# Patient Record
Sex: MALE | Race: WHITE | NOT HISPANIC OR LATINO | Employment: OTHER | URBAN - METROPOLITAN AREA
[De-identification: names, ages, dates, MRNs, and addresses within clinical notes are randomized per-mention and may not be internally consistent; named-entity substitution may affect disease eponyms.]

---

## 2017-01-13 ENCOUNTER — OFFICE VISIT (OUTPATIENT)
Dept: CARDIOLOGY | Facility: CLINIC | Age: 62
End: 2017-01-13
Payer: COMMERCIAL

## 2017-01-13 VITALS
BODY MASS INDEX: 26.62 KG/M2 | HEART RATE: 74 BPM | HEIGHT: 74 IN | SYSTOLIC BLOOD PRESSURE: 163 MMHG | WEIGHT: 207.44 LBS | DIASTOLIC BLOOD PRESSURE: 95 MMHG

## 2017-01-13 DIAGNOSIS — E11.22 CONTROLLED TYPE 2 DIABETES MELLITUS WITH STAGE 1 CHRONIC KIDNEY DISEASE, UNSPECIFIED LONG TERM INSULIN USE STATUS: ICD-10-CM

## 2017-01-13 DIAGNOSIS — I25.83 CORONARY ARTERY DISEASE DUE TO LIPID RICH PLAQUE: ICD-10-CM

## 2017-01-13 DIAGNOSIS — I25.10 CORONARY ARTERY DISEASE DUE TO LIPID RICH PLAQUE: ICD-10-CM

## 2017-01-13 DIAGNOSIS — N18.1 CONTROLLED TYPE 2 DIABETES MELLITUS WITH STAGE 1 CHRONIC KIDNEY DISEASE, UNSPECIFIED LONG TERM INSULIN USE STATUS: ICD-10-CM

## 2017-01-13 DIAGNOSIS — I10 ESSENTIAL HYPERTENSION: ICD-10-CM

## 2017-01-13 DIAGNOSIS — I48.92 ATRIAL FLUTTER, PAROXYSMAL: ICD-10-CM

## 2017-01-13 DIAGNOSIS — R94.39 ABNORMAL THALLIUM STRESS TEST: ICD-10-CM

## 2017-01-13 DIAGNOSIS — Z95.5 STENTED CORONARY ARTERY: Primary | ICD-10-CM

## 2017-01-13 DIAGNOSIS — I26.02 SADDLE EMBOLUS OF PULMONARY ARTERY WITH ACUTE COR PULMONALE, UNSPECIFIED CHRONICITY: ICD-10-CM

## 2017-01-13 PROCEDURE — 3077F SYST BP >= 140 MM HG: CPT | Mod: S$GLB,,, | Performed by: INTERNAL MEDICINE

## 2017-01-13 PROCEDURE — 99999 PR PBB SHADOW E&M-EST. PATIENT-LVL II: CPT | Mod: PBBFAC,,, | Performed by: INTERNAL MEDICINE

## 2017-01-13 PROCEDURE — 1159F MED LIST DOCD IN RCRD: CPT | Mod: S$GLB,,, | Performed by: INTERNAL MEDICINE

## 2017-01-13 PROCEDURE — 3044F HG A1C LEVEL LT 7.0%: CPT | Mod: S$GLB,,, | Performed by: INTERNAL MEDICINE

## 2017-01-13 PROCEDURE — 99213 OFFICE O/P EST LOW 20 MIN: CPT | Mod: S$GLB,,, | Performed by: INTERNAL MEDICINE

## 2017-01-13 PROCEDURE — 4010F ACE/ARB THERAPY RXD/TAKEN: CPT | Mod: S$GLB,,, | Performed by: INTERNAL MEDICINE

## 2017-01-13 PROCEDURE — 2022F DILAT RTA XM EVC RTNOPTHY: CPT | Mod: S$GLB,,, | Performed by: INTERNAL MEDICINE

## 2017-01-13 PROCEDURE — 3080F DIAST BP >= 90 MM HG: CPT | Mod: S$GLB,,, | Performed by: INTERNAL MEDICINE

## 2017-01-13 RX ORDER — PRAVASTATIN SODIUM 20 MG/1
20 TABLET ORAL DAILY
Qty: 90 TABLET | Refills: 4 | Status: SHIPPED | OUTPATIENT
Start: 2017-01-13 | End: 2019-12-17 | Stop reason: SDUPTHER

## 2017-01-13 RX ORDER — LOSARTAN POTASSIUM 50 MG/1
50 TABLET ORAL NIGHTLY
Qty: 90 TABLET | Refills: 6 | Status: SHIPPED | OUTPATIENT
Start: 2017-01-13 | End: 2017-07-26

## 2017-01-13 RX ORDER — METOPROLOL SUCCINATE 50 MG/1
50 TABLET, EXTENDED RELEASE ORAL EVERY MORNING
Qty: 90 TABLET | Refills: 5 | Status: SHIPPED | OUTPATIENT
Start: 2017-01-13 | End: 2018-01-13 | Stop reason: SDUPTHER

## 2017-01-13 NOTE — PROGRESS NOTES
Subjective:    Patient ID:  Yobany Richardson is a 61 y.o. male who presents for follow-up of Coronary Artery Disease (stent); Atrial Fibrillation; and Hypertension      HPI   Here for follow up of CAD-PCI. Patients states is doing well no chest pain, SOB or change in exertional tolerence. Patient is exercising regularly with out symptoms.Patient denies palpitations, syncope, presyncope, lightheadedness or dizziness.    Review of Systems   Constitution: Negative for malaise/fatigue.   Eyes: Negative for blurred vision.   Cardiovascular: Negative for chest pain, claudication, cyanosis, dyspnea on exertion, irregular heartbeat, leg swelling, near-syncope, orthopnea, palpitations, paroxysmal nocturnal dyspnea and syncope.   Respiratory: Negative for cough and shortness of breath.    Hematologic/Lymphatic: Does not bruise/bleed easily.   Musculoskeletal: Negative for back pain, falls, joint pain, muscle cramps, muscle weakness and myalgias.   Gastrointestinal: Negative for abdominal pain, change in bowel habit, nausea and vomiting.   Genitourinary: Negative for urgency.   Neurological: Negative for dizziness, focal weakness and light-headedness.        Objective:    Physical Exam   Constitutional: He is oriented to person, place, and time. He appears well-developed and well-nourished.   Neck: Normal range of motion. Neck supple. No JVD present.   Cardiovascular: Normal rate, regular rhythm, normal heart sounds and intact distal pulses.    Pulses:       Carotid pulses are 2+ on the right side, and 2+ on the left side.       Radial pulses are 2+ on the right side, and 2+ on the left side.   Pulmonary/Chest: Effort normal and breath sounds normal.   Musculoskeletal: Normal range of motion. He exhibits no edema.   Neurological: He is alert and oriented to person, place, and time.   Skin: Skin is warm and dry.   Psychiatric: He has a normal mood and affect. His speech is normal. Cognition and memory are normal.             ..     Chemistry        Component Value Date/Time     04/01/2016 0821    K 4.9 04/01/2016 0821     04/01/2016 0821    CO2 29 04/01/2016 0821    BUN 19 04/01/2016 0821    CREATININE 1.0 04/01/2016 0821     (H) 04/01/2016 0821        Component Value Date/Time    CALCIUM 9.8 04/01/2016 0821    ALKPHOS 79 04/01/2016 0821    AST 12 04/01/2016 0821    AST 18 01/07/2016 0412    ALT 10 04/01/2016 0821    BILITOT 0.4 04/01/2016 0821            ..  Lab Results   Component Value Date    CHOL 129 04/01/2016    CHOL 142 01/08/2016     Lab Results   Component Value Date    HDL 51 04/01/2016    HDL 32 (L) 01/08/2016     Lab Results   Component Value Date    LDLCALC 66.2 04/01/2016    LDLCALC 69.2 01/08/2016     Lab Results   Component Value Date    TRIG 59 04/01/2016    TRIG 204 (H) 01/08/2016     Lab Results   Component Value Date    CHOLHDL 39.5 04/01/2016    CHOLHDL 22.5 01/08/2016     ..  Lab Results   Component Value Date    WBC 9.31 12/29/2016    HGB 13.0 (L) 12/29/2016    HCT 37.3 (L) 12/29/2016    MCV 94 12/29/2016     12/29/2016       Test(s) Reviewed  I have reviewed the following in detail:  [] Stress test   [] Angiography   [x] Echocardiogram   [x] Labs   [x] Other:  holter 4k PVC, no AF     Assessment:       1. Stented coronary artery    2. Saddle embolus of pulmonary artery with acute cor pulmonale, unspecified chronicity    3. Essential hypertension    4. Coronary artery disease due to lipid rich plaque    5. Atrial flutter, paroxysmal    6. Abnormal thallium stress test         Plan:           Return to clinic 6 months   Aerobic exercise 5x's/wk. Heart healthy diet and risk factor modification.    See labs and med orders.  Increase BB. Decrease caffine use

## 2017-01-13 NOTE — MR AVS SNAPSHOT
[facility]  [address]  [facility] [address]  Phone: [phone]                  Yobany Richardson   2017 1:00 PM   Office Visit    Description:  Male : 1955   Provider:  Abhinav Bedolla MD   Department:  [facility]           Reason for Visit     Coronary Artery Disease     Atrial Fibrillation     Hypertension           Diagnoses this Visit        Comments    Stented coronary artery    -  Primary     Saddle embolus of pulmonary artery with acute cor pulmonale, unspecified chronicity         Essential hypertension         Coronary artery disease due to lipid rich plaque         Atrial flutter, paroxysmal         Abnormal thallium stress test         Controlled type 2 diabetes mellitus with stage 1 chronic kidney disease, unspecified long term insulin use status                To Do List           Goals (5 Years of Data)     None      Follow-Up and Disposition     Return in about 6 months (around 2017).       These Medications        Disp Refills Start End    losartan (COZAAR) 50 MG tablet 90 tablet 6 2017     Take 1 tablet (50 mg total) by mouth every evening. - Oral    Pharmacy: [facility] [address] [place] [address] [employer] [facility] AT [facility] [address] [facility] Ph #: [phone]       metoprolol succinate (TOPROL-XL) 50 MG 24 hr tablet 90 tablet 5 2017    Take 1 tablet (50 mg total) by mouth every morning. - Oral    Pharmacy: [facility] [address] [place] [address] [employer] [facility] [address] [address] Ph #: [phone]       pravastatin (PRAVACHOL) 20 MG tablet 90 tablet 4 2017    Take 1 tablet (20 mg total) by mouth once daily. - Oral    Pharmacy: [facility] [address] [place] [address] [employer] [facility] AT [facility] [address] [address] Ph #: [phone]         [facility]     [facility] On Call Nurse Care Line -  Assistance  Registered nurses in the [facility] provide clinical  advisement, health education, appointment booking, and other advisory services.  Call for this free service at [phone].             Medications           Message regarding Medications     Verify the changes and/or additions to your medication regime listed below are the same as discussed with your clinician today.  If any of these changes or additions are incorrect, please notify your healthcare provider.        CHANGE how you are taking these medications     Start Taking Instead of    losartan (COZAAR) 50 MG tablet losartan (COZAAR) 50 MG tablet    Dosage:  Take 1 tablet (50 mg total) by mouth every evening. Dosage:  Take 1 tablet (50 mg total) by mouth once daily.    Reason for Change:  Reorder     metoprolol succinate (TOPROL-XL) 50 MG 24 hr tablet metoprolol succinate (TOPROL-XL) 25 MG 24 hr tablet    Dosage:  Take 1 tablet (50 mg total) by mouth every morning. Dosage:  Take 1 tablet (25 mg total) by mouth once daily.    Reason for Change:  Reorder            Verify that the below list of medications is an accurate representation of the medications you are currently taking.  If none reported, the list may be blank. If incorrect, please contact your healthcare provider. Carry this list with you in case of emergency.           Current Medications     acetaminophen (TYLENOL) 325 MG tablet Take 1 tablet (325 mg total) by mouth every 6 (six) hours as needed for Pain.    ascorbic acid (VITAMIN C) 500 MG tablet Take 500 mg by mouth once daily.    clopidogrel (PLAVIX) 75 mg tablet TAKE 1 TABLET BY MOUTH ONCE DAILY    gabapentin (NEURONTIN) 100 MG capsule Take 300 mg by mouth every evening.     hydrocodone-acetaminophen 7.5-325mg (NORCO) 7.5-325 mg per tablet Take 1 tablet by mouth every 4 (four) hours as needed.    linagliptin (TRADJENTA) 5 mg Tab tablet Take 5 mg by mouth once daily.    losartan (COZAAR) 50 MG tablet Take 1 tablet (50 mg total) by mouth every evening.    metformin (GLUCOPHAGE) 1000 MG tablet  "Take 1,000 mg by mouth 2 (two) times daily with meals.    mv with min-FA-lycopene-ginkgo 400-300-120 mcg-mcg-mg Tab Take 1 tablet by mouth once daily.    metoprolol succinate (TOPROL-XL) 50 MG 24 hr tablet Take 1 tablet (50 mg total) by mouth every morning.    pantoprazole (PROTONIX) 40 MG tablet Take 1 tablet (40 mg total) by mouth once daily.    pravastatin (PRAVACHOL) 20 MG tablet Take 1 tablet (20 mg total) by mouth once daily.           Clinical Reference Information           Vital Signs - Last Recorded  Most recent update: 1/13/2017  1:00 PM by Kareen Chopra LPN    BP Pulse Ht Wt BMI    (!) 163/95 74 6' 2" (1.88 m) 94.1 kg (207 lb 7.3 oz) 26.64 kg/m2      Blood Pressure          Most Recent Value    BP  (!)  163/95      Allergies as of 1/13/2017     No Known Allergies      Immunizations Administered on Date of Encounter - 1/13/2017     None      Orders Placed During Today's Visit     Future Labs/Procedures Expected by Expires    2D echo with color flow doppler  7/13/2017 1/14/2018    Comprehensive metabolic panel  7/13/2017 1/14/2018    Lipid panel  7/13/2017 1/14/2018      [facility] Sign-Up     Activating your [facility] account is as easy as 1-2-3!     1) Visit [email or web address], select Sign Up Now, enter this activation code and your date of birth, then select Next.  [email or web address]  Expires: 2/27/2017  1:24 PM      2) Create a username and password to use when you visit [facility] in the future and select a security question in case you lose your password and select Next.    3) Enter your e-mail address and click Sign Up!    Additional Information  If you have questions, please e-mail [email or web address] or call [phone] to talk to our [facility] staff. Remember, [facility] is NOT to be used for urgent needs. For medical emergencies, dial 911.         "

## 2017-02-10 DIAGNOSIS — E11.9 DIABETES MELLITUS TYPE 2, CONTROLLED: ICD-10-CM

## 2017-02-10 DIAGNOSIS — I48.92 ATRIAL FLUTTER, PAROXYSMAL: ICD-10-CM

## 2017-02-10 DIAGNOSIS — I10 ESSENTIAL HYPERTENSION: ICD-10-CM

## 2017-02-13 RX ORDER — LOSARTAN POTASSIUM 50 MG/1
TABLET ORAL
Qty: 90 TABLET | Refills: 0 | Status: SHIPPED | OUTPATIENT
Start: 2017-02-13 | End: 2017-05-17 | Stop reason: SDUPTHER

## 2017-05-17 DIAGNOSIS — I10 ESSENTIAL HYPERTENSION: ICD-10-CM

## 2017-05-17 DIAGNOSIS — I48.92 ATRIAL FLUTTER, PAROXYSMAL: ICD-10-CM

## 2017-05-17 DIAGNOSIS — E11.9 DIABETES MELLITUS TYPE 2, CONTROLLED: ICD-10-CM

## 2017-05-17 RX ORDER — LOSARTAN POTASSIUM 50 MG/1
TABLET ORAL
Qty: 90 TABLET | Refills: 4 | Status: SHIPPED | OUTPATIENT
Start: 2017-05-17 | End: 2017-07-26

## 2017-06-15 ENCOUNTER — TELEPHONE (OUTPATIENT)
Dept: CARDIOLOGY | Facility: CLINIC | Age: 62
End: 2017-06-15

## 2017-06-15 NOTE — TELEPHONE ENCOUNTER
----- Message from Allyssa Mcwilliams sent at 6/14/2017  4:08 PM CDT -----  Contact: Patient  Yobany, patient [phone], Calling about recall letter to Schedule with Dr Bedolla on 7/27/17, but schedule is full. Please advise. Thanks.    Not available on Friday 7/28/17  Please schedule before August 1st, start of school in [facility].

## 2017-07-13 ENCOUNTER — LAB VISIT (OUTPATIENT)
Dept: LAB | Facility: HOSPITAL | Age: 62
End: 2017-07-13
Attending: INTERNAL MEDICINE
Payer: COMMERCIAL

## 2017-07-13 ENCOUNTER — CLINICAL SUPPORT (OUTPATIENT)
Dept: CARDIOLOGY | Facility: CLINIC | Age: 62
End: 2017-07-13
Payer: COMMERCIAL

## 2017-07-13 DIAGNOSIS — Z95.5 STENTED CORONARY ARTERY: ICD-10-CM

## 2017-07-13 DIAGNOSIS — I34.9 NONRHEUMATIC MITRAL VALVE DISORDER: ICD-10-CM

## 2017-07-13 DIAGNOSIS — I26.02 SADDLE EMBOLUS OF PULMONARY ARTERY WITH ACUTE COR PULMONALE, UNSPECIFIED CHRONICITY: ICD-10-CM

## 2017-07-13 DIAGNOSIS — I10 ESSENTIAL HYPERTENSION: ICD-10-CM

## 2017-07-13 DIAGNOSIS — N18.1 CONTROLLED TYPE 2 DIABETES MELLITUS WITH STAGE 1 CHRONIC KIDNEY DISEASE, UNSPECIFIED LONG TERM INSULIN USE STATUS: ICD-10-CM

## 2017-07-13 DIAGNOSIS — I25.10 CORONARY ARTERY DISEASE DUE TO LIPID RICH PLAQUE: ICD-10-CM

## 2017-07-13 DIAGNOSIS — R94.39 ABNORMAL THALLIUM STRESS TEST: ICD-10-CM

## 2017-07-13 DIAGNOSIS — E11.22 CONTROLLED TYPE 2 DIABETES MELLITUS WITH STAGE 1 CHRONIC KIDNEY DISEASE, UNSPECIFIED LONG TERM INSULIN USE STATUS: ICD-10-CM

## 2017-07-13 DIAGNOSIS — I25.83 CORONARY ARTERY DISEASE DUE TO LIPID RICH PLAQUE: ICD-10-CM

## 2017-07-13 DIAGNOSIS — I48.92 ATRIAL FLUTTER, PAROXYSMAL: ICD-10-CM

## 2017-07-13 LAB
ALBUMIN SERPL BCP-MCNC: 4 G/DL
ALP SERPL-CCNC: 82 U/L
ALT SERPL W/O P-5'-P-CCNC: 18 U/L
ANION GAP SERPL CALC-SCNC: 11 MMOL/L
AST SERPL-CCNC: 20 U/L
BILIRUB SERPL-MCNC: 0.6 MG/DL
BUN SERPL-MCNC: 20 MG/DL
CALCIUM SERPL-MCNC: 9.9 MG/DL
CHLORIDE SERPL-SCNC: 103 MMOL/L
CHOLEST/HDLC SERPL: 3 {RATIO}
CO2 SERPL-SCNC: 25 MMOL/L
CREAT SERPL-MCNC: 1.3 MG/DL
DIASTOLIC DYSFUNCTION: NO
EST. GFR  (AFRICAN AMERICAN): >60 ML/MIN/1.73 M^2
EST. GFR  (NON AFRICAN AMERICAN): 59.7 ML/MIN/1.73 M^2
ESTIMATED PA SYSTOLIC PRESSURE: 26.23
GLUCOSE SERPL-MCNC: 243 MG/DL
HDL/CHOLESTEROL RATIO: 33.6 %
HDLC SERPL-MCNC: 143 MG/DL
HDLC SERPL-MCNC: 48 MG/DL
LDLC SERPL CALC-MCNC: 65.2 MG/DL
MITRAL VALVE MOBILITY: NORMAL
MITRAL VALVE REGURGITATION: NORMAL
NONHDLC SERPL-MCNC: 95 MG/DL
POTASSIUM SERPL-SCNC: 5.2 MMOL/L
PROT SERPL-MCNC: 7.4 G/DL
RETIRED EF AND QEF - SEE NOTES: 55 (ref 55–65)
SODIUM SERPL-SCNC: 139 MMOL/L
TRICUSPID VALVE REGURGITATION: NORMAL
TRIGL SERPL-MCNC: 149 MG/DL

## 2017-07-13 PROCEDURE — 80053 COMPREHEN METABOLIC PANEL: CPT

## 2017-07-13 PROCEDURE — 80061 LIPID PANEL: CPT

## 2017-07-13 PROCEDURE — 93306 TTE W/DOPPLER COMPLETE: CPT | Mod: S$GLB,,, | Performed by: INTERNAL MEDICINE

## 2017-07-13 PROCEDURE — 36415 COLL VENOUS BLD VENIPUNCTURE: CPT | Mod: PO

## 2017-07-26 ENCOUNTER — OFFICE VISIT (OUTPATIENT)
Dept: CARDIOLOGY | Facility: CLINIC | Age: 62
End: 2017-07-26
Payer: COMMERCIAL

## 2017-07-26 VITALS
HEIGHT: 74 IN | HEART RATE: 71 BPM | BODY MASS INDEX: 25.47 KG/M2 | SYSTOLIC BLOOD PRESSURE: 152 MMHG | WEIGHT: 198.44 LBS | DIASTOLIC BLOOD PRESSURE: 83 MMHG

## 2017-07-26 DIAGNOSIS — Z95.5 STENTED CORONARY ARTERY: Primary | ICD-10-CM

## 2017-07-26 DIAGNOSIS — I25.10 CORONARY ARTERY DISEASE INVOLVING NATIVE CORONARY ARTERY OF NATIVE HEART WITHOUT ANGINA PECTORIS: ICD-10-CM

## 2017-07-26 DIAGNOSIS — I10 ESSENTIAL HYPERTENSION: ICD-10-CM

## 2017-07-26 DIAGNOSIS — I48.92 ATRIAL FLUTTER, PAROXYSMAL: ICD-10-CM

## 2017-07-26 PROCEDURE — 99213 OFFICE O/P EST LOW 20 MIN: CPT | Mod: S$GLB,,, | Performed by: INTERNAL MEDICINE

## 2017-07-26 PROCEDURE — 99999 PR PBB SHADOW E&M-EST. PATIENT-LVL III: CPT | Mod: PBBFAC,,, | Performed by: INTERNAL MEDICINE

## 2017-07-26 RX ORDER — VALSARTAN 160 MG/1
160 TABLET ORAL DAILY
Qty: 90 TABLET | Refills: 3 | Status: SHIPPED | OUTPATIENT
Start: 2017-07-26 | End: 2017-09-05

## 2017-07-26 NOTE — PROGRESS NOTES
Subjective:    Patient ID:  Yobany Richardson is a 61 y.o. male who presents for evaluation of Hypertension (6 month f/u -review labs ); Coronary Artery Disease; and Atrial Flutter      HPI  Here for follow up of CAD-PCI (DANILO 5/16 asymptomatic). Patients states is doing well no chest pain, SOB or change in exertional tolerence. Patient is exercising regularly with out symptoms.    Review of Systems   Constitution: Negative for malaise/fatigue.   Eyes: Negative for blurred vision.   Cardiovascular: Negative for chest pain, claudication, cyanosis, dyspnea on exertion, irregular heartbeat, leg swelling, near-syncope, orthopnea, palpitations, paroxysmal nocturnal dyspnea and syncope.   Respiratory: Negative for cough and shortness of breath.    Hematologic/Lymphatic: Does not bruise/bleed easily.   Musculoskeletal: Negative for back pain, falls, joint pain, muscle cramps, muscle weakness and myalgias.   Gastrointestinal: Negative for abdominal pain, change in bowel habit, nausea and vomiting.   Genitourinary: Negative for urgency.   Neurological: Negative for dizziness, focal weakness and light-headedness.        Objective:    Physical Exam   Constitutional: He is oriented to person, place, and time. He appears well-developed and well-nourished.   Neck: Normal range of motion. Neck supple. No JVD present.   Cardiovascular: Normal rate, regular rhythm, normal heart sounds and intact distal pulses.    Pulses:       Carotid pulses are 2+ on the right side, and 2+ on the left side.       Radial pulses are 2+ on the right side, and 2+ on the left side.   Pulmonary/Chest: Effort normal and breath sounds normal.   Musculoskeletal: Normal range of motion. He exhibits no edema.   Neurological: He is alert and oriented to person, place, and time.   Skin: Skin is warm and dry.   Psychiatric: He has a normal mood and affect. His speech is normal. Cognition and memory are normal.             ..    Chemistry        Component Value Date/Time      07/13/2017 0738    K 5.2 (H) 07/13/2017 0738     07/13/2017 0738    CO2 25 07/13/2017 0738    BUN 20 07/13/2017 0738    CREATININE 1.3 07/13/2017 0738     (H) 07/13/2017 0738        Component Value Date/Time    CALCIUM 9.9 07/13/2017 0738    ALKPHOS 82 07/13/2017 0738    AST 20 07/13/2017 0738    AST 18 01/07/2016 0412    ALT 18 07/13/2017 0738    BILITOT 0.6 07/13/2017 0738    ESTGFRAFRICA >60.0 07/13/2017 0738    EGFRNONAA 59.7 (A) 07/13/2017 0738            ..  Lab Results   Component Value Date    CHOL 143 07/13/2017    CHOL 129 04/01/2016    CHOL 142 01/08/2016     Lab Results   Component Value Date    HDL 48 07/13/2017    HDL 51 04/01/2016    HDL 32 (L) 01/08/2016     Lab Results   Component Value Date    LDLCALC 65.2 07/13/2017    LDLCALC 66.2 04/01/2016    LDLCALC 69.2 01/08/2016     Lab Results   Component Value Date    TRIG 149 07/13/2017    TRIG 59 04/01/2016    TRIG 204 (H) 01/08/2016     Lab Results   Component Value Date    CHOLHDL 33.6 07/13/2017    CHOLHDL 39.5 04/01/2016    CHOLHDL 22.5 01/08/2016     ..  Lab Results   Component Value Date    WBC 9.31 12/29/2016    HGB 13.0 (L) 12/29/2016    HCT 37.3 (L) 12/29/2016    MCV 94 12/29/2016     12/29/2016       Test(s) Reviewed  I have reviewed the following in detail:  [] Stress test   [x] Angiography   [x] Echocardiogram   [x] Labs   [x] Other:       Assessment:         ICD-10-CM ICD-9-CM   1. Stented coronary artery Z95.5 V45.82   2. Coronary artery disease involving native coronary artery of native heart without angina pectoris I25.10 414.01   3. Atrial flutter, paroxysmal I48.92 427.32   4. Essential hypertension I10 401.9     Problem List Items Addressed This Visit     Atrial flutter, paroxysmal    CAD (coronary artery disease)    Overview     5/2016 [facility]  Left main:NL  LAD: 35% proximal LAD. two small diagonals with minimal disease.   Circumflex/Large branching first obtuse marginal: with  minimal disease.    RCA: The  vessel was large sized (dominant) with a 60%  proximal lesion. 100 mcgs of intracoronary Nitroglycerin were given with no  change in the lesion. Thus FFR was performed. FFR was 0.79    2/2016 cor CTA ~~ 50% LAD         Essential hypertension    Stented coronary artery - Primary    Overview     5/2016 RCA- synergy 3.5x12           Other Visit Diagnoses    None.          Plan:         Return to clinic 12 months   Aerobic exercise 5x's/wk. Heart healthy diet and risk factor modification.    See labs and med orders.  Change to valsartan.   holter next visit follow pvc's

## 2017-10-17 ENCOUNTER — TELEPHONE (OUTPATIENT)
Dept: PODIATRY | Facility: CLINIC | Age: 62
End: 2017-10-17

## 2017-10-17 NOTE — TELEPHONE ENCOUNTER
----- Message from Augusto Simeon DPM sent at 10/13/2017  3:22 PM CDT -----  Alicia Sher,     Please mail this patient information regarding gout diet.  He will also need an appointment in 1 month for diabetes.  Thanks!    Dr. Simeon

## 2017-12-21 ENCOUNTER — OFFICE VISIT (OUTPATIENT)
Dept: PODIATRY | Facility: CLINIC | Age: 62
End: 2017-12-21
Payer: COMMERCIAL

## 2017-12-21 VITALS — BODY MASS INDEX: 24.75 KG/M2 | HEIGHT: 74 IN | WEIGHT: 192.88 LBS

## 2017-12-21 DIAGNOSIS — M20.41 HAMMER TOES OF BOTH FEET: ICD-10-CM

## 2017-12-21 DIAGNOSIS — M20.10 VALGUS DEFORMITY OF GREAT TOE, UNSPECIFIED LATERALITY: ICD-10-CM

## 2017-12-21 DIAGNOSIS — R20.2 PARESTHESIA OF FOOT, BILATERAL: ICD-10-CM

## 2017-12-21 DIAGNOSIS — M20.42 HAMMER TOES OF BOTH FEET: ICD-10-CM

## 2017-12-21 DIAGNOSIS — E11.49 TYPE II DIABETES MELLITUS WITH NEUROLOGICAL MANIFESTATIONS: Primary | ICD-10-CM

## 2017-12-21 DIAGNOSIS — M19.071 ARTHRITIS OF FOOT, RIGHT: ICD-10-CM

## 2017-12-21 PROCEDURE — 99999 PR PBB SHADOW E&M-EST. PATIENT-LVL III: CPT | Mod: PBBFAC,,, | Performed by: PODIATRIST

## 2017-12-21 PROCEDURE — 99214 OFFICE O/P EST MOD 30 MIN: CPT | Mod: S$GLB,,, | Performed by: PODIATRIST

## 2017-12-21 NOTE — PROGRESS NOTES
Subjective:      Patient ID: Yobany Richardson is a 62 y.o. male.    Chief Complaint: Diabetic Foot Exam ( PCP Selena  7/17  A1C ?) and Nail Care    Yobany is a 62 y.o. male who presents to the clinic for evaluation and treatment of diabetic feet. Yobany has a past medical history of Abnormal thallium stress test; Arrhythmia; Coronary artery disease; Diabetes mellitus; Diabetes mellitus, type 2; Disorder of kidney and ureter; Hypertension; Neuropathy; Paroxysmal atrial flutter; PE (pulmonary embolism); Rheumatoid arthritis; Shortness of breath; Wears contact lenses; and Wears prescription eyeglasses. Patient's chief complaint consists of neuropathy pain in bilateral foot with the Rt. > Lt.  States that symptoms have been present for months and is taking 500 mg gabapentin daily with only modest improvement.  Symptoms can occur throughout the day, however, are more noticeable at night.  Has attempted no other self treat.  Also, seeking reassurance that prior ulcerative site remains fully healed with today's exam.  Denies any additional pedal complaints.     PCP: Alirio Walters MD    Date Last Seen by PCP: 7/17      Hemoglobin A1C   Date Value Ref Range Status   04/01/2016 6.6 (H) 4.5 - 6.2 % Final   01/05/2016 9.9 (H) 0.0 - 5.6 % Final     Comment:     Reference Interval:  5.0 - 5.6 Normal   5.7 - 6.4 High Risk   > 6.5 Diabetic    Hgb A1c results are standardized based on the (NGSP) National   Glycohemoglobin Standardization Program.    Hemoglobin A1C levels are related to mean serum/plasma glucose   during the preceding 2-3 months.                Past Medical History:   Diagnosis Date    Abnormal thallium stress test     Arrhythmia     Coronary artery disease     Diabetes mellitus     Diabetes mellitus, type 2     Disorder of kidney and ureter     Hypertension     Neuropathy     Paroxysmal atrial flutter     PE (pulmonary embolism)     Rheumatoid arthritis     Shortness of breath     Wears contact lenses      Wears prescription eyeglasses        Past Surgical History:   Procedure Laterality Date    CARDIAC CATHETERIZATION      with stent placed x 1    KNEE ARTHROSCOPY      TONSILLECTOMY         Family History   Problem Relation Age of Onset    Cancer Mother     Angina Mother     Heart disease Mother     Hypertension Mother     Kidney disease Father     Thyroid disease Sister     Bell's palsy Sister     Thyroid disease Sister     Depression Sister     Depression Sister        Social History     Social History    Marital status:      Spouse name: N/A    Number of children: N/A    Years of education: N/A     Social History Main Topics    Smoking status: Never Smoker    Smokeless tobacco: Never Used    Alcohol use No    Drug use: No    Sexual activity: Not Asked     Other Topics Concern    None     Social History Narrative    None       Current Outpatient Prescriptions   Medication Sig Dispense Refill    acetaminophen (TYLENOL) 325 MG tablet Take 1 tablet (325 mg total) by mouth every 6 (six) hours as needed for Pain.      ascorbic acid (VITAMIN C) 500 MG tablet Take 500 mg by mouth once daily.      clopidogrel (PLAVIX) 75 mg tablet TAKE 1 TABLET BY MOUTH ONCE DAILY 90 tablet 11    clopidogrel (PLAVIX) 75 mg tablet TAKE 1 TABLET BY MOUTH ONCE DAILY 90 tablet 4    doxycycline (VIBRA-TABS) 100 MG tablet Take 1 tablet (100 mg total) by mouth 2 (two) times daily. 20 tablet 0    empagliflozin (JARDIANCE) 10 mg Tab Take 10 mg by mouth every evening.      gabapentin (NEURONTIN) 300 MG capsule Take 300 mg by mouth every evening.      losartan (COZAAR) 50 MG tablet Take 50 mg by mouth every evening.      metformin (GLUCOPHAGE) 1000 MG tablet Take 1,000 mg by mouth 2 (two) times daily with meals.      metoprolol succinate (TOPROL-XL) 50 MG 24 hr tablet Take 1 tablet (50 mg total) by mouth every morning. 90 tablet 5    naproxen (NAPROSYN) 250 MG tablet Take 250 mg by mouth 3 (three) times  daily as needed.      pravastatin (PRAVACHOL) 20 MG tablet Take 1 tablet (20 mg total) by mouth once daily. 90 tablet 4     No current facility-administered medications for this visit.        Review of patient's allergies indicates:  No Known Allergies      Review of Systems   Constitution: Negative for chills and fever.   Cardiovascular: Negative for claudication and leg swelling.   Skin: Positive for color change and nail changes.   Musculoskeletal: Positive for arthritis, joint pain and joint swelling.   Neurological: Positive for numbness and paresthesias.   Psychiatric/Behavioral: Negative for altered mental status.           Objective:      Physical Exam   Constitutional: He is oriented to person, place, and time. He appears well-developed and well-nourished. No distress.   Cardiovascular:   Pulses:       Dorsalis pedis pulses are 2+ on the right side, and 2+ on the left side.        Posterior tibial pulses are 2+ on the right side, and 2+ on the left side.   CFT <3 seconds bilateral.  Pedal hair growth present bilateral.   No varicosities noted bilateral.  Mild localized edema noted to the Rt. 1st mtp joint.   Musculoskeletal: He exhibits edema. He exhibits no tenderness.   Muscle strength 5/5 in all muscle groups bilateral.  No tenderness nor crepitation with ROM of foot/ankle joints bilateral.  No tenderness with palpation of bilateral foot and ankle.  Arthritic changes changes noted to the dorsal and medial aspect of the Rt. 1st mtp joint.  Bilateral pes planus foot type.  Bilateral hallux abducto valgus.  Bilateral semi-reducible contracture of toes 2-5.   Neurological: He is alert and oriented to person, place, and time. He has normal strength. No sensory deficit.   Protective sensation per Collins-Yash monofilament decreased bilateral.    Vibratory sensation delayed bilateral.    Light touch intact bilateral.   Skin: Skin is warm, dry and intact. Capillary refill takes less than 2 seconds. No  abrasion, no bruising, no burn, no ecchymosis, no lesion, no petechiae and no rash noted. He is not diaphoretic. No cyanosis or erythema. No pallor. Nails show no clubbing.   Pedal skin has normal turgor, temperature, and texture bilateral.  Toenails x 10 appear mycotic but well maintained.  Hyperpigmentation of skin overlying the dorsal lateral Rt. 1st ray, secondary to prior ulceration.  Adjacent skin integrity remains intact and devoid of signs of infection. Examination of the skin reveals no evidence of significant maceration, rashes, open lesions, suspicious appearing nevi or other concerning lesions.              Assessment:       Encounter Diagnoses   Name Primary?    Type II diabetes mellitus with neurological manifestations Yes    Paresthesia of foot, bilateral     Arthritis of foot, right     Valgus deformity of great toe, unspecified laterality     Hammer toes of both feet          Plan:       Yobany was seen today for diabetic foot exam and nail care.    Diagnoses and all orders for this visit:    Type II diabetes mellitus with neurological manifestations    Paresthesia of foot, bilateral    Arthritis of foot, right    Valgus deformity of great toe, unspecified laterality    Hammer toes of both feet      I counseled the patient on his conditions, their implications and medical management.    Advised to continue wearing shoe gear that accommodates for digital deformities.    Given both verbal and written information regarding alpha lipoic acid and B-complex vitamins to address paresthesias associated with diabetic neuropathy.    Patient to discuss increasing current dosage of gabapentin with PCP.    Discussed applying topical frankincense and myrrh oil to bilateral foot to address paresthesias.      Shoe inspection. Diabetic Foot Education. Patient reminded of the importance of good nutrition and blood sugar control to help prevent podiatric complications of diabetes. Patient instructed on proper foot  hygeine. We discussed wearing proper shoe gear, daily foot inspections, never walking without protective shoe gear, never putting sharp instruments to feet    Patient instructed to inspect his feet, wear protective shoe gear when ambulatory, moisturizer to maintain skin integrity and follow in this office in approximately 12 months, sooner p.r.n.    Return in about 1 year (around 12/21/2018).    Augusto Simeon DPM

## 2018-01-13 DIAGNOSIS — I25.83 CORONARY ARTERY DISEASE DUE TO LIPID RICH PLAQUE: ICD-10-CM

## 2018-01-13 DIAGNOSIS — R94.39 ABNORMAL THALLIUM STRESS TEST: ICD-10-CM

## 2018-01-13 DIAGNOSIS — E11.22 CONTROLLED TYPE 2 DIABETES MELLITUS WITH STAGE 1 CHRONIC KIDNEY DISEASE, UNSPECIFIED LONG TERM INSULIN USE STATUS: ICD-10-CM

## 2018-01-13 DIAGNOSIS — I26.02 SADDLE EMBOLUS OF PULMONARY ARTERY WITH ACUTE COR PULMONALE, UNSPECIFIED CHRONICITY: ICD-10-CM

## 2018-01-13 DIAGNOSIS — Z95.5 STENTED CORONARY ARTERY: ICD-10-CM

## 2018-01-13 DIAGNOSIS — I48.92 ATRIAL FLUTTER, PAROXYSMAL: ICD-10-CM

## 2018-01-13 DIAGNOSIS — N18.1 CONTROLLED TYPE 2 DIABETES MELLITUS WITH STAGE 1 CHRONIC KIDNEY DISEASE, UNSPECIFIED LONG TERM INSULIN USE STATUS: ICD-10-CM

## 2018-01-13 DIAGNOSIS — I25.10 CORONARY ARTERY DISEASE DUE TO LIPID RICH PLAQUE: ICD-10-CM

## 2018-01-13 DIAGNOSIS — I10 ESSENTIAL HYPERTENSION: ICD-10-CM

## 2018-01-16 RX ORDER — METOPROLOL SUCCINATE 50 MG/1
TABLET, EXTENDED RELEASE ORAL
Qty: 90 TABLET | Refills: 4 | Status: SHIPPED | OUTPATIENT
Start: 2018-01-16 | End: 2018-10-31

## 2018-03-01 DIAGNOSIS — Z95.5 STENTED CORONARY ARTERY: ICD-10-CM

## 2018-03-01 DIAGNOSIS — R94.39 ABNORMAL THALLIUM STRESS TEST: ICD-10-CM

## 2018-03-01 DIAGNOSIS — I26.02 SADDLE EMBOLUS OF PULMONARY ARTERY WITH ACUTE COR PULMONALE, UNSPECIFIED CHRONICITY: ICD-10-CM

## 2018-03-01 DIAGNOSIS — I48.92 ATRIAL FLUTTER, PAROXYSMAL: ICD-10-CM

## 2018-03-01 DIAGNOSIS — N18.1 CONTROLLED TYPE 2 DIABETES MELLITUS WITH STAGE 1 CHRONIC KIDNEY DISEASE, UNSPECIFIED LONG TERM INSULIN USE STATUS: ICD-10-CM

## 2018-03-01 DIAGNOSIS — I25.83 CORONARY ARTERY DISEASE DUE TO LIPID RICH PLAQUE: ICD-10-CM

## 2018-03-01 DIAGNOSIS — I10 ESSENTIAL HYPERTENSION: ICD-10-CM

## 2018-03-01 DIAGNOSIS — I25.10 CORONARY ARTERY DISEASE DUE TO LIPID RICH PLAQUE: ICD-10-CM

## 2018-03-01 DIAGNOSIS — E11.22 CONTROLLED TYPE 2 DIABETES MELLITUS WITH STAGE 1 CHRONIC KIDNEY DISEASE, UNSPECIFIED LONG TERM INSULIN USE STATUS: ICD-10-CM

## 2018-03-01 RX ORDER — LOSARTAN POTASSIUM 50 MG/1
TABLET ORAL
Qty: 90 TABLET | Refills: 4 | Status: SHIPPED | OUTPATIENT
Start: 2018-03-01 | End: 2018-10-31

## 2018-06-20 ENCOUNTER — TELEPHONE (OUTPATIENT)
Dept: CARDIOLOGY | Facility: CLINIC | Age: 63
End: 2018-06-20

## 2018-06-20 NOTE — TELEPHONE ENCOUNTER
----- Message from Jasmina Leiva sent at 2018 10:30 AM CDT -----  Contact: Patient  Yobany, patient [phone] called to schedule an annual check up from recall, tried scheduling lab work orders and 48 hour holter monitor but I could not schedule an appointment on the recall date (18) so the orders  on 18 and it would not let me schedule. Patient is scheduled for 18 at 2:30. Please advise. Thanks.

## 2018-08-20 ENCOUNTER — CLINICAL SUPPORT (OUTPATIENT)
Dept: CARDIOLOGY | Facility: CLINIC | Age: 63
End: 2018-08-20
Attending: INTERNAL MEDICINE
Payer: COMMERCIAL

## 2018-08-20 ENCOUNTER — LAB VISIT (OUTPATIENT)
Dept: LAB | Facility: HOSPITAL | Age: 63
End: 2018-08-20
Attending: INTERNAL MEDICINE
Payer: COMMERCIAL

## 2018-08-20 DIAGNOSIS — I48.92 ATRIAL FLUTTER, PAROXYSMAL: ICD-10-CM

## 2018-08-20 DIAGNOSIS — Z95.5 STENTED CORONARY ARTERY: ICD-10-CM

## 2018-08-20 DIAGNOSIS — I25.10 CORONARY ARTERY DISEASE INVOLVING NATIVE CORONARY ARTERY OF NATIVE HEART WITHOUT ANGINA PECTORIS: ICD-10-CM

## 2018-08-20 DIAGNOSIS — I10 ESSENTIAL HYPERTENSION: ICD-10-CM

## 2018-08-20 LAB
ALBUMIN SERPL BCP-MCNC: 4.3 G/DL
ALP SERPL-CCNC: 92 U/L
ALT SERPL W/O P-5'-P-CCNC: 14 U/L
ANION GAP SERPL CALC-SCNC: 11 MMOL/L
AST SERPL-CCNC: 18 U/L
BILIRUB SERPL-MCNC: 0.9 MG/DL
BUN SERPL-MCNC: 19 MG/DL
CALCIUM SERPL-MCNC: 10.3 MG/DL
CHLORIDE SERPL-SCNC: 101 MMOL/L
CHOLEST SERPL-MCNC: 129 MG/DL
CHOLEST/HDLC SERPL: 2.7 {RATIO}
CO2 SERPL-SCNC: 27 MMOL/L
CREAT SERPL-MCNC: 1.2 MG/DL
EST. GFR  (AFRICAN AMERICAN): >60 ML/MIN/1.73 M^2
EST. GFR  (NON AFRICAN AMERICAN): >60 ML/MIN/1.73 M^2
GLUCOSE SERPL-MCNC: 152 MG/DL
HDLC SERPL-MCNC: 47 MG/DL
HDLC SERPL: 36.4 %
LDLC SERPL CALC-MCNC: 60.2 MG/DL
NONHDLC SERPL-MCNC: 82 MG/DL
POTASSIUM SERPL-SCNC: 4.2 MMOL/L
PROT SERPL-MCNC: 7.4 G/DL
SODIUM SERPL-SCNC: 139 MMOL/L
TRIGL SERPL-MCNC: 109 MG/DL
TSH SERPL DL<=0.005 MIU/L-ACNC: 1.81 UIU/ML

## 2018-08-20 PROCEDURE — 36415 COLL VENOUS BLD VENIPUNCTURE: CPT | Mod: PO

## 2018-08-20 PROCEDURE — 80061 LIPID PANEL: CPT

## 2018-08-20 PROCEDURE — 80053 COMPREHEN METABOLIC PANEL: CPT

## 2018-08-20 PROCEDURE — 84443 ASSAY THYROID STIM HORMONE: CPT

## 2018-08-20 PROCEDURE — 93224 XTRNL ECG REC UP TO 48 HRS: CPT | Mod: ,,, | Performed by: INTERNAL MEDICINE

## 2018-08-27 LAB
OHS CV HOLTER LENGTH DECIMAL HOURS: 47.98
OHS CV HOLTER LENGTH HOURS: 47
OHS CV HOLTER LENGTH MINUTES: 59

## 2018-08-29 ENCOUNTER — TELEPHONE (OUTPATIENT)
Dept: CARDIOLOGY | Facility: CLINIC | Age: 63
End: 2018-08-29

## 2018-08-29 NOTE — TELEPHONE ENCOUNTER
----- Message from Jaron Hightower sent at 8/29/2018 12:00 PM CDT -----  Contact: pt  Pt is returning call, call placed to pod no answer( I attempted to reschedule appt for him but pt does not want to wait until Oct to be seen)  Call Back#[phone]  Thanks

## 2018-10-31 ENCOUNTER — OFFICE VISIT (OUTPATIENT)
Dept: CARDIOLOGY | Facility: CLINIC | Age: 63
End: 2018-10-31
Payer: COMMERCIAL

## 2018-10-31 VITALS
HEART RATE: 77 BPM | BODY MASS INDEX: 29.14 KG/M2 | DIASTOLIC BLOOD PRESSURE: 92 MMHG | WEIGHT: 227.06 LBS | HEIGHT: 74 IN | SYSTOLIC BLOOD PRESSURE: 178 MMHG

## 2018-10-31 DIAGNOSIS — I10 ESSENTIAL HYPERTENSION: ICD-10-CM

## 2018-10-31 DIAGNOSIS — Z95.5 STENTED CORONARY ARTERY: Primary | ICD-10-CM

## 2018-10-31 DIAGNOSIS — I48.92 ATRIAL FLUTTER, PAROXYSMAL: ICD-10-CM

## 2018-10-31 DIAGNOSIS — I25.10 CORONARY ARTERY DISEASE INVOLVING NATIVE CORONARY ARTERY OF NATIVE HEART WITHOUT ANGINA PECTORIS: ICD-10-CM

## 2018-10-31 PROCEDURE — 3080F DIAST BP >= 90 MM HG: CPT | Mod: CPTII,S$GLB,, | Performed by: INTERNAL MEDICINE

## 2018-10-31 PROCEDURE — 3008F BODY MASS INDEX DOCD: CPT | Mod: CPTII,S$GLB,, | Performed by: INTERNAL MEDICINE

## 2018-10-31 PROCEDURE — 99999 PR PBB SHADOW E&M-EST. PATIENT-LVL III: CPT | Mod: PBBFAC,,, | Performed by: INTERNAL MEDICINE

## 2018-10-31 PROCEDURE — 99214 OFFICE O/P EST MOD 30 MIN: CPT | Mod: S$GLB,,, | Performed by: INTERNAL MEDICINE

## 2018-10-31 PROCEDURE — 3077F SYST BP >= 140 MM HG: CPT | Mod: CPTII,S$GLB,, | Performed by: INTERNAL MEDICINE

## 2018-10-31 RX ORDER — NAPROXEN SODIUM 220 MG/1
81 TABLET, FILM COATED ORAL DAILY
COMMUNITY
End: 2023-04-25

## 2018-10-31 RX ORDER — INSULIN GLARGINE 100 [IU]/ML
45 INJECTION, SOLUTION SUBCUTANEOUS
COMMUNITY
End: 2020-01-02 | Stop reason: SDUPTHER

## 2018-10-31 RX ORDER — CARVEDILOL 6.25 MG/1
6.25 TABLET ORAL 2 TIMES DAILY
Qty: 180 TABLET | Refills: 5 | Status: SHIPPED | OUTPATIENT
Start: 2018-10-31 | End: 2020-02-03

## 2018-10-31 RX ORDER — CYANOCOBALAMIN (VITAMIN B-12) 1000MCG/15
LIQUID (ML) ORAL
COMMUNITY

## 2018-10-31 RX ORDER — OLMESARTAN MEDOXOMIL 40 MG/1
40 TABLET ORAL DAILY
COMMUNITY
End: 2019-10-04

## 2018-10-31 NOTE — PROGRESS NOTES
Subjective:    Patient ID:  Yobany Richardson is a 62 y.o. male who presents for follow-up of Hypertension (Annual)      HPI  Here for follow up of CAD-PCI (DANILO 5/16 asymptomatic).  Decreased exercise due to staph infection in foot. No angina.         Review of Systems   Constitution: Negative for malaise/fatigue.   Eyes: Negative for blurred vision.   Cardiovascular: Negative for chest pain, claudication, cyanosis, dyspnea on exertion, irregular heartbeat, leg swelling, near-syncope, orthopnea, palpitations, paroxysmal nocturnal dyspnea and syncope.   Respiratory: Negative for cough and shortness of breath.    Hematologic/Lymphatic: Does not bruise/bleed easily.   Musculoskeletal: Negative for back pain, falls, joint pain, muscle cramps, muscle weakness and myalgias.   Gastrointestinal: Negative for abdominal pain, change in bowel habit, nausea and vomiting.   Genitourinary: Negative for urgency.   Neurological: Negative for dizziness, focal weakness and light-headedness.        Objective:    Physical Exam   Constitutional: He is oriented to person, place, and time. He appears well-developed and well-nourished.   Neck: Normal range of motion. Neck supple. No JVD present.   Cardiovascular: Normal rate, regular rhythm, normal heart sounds and intact distal pulses.   Pulses:       Carotid pulses are 2+ on the right side, and 2+ on the left side.       Radial pulses are 2+ on the right side, and 2+ on the left side.   Pulmonary/Chest: Effort normal and breath sounds normal.   Musculoskeletal: Normal range of motion. He exhibits no edema.   Neurological: He is alert and oriented to person, place, and time.   Skin: Skin is warm and dry.   Psychiatric: He has a normal mood and affect. His speech is normal. Cognition and memory are normal.               ..    Chemistry        Component Value Date/Time     08/20/2018 1501    K 4.2 08/20/2018 1501     08/20/2018 1501    CO2 27 08/20/2018 1501    BUN 19 08/20/2018 1501     CREATININE 1.2 08/20/2018 1501     (H) 08/20/2018 1501        Component Value Date/Time    CALCIUM 10.3 08/20/2018 1501    ALKPHOS 92 08/20/2018 1501    AST 18 08/20/2018 1501    AST 18 01/07/2016 0412    ALT 14 08/20/2018 1501    BILITOT 0.9 08/20/2018 1501    ESTGFRAFRICA >60.0 08/20/2018 1501    EGFRNONAA >60.0 08/20/2018 1501            ..  Lab Results   Component Value Date    CHOL 129 08/20/2018    CHOL 143 07/13/2017    CHOL 129 04/01/2016     Lab Results   Component Value Date    HDL 47 08/20/2018    HDL 48 07/13/2017    HDL 51 04/01/2016     Lab Results   Component Value Date    LDLCALC 60.2 (L) 08/20/2018    LDLCALC 65.2 07/13/2017    LDLCALC 66.2 04/01/2016     Lab Results   Component Value Date    TRIG 109 08/20/2018    TRIG 149 07/13/2017    TRIG 59 04/01/2016     Lab Results   Component Value Date    CHOLHDL 36.4 08/20/2018    CHOLHDL 33.6 07/13/2017    CHOLHDL 39.5 04/01/2016     ..  Lab Results   Component Value Date    WBC 12.05 09/03/2017    HGB 11.9 (L) 09/03/2017    HCT 35.2 (L) 09/03/2017    MCV 96 09/03/2017     09/03/2017       Test(s) Reviewed  I have reviewed the following in detail:  [] Stress test   [] Angiography   [x] Echocardiogram   [x] Labs   [x] Other:       Assessment:         ICD-10-CM ICD-9-CM   1. Stented coronary artery Z95.5 V45.82   2. Essential hypertension I10 401.9   3. Coronary artery disease involving native coronary artery of native heart without angina pectoris I25.10 414.01   4. Atrial flutter, paroxysmal I48.92 427.32     Problem List Items Addressed This Visit     Atrial flutter, paroxysmal    CAD (coronary artery disease)    Overview     5/2016 [facility]  Left main:NL  LAD: 35% proximal LAD. two small diagonals with minimal disease.   Circumflex/Large branching first obtuse marginal: with  minimal disease.    RCA: The vessel was large sized (dominant) with a 60%  proximal lesion. 100 mcgs of intracoronary Nitroglycerin were given with no  change in the  lesion. Thus FFR was performed. FFR was 0.79    2/2016 cor CTA ~~ 50% LAD         Essential hypertension    Stented coronary artery - Primary    Overview     5/2016 RCA- synergy 3.5x12                Plan:           Return to clinic 6 months   Aerobic exercise 5x's/wk. Heart healthy diet and risk factor modification.    See labs and med orders.  CHELE leg due to foot wound. Tm stress nuc in 6 months due to asx ischemia  Change HR

## 2019-05-16 ENCOUNTER — TELEPHONE (OUTPATIENT)
Dept: CARDIOLOGY | Facility: CLINIC | Age: 64
End: 2019-05-16

## 2019-05-16 NOTE — TELEPHONE ENCOUNTER
----- Message from Leslie Anne sent at 5/16/2019  9:20 AM CDT -----  Contact: pt  pt wants to cancel appointment  and reschedule and be worked into the schedule please advise. [phone] (home)

## 2019-06-03 ENCOUNTER — HOSPITAL ENCOUNTER (OUTPATIENT)
Dept: RADIOLOGY | Facility: HOSPITAL | Age: 64
Discharge: HOME | End: 2019-06-03
Attending: INTERNAL MEDICINE
Payer: COMMERCIAL

## 2019-06-03 ENCOUNTER — CLINICAL SUPPORT (OUTPATIENT)
Dept: CARDIOLOGY | Facility: CLINIC | Age: 64
End: 2019-06-03
Attending: INTERNAL MEDICINE
Payer: COMMERCIAL

## 2019-06-03 DIAGNOSIS — I25.10 CORONARY ARTERY DISEASE INVOLVING NATIVE CORONARY ARTERY OF NATIVE HEART WITHOUT ANGINA PECTORIS: ICD-10-CM

## 2019-06-03 DIAGNOSIS — I10 ESSENTIAL HYPERTENSION: ICD-10-CM

## 2019-06-03 DIAGNOSIS — Z95.5 STENTED CORONARY ARTERY: ICD-10-CM

## 2019-06-03 PROCEDURE — 93925 LOWER EXTREMITY STUDY: CPT | Mod: S$GLB,,, | Performed by: INTERNAL MEDICINE

## 2019-06-03 PROCEDURE — 93925 CV US DOPPLER ARTERIAL LEGS BILATERAL: ICD-10-PCS | Mod: S$GLB,,, | Performed by: INTERNAL MEDICINE

## 2019-06-03 PROCEDURE — 78452 HT MUSCLE IMAGE SPECT MULT: CPT | Mod: 26,,, | Performed by: INTERNAL MEDICINE

## 2019-06-03 PROCEDURE — 93016 CV STRESS TEST SUPVJ ONLY: CPT | Mod: ,,, | Performed by: INTERNAL MEDICINE

## 2019-06-03 PROCEDURE — 93016 STRESS TEST WITH MYOCARDIAL PERFUSION: ICD-10-PCS | Mod: ,,, | Performed by: INTERNAL MEDICINE

## 2019-06-03 PROCEDURE — 93018 PR CARDIAC STRESS TST,INTERP/REPT ONLY: ICD-10-PCS | Mod: ,,, | Performed by: INTERNAL MEDICINE

## 2019-06-03 PROCEDURE — 78452 STRESS TEST WITH MYOCARDIAL PERFUSION: ICD-10-PCS | Mod: 26,,, | Performed by: INTERNAL MEDICINE

## 2019-06-03 PROCEDURE — A9502 TC99M TETROFOSMIN: HCPCS | Mod: PO

## 2019-06-03 PROCEDURE — 93018 CV STRESS TEST I&R ONLY: CPT | Mod: ,,, | Performed by: INTERNAL MEDICINE

## 2019-06-04 LAB
CV STRESS BASE HR: 63 BPM
DIASTOLIC BLOOD PRESSURE: 79 MMHG
LEFT ANT TIBIAL SYS PSV: 85 CM/S
LEFT CFA PSV: 101 CM/S
LEFT PERONEAL SYS PSV: 83 CM/S
LEFT POPLITEAL PSV: 61 CM/S
LEFT POST TIBIAL SYS PSV: 129 CM/S
LEFT PROFUNDA SYS PSV: 70 CM/S
LEFT SUPER FEMORAL DIST SYS PSV: 104 CM/S
LEFT SUPER FEMORAL MID SYS PSV: 89 CM/S
LEFT SUPER FEMORAL OSTIAL SYS PSV: 114 CM/S
LEFT SUPER FEMORAL PROX SYS PSV: 119 CM/S
LEFT TIB/PER TRUNK SYS PSV: 70 CM/S
NUC REST DIASTOLIC VOLUME INDEX: 109
NUC REST EJECTION FRACTION: 56
NUC REST SYSTOLIC VOLUME INDEX: 48
OHS CV CPX 1 MINUTE RECOVERY HEART RATE: 114 BPM
OHS CV CPX 85 PERCENT MAX PREDICTED HEART RATE MALE: 135
OHS CV CPX ESTIMATED METS: 8
OHS CV CPX MAX PREDICTED HEART RATE: 159
OHS CV CPX PATIENT IS FEMALE: 0
OHS CV CPX PATIENT IS MALE: 1
OHS CV CPX PEAK DIASTOLIC BLOOD PRESSURE: 65 MMHG
OHS CV CPX PEAK HEAR RATE: 136 BPM
OHS CV CPX PEAK RATE PRESSURE PRODUCT: NORMAL
OHS CV CPX PEAK SYSTOLIC BLOOD PRESSURE: 232 MMHG
OHS CV CPX PERCENT MAX PREDICTED HEART RATE ACHIEVED: 86
OHS CV CPX RATE PRESSURE PRODUCT PRESENTING: NORMAL
OHS CV LEFT LOWER EXTREMITY ABI (NO CALC): 0.9
OHS CV RIGHT ABI LOWER EXTREMITY (NO CALC): 1
RIGHT ANT TIBIAL SYS PSV: 101 CM/S
RIGHT CFA PSV: 134 CM/S
RIGHT PERONEAL SYS PSV: 108 CM/S
RIGHT POPLITEAL PSV: 68 CM/S
RIGHT POST TIBIAL SYS PSV: 77 CM/S
RIGHT PROFUNDA SYS PSV: 108 CM/S
RIGHT SUPER FEMORAL DIST SYS PSV: 112 CM/S
RIGHT SUPER FEMORAL MID SYS PSV: 102 CM/S
RIGHT SUPER FEMORAL OSTIAL SYS PSV: 108 CM/S
RIGHT SUPER FEMORAL PROX SYS PSV: 106 CM/S
RIGHT TIB/PER TRUNK SYS PSV: 87 CM/S
STRESS ECHO POST EXERCISE DUR MIN: 5 MINUTES
STRESS ECHO POST EXERCISE DUR SEC: 24 SECONDS
STRESS ECHO TARGET HR: 135.15 BPM
SYSTOLIC BLOOD PRESSURE: 188 MMHG

## 2019-07-24 ENCOUNTER — TELEPHONE (OUTPATIENT)
Dept: FAMILY MEDICINE | Facility: CLINIC | Age: 64
End: 2019-07-24

## 2019-07-24 NOTE — TELEPHONE ENCOUNTER
----- Message from Mihaela Bashir sent at 7/24/2019  1:26 PM CDT -----  Contact: Pt wife   Pt needs a refill on  gabapentin (NEURONTIN) 300 MG capsule called into pharmacy. Pt has been out of his rx for two days and stated it is urgent for him to get a refill.     Pt wife can be reached at [phone]

## 2019-07-24 NOTE — TELEPHONE ENCOUNTER
Spoke to pt wife, pt is scheduled to see Dr. RODRIGUEZ on 8/30 @ 3pm. Wife states pt takes gabapentin 300mg 1 po bid. Per pt wife, call into [facility] [phone].    Per Dr. RODRIGUEZ, ok to call in Gabapentin 300mg 1 po bid #60x0.  Called into [facility] [phone], spoke to Santa.

## 2019-07-25 ENCOUNTER — OFFICE VISIT (OUTPATIENT)
Dept: CARDIOLOGY | Facility: CLINIC | Age: 64
End: 2019-07-25
Payer: COMMERCIAL

## 2019-07-25 VITALS
BODY MASS INDEX: 29.09 KG/M2 | HEIGHT: 74 IN | WEIGHT: 226.63 LBS | DIASTOLIC BLOOD PRESSURE: 89 MMHG | HEART RATE: 89 BPM | SYSTOLIC BLOOD PRESSURE: 164 MMHG

## 2019-07-25 DIAGNOSIS — I25.10 CORONARY ARTERY DISEASE INVOLVING NATIVE CORONARY ARTERY OF NATIVE HEART WITHOUT ANGINA PECTORIS: ICD-10-CM

## 2019-07-25 DIAGNOSIS — Z95.5 STENTED CORONARY ARTERY: Primary | ICD-10-CM

## 2019-07-25 DIAGNOSIS — I48.92 ATRIAL FLUTTER, PAROXYSMAL: ICD-10-CM

## 2019-07-25 DIAGNOSIS — I10 ESSENTIAL HYPERTENSION: ICD-10-CM

## 2019-07-25 PROCEDURE — 99214 PR OFFICE/OUTPT VISIT, EST, LEVL IV, 30-39 MIN: ICD-10-PCS | Mod: S$GLB,,, | Performed by: INTERNAL MEDICINE

## 2019-07-25 PROCEDURE — 99999 PR PBB SHADOW E&M-EST. PATIENT-LVL III: CPT | Mod: PBBFAC,,, | Performed by: INTERNAL MEDICINE

## 2019-07-25 PROCEDURE — 3079F DIAST BP 80-89 MM HG: CPT | Mod: CPTII,S$GLB,, | Performed by: INTERNAL MEDICINE

## 2019-07-25 PROCEDURE — 3008F BODY MASS INDEX DOCD: CPT | Mod: CPTII,S$GLB,, | Performed by: INTERNAL MEDICINE

## 2019-07-25 PROCEDURE — 3079F PR MOST RECENT DIASTOLIC BLOOD PRESSURE 80-89 MM HG: ICD-10-PCS | Mod: CPTII,S$GLB,, | Performed by: INTERNAL MEDICINE

## 2019-07-25 PROCEDURE — 99214 OFFICE O/P EST MOD 30 MIN: CPT | Mod: S$GLB,,, | Performed by: INTERNAL MEDICINE

## 2019-07-25 PROCEDURE — 3008F PR BODY MASS INDEX (BMI) DOCUMENTED: ICD-10-PCS | Mod: CPTII,S$GLB,, | Performed by: INTERNAL MEDICINE

## 2019-07-25 PROCEDURE — 3077F SYST BP >= 140 MM HG: CPT | Mod: CPTII,S$GLB,, | Performed by: INTERNAL MEDICINE

## 2019-07-25 PROCEDURE — 3077F PR MOST RECENT SYSTOLIC BLOOD PRESSURE >= 140 MM HG: ICD-10-PCS | Mod: CPTII,S$GLB,, | Performed by: INTERNAL MEDICINE

## 2019-07-25 PROCEDURE — 99999 PR PBB SHADOW E&M-EST. PATIENT-LVL III: ICD-10-PCS | Mod: PBBFAC,,, | Performed by: INTERNAL MEDICINE

## 2019-07-25 RX ORDER — VITAMIN B COMPLEX
1 CAPSULE ORAL DAILY
COMMUNITY

## 2019-07-25 RX ORDER — OLMESARTAN MEDOXOMIL AND HYDROCHLOROTHIAZIDE 40/12.5 40; 12.5 MG/1; MG/1
1 TABLET ORAL DAILY
COMMUNITY
End: 2022-07-12

## 2019-07-25 NOTE — PROGRESS NOTES
Subjective:    Patient ID:  Yobany Richardson is a 63 y.o. male who presents for follow-up of Atrial Flutter F/U and Results      HPI  Here for follow up of CAD-PCI (DANILO 5/16 asymptomatic).  Active man w/o angina.     Review of Systems   Constitution: Negative for malaise/fatigue.   Eyes: Negative for blurred vision.   Cardiovascular: Negative for chest pain, claudication, cyanosis, dyspnea on exertion, irregular heartbeat, leg swelling, near-syncope, orthopnea, palpitations, paroxysmal nocturnal dyspnea and syncope.   Respiratory: Negative for cough and shortness of breath.    Hematologic/Lymphatic: Does not bruise/bleed easily.   Musculoskeletal: Negative for back pain, falls, joint pain, muscle cramps, muscle weakness and myalgias.   Gastrointestinal: Negative for abdominal pain, change in bowel habit, nausea and vomiting.   Genitourinary: Negative for urgency.   Neurological: Negative for dizziness, focal weakness and light-headedness.       Past Medical History:   Diagnosis Date    Abnormal thallium stress test     Arrhythmia     Atrial flutter, paroxysmal 2/11/2016    CAD (coronary artery disease) 3/9/2016    5/2016 [facility] Left main:NL LAD: 35% proximal LAD. two small diagonals with minimal disease.  Circumflex/Large branching first obtuse marginal: with minimal disease.   RCA: The vessel was large sized (dominant) with a 60% proximal lesion. 100 mcgs of intracoronary Nitroglycerin were given with no change in the lesion. Thus FFR was performed. FFR was 0.79  2/2016 cor CTA ~~ 50% LAD    Coronary artery disease     Diabetes mellitus     Diabetes mellitus, type 2     Disorder of kidney and ureter     Hypertension     Neuropathy     Paroxysmal atrial flutter     PE (pulmonary embolism)     Pulmonary embolism 2/11/2016 1/2016     Rheumatoid arthritis     Shortness of breath     Stented coronary artery 6/15/2016    5/2016 RCA- synergy 3.5x12    Wears contact lenses     Wears prescription eyeglasses            Objective:     Vitals:    07/25/19 1043   BP: (!) 164/89   Pulse: 89        Physical Exam   Constitutional: He is oriented to person, place, and time. He appears well-developed and well-nourished.   Neck: Normal range of motion. Neck supple. No JVD present.   Cardiovascular: Normal rate, regular rhythm, normal heart sounds and intact distal pulses.   Pulses:       Carotid pulses are 2+ on the right side, and 2+ on the left side.       Radial pulses are 2+ on the right side, and 2+ on the left side.   Pulmonary/Chest: Effort normal and breath sounds normal.   Musculoskeletal: Normal range of motion. He exhibits no edema.   Neurological: He is alert and oriented to person, place, and time.   Skin: Skin is warm and dry.   Psychiatric: He has a normal mood and affect. His speech is normal. Cognition and memory are normal.             ..    Chemistry        Component Value Date/Time     06/03/2019 0745    K 4.6 06/03/2019 0745     06/03/2019 0745    CO2 25 06/03/2019 0745    BUN 23 06/03/2019 0745    CREATININE 1.1 06/03/2019 0745    GLU 99 06/03/2019 0745        Component Value Date/Time    CALCIUM 10.2 06/03/2019 0745    ALKPHOS 80 06/03/2019 0745    AST 21 06/03/2019 0745    AST 18 01/07/2016 0412    ALT 17 06/03/2019 0745    BILITOT 0.4 06/03/2019 0745    ESTGFRAFRICA >60.0 06/03/2019 0745    EGFRNONAA >60.0 06/03/2019 0745            ..  Lab Results   Component Value Date    CHOL 136 06/03/2019    CHOL 129 08/20/2018    CHOL 143 07/13/2017     Lab Results   Component Value Date    HDL 44 06/03/2019    HDL 47 08/20/2018    HDL 48 07/13/2017     Lab Results   Component Value Date    LDLCALC 67.2 06/03/2019    LDLCALC 60.2 (L) 08/20/2018    LDLCALC 65.2 07/13/2017     Lab Results   Component Value Date    TRIG 124 06/03/2019    TRIG 109 08/20/2018    TRIG 149 07/13/2017     Lab Results   Component Value Date    CHOLHDL 32.4 06/03/2019    CHOLHDL 36.4 08/20/2018    CHOLHDL 33.6 07/13/2017      ..  Lab Results   Component Value Date    WBC 12.05 09/03/2017    HGB 11.9 (L) 09/03/2017    HCT 35.2 (L) 09/03/2017    MCV 96 09/03/2017     09/03/2017       Test(s) Reviewed  I have reviewed the following in detail:  [x] Stress test   [] Angiography   [x] Echocardiogram   [] Labs   [] Other:       Assessment:         ICD-10-CM ICD-9-CM   1. Stented coronary artery Z95.5 V45.82   2. Coronary artery disease involving native coronary artery of native heart without angina pectoris I25.10 414.01   3. Atrial flutter, paroxysmal I48.92 427.32   4. Essential hypertension I10 401.9     Problem List Items Addressed This Visit     Stented coronary artery - Primary    Overview     5/2016 RCA- synergy 3.5x12         Essential hypertension    CAD (coronary artery disease)    Overview     5/2016 [facility]  Left main:NL  LAD: 35% proximal LAD. two small diagonals with minimal disease.   Circumflex/Large branching first obtuse marginal: with  minimal disease.    RCA: The vessel was large sized (dominant) with a 60%  proximal lesion. 100 mcgs of intracoronary Nitroglycerin were given with no  change in the lesion. Thus FFR was performed. FFR was 0.79    2/2016 cor CTA ~~ 50% LAD         Atrial flutter, paroxysmal           Plan:           Return to clinic 12 months   Aerobic exercise 5x's/wk. Heart healthy diet and risk factor modification.    See labs and med orders.  BP control has renal f/u      Portions of this note may have been created with voice recognition software.  Grammatical, syntax and spelling errors may be inevitable.

## 2019-08-20 DIAGNOSIS — E11.42 TYPE 2 DIABETES MELLITUS WITH DIABETIC POLYNEUROPATHY, WITH LONG-TERM CURRENT USE OF INSULIN: Primary | ICD-10-CM

## 2019-08-20 DIAGNOSIS — Z79.4 TYPE 2 DIABETES MELLITUS WITH DIABETIC POLYNEUROPATHY, WITH LONG-TERM CURRENT USE OF INSULIN: Primary | ICD-10-CM

## 2019-08-20 RX ORDER — GABAPENTIN 300 MG/1
300 CAPSULE ORAL 2 TIMES DAILY
Qty: 60 CAPSULE | Refills: 5 | Status: SHIPPED | OUTPATIENT
Start: 2019-08-20 | End: 2020-02-18 | Stop reason: SDUPTHER

## 2019-08-20 NOTE — TELEPHONE ENCOUNTER
Ok to refill gabapentin 300 mg take two cap po at bedtime #60  Last ref 07/24/19 pharm tisha [phone]

## 2019-10-04 ENCOUNTER — OFFICE VISIT (OUTPATIENT)
Dept: FAMILY MEDICINE | Facility: CLINIC | Age: 64
End: 2019-10-04
Payer: COMMERCIAL

## 2019-10-04 VITALS
SYSTOLIC BLOOD PRESSURE: 154 MMHG | DIASTOLIC BLOOD PRESSURE: 84 MMHG | BODY MASS INDEX: 31.49 KG/M2 | WEIGHT: 237.56 LBS | HEIGHT: 73 IN | RESPIRATION RATE: 18 BRPM | HEART RATE: 78 BPM | OXYGEN SATURATION: 98 % | TEMPERATURE: 99 F

## 2019-10-04 DIAGNOSIS — I25.10 CORONARY ARTERY DISEASE INVOLVING NATIVE CORONARY ARTERY OF NATIVE HEART WITHOUT ANGINA PECTORIS: ICD-10-CM

## 2019-10-04 DIAGNOSIS — E11.42 DIABETIC POLYNEUROPATHY ASSOCIATED WITH TYPE 2 DIABETES MELLITUS: ICD-10-CM

## 2019-10-04 DIAGNOSIS — I48.92 ATRIAL FLUTTER, PAROXYSMAL: ICD-10-CM

## 2019-10-04 DIAGNOSIS — I11.9 HYPERTENSIVE HEART DISEASE WITHOUT HEART FAILURE: ICD-10-CM

## 2019-10-04 PROCEDURE — 99213 PR OFFICE/OUTPT VISIT, EST, LEVL III, 20-29 MIN: ICD-10-PCS | Mod: S$GLB,,, | Performed by: INTERNAL MEDICINE

## 2019-10-04 PROCEDURE — 3077F SYST BP >= 140 MM HG: CPT | Mod: CPTII,S$GLB,, | Performed by: INTERNAL MEDICINE

## 2019-10-04 PROCEDURE — 3008F BODY MASS INDEX DOCD: CPT | Mod: CPTII,S$GLB,, | Performed by: INTERNAL MEDICINE

## 2019-10-04 PROCEDURE — 99213 OFFICE O/P EST LOW 20 MIN: CPT | Mod: S$GLB,,, | Performed by: INTERNAL MEDICINE

## 2019-10-04 PROCEDURE — 3079F PR MOST RECENT DIASTOLIC BLOOD PRESSURE 80-89 MM HG: ICD-10-PCS | Mod: CPTII,S$GLB,, | Performed by: INTERNAL MEDICINE

## 2019-10-04 PROCEDURE — 99999 PR PBB SHADOW E&M-EST. PATIENT-LVL III: ICD-10-PCS | Mod: PBBFAC,,, | Performed by: INTERNAL MEDICINE

## 2019-10-04 PROCEDURE — 99999 PR PBB SHADOW E&M-EST. PATIENT-LVL III: CPT | Mod: PBBFAC,,, | Performed by: INTERNAL MEDICINE

## 2019-10-04 PROCEDURE — 3079F DIAST BP 80-89 MM HG: CPT | Mod: CPTII,S$GLB,, | Performed by: INTERNAL MEDICINE

## 2019-10-04 PROCEDURE — 3008F PR BODY MASS INDEX (BMI) DOCUMENTED: ICD-10-PCS | Mod: CPTII,S$GLB,, | Performed by: INTERNAL MEDICINE

## 2019-10-04 PROCEDURE — 3077F PR MOST RECENT SYSTOLIC BLOOD PRESSURE >= 140 MM HG: ICD-10-PCS | Mod: CPTII,S$GLB,, | Performed by: INTERNAL MEDICINE

## 2019-10-04 RX ORDER — PEN NEEDLE, DIABETIC 31 GX5/16"
NEEDLE, DISPOSABLE MISCELLANEOUS
Refills: 3 | COMMUNITY
Start: 2019-06-28 | End: 2019-10-04 | Stop reason: SDUPTHER

## 2019-10-04 RX ORDER — METOPROLOL SUCCINATE 25 MG/1
TABLET, EXTENDED RELEASE ORAL
Refills: 2 | COMMUNITY
Start: 2019-07-05 | End: 2019-10-04 | Stop reason: ALTCHOICE

## 2019-10-04 RX ORDER — LANCETS 33 GAUGE
EACH MISCELLANEOUS
Refills: 0 | COMMUNITY
Start: 2019-09-09

## 2019-10-04 RX ORDER — VITAMIN E 268 MG
400 CAPSULE ORAL DAILY
COMMUNITY

## 2019-10-04 RX ORDER — BLOOD-GLUCOSE METER
KIT MISCELLANEOUS
Refills: 2 | COMMUNITY
Start: 2019-08-20

## 2019-10-04 RX ORDER — PEN NEEDLE, DIABETIC 31 GX5/16"
1 NEEDLE, DISPOSABLE MISCELLANEOUS DAILY
Qty: 100 EACH | Refills: 3 | Status: SHIPPED | OUTPATIENT
Start: 2019-10-04 | End: 2020-10-12 | Stop reason: SDUPTHER

## 2019-10-04 NOTE — PROGRESS NOTES
[facility] Note    Chief Complaint      Chief Complaint   Patient presents with    Follow-up     6 month F/U       History of Present Illness      Yobany Richardson is a 63 y.o. male who presents today for   Chief Complaint   Patient presents with    Follow-up     6 month F/U   .  Patient comes to appointment here for 6 m checkup for chronic issues as below . He is complaint with all meds and specialists f/u . needs a1c today. Considering flu vaccine .     HPI    No problem-specific Assessment & Plan notes found for this encounter.       Problem List Items Addressed This Visit        Neuro    Diabetic polyneuropathy associated with type 2 diabetes mellitus    Overview     Needs a1c , optho is needed [place] cotn current medical regimen             Cardiac/Vascular    Hypertensive heart disease without heart failure    Overview     Repeat bp looks good cont current regimen is seeing dr pradhan          Atrial flutter, paroxysmal    Overview     Taking plavix only . Had only one episode dr pradhan managing          CAD (coronary artery disease)    Overview     Dr pradhan managing just ahd neg stress est last month with him labs all reviewed is on statin , b blocker and plavix for sec prevention . Has no pain currently                 Past Medical History:  Past Medical History:   Diagnosis Date    Abnormal thallium stress test     Arrhythmia     Atrial flutter, paroxysmal 2/11/2016    CAD (coronary artery disease) 3/9/2016    5/2016 [facility] Left main:NL LAD: 35% proximal LAD. two small diagonals with minimal disease.  Circumflex/Large branching first obtuse marginal: with minimal disease.   RCA: The vessel was large sized (dominant) with a 60% proximal lesion. 100 mcgs of intracoronary Nitroglycerin were given with no change in the lesion. Thus FFR was performed. FFR was 0.79  2/2016 cor CTA ~~ 50% LAD    Coronary artery disease     Diabetes mellitus     Diabetes mellitus, type 2     Disorder of  kidney and ureter     Hypertension     Neuropathy     Paroxysmal atrial flutter     PE (pulmonary embolism)     Pulmonary embolism 2/11/2016 1/2016     Rheumatoid arthritis     Shortness of breath     Stented coronary artery 6/15/2016    5/2016 RCA- synergy 3.5x12    Wears contact lenses     Wears prescription eyeglasses        Past Surgical History:  Past Surgical History:   Procedure Laterality Date    CARDIAC CATHETERIZATION      with stent placed x 1    KNEE ARTHROSCOPY      TONSILLECTOMY         Family History:  family history includes Angina in his mother; Bell's palsy in his sister; Cancer in his mother; Depression in his sister and sister; Heart disease in his mother; Hypertension in his mother; Kidney disease in his father; Thyroid disease in his sister and sister.     Social History:  Social History     Socioeconomic History    Marital status:      Spouse name: Not on file    Number of children: Not on file    Years of education: Not on file    Highest education level: Not on file   Occupational History    Not on file   Social Needs    Financial resource strain: Not on file    Food insecurity:     Worry: Not on file     Inability: Not on file    Transportation needs:     Medical: Not on file     Non-medical: Not on file   Tobacco Use    Smoking status: Never Smoker    Smokeless tobacco: Never Used   Substance and Sexual Activity    Alcohol use: No    Drug use: No    Sexual activity: Not on file   Lifestyle    Physical activity:     Days per week: Not on file     Minutes per session: Not on file    Stress: Not on file   Relationships    Social connections:     Talks on phone: Not on file     Gets together: Not on file     Attends Shinto service: Not on file     Active member of club or organization: Not on file     Attends meetings of clubs or organizations: Not on file     Relationship status: Not on file   Other Topics Concern    Not on file   Social History  "Narrative    Not on file       Review of Systems:   Review of Systems   Constitutional: Negative for fever and weight loss.   HENT: Negative for congestion and hearing loss.    Eyes: Negative for blurred vision.   Respiratory: Negative for cough and shortness of breath.    Cardiovascular: Negative for chest pain, palpitations, claudication and leg swelling.   Gastrointestinal: Negative for abdominal pain, constipation, diarrhea and vomiting.   Genitourinary: Negative for dysuria.   Musculoskeletal: Negative for back pain and myalgias.   Skin: Negative for rash.   Neurological: Positive for tingling. Negative for focal weakness and headaches.   Psychiatric/Behavioral: Negative for depression and suicidal ideas. The patient is not nervous/anxious.         Medications:  Outpatient Encounter Medications as of 10/4/2019   Medication Sig Note Dispense Refill    acetaminophen (TYLENOL) 325 MG tablet Take 1 tablet (325 mg total) by mouth every 6 (six) hours as needed for Pain. 10/4/2019: Take as needed      APPLE CIDER VINEGAR ORAL Take by mouth.       ascorbic acid (VITAMIN C) 500 MG tablet Take 500 mg by mouth once daily.       aspirin 81 MG Chew Take 81 mg by mouth once daily.       b complex vitamins capsule Take 1 capsule by mouth once daily.       BD ULTRA-FINE AMAN PEN NEEDLE 32 gauge x 5/32" Ndle Inject 1 Syringe into the skin once daily.  100 each 3    carvedilol (COREG) 6.25 MG tablet Take 1 tablet (6.25 mg total) by mouth 2 (two) times daily.  180 tablet 5    clopidogrel (PLAVIX) 75 mg tablet TAKE 1 TABLET BY MOUTH ONCE DAILY  90 tablet 4    fish,bora,flax oils-om3,6,9no1 (OMEGA 3-6-9) 1,200 mg Cap Take by mouth.       FREESTYLE LITE STRIPS Strp TEST 2 TO 3 TIMES D   2    gabapentin (NEURONTIN) 300 MG capsule Take 1 capsule (300 mg total) by mouth 2 (two) times daily.  60 capsule 5    insulin glargine (LANTUS SOLOSTAR) 100 unit/mL (3 mL) InPn pen Inject 45 Units into the skin.        metformin " "(GLUCOPHAGE) 1000 MG tablet Take 1,000 mg by mouth 2 (two) times daily with meals.       multivit-min/iron/folic acid/K (ADULTS MULTIVITAMIN ORAL) Take by mouth.       olmesartan-hydrochlorothiazide (BENICAR HCT) 40-12.5 mg Tab Take 1 tablet by mouth once daily.       ONETOUCH DELICA PLUS LANCET 33 gauge Misc TEST ONCE D   0    UNABLE TO FIND medication name: Tart Cherry Extract       vitamin E 400 UNIT capsule Take 400 Units by mouth once daily.       [DISCONTINUED] BD ULTRA-FINE AMAN PEN NEEDLE 32 gauge x 5/32" Ndle INJ UTD   3    [DISCONTINUED] metoprolol succinate (TOPROL-XL) 25 MG 24 hr tablet TK 1 T PO D   2    cyanocobalamin, vitamin B-12, 1,000 mcg/15 mL Liqd Take by mouth.       pravastatin (PRAVACHOL) 20 MG tablet Take 1 tablet (20 mg total) by mouth once daily.  90 tablet 4    [DISCONTINUED] doxycycline (VIBRA-TABS) 100 MG tablet Take 1 tablet (100 mg total) by mouth 2 (two) times daily. 9/29/2017: Completed  20 tablet 0    [DISCONTINUED] empagliflozin (JARDIANCE) 10 mg Tab Take 10 mg by mouth every evening.       [DISCONTINUED] naproxen (NAPROSYN) 250 MG tablet Take 250 mg by mouth 3 (three) times daily as needed.       [DISCONTINUED] olmesartan (BENICAR) 40 MG tablet Take 40 mg by mouth once daily.        No facility-administered encounter medications on file as of 10/4/2019.        Allergies:  Review of patient's allergies indicates:  No Known Allergies      Physical Exam      Vitals:    10/04/19 1612   BP: (!) 154/84   Pulse:    Resp:    Temp:         Vital Signs  Temp: 98.6 °F (37 °C)  Temp src: Oral  Pulse: 78  Resp: 18  SpO2: 98 %  BP: (!) 154/84  BP Location: Right arm  Patient Position: Sitting  Pain Score: 0-No pain  Height and Weight  Height: 6' 1" (185.4 cm)  Weight: 107.7 kg (237 lb 8.7 oz)  BSA (Calculated - sq m): 2.36 sq meters  BMI (Calculated): 31.4  Weight in (lb) to have BMI = 25: 189.1]     Body mass index is 31.34 kg/m².    Physical Exam   Constitutional: He is oriented " to person, place, and time. He appears well-developed and well-nourished.   HENT:   Head: Normocephalic.   Eyes: Pupils are equal, round, and reactive to light.   Neck: Normal range of motion. No thyromegaly present.   Cardiovascular: Normal rate and regular rhythm. Exam reveals no gallop and no friction rub.   No murmur heard.  Pulses:       Dorsalis pedis pulses are 1+ on the right side, and 1+ on the left side.   Pulmonary/Chest: Effort normal and breath sounds normal.   Abdominal: Soft. Bowel sounds are normal.   Musculoskeletal: Normal range of motion. He exhibits no edema.   Feet:   Right Foot:   Protective Sensation: 3 sites tested. 4 sites sensed.   Left Foot:   Protective Sensation: 3 sites tested. 4 sites sensed.   Neurological: He is alert and oriented to person, place, and time. No sensory deficit.   Skin: Skin is warm and dry.   Psychiatric: He has a normal mood and affect. His behavior is normal.        Laboratory:  CBC:  No results for input(s): WBC, RBC, HGB, HCT, PLT, MCV, MCH, MCHC in the last 2160 hours.  CMP:  No results for input(s): GLU, CALCIUM, ALBUMIN, PROT, NA, K, CO2, CL, BUN, ALKPHOS, ALT, AST, BILITOT in the last 2160 hours.    Invalid input(s): CREATININ  URINALYSIS:  No results for input(s): COLORU, CLARITYU, SPECGRAV, PHUR, PROTEINUA, GLUCOSEU, BILIRUBINCON, BLOODU, WBCU, RBCU, BACTERIA, MUCUS, NITRITE, LEUKOCYTESUR, UROBILINOGEN, HYALINECASTS in the last 2160 hours.   LIPIDS:  No results for input(s): TSH, HDL, CHOL, TRIG, LDLCALC, CHOLHDL, NONHDLCHOL, TOTALCHOLEST in the last 2160 hours.  TSH:  No results for input(s): TSH in the last 2160 hours.  A1C:  No results for input(s): HGBA1C in the last 2160 hours.    Radiology:        Assessment:     Yobany Richardson is a 63 y.o.male with:    Diabetic polyneuropathy associated with type 2 diabetes mellitus  -     Hemoglobin A1c; Future; Expected date: 10/04/2019    Hypertensive heart disease without heart failure    Coronary artery disease  "involving native coronary artery of native heart without angina pectoris    Atrial flutter, paroxysmal    Other orders  -     BD ULTRA-FINE AMAN PEN NEEDLE 32 gauge x 5/32" Ndle; Inject 1 Syringe into the skin once daily.  Dispense: 100 each; Refill: 3                Plan:     As above, continue current medications and maintain follow up with specialists.  Return to clinic in 6 months.    Frederick W Dantagnan [facility]                "

## 2019-11-06 LAB — HBA1C MFR BLD: 7.3 % OF TOTAL HGB

## 2019-11-12 ENCOUNTER — TELEPHONE (OUTPATIENT)
Dept: FAMILY MEDICINE | Facility: CLINIC | Age: 64
End: 2019-11-12

## 2019-11-12 NOTE — TELEPHONE ENCOUNTER
----- Message from Manjula Curiel sent at 11/12/2019  1:46 PM CST -----  Contact: Yobany Weston missed a call from the provider on 11/11/19. Please call the pt at [phone].

## 2019-11-19 RX ORDER — METFORMIN HYDROCHLORIDE 1000 MG/1
1000 TABLET ORAL 2 TIMES DAILY WITH MEALS
Qty: 180 TABLET | Refills: 3 | Status: SHIPPED | OUTPATIENT
Start: 2019-11-19 | End: 2020-11-16 | Stop reason: SDUPTHER

## 2019-11-19 NOTE — TELEPHONE ENCOUNTER
----- Message from Maddie De La Garza sent at 11/19/2019  3:33 PM CST -----  Contact: [phone]/pharmacy  Type:  RX Refill Request    Who Called:  pharmacy  Refill or New Rx: NEW  RX Name and Strength: METFORMIN 1000mg  How is the patient currently taking it? (ex. 1XDay):  1 tablet 2 times a day  Is this a 30 day or 90 day RX: 90 day  RX Name and Strength: PRAVASTATIN 20 mg  How is the patient currently taking it? (ex. 1XDay): 1 tablet 1 time a day  Is this a 30 day or 90 day RX: 90    Preferred Pharmacy with phone number: [facility] #[id number] [place] [address] [address] AT [facility] [address]  Local or Mail Order: local  Ordering Provider:   Would the patient rather a call back or a response via [facility]?  call  Best Call Back Number:   Additional Information:  HE IS WAITING AT PHARMACY

## 2019-12-17 ENCOUNTER — TELEPHONE (OUTPATIENT)
Dept: FAMILY MEDICINE | Facility: CLINIC | Age: 64
End: 2019-12-17

## 2019-12-17 DIAGNOSIS — I48.92 ATRIAL FLUTTER, PAROXYSMAL: ICD-10-CM

## 2019-12-17 DIAGNOSIS — I25.83 CORONARY ARTERY DISEASE DUE TO LIPID RICH PLAQUE: ICD-10-CM

## 2019-12-17 DIAGNOSIS — I25.10 CORONARY ARTERY DISEASE DUE TO LIPID RICH PLAQUE: ICD-10-CM

## 2019-12-17 DIAGNOSIS — E11.22: ICD-10-CM

## 2019-12-17 DIAGNOSIS — I26.02 SADDLE EMBOLUS OF PULMONARY ARTERY WITH ACUTE COR PULMONALE, UNSPECIFIED CHRONICITY: ICD-10-CM

## 2019-12-17 DIAGNOSIS — I10 ESSENTIAL HYPERTENSION: ICD-10-CM

## 2019-12-17 DIAGNOSIS — R94.39 ABNORMAL THALLIUM STRESS TEST: ICD-10-CM

## 2019-12-17 DIAGNOSIS — N18.1: ICD-10-CM

## 2019-12-17 DIAGNOSIS — Z95.5 STENTED CORONARY ARTERY: ICD-10-CM

## 2019-12-17 RX ORDER — PRAVASTATIN SODIUM 20 MG/1
20 TABLET ORAL DAILY
Qty: 90 TABLET | Refills: 3 | Status: SHIPPED | OUTPATIENT
Start: 2019-12-17 | End: 2020-12-10 | Stop reason: SDUPTHER

## 2019-12-17 NOTE — TELEPHONE ENCOUNTER
----- Message from Kennedy Huff sent at 12/17/2019  9:30 AM CST -----  Contact: patient  Patient called to request a refill sent to his pharmacy for the medication listed below.    Please call [phone] to confirm when sent.    pravastatin (PRAVACHOL) 20 MG tablet    [facility] #[id number] [address] AT [facility] [address] & [facility]

## 2019-12-17 NOTE — TELEPHONE ENCOUNTER
----- Message from Kennedy Huff sent at 12/17/2019  9:30 AM CST -----  Contact: patient  Patient called to request a refill sent to his pharmacy for the medication listed below.    Please call [phone] to confirm when sent.    pravastatin (PRAVACHOL) 20 MG tablet    [facility] #[id number] [address] AT [facility] [address] & [facility]

## 2020-01-02 RX ORDER — INSULIN GLARGINE 100 [IU]/ML
45 INJECTION, SOLUTION SUBCUTANEOUS DAILY
Qty: 15 ML | Refills: 5 | Status: SHIPPED | OUTPATIENT
Start: 2020-01-02 | End: 2020-07-06 | Stop reason: SDUPTHER

## 2020-02-03 RX ORDER — CARVEDILOL 6.25 MG/1
TABLET ORAL
Qty: 180 TABLET | Refills: 4 | Status: SHIPPED | OUTPATIENT
Start: 2020-02-03 | End: 2021-04-28

## 2020-02-18 DIAGNOSIS — E11.42 TYPE 2 DIABETES MELLITUS WITH DIABETIC POLYNEUROPATHY, WITH LONG-TERM CURRENT USE OF INSULIN: ICD-10-CM

## 2020-02-18 DIAGNOSIS — Z79.4 TYPE 2 DIABETES MELLITUS WITH DIABETIC POLYNEUROPATHY, WITH LONG-TERM CURRENT USE OF INSULIN: ICD-10-CM

## 2020-02-18 RX ORDER — GABAPENTIN 300 MG/1
300 CAPSULE ORAL 2 TIMES DAILY
Qty: 60 CAPSULE | Refills: 5 | Status: SHIPPED | OUTPATIENT
Start: 2020-02-18 | End: 2020-07-21 | Stop reason: SDUPTHER

## 2020-05-08 DIAGNOSIS — Z12.11 COLON CANCER SCREENING: ICD-10-CM

## 2020-06-16 ENCOUNTER — TELEPHONE (OUTPATIENT)
Dept: FAMILY MEDICINE | Facility: CLINIC | Age: 65
End: 2020-06-16

## 2020-06-16 DIAGNOSIS — Z79.4 CONTROLLED TYPE 2 DIABETES MELLITUS WITH STAGE 1 CHRONIC KIDNEY DISEASE, WITH LONG-TERM CURRENT USE OF INSULIN: Primary | ICD-10-CM

## 2020-06-16 DIAGNOSIS — E11.22 CONTROLLED TYPE 2 DIABETES MELLITUS WITH STAGE 1 CHRONIC KIDNEY DISEASE, WITH LONG-TERM CURRENT USE OF INSULIN: Primary | ICD-10-CM

## 2020-06-16 DIAGNOSIS — N18.1 CONTROLLED TYPE 2 DIABETES MELLITUS WITH STAGE 1 CHRONIC KIDNEY DISEASE, WITH LONG-TERM CURRENT USE OF INSULIN: Primary | ICD-10-CM

## 2020-06-16 NOTE — TELEPHONE ENCOUNTER
Called and spoke with pt in regards of his message. Pt called to see if he needs to get blood work done before he comes and see you in the office on 7/21/2020. Patient stated if blood work is needed please send lab orders to [facility] please. Please advise.

## 2020-06-16 NOTE — TELEPHONE ENCOUNTER
----- Message from Jo Rushing sent at 6/16/2020 12:00 PM CDT -----  Regarding: Lab inquiry  Contact: self  Patient requesting to speak with you regarding if he needs to have labs done before his appointment that is scheduled on 07/21/2020. His phone number is [phone]. Please advise.

## 2020-06-23 LAB — HBA1C MFR BLD: 6.7 % OF TOTAL HGB

## 2020-07-06 RX ORDER — INSULIN GLARGINE 100 [IU]/ML
45 INJECTION, SOLUTION SUBCUTANEOUS DAILY
Qty: 15 ML | Refills: 5 | Status: ON HOLD | OUTPATIENT
Start: 2020-07-06 | End: 2020-07-11 | Stop reason: SDUPTHER

## 2020-07-07 ENCOUNTER — PATIENT OUTREACH (OUTPATIENT)
Dept: ADMINISTRATIVE | Facility: HOSPITAL | Age: 65
End: 2020-07-07

## 2020-07-07 DIAGNOSIS — Z11.59 NEED FOR HEPATITIS C SCREENING TEST: Primary | ICD-10-CM

## 2020-07-10 DIAGNOSIS — E11.9 TYPE 2 DIABETES MELLITUS WITHOUT COMPLICATION, UNSPECIFIED WHETHER LONG TERM INSULIN USE: ICD-10-CM

## 2020-07-11 PROBLEM — E16.2 HYPOGLYCEMIA: Status: ACTIVE | Noted: 2020-07-11

## 2020-07-11 PROBLEM — E11.649 HYPOGLYCEMIA ASSOCIATED WITH DIABETES: Status: ACTIVE | Noted: 2020-07-11

## 2020-07-17 ENCOUNTER — LAB VISIT (OUTPATIENT)
Dept: LAB | Facility: HOSPITAL | Age: 65
End: 2020-07-17
Attending: INTERNAL MEDICINE
Payer: COMMERCIAL

## 2020-07-17 DIAGNOSIS — I10 ESSENTIAL HYPERTENSION: ICD-10-CM

## 2020-07-17 DIAGNOSIS — Z11.59 NEED FOR HEPATITIS C SCREENING TEST: ICD-10-CM

## 2020-07-17 DIAGNOSIS — I48.92 ATRIAL FLUTTER, PAROXYSMAL: ICD-10-CM

## 2020-07-17 DIAGNOSIS — Z95.5 STENTED CORONARY ARTERY: ICD-10-CM

## 2020-07-17 DIAGNOSIS — I25.10 CORONARY ARTERY DISEASE INVOLVING NATIVE CORONARY ARTERY OF NATIVE HEART WITHOUT ANGINA PECTORIS: ICD-10-CM

## 2020-07-17 LAB
ALBUMIN SERPL BCP-MCNC: 4.2 G/DL (ref 3.5–5.2)
ALP SERPL-CCNC: 83 U/L (ref 55–135)
ALT SERPL W/O P-5'-P-CCNC: 12 U/L (ref 10–44)
ANION GAP SERPL CALC-SCNC: 8 MMOL/L (ref 8–16)
AST SERPL-CCNC: 15 U/L (ref 10–40)
BILIRUB SERPL-MCNC: 0.3 MG/DL (ref 0.1–1)
BUN SERPL-MCNC: 24 MG/DL (ref 8–23)
CALCIUM SERPL-MCNC: 9.9 MG/DL (ref 8.7–10.5)
CHLORIDE SERPL-SCNC: 103 MMOL/L (ref 95–110)
CHOLEST SERPL-MCNC: 180 MG/DL (ref 120–199)
CHOLEST/HDLC SERPL: 4.3 {RATIO} (ref 2–5)
CK SERPL-CCNC: 108 U/L (ref 20–200)
CO2 SERPL-SCNC: 27 MMOL/L (ref 23–29)
CREAT SERPL-MCNC: 1.3 MG/DL (ref 0.5–1.4)
EST. GFR  (AFRICAN AMERICAN): >60 ML/MIN/1.73 M^2
EST. GFR  (NON AFRICAN AMERICAN): 58.5 ML/MIN/1.73 M^2
GLUCOSE SERPL-MCNC: 140 MG/DL (ref 70–110)
HCV AB SERPL QL IA: NEGATIVE
HDLC SERPL-MCNC: 42 MG/DL (ref 40–75)
HDLC SERPL: 23.3 % (ref 20–50)
LDLC SERPL CALC-MCNC: 80.4 MG/DL (ref 63–159)
NONHDLC SERPL-MCNC: 138 MG/DL
POTASSIUM SERPL-SCNC: 4.2 MMOL/L (ref 3.5–5.1)
PROT SERPL-MCNC: 7.3 G/DL (ref 6–8.4)
SODIUM SERPL-SCNC: 138 MMOL/L (ref 136–145)
TRIGL SERPL-MCNC: 288 MG/DL (ref 30–150)

## 2020-07-17 PROCEDURE — 86803 HEPATITIS C AB TEST: CPT

## 2020-07-17 PROCEDURE — 36415 COLL VENOUS BLD VENIPUNCTURE: CPT | Mod: PO

## 2020-07-17 PROCEDURE — 82550 ASSAY OF CK (CPK): CPT

## 2020-07-17 PROCEDURE — 80061 LIPID PANEL: CPT

## 2020-07-17 PROCEDURE — 80053 COMPREHEN METABOLIC PANEL: CPT

## 2020-07-20 ENCOUNTER — TELEPHONE (OUTPATIENT)
Dept: FAMILY MEDICINE | Facility: CLINIC | Age: 65
End: 2020-07-20

## 2020-07-21 ENCOUNTER — OFFICE VISIT (OUTPATIENT)
Dept: FAMILY MEDICINE | Facility: CLINIC | Age: 65
End: 2020-07-21
Payer: COMMERCIAL

## 2020-07-21 VITALS
HEIGHT: 74 IN | OXYGEN SATURATION: 97 % | SYSTOLIC BLOOD PRESSURE: 164 MMHG | HEART RATE: 77 BPM | RESPIRATION RATE: 16 BRPM | WEIGHT: 230.5 LBS | DIASTOLIC BLOOD PRESSURE: 80 MMHG | TEMPERATURE: 99 F | BODY MASS INDEX: 29.58 KG/M2

## 2020-07-21 DIAGNOSIS — Z79.4 TYPE 2 DIABETES MELLITUS WITH DIABETIC POLYNEUROPATHY, WITH LONG-TERM CURRENT USE OF INSULIN: ICD-10-CM

## 2020-07-21 DIAGNOSIS — N18.2 CONTROLLED TYPE 2 DIABETES MELLITUS WITH STAGE 2 CHRONIC KIDNEY DISEASE, WITH LONG-TERM CURRENT USE OF INSULIN: Primary | ICD-10-CM

## 2020-07-21 DIAGNOSIS — I25.10 CORONARY ARTERY DISEASE INVOLVING NATIVE CORONARY ARTERY OF NATIVE HEART WITHOUT ANGINA PECTORIS: ICD-10-CM

## 2020-07-21 DIAGNOSIS — I48.92 ATRIAL FLUTTER, PAROXYSMAL: ICD-10-CM

## 2020-07-21 DIAGNOSIS — I11.9 HYPERTENSIVE HEART DISEASE WITHOUT HEART FAILURE: ICD-10-CM

## 2020-07-21 DIAGNOSIS — Z79.4 CONTROLLED TYPE 2 DIABETES MELLITUS WITH STAGE 2 CHRONIC KIDNEY DISEASE, WITH LONG-TERM CURRENT USE OF INSULIN: Primary | ICD-10-CM

## 2020-07-21 DIAGNOSIS — E11.42 TYPE 2 DIABETES MELLITUS WITH DIABETIC POLYNEUROPATHY, WITH LONG-TERM CURRENT USE OF INSULIN: ICD-10-CM

## 2020-07-21 DIAGNOSIS — E11.22 CONTROLLED TYPE 2 DIABETES MELLITUS WITH STAGE 2 CHRONIC KIDNEY DISEASE, WITH LONG-TERM CURRENT USE OF INSULIN: Primary | ICD-10-CM

## 2020-07-21 PROCEDURE — 99999 PR PBB SHADOW E&M-EST. PATIENT-LVL V: CPT | Mod: PBBFAC,,, | Performed by: INTERNAL MEDICINE

## 2020-07-21 PROCEDURE — 3079F DIAST BP 80-89 MM HG: CPT | Mod: CPTII,S$GLB,, | Performed by: INTERNAL MEDICINE

## 2020-07-21 PROCEDURE — 3079F PR MOST RECENT DIASTOLIC BLOOD PRESSURE 80-89 MM HG: ICD-10-PCS | Mod: CPTII,S$GLB,, | Performed by: INTERNAL MEDICINE

## 2020-07-21 PROCEDURE — 3008F BODY MASS INDEX DOCD: CPT | Mod: CPTII,S$GLB,, | Performed by: INTERNAL MEDICINE

## 2020-07-21 PROCEDURE — 99999 PR PBB SHADOW E&M-EST. PATIENT-LVL V: ICD-10-PCS | Mod: PBBFAC,,, | Performed by: INTERNAL MEDICINE

## 2020-07-21 PROCEDURE — 3077F SYST BP >= 140 MM HG: CPT | Mod: CPTII,S$GLB,, | Performed by: INTERNAL MEDICINE

## 2020-07-21 PROCEDURE — 3008F PR BODY MASS INDEX (BMI) DOCUMENTED: ICD-10-PCS | Mod: CPTII,S$GLB,, | Performed by: INTERNAL MEDICINE

## 2020-07-21 PROCEDURE — 99214 PR OFFICE/OUTPT VISIT, EST, LEVL IV, 30-39 MIN: ICD-10-PCS | Mod: S$GLB,,, | Performed by: INTERNAL MEDICINE

## 2020-07-21 PROCEDURE — 99214 OFFICE O/P EST MOD 30 MIN: CPT | Mod: S$GLB,,, | Performed by: INTERNAL MEDICINE

## 2020-07-21 PROCEDURE — 3044F HG A1C LEVEL LT 7.0%: CPT | Mod: CPTII,S$GLB,, | Performed by: INTERNAL MEDICINE

## 2020-07-21 PROCEDURE — 3044F PR MOST RECENT HEMOGLOBIN A1C LEVEL <7.0%: ICD-10-PCS | Mod: CPTII,S$GLB,, | Performed by: INTERNAL MEDICINE

## 2020-07-21 PROCEDURE — 3077F PR MOST RECENT SYSTOLIC BLOOD PRESSURE >= 140 MM HG: ICD-10-PCS | Mod: CPTII,S$GLB,, | Performed by: INTERNAL MEDICINE

## 2020-07-21 RX ORDER — GABAPENTIN 300 MG/1
300 CAPSULE ORAL 3 TIMES DAILY
Qty: 90 CAPSULE | Refills: 5 | Status: SHIPPED | OUTPATIENT
Start: 2020-07-21 | End: 2020-08-17

## 2020-07-21 NOTE — PROGRESS NOTES
[facility] Note    Chief Complaint      Chief Complaint   Patient presents with    Follow-up       History of Present Illness      Yobany Richardson is a 64 y.o. male who presents today for   Chief Complaint   Patient presents with    Follow-up   .  Patient comes to appointment here for 6m checkup for chronic issues . He has been having problems with his neuropathy as well as issues with hypoglycemic events . He has adjusted his insulin down from 40 to 25  units . He will be seeing dr quiñonez his endocrinologist soon and will likely be starting on insulin pump . He will be seeing dr lorne avendaño soon for routine checkup .     HPI    No problem-specific Assessment & Plan notes found for this encounter.       Problem List Items Addressed This Visit        Cardiac/Vascular    Hypertensive heart disease without heart failure    Overview     bp elevated will be seeing dr pradhan this week will defer to him for med adjustment          Atrial flutter, paroxysmal    Overview     Taking plavix only . Had only one episode dr pradhan managing          CAD (coronary artery disease)    Overview     Dr pradhan managing just ahd neg stress est last month with him labs all reviewed is on statin , b blocker and plavix for sec prevention . Has no pain currently             Endocrine    Type 2 diabetes mellitus with diabetic polyneuropathy, with long-term current use of insulin    Overview     See hpi will be having visit with dr olamide kevin , increase gabapentin to 300 mg in am 600mg at baseline         Controlled type 2 diabetes mellitus with stage 2 chronic kidney disease, with long-term current use of insulin - Primary    Overview     ckd 60 with last visit cont monitoring                 Past Medical History:  Past Medical History:   Diagnosis Date    Abnormal thallium stress test     Arrhythmia     Atrial flutter, paroxysmal 2/11/2016    CAD (coronary artery disease) 3/9/2016    5/2016 [facility] Left main:NL LAD: 35% proximal  LAD. two small diagonals with minimal disease.  Circumflex/Large branching first obtuse marginal: with minimal disease.   RCA: The vessel was large sized (dominant) with a 60% proximal lesion. 100 mcgs of intracoronary Nitroglycerin were given with no change in the lesion. Thus FFR was performed. FFR was 0.79  2/2016 cor CTA ~~ 50% LAD    Coronary artery disease     Diabetes mellitus     Diabetes mellitus, type 2     Disorder of kidney and ureter     Hypertension     Neuropathy     Paroxysmal atrial flutter     PE (pulmonary embolism)     Pulmonary embolism 2/11/2016 1/2016     Rheumatoid arthritis     Shortness of breath     Stented coronary artery 6/15/2016    5/2016 RCA- synergy 3.5x12    Wears contact lenses     Wears prescription eyeglasses        Past Surgical History:  Past Surgical History:   Procedure Laterality Date    CARDIAC CATHETERIZATION      with stent placed x 1    KNEE ARTHROSCOPY      TONSILLECTOMY         Family History:  family history includes Angina in his mother; Bell's palsy in his sister; Cancer in his mother; Depression in his sister and sister; Heart disease in his mother; Hypertension in his mother; Kidney disease in his father; Thyroid disease in his sister and sister.     Social History:  Social History     Socioeconomic History    Marital status:      Spouse name: Not on file    Number of children: Not on file    Years of education: Not on file    Highest education level: Not on file   Occupational History    Not on file   Social Needs    Financial resource strain: Not on file    Food insecurity     Worry: Not on file     Inability: Not on file    Transportation needs     Medical: Not on file     Non-medical: Not on file   Tobacco Use    Smoking status: Never Smoker    Smokeless tobacco: Never Used   Substance and Sexual Activity    Alcohol use: No    Drug use: No    Sexual activity: Not on file   Lifestyle    Physical activity     Days per  "week: Not on file     Minutes per session: Not on file    Stress: Not on file   Relationships    Social connections     Talks on phone: Not on file     Gets together: Not on file     Attends Hindu service: Not on file     Active member of club or organization: Not on file     Attends meetings of clubs or organizations: Not on file     Relationship status: Not on file   Other Topics Concern    Not on file   Social History Narrative    Not on file       Review of Systems:   Review of Systems   Constitutional: Negative for fever and weight loss.   HENT: Negative for congestion, hearing loss and sore throat.    Eyes: Negative for blurred vision.   Respiratory: Negative for cough and shortness of breath.    Cardiovascular: Negative for chest pain, palpitations, claudication and leg swelling.   Gastrointestinal: Negative for abdominal pain, constipation, diarrhea and heartburn.   Genitourinary: Negative for dysuria.   Musculoskeletal: Negative for back pain and myalgias.   Skin: Negative for rash.   Neurological: Negative for focal weakness and headaches.   Psychiatric/Behavioral: Negative for depression, memory loss and suicidal ideas. The patient is not nervous/anxious.         Medications:  Outpatient Encounter Medications as of 7/21/2020   Medication Sig Note Dispense Refill    amLODIPine (NORVASC) 10 MG tablet TK 1 T PO D       APPLE CIDER VINEGAR ORAL Take by mouth.       ascorbic acid (VITAMIN C) 500 MG tablet Take 500 mg by mouth once daily.       aspirin 81 MG Chew Take 81 mg by mouth once daily.       b complex vitamins capsule Take 1 capsule by mouth once daily.       BD ULTRA-FINE AMAN PEN NEEDLE 32 gauge x 5/32" Ndle Inject 1 Syringe into the skin once daily.  100 each 3    carvediloL (COREG) 6.25 MG tablet TAKE 1 TABLET(6.25 MG) BY MOUTH TWICE DAILY  180 tablet 4    clopidogrel (PLAVIX) 75 mg tablet TAKE 1 TABLET BY MOUTH ONCE DAILY  90 tablet 4    cyanocobalamin, vitamin B-12, 1,000 mcg/15 " "mL Liqd Take by mouth.       fish,bora,flax oils-om3,6,9no1 (OMEGA 3-6-9) 1,200 mg Cap Take by mouth.       FREESTYLE LITE STRIPS Strp TEST 2 TO 3 TIMES D   2    gabapentin (NEURONTIN) 300 MG capsule Take 1 capsule (300 mg total) by mouth 3 (three) times daily.  90 capsule 5    insulin glargine 100 units/mL (3mL) SubQ pen Inject 25 Units into the skin every evening.  15 mL 5    metFORMIN (GLUCOPHAGE) 1000 MG tablet Take 1 tablet (1,000 mg total) by mouth 2 (two) times daily with meals.  180 tablet 3    multivit-min/iron/folic acid/K (ADULTS MULTIVITAMIN ORAL) Take by mouth.       olmesartan-hydrochlorothiazide (BENICAR HCT) 40-12.5 mg Tab Take 1 tablet by mouth once daily.       ONETOUCH DELICA PLUS LANCET 33 gauge Misc TEST ONCE D   0    pravastatin (PRAVACHOL) 20 MG tablet Take 1 tablet (20 mg total) by mouth once daily.  90 tablet 3    UNABLE TO FIND medication name: Tart Cherry Extract       vitamin E 400 UNIT capsule Take 400 Units by mouth once daily.       [DISCONTINUED] gabapentin (NEURONTIN) 300 MG capsule Take 1 capsule (300 mg total) by mouth 2 (two) times daily.  60 capsule 5    acetaminophen (TYLENOL) 325 MG tablet Take 1 tablet (325 mg total) by mouth every 6 (six) hours as needed for Pain. 10/4/2019: Take as needed      famotidine (PEPCID) 20 MG tablet Take 1 tablet (20 mg total) by mouth 2 (two) times daily. (Patient not taking: Reported on 7/21/2020)  60 tablet 0     No facility-administered encounter medications on file as of 7/21/2020.        Allergies:  Review of patient's allergies indicates:  No Known Allergies      Physical Exam      Vitals:    07/21/20 1531   BP: (!) 164/80   Pulse:    Resp:    Temp:         Vital Signs  Temp: 98.8 °F (37.1 °C)  Temp src: Temporal  Pulse: 77  Resp: 16  SpO2: 97 %  BP: (!) 164/80  BP Location: Left arm  Patient Position: Sitting  Height and Weight  Height: 6' 2" (188 cm)  Weight: 104.5 kg (230 lb 7.9 oz)  BSA (Calculated - sq m): 2.34 sq " meters  BMI (Calculated): 29.6  Weight in (lb) to have BMI = 25: 194.3]     Body mass index is 29.59 kg/m².    Physical Exam  Constitutional:       Appearance: He is well-developed.   HENT:      Head: Normocephalic.   Eyes:      Pupils: Pupils are equal, round, and reactive to light.   Neck:      Musculoskeletal: Normal range of motion.      Thyroid: No thyromegaly.   Cardiovascular:      Rate and Rhythm: Normal rate and regular rhythm.      Heart sounds: No murmur. No friction rub. No gallop.    Pulmonary:      Effort: Pulmonary effort is normal.      Breath sounds: Normal breath sounds.   Abdominal:      General: Bowel sounds are normal.      Palpations: Abdomen is soft.   Musculoskeletal: Normal range of motion.   Skin:     General: Skin is warm and dry.   Neurological:      Mental Status: He is alert and oriented to person, place, and time.      Sensory: No sensory deficit.   Psychiatric:         Behavior: Behavior normal.          Laboratory:  CBC:  Recent Labs   Lab Result Units 06/06/20  2303 07/10/20  2218   WBC K/uL 8.58 13.08*   RBC M/uL 2.98* 3.35*   Hemoglobin g/dL 9.8* 10.9*   Hematocrit % 28.8* 32.7*   Platelets K/uL 218 227   Mean Corpuscular Volume fL 97 98   Mean Corpuscular Hemoglobin pg 32.9* 32.5*   Mean Corpuscular Hemoglobin Conc g/dL 34.0 33.3     CMP:  Recent Labs   Lab Result Units 06/06/20  2303 07/10/20  2218  07/17/20  0802   Glucose mg/dL 71 135*   < > 140*   Calcium mg/dL 9.6 10.0   < > 9.9   Albumin g/dL 4.4 4.8  --  4.2   Total Protein g/dL 7.2 7.9  --  7.3   Sodium mmol/L 137 137   < > 138   Potassium mmol/L 3.8 3.9   < > 4.2   CO2 mmol/L 25 28   < > 27   Chloride mmol/L 103 102   < > 103   BUN, Bld mg/dL 33* 24*   < > 24*   Alkaline Phosphatase U/L 71 83  --  83   ALT U/L 14 14  --  12   AST U/L 25 27  --  15   Total Bilirubin mg/dL 0.3 0.3  --  0.3    < > = values in this interval not displayed.     URINALYSIS:  Recent Labs   Lab Result Units 07/11/20  1310   Color, UA  Yellow    Specific Gravity, UA  1.015   pH, UA  6.0   Protein, UA  2+*   Bacteria /hpf Negative  Negative   Nitrite, UA  Negative   Leukocytes, UA  Negative   Urobilinogen, UA EU/dL 0.2   Hyaline Casts, UA /lpf 6*  6*      LIPIDS:  Recent Labs   Lab Result Units 07/11/20  0512 07/17/20  0802   TSH uIU/mL 1.690  --    HDL mg/dL  --  42   Cholesterol mg/dL  --  180   Triglycerides mg/dL  --  288*   LDL Cholesterol mg/dL  --  80.4   Hdl/Cholesterol Ratio %  --  23.3   Non-HDL Cholesterol mg/dL  --  138   Total Cholesterol/HDL Ratio   --  4.3     TSH:  Recent Labs   Lab Result Units 07/11/20  0512   TSH uIU/mL 1.690     A1C:  Recent Labs   Lab Result Units 06/22/20  1014   Hemoglobin A1C % of total Hgb 6.7*       Radiology:        Assessment:     Yobany Richardson is a 64 y.o.male with:    Controlled type 2 diabetes mellitus with stage 2 chronic kidney disease, with long-term current use of insulin    Type 2 diabetes mellitus with diabetic polyneuropathy, with long-term current use of insulin  -     gabapentin (NEURONTIN) 300 MG capsule; Take 1 capsule (300 mg total) by mouth 3 (three) times daily.  Dispense: 90 capsule; Refill: 5    Coronary artery disease involving native coronary artery of native heart without angina pectoris    Hypertensive heart disease without heart failure    Atrial flutter, paroxysmal                Plan:     Problem List Items Addressed This Visit        Cardiac/Vascular    Hypertensive heart disease without heart failure    Overview     bp elevated will be seeing dr pradhan this week will defer to him for med adjustment          Atrial flutter, paroxysmal    Overview     Taking plavix only . Had only one episode dr pradhan managing          CAD (coronary artery disease)    Overview     Dr pradhan managing just ahd neg stress est last month with him labs all reviewed is on statin , b blocker and plavix for sec prevention . Has no pain currently             Endocrine    Type 2 diabetes mellitus with diabetic  polyneuropathy, with long-term current use of insulin    Overview     See hpi will be having visit with dr olamide kevin , increase gabapentin to 300 mg in am 600mg at baseline         Controlled type 2 diabetes mellitus with stage 2 chronic kidney disease, with long-term current use of insulin - Primary    Overview     ckd 60 with last visit cont monitoring                As above, continue current medications and maintain follow up with specialists.  Return to clinic in 6 months.      Frederick W Dantagnan [facility] [place]

## 2020-07-24 ENCOUNTER — OFFICE VISIT (OUTPATIENT)
Dept: CARDIOLOGY | Facility: CLINIC | Age: 65
End: 2020-07-24
Payer: COMMERCIAL

## 2020-07-24 VITALS
HEIGHT: 74 IN | SYSTOLIC BLOOD PRESSURE: 148 MMHG | DIASTOLIC BLOOD PRESSURE: 81 MMHG | WEIGHT: 229.06 LBS | HEART RATE: 70 BPM | BODY MASS INDEX: 29.4 KG/M2

## 2020-07-24 DIAGNOSIS — E11.22 CONTROLLED TYPE 2 DIABETES MELLITUS WITH STAGE 2 CHRONIC KIDNEY DISEASE, WITH LONG-TERM CURRENT USE OF INSULIN: ICD-10-CM

## 2020-07-24 DIAGNOSIS — N18.2 CONTROLLED TYPE 2 DIABETES MELLITUS WITH STAGE 2 CHRONIC KIDNEY DISEASE, WITH LONG-TERM CURRENT USE OF INSULIN: ICD-10-CM

## 2020-07-24 DIAGNOSIS — Z95.5 STENTED CORONARY ARTERY: ICD-10-CM

## 2020-07-24 DIAGNOSIS — E11.649 HYPOGLYCEMIA ASSOCIATED WITH DIABETES: ICD-10-CM

## 2020-07-24 DIAGNOSIS — I11.9 HYPERTENSIVE HEART DISEASE WITHOUT HEART FAILURE: Primary | ICD-10-CM

## 2020-07-24 DIAGNOSIS — I25.10 CORONARY ARTERY DISEASE INVOLVING NATIVE CORONARY ARTERY OF NATIVE HEART WITHOUT ANGINA PECTORIS: ICD-10-CM

## 2020-07-24 DIAGNOSIS — Z79.4 CONTROLLED TYPE 2 DIABETES MELLITUS WITH STAGE 2 CHRONIC KIDNEY DISEASE, WITH LONG-TERM CURRENT USE OF INSULIN: ICD-10-CM

## 2020-07-24 DIAGNOSIS — I48.92 ATRIAL FLUTTER, PAROXYSMAL: ICD-10-CM

## 2020-07-24 DIAGNOSIS — R94.39 ABNORMAL THALLIUM STRESS TEST: ICD-10-CM

## 2020-07-24 PROCEDURE — 99214 OFFICE O/P EST MOD 30 MIN: CPT | Mod: S$GLB,,, | Performed by: INTERNAL MEDICINE

## 2020-07-24 PROCEDURE — 99999 PR PBB SHADOW E&M-EST. PATIENT-LVL IV: ICD-10-PCS | Mod: PBBFAC,,, | Performed by: INTERNAL MEDICINE

## 2020-07-24 PROCEDURE — 3044F PR MOST RECENT HEMOGLOBIN A1C LEVEL <7.0%: ICD-10-PCS | Mod: CPTII,S$GLB,, | Performed by: INTERNAL MEDICINE

## 2020-07-24 PROCEDURE — 3044F HG A1C LEVEL LT 7.0%: CPT | Mod: CPTII,S$GLB,, | Performed by: INTERNAL MEDICINE

## 2020-07-24 PROCEDURE — 3008F BODY MASS INDEX DOCD: CPT | Mod: CPTII,S$GLB,, | Performed by: INTERNAL MEDICINE

## 2020-07-24 PROCEDURE — 3077F PR MOST RECENT SYSTOLIC BLOOD PRESSURE >= 140 MM HG: ICD-10-PCS | Mod: CPTII,S$GLB,, | Performed by: INTERNAL MEDICINE

## 2020-07-24 PROCEDURE — 99999 PR PBB SHADOW E&M-EST. PATIENT-LVL IV: CPT | Mod: PBBFAC,,, | Performed by: INTERNAL MEDICINE

## 2020-07-24 PROCEDURE — 99214 PR OFFICE/OUTPT VISIT, EST, LEVL IV, 30-39 MIN: ICD-10-PCS | Mod: S$GLB,,, | Performed by: INTERNAL MEDICINE

## 2020-07-24 PROCEDURE — 3077F SYST BP >= 140 MM HG: CPT | Mod: CPTII,S$GLB,, | Performed by: INTERNAL MEDICINE

## 2020-07-24 PROCEDURE — 3008F PR BODY MASS INDEX (BMI) DOCUMENTED: ICD-10-PCS | Mod: CPTII,S$GLB,, | Performed by: INTERNAL MEDICINE

## 2020-07-24 PROCEDURE — 3079F DIAST BP 80-89 MM HG: CPT | Mod: CPTII,S$GLB,, | Performed by: INTERNAL MEDICINE

## 2020-07-24 PROCEDURE — 3079F PR MOST RECENT DIASTOLIC BLOOD PRESSURE 80-89 MM HG: ICD-10-PCS | Mod: CPTII,S$GLB,, | Performed by: INTERNAL MEDICINE

## 2020-07-24 NOTE — PROGRESS NOTES
Subjective:    Patient ID:  Yobany Richardson is a 64 y.o. male who presents for follow-up of No chief complaint on file.      HPI  Here for follow up of CAD-PCI (DANILO 5/16 asymptomatic). 2 recent hosp stay for hypoglycemia. No angina.   Active man.     Review of Systems   Constitution: Negative for malaise/fatigue.   Eyes: Negative for blurred vision.   Cardiovascular: Negative for chest pain, claudication, cyanosis, dyspnea on exertion, irregular heartbeat, leg swelling, near-syncope, orthopnea, palpitations, paroxysmal nocturnal dyspnea and syncope.   Respiratory: Negative for cough and shortness of breath.    Hematologic/Lymphatic: Does not bruise/bleed easily.   Musculoskeletal: Negative for back pain, falls, joint pain, muscle cramps, muscle weakness and myalgias.   Gastrointestinal: Negative for abdominal pain, change in bowel habit, nausea and vomiting.   Genitourinary: Negative for urgency.   Neurological: Negative for dizziness, focal weakness and light-headedness.       Past Medical History:   Diagnosis Date    Abnormal thallium stress test     Arrhythmia     Atrial flutter, paroxysmal 2/11/2016    CAD (coronary artery disease) 3/9/2016    5/2016 [facility] Left main:NL LAD: 35% proximal LAD. two small diagonals with minimal disease.  Circumflex/Large branching first obtuse marginal: with minimal disease.   RCA: The vessel was large sized (dominant) with a 60% proximal lesion. 100 mcgs of intracoronary Nitroglycerin were given with no change in the lesion. Thus FFR was performed. FFR was 0.79  2/2016 cor CTA ~~ 50% LAD    Coronary artery disease     Diabetes mellitus     Diabetes mellitus, type 2     Disorder of kidney and ureter     Hypertension     Neuropathy     Paroxysmal atrial flutter     PE (pulmonary embolism)     Pulmonary embolism 2/11/2016 1/2016     Rheumatoid arthritis     Shortness of breath     Stented coronary artery 6/15/2016    5/2016 RCA- synergy 3.5x12    Wears contact lenses      Wears prescription eyeglasses      Patient Active Problem List   Diagnosis    Diabetic polyneuropathy associated with type 2 diabetes mellitus    Cholelithiasis    Hypertensive heart disease without heart failure    Atrial flutter, paroxysmal    Pulmonary embolism    Abnormal thallium stress test    CAD (coronary artery disease)    Stented coronary artery    Hypoglycemia associated with diabetes    Hypoglycemia    Type 2 diabetes mellitus with diabetic polyneuropathy, with long-term current use of insulin    Controlled type 2 diabetes mellitus with stage 2 chronic kidney disease, with long-term current use of insulin        Objective:     Vitals:    07/24/20 1136   BP: (!) 148/81   Pulse: 70        Physical Exam   Constitutional: He is oriented to person, place, and time. He appears well-developed and well-nourished.   Neck: Normal range of motion. Neck supple. No JVD present.   Cardiovascular: Normal rate, regular rhythm, normal heart sounds and intact distal pulses.   Pulses:       Carotid pulses are 2+ on the right side and 2+ on the left side.       Radial pulses are 2+ on the right side and 2+ on the left side.   Pulmonary/Chest: Effort normal and breath sounds normal.   Musculoskeletal: Normal range of motion.         General: No edema.   Neurological: He is alert and oriented to person, place, and time.   Skin: Skin is warm and dry.   Psychiatric: He has a normal mood and affect. His speech is normal. Cognition and memory are normal.             ..    Chemistry        Component Value Date/Time     07/17/2020 0802    K 4.2 07/17/2020 0802     07/17/2020 0802    CO2 27 07/17/2020 0802    BUN 24 (H) 07/17/2020 0802    CREATININE 1.3 07/17/2020 0802     (H) 07/17/2020 0802        Component Value Date/Time    CALCIUM 9.9 07/17/2020 0802    ALKPHOS 83 07/17/2020 0802    AST 15 07/17/2020 0802    AST 18 01/07/2016 0412    ALT 12 07/17/2020 0802    BILITOT 0.3 07/17/2020 0802     ESTGFRAFRICA >60.0 07/17/2020 0802    EGFRNONAA 58.5 (A) 07/17/2020 0802            ..  Lab Results   Component Value Date    CHOL 180 07/17/2020    CHOL 136 06/03/2019    CHOL 129 08/20/2018     Lab Results   Component Value Date    HDL 42 07/17/2020    HDL 44 06/03/2019    HDL 47 08/20/2018     Lab Results   Component Value Date    LDLCALC 80.4 07/17/2020    LDLCALC 67.2 06/03/2019    LDLCALC 60.2 (L) 08/20/2018     Lab Results   Component Value Date    TRIG 288 (H) 07/17/2020    TRIG 124 06/03/2019    TRIG 109 08/20/2018     Lab Results   Component Value Date    CHOLHDL 23.3 07/17/2020    CHOLHDL 32.4 06/03/2019    CHOLHDL 36.4 08/20/2018     ..  Lab Results   Component Value Date    WBC 13.08 (H) 07/10/2020    HGB 10.9 (L) 07/10/2020    HCT 32.7 (L) 07/10/2020    MCV 98 07/10/2020     07/10/2020       Test(s) Reviewed  I have reviewed the following in detail:  [] Stress test   [] Angiography   [x] Echocardiogram   [x] Labs   [x] Other:       Assessment:         ICD-10-CM ICD-9-CM   1. Hypertensive heart disease without heart failure  I11.9 402.90   2. Atrial flutter, paroxysmal  I48.92 427.32   3. Coronary artery disease involving native coronary artery of native heart without angina pectoris  I25.10 414.01   4. Stented coronary artery  Z95.5 V45.82   5. Abnormal thallium stress test  R94.39 794.39   6. Controlled type 2 diabetes mellitus with stage 2 chronic kidney disease, with long-term current use of insulin  E11.22 250.40    N18.2 585.2    Z79.4 V58.67   7. Hypoglycemia associated with diabetes  E11.649 250.80     Problem List Items Addressed This Visit     Hypertensive heart disease without heart failure - Primary    Overview     bp elevated will be seeing dr pradhan this week will defer to him for med adjustment          Atrial flutter, paroxysmal    Overview     Taking plavix only . Had only one episode dr pradhan managing          Abnormal thallium stress test    Overview     2/2016 mild [place] wall          CAD (coronary artery disease)    Overview     Dr pradhan managing just ahd neg stress est last month with him labs all reviewed is on statin , b blocker and plavix for sec prevention . Has no pain currently          Stented coronary artery    Overview     5/2016 RCA- synergy 3.5x12         Hypoglycemia associated with diabetes    Controlled type 2 diabetes mellitus with stage 2 chronic kidney disease, with long-term current use of insulin    Overview     ckd 60 with last visit cont monitoring                 Plan:             Return to clinic 1 year   Aerobic exercise 5x's/wk. Heart healthy diet and risk factor modification.    See labs and med orders.  Reduce norvasc if BP controled    Portions of this note may have been created with voice recognition software.  Grammatical, syntax and spelling errors may be inevitable.

## 2020-09-17 ENCOUNTER — PATIENT MESSAGE (OUTPATIENT)
Dept: ADMINISTRATIVE | Facility: HOSPITAL | Age: 65
End: 2020-09-17

## 2020-09-24 ENCOUNTER — PATIENT OUTREACH (OUTPATIENT)
Dept: ADMINISTRATIVE | Facility: HOSPITAL | Age: 65
End: 2020-09-24

## 2020-10-05 ENCOUNTER — PATIENT MESSAGE (OUTPATIENT)
Dept: INTERNAL MEDICINE | Facility: CLINIC | Age: 65
End: 2020-10-05

## 2020-10-12 RX ORDER — PEN NEEDLE, DIABETIC 31 GX5/16"
1 NEEDLE, DISPOSABLE MISCELLANEOUS DAILY
Qty: 100 EACH | Refills: 3 | Status: SHIPPED | OUTPATIENT
Start: 2020-10-12 | End: 2021-10-14 | Stop reason: SDUPTHER

## 2020-11-16 RX ORDER — METFORMIN HYDROCHLORIDE 1000 MG/1
1000 TABLET ORAL 2 TIMES DAILY WITH MEALS
Qty: 180 TABLET | Refills: 3 | Status: SHIPPED | OUTPATIENT
Start: 2020-11-16 | End: 2021-08-17

## 2020-12-10 DIAGNOSIS — Z95.5 STENTED CORONARY ARTERY: ICD-10-CM

## 2020-12-10 DIAGNOSIS — E11.22: ICD-10-CM

## 2020-12-10 DIAGNOSIS — I25.83 CORONARY ARTERY DISEASE DUE TO LIPID RICH PLAQUE: ICD-10-CM

## 2020-12-10 DIAGNOSIS — I10 ESSENTIAL HYPERTENSION: ICD-10-CM

## 2020-12-10 DIAGNOSIS — R94.39 ABNORMAL THALLIUM STRESS TEST: ICD-10-CM

## 2020-12-10 DIAGNOSIS — N18.1: ICD-10-CM

## 2020-12-10 DIAGNOSIS — I48.92 ATRIAL FLUTTER, PAROXYSMAL: ICD-10-CM

## 2020-12-10 DIAGNOSIS — I25.10 CORONARY ARTERY DISEASE DUE TO LIPID RICH PLAQUE: ICD-10-CM

## 2020-12-10 DIAGNOSIS — I26.02 SADDLE EMBOLUS OF PULMONARY ARTERY WITH ACUTE COR PULMONALE, UNSPECIFIED CHRONICITY: ICD-10-CM

## 2020-12-10 RX ORDER — PRAVASTATIN SODIUM 20 MG/1
20 TABLET ORAL DAILY
Qty: 90 TABLET | Refills: 3 | Status: SHIPPED | OUTPATIENT
Start: 2020-12-10 | End: 2021-01-22

## 2020-12-28 LAB
LEFT EYE DM RETINOPATHY: NORMAL
RIGHT EYE DM RETINOPATHY: NORMAL

## 2021-01-04 ENCOUNTER — PATIENT MESSAGE (OUTPATIENT)
Dept: ADMINISTRATIVE | Facility: HOSPITAL | Age: 66
End: 2021-01-04

## 2021-01-06 DIAGNOSIS — E11.9 TYPE 2 DIABETES MELLITUS WITHOUT COMPLICATION: ICD-10-CM

## 2021-01-18 LAB
CHOL/HDLC RATIO: 2.6
CHOLEST SERPL-MSCNC: 135 MG/DL (ref 0–200)
HBA1C MFR BLD: 6.7 % (ref 4–6)
HDLC SERPL-MCNC: 51 MG/DL
LDLC SERPL CALC-MCNC: 68 MG/DL
NON-HDL CHOLESTEROL: 84
TRIGLYCERIDE (LIPID PAN): 81

## 2021-01-22 ENCOUNTER — OFFICE VISIT (OUTPATIENT)
Dept: FAMILY MEDICINE | Facility: CLINIC | Age: 66
End: 2021-01-22
Payer: COMMERCIAL

## 2021-01-22 ENCOUNTER — CLINICAL SUPPORT (OUTPATIENT)
Dept: FAMILY MEDICINE | Facility: CLINIC | Age: 66
End: 2021-01-22
Attending: INTERNAL MEDICINE
Payer: COMMERCIAL

## 2021-01-22 ENCOUNTER — TELEPHONE (OUTPATIENT)
Dept: ADMINISTRATIVE | Facility: HOSPITAL | Age: 66
End: 2021-01-22

## 2021-01-22 VITALS
SYSTOLIC BLOOD PRESSURE: 120 MMHG | DIASTOLIC BLOOD PRESSURE: 68 MMHG | TEMPERATURE: 97 F | HEART RATE: 79 BPM | OXYGEN SATURATION: 99 % | WEIGHT: 223.88 LBS | BODY MASS INDEX: 28.73 KG/M2 | HEIGHT: 74 IN | RESPIRATION RATE: 18 BRPM

## 2021-01-22 DIAGNOSIS — N18.2 CONTROLLED TYPE 2 DIABETES MELLITUS WITH STAGE 2 CHRONIC KIDNEY DISEASE, WITH LONG-TERM CURRENT USE OF INSULIN: Primary | ICD-10-CM

## 2021-01-22 DIAGNOSIS — I11.9 HYPERTENSIVE HEART DISEASE WITHOUT HEART FAILURE: ICD-10-CM

## 2021-01-22 DIAGNOSIS — E11.42 DIABETIC POLYNEUROPATHY ASSOCIATED WITH TYPE 2 DIABETES MELLITUS: ICD-10-CM

## 2021-01-22 DIAGNOSIS — E11.22 CONTROLLED TYPE 2 DIABETES MELLITUS WITH STAGE 2 CHRONIC KIDNEY DISEASE, WITH LONG-TERM CURRENT USE OF INSULIN: Primary | ICD-10-CM

## 2021-01-22 DIAGNOSIS — I25.10 CORONARY ARTERY DISEASE INVOLVING NATIVE CORONARY ARTERY OF NATIVE HEART WITHOUT ANGINA PECTORIS: ICD-10-CM

## 2021-01-22 DIAGNOSIS — Z79.4 CONTROLLED TYPE 2 DIABETES MELLITUS WITH STAGE 2 CHRONIC KIDNEY DISEASE, WITH LONG-TERM CURRENT USE OF INSULIN: Primary | ICD-10-CM

## 2021-01-22 DIAGNOSIS — Z79.4 CONTROLLED TYPE 2 DIABETES MELLITUS WITH STAGE 2 CHRONIC KIDNEY DISEASE, WITH LONG-TERM CURRENT USE OF INSULIN: ICD-10-CM

## 2021-01-22 DIAGNOSIS — I48.92 ATRIAL FLUTTER, PAROXYSMAL: ICD-10-CM

## 2021-01-22 DIAGNOSIS — E11.22 CONTROLLED TYPE 2 DIABETES MELLITUS WITH STAGE 2 CHRONIC KIDNEY DISEASE, WITH LONG-TERM CURRENT USE OF INSULIN: ICD-10-CM

## 2021-01-22 DIAGNOSIS — N18.2 CONTROLLED TYPE 2 DIABETES MELLITUS WITH STAGE 2 CHRONIC KIDNEY DISEASE, WITH LONG-TERM CURRENT USE OF INSULIN: ICD-10-CM

## 2021-01-22 PROBLEM — N18.1: Status: ACTIVE | Noted: 2020-07-21

## 2021-01-22 PROCEDURE — 1126F AMNT PAIN NOTED NONE PRSNT: CPT | Mod: S$GLB,,, | Performed by: INTERNAL MEDICINE

## 2021-01-22 PROCEDURE — 99214 PR OFFICE/OUTPT VISIT, EST, LEVL IV, 30-39 MIN: ICD-10-PCS | Mod: S$GLB,,, | Performed by: INTERNAL MEDICINE

## 2021-01-22 PROCEDURE — 3078F DIAST BP <80 MM HG: CPT | Mod: CPTII,S$GLB,, | Performed by: INTERNAL MEDICINE

## 2021-01-22 PROCEDURE — 99214 OFFICE O/P EST MOD 30 MIN: CPT | Mod: S$GLB,,, | Performed by: INTERNAL MEDICINE

## 2021-01-22 PROCEDURE — 99999 PR PBB SHADOW E&M-EST. PATIENT-LVL V: CPT | Mod: PBBFAC,,, | Performed by: INTERNAL MEDICINE

## 2021-01-22 PROCEDURE — 1126F PR PAIN SEVERITY QUANTIFIED, NO PAIN PRESENT: ICD-10-PCS | Mod: S$GLB,,, | Performed by: INTERNAL MEDICINE

## 2021-01-22 PROCEDURE — 3008F PR BODY MASS INDEX (BMI) DOCUMENTED: ICD-10-PCS | Mod: CPTII,S$GLB,, | Performed by: INTERNAL MEDICINE

## 2021-01-22 PROCEDURE — 3044F PR MOST RECENT HEMOGLOBIN A1C LEVEL <7.0%: ICD-10-PCS | Mod: CPTII,S$GLB,, | Performed by: INTERNAL MEDICINE

## 2021-01-22 PROCEDURE — 3044F HG A1C LEVEL LT 7.0%: CPT | Mod: CPTII,S$GLB,, | Performed by: INTERNAL MEDICINE

## 2021-01-22 PROCEDURE — 3078F PR MOST RECENT DIASTOLIC BLOOD PRESSURE < 80 MM HG: ICD-10-PCS | Mod: CPTII,S$GLB,, | Performed by: INTERNAL MEDICINE

## 2021-01-22 PROCEDURE — 3074F PR MOST RECENT SYSTOLIC BLOOD PRESSURE < 130 MM HG: ICD-10-PCS | Mod: CPTII,S$GLB,, | Performed by: INTERNAL MEDICINE

## 2021-01-22 PROCEDURE — 3074F SYST BP LT 130 MM HG: CPT | Mod: CPTII,S$GLB,, | Performed by: INTERNAL MEDICINE

## 2021-01-22 PROCEDURE — 99999 PR PBB SHADOW E&M-EST. PATIENT-LVL V: ICD-10-PCS | Mod: PBBFAC,,, | Performed by: INTERNAL MEDICINE

## 2021-01-22 PROCEDURE — 3008F BODY MASS INDEX DOCD: CPT | Mod: CPTII,S$GLB,, | Performed by: INTERNAL MEDICINE

## 2021-01-22 RX ORDER — FLASH GLUCOSE SENSOR
KIT MISCELLANEOUS
COMMUNITY
Start: 2021-01-05

## 2021-01-22 RX ORDER — PRAVASTATIN SODIUM 40 MG/1
40 TABLET ORAL NIGHTLY
Qty: 90 TABLET | Refills: 3 | Status: SHIPPED | OUTPATIENT
Start: 2021-01-22 | End: 2021-10-25

## 2021-01-22 RX ORDER — ORAL SEMAGLUTIDE 7 MG/1
7 TABLET ORAL EVERY MORNING
COMMUNITY
Start: 2021-01-07 | End: 2023-10-20

## 2021-01-29 ENCOUNTER — TELEPHONE (OUTPATIENT)
Dept: ADMINISTRATIVE | Facility: HOSPITAL | Age: 66
End: 2021-01-29

## 2021-01-29 ENCOUNTER — PATIENT OUTREACH (OUTPATIENT)
Dept: ADMINISTRATIVE | Facility: HOSPITAL | Age: 66
End: 2021-01-29

## 2021-02-04 DIAGNOSIS — E11.42 TYPE 2 DIABETES MELLITUS WITH DIABETIC POLYNEUROPATHY, WITH LONG-TERM CURRENT USE OF INSULIN: ICD-10-CM

## 2021-02-04 DIAGNOSIS — Z79.4 TYPE 2 DIABETES MELLITUS WITH DIABETIC POLYNEUROPATHY, WITH LONG-TERM CURRENT USE OF INSULIN: ICD-10-CM

## 2021-02-04 RX ORDER — GABAPENTIN 300 MG/1
CAPSULE ORAL
Qty: 90 CAPSULE | Refills: 3 | Status: SHIPPED | OUTPATIENT
Start: 2021-02-04 | End: 2021-06-07

## 2021-03-29 DIAGNOSIS — I25.10 CORONARY ARTERY DISEASE INVOLVING NATIVE CORONARY ARTERY OF NATIVE HEART WITHOUT ANGINA PECTORIS: Primary | ICD-10-CM

## 2021-03-29 RX ORDER — CLOPIDOGREL BISULFATE 75 MG/1
75 TABLET ORAL DAILY
Qty: 90 TABLET | Refills: 4 | Status: SHIPPED | OUTPATIENT
Start: 2021-03-29 | End: 2021-10-26

## 2021-04-20 LAB
CHOL/HDLC RATIO: 3.1
CHOLEST SERPL-MSCNC: 145 MG/DL (ref 0–200)
CREATININE RANDOM URINE: 259 MG/DL
HBA1C MFR BLD: 7.7 % (ref 4–6)
HDLC SERPL-MCNC: 47 MG/DL
LDLC SERPL CALC-MCNC: 73 MG/DL
MICROALBUMIN/CREAT RATIO PNL UR: 346 MG/G
NON-HDL CHOLESTEROL: 98
TRIGLYCERIDE (LIPID PAN): 169

## 2021-04-28 RX ORDER — CARVEDILOL 6.25 MG/1
TABLET ORAL
Qty: 180 TABLET | Refills: 4 | Status: SHIPPED | OUTPATIENT
Start: 2021-04-28 | End: 2022-07-05

## 2021-05-05 ENCOUNTER — TELEPHONE (OUTPATIENT)
Dept: ADMINISTRATIVE | Facility: HOSPITAL | Age: 66
End: 2021-05-05

## 2021-05-10 ENCOUNTER — PATIENT MESSAGE (OUTPATIENT)
Dept: RESEARCH | Facility: HOSPITAL | Age: 66
End: 2021-05-10

## 2021-06-17 ENCOUNTER — TELEPHONE (OUTPATIENT)
Dept: FAMILY MEDICINE | Facility: CLINIC | Age: 66
End: 2021-06-17

## 2021-06-17 ENCOUNTER — TELEPHONE (OUTPATIENT)
Dept: CARDIOLOGY | Facility: CLINIC | Age: 66
End: 2021-06-17

## 2021-06-17 DIAGNOSIS — Z79.4 CONTROLLED TYPE 2 DIABETES MELLITUS WITH STAGE 2 CHRONIC KIDNEY DISEASE, WITH LONG-TERM CURRENT USE OF INSULIN: Primary | ICD-10-CM

## 2021-06-17 DIAGNOSIS — N18.2 CONTROLLED TYPE 2 DIABETES MELLITUS WITH STAGE 2 CHRONIC KIDNEY DISEASE, WITH LONG-TERM CURRENT USE OF INSULIN: Primary | ICD-10-CM

## 2021-06-17 DIAGNOSIS — E11.22 CONTROLLED TYPE 2 DIABETES MELLITUS WITH STAGE 2 CHRONIC KIDNEY DISEASE, WITH LONG-TERM CURRENT USE OF INSULIN: Primary | ICD-10-CM

## 2021-06-22 LAB — HBA1C MFR BLD: 6.9 % OF TOTAL HGB

## 2021-07-09 ENCOUNTER — PATIENT OUTREACH (OUTPATIENT)
Dept: ADMINISTRATIVE | Facility: HOSPITAL | Age: 66
End: 2021-07-09

## 2021-07-16 ENCOUNTER — HOSPITAL ENCOUNTER (OUTPATIENT)
Dept: CARDIOLOGY | Facility: HOSPITAL | Age: 66
Discharge: HOME | End: 2021-07-16
Attending: INTERNAL MEDICINE
Payer: COMMERCIAL

## 2021-07-16 VITALS — HEIGHT: 74 IN | WEIGHT: 223 LBS | BODY MASS INDEX: 28.62 KG/M2

## 2021-07-16 DIAGNOSIS — I25.10 CORONARY ARTERY DISEASE INVOLVING NATIVE CORONARY ARTERY OF NATIVE HEART WITHOUT ANGINA PECTORIS: ICD-10-CM

## 2021-07-16 DIAGNOSIS — R94.39 ABNORMAL THALLIUM STRESS TEST: ICD-10-CM

## 2021-07-16 DIAGNOSIS — Z95.5 STENTED CORONARY ARTERY: ICD-10-CM

## 2021-07-16 DIAGNOSIS — I11.9 HYPERTENSIVE HEART DISEASE WITHOUT HEART FAILURE: ICD-10-CM

## 2021-07-16 DIAGNOSIS — I48.92 ATRIAL FLUTTER, PAROXYSMAL: ICD-10-CM

## 2021-07-16 LAB
ASCENDING AORTA: 3.09 CM
AV INDEX (PROSTH): 0.9
AV MEAN GRADIENT: 4 MMHG
AV PEAK GRADIENT: 6 MMHG
AV VALVE AREA: 3.46 CM2
AV VELOCITY RATIO: 0.85
BSA FOR ECHO PROCEDURE: 2.3 M2
CV ECHO LV RWT: 0.58 CM
DOP CALC AO PEAK VEL: 1.21 M/S
DOP CALC AO VTI: 26.16 CM
DOP CALC LVOT AREA: 3.8 CM2
DOP CALC LVOT DIAMETER: 2.21 CM
DOP CALC LVOT PEAK VEL: 1.03 M/S
DOP CALC LVOT STROKE VOLUME: 90.64 CM3
DOP CALCLVOT PEAK VEL VTI: 23.64 CM
E WAVE DECELERATION TIME: 117.35 MSEC
E/A RATIO: 0.9
E/E' RATIO: 9.76 M/S
ECHO LV POSTERIOR WALL: 1.16 CM (ref 0.6–1.1)
EJECTION FRACTION: 60 %
FRACTIONAL SHORTENING: 31 % (ref 28–44)
INTERVENTRICULAR SEPTUM: 1.1 CM (ref 0.6–1.1)
LA MAJOR: 5.87 CM
LA MINOR: 5.67 CM
LA WIDTH: 4.06 CM
LEFT ATRIUM SIZE: 3.81 CM
LEFT ATRIUM VOLUME INDEX: 33.3 ML/M2
LEFT ATRIUM VOLUME: 75.84 CM3
LEFT INTERNAL DIMENSION IN SYSTOLE: 2.78 CM (ref 2.1–4)
LEFT VENTRICLE DIASTOLIC VOLUME INDEX: 30.88 ML/M2
LEFT VENTRICLE DIASTOLIC VOLUME: 70.41 ML
LEFT VENTRICLE MASS INDEX: 67 G/M2
LEFT VENTRICLE SYSTOLIC VOLUME INDEX: 12.7 ML/M2
LEFT VENTRICLE SYSTOLIC VOLUME: 28.93 ML
LEFT VENTRICULAR INTERNAL DIMENSION IN DIASTOLE: 4.01 CM (ref 3.5–6)
LEFT VENTRICULAR MASS: 152.03 G
LV LATERAL E/E' RATIO: 8.3 M/S
LV SEPTAL E/E' RATIO: 11.86 M/S
MV PEAK A VEL: 0.92 M/S
MV PEAK E VEL: 0.83 M/S
MV STENOSIS PRESSURE HALF TIME: 34.03 MS
MV VALVE AREA P 1/2 METHOD: 6.46 CM2
PISA TR MAX VEL: 2.64 M/S
RA MAJOR: 5.53 CM
RA PRESSURE: 3 MMHG
RA WIDTH: 3.66 CM
RIGHT VENTRICULAR END-DIASTOLIC DIMENSION: 4.07 CM
RV TISSUE DOPPLER FREE WALL SYSTOLIC VELOCITY 1 (APICAL 4 CHAMBER VIEW): 14.49 CM/S
SINUS: 3.34 CM
STJ: 2.92 CM
TDI LATERAL: 0.1 M/S
TDI SEPTAL: 0.07 M/S
TDI: 0.09 M/S
TR MAX PG: 28 MMHG
TRICUSPID ANNULAR PLANE SYSTOLIC EXCURSION: 3.17 CM
TV REST PULMONARY ARTERY PRESSURE: 31 MMHG

## 2021-07-16 PROCEDURE — 93306 ECHO (CUPID ONLY): ICD-10-PCS | Mod: 26,,, | Performed by: INTERNAL MEDICINE

## 2021-07-16 PROCEDURE — 93306 TTE W/DOPPLER COMPLETE: CPT | Mod: 26,,, | Performed by: INTERNAL MEDICINE

## 2021-07-16 PROCEDURE — 93306 TTE W/DOPPLER COMPLETE: CPT | Mod: PO

## 2021-07-22 ENCOUNTER — PATIENT OUTREACH (OUTPATIENT)
Dept: ADMINISTRATIVE | Facility: OTHER | Age: 66
End: 2021-07-22

## 2021-07-22 DIAGNOSIS — Z12.11 COLON CANCER SCREENING: Primary | ICD-10-CM

## 2021-07-23 ENCOUNTER — OFFICE VISIT (OUTPATIENT)
Dept: FAMILY MEDICINE | Facility: CLINIC | Age: 66
End: 2021-07-23
Payer: COMMERCIAL

## 2021-07-23 VITALS
SYSTOLIC BLOOD PRESSURE: 124 MMHG | WEIGHT: 222.44 LBS | HEART RATE: 72 BPM | HEIGHT: 74 IN | DIASTOLIC BLOOD PRESSURE: 80 MMHG | BODY MASS INDEX: 28.55 KG/M2 | OXYGEN SATURATION: 97 % | RESPIRATION RATE: 18 BRPM | TEMPERATURE: 98 F

## 2021-07-23 DIAGNOSIS — Z79.4 CONTROLLED TYPE 2 DIABETES MELLITUS WITH STAGE 1 CHRONIC KIDNEY DISEASE, WITH LONG-TERM CURRENT USE OF INSULIN: ICD-10-CM

## 2021-07-23 DIAGNOSIS — I11.9 HYPERTENSIVE HEART DISEASE WITHOUT HEART FAILURE: Primary | ICD-10-CM

## 2021-07-23 DIAGNOSIS — E11.42 DIABETIC POLYNEUROPATHY ASSOCIATED WITH TYPE 2 DIABETES MELLITUS: ICD-10-CM

## 2021-07-23 DIAGNOSIS — N18.1 CONTROLLED TYPE 2 DIABETES MELLITUS WITH STAGE 1 CHRONIC KIDNEY DISEASE, WITH LONG-TERM CURRENT USE OF INSULIN: ICD-10-CM

## 2021-07-23 DIAGNOSIS — Z79.4 TYPE 2 DIABETES MELLITUS WITH DIABETIC POLYNEUROPATHY, WITH LONG-TERM CURRENT USE OF INSULIN: ICD-10-CM

## 2021-07-23 DIAGNOSIS — I25.10 CORONARY ARTERY DISEASE INVOLVING NATIVE CORONARY ARTERY OF NATIVE HEART WITHOUT ANGINA PECTORIS: ICD-10-CM

## 2021-07-23 DIAGNOSIS — E11.22 CONTROLLED TYPE 2 DIABETES MELLITUS WITH STAGE 1 CHRONIC KIDNEY DISEASE, WITH LONG-TERM CURRENT USE OF INSULIN: ICD-10-CM

## 2021-07-23 DIAGNOSIS — I48.92 ATRIAL FLUTTER, PAROXYSMAL: ICD-10-CM

## 2021-07-23 DIAGNOSIS — E11.42 TYPE 2 DIABETES MELLITUS WITH DIABETIC POLYNEUROPATHY, WITH LONG-TERM CURRENT USE OF INSULIN: ICD-10-CM

## 2021-07-23 PROCEDURE — 1126F AMNT PAIN NOTED NONE PRSNT: CPT | Mod: CPTII,S$GLB,, | Performed by: INTERNAL MEDICINE

## 2021-07-23 PROCEDURE — 3008F PR BODY MASS INDEX (BMI) DOCUMENTED: ICD-10-PCS | Mod: CPTII,S$GLB,, | Performed by: INTERNAL MEDICINE

## 2021-07-23 PROCEDURE — 3051F HG A1C>EQUAL 7.0%<8.0%: CPT | Mod: CPTII,S$GLB,, | Performed by: INTERNAL MEDICINE

## 2021-07-23 PROCEDURE — 99214 OFFICE O/P EST MOD 30 MIN: CPT | Mod: S$GLB,,, | Performed by: INTERNAL MEDICINE

## 2021-07-23 PROCEDURE — 1126F PR PAIN SEVERITY QUANTIFIED, NO PAIN PRESENT: ICD-10-PCS | Mod: CPTII,S$GLB,, | Performed by: INTERNAL MEDICINE

## 2021-07-23 PROCEDURE — 99214 PR OFFICE/OUTPT VISIT, EST, LEVL IV, 30-39 MIN: ICD-10-PCS | Mod: S$GLB,,, | Performed by: INTERNAL MEDICINE

## 2021-07-23 PROCEDURE — 3008F BODY MASS INDEX DOCD: CPT | Mod: CPTII,S$GLB,, | Performed by: INTERNAL MEDICINE

## 2021-07-23 PROCEDURE — 99999 PR PBB SHADOW E&M-EST. PATIENT-LVL V: ICD-10-PCS | Mod: PBBFAC,,, | Performed by: INTERNAL MEDICINE

## 2021-07-23 PROCEDURE — 99999 PR PBB SHADOW E&M-EST. PATIENT-LVL V: CPT | Mod: PBBFAC,,, | Performed by: INTERNAL MEDICINE

## 2021-07-23 PROCEDURE — 3051F PR MOST RECENT HEMOGLOBIN A1C LEVEL 7.0 - < 8.0%: ICD-10-PCS | Mod: CPTII,S$GLB,, | Performed by: INTERNAL MEDICINE

## 2021-07-23 RX ORDER — DAPAGLIFLOZIN 5 MG/1
5 TABLET, FILM COATED ORAL NIGHTLY
COMMUNITY
Start: 2021-07-06 | End: 2023-10-20

## 2021-07-27 ENCOUNTER — OFFICE VISIT (OUTPATIENT)
Dept: CARDIOLOGY | Facility: CLINIC | Age: 66
End: 2021-07-27
Payer: COMMERCIAL

## 2021-07-27 VITALS
WEIGHT: 222 LBS | HEART RATE: 72 BPM | DIASTOLIC BLOOD PRESSURE: 76 MMHG | SYSTOLIC BLOOD PRESSURE: 124 MMHG | BODY MASS INDEX: 28.49 KG/M2 | HEIGHT: 74 IN

## 2021-07-27 DIAGNOSIS — I48.92 ATRIAL FLUTTER, PAROXYSMAL: ICD-10-CM

## 2021-07-27 DIAGNOSIS — Z86.711 HISTORY OF PULMONARY EMBOLISM: ICD-10-CM

## 2021-07-27 DIAGNOSIS — I11.9 HYPERTENSIVE HEART DISEASE WITHOUT HEART FAILURE: ICD-10-CM

## 2021-07-27 DIAGNOSIS — I25.10 CORONARY ARTERY DISEASE INVOLVING NATIVE CORONARY ARTERY OF NATIVE HEART WITHOUT ANGINA PECTORIS: ICD-10-CM

## 2021-07-27 DIAGNOSIS — Z95.5 STENTED CORONARY ARTERY: ICD-10-CM

## 2021-07-27 DIAGNOSIS — E11.22 CONTROLLED TYPE 2 DIABETES MELLITUS WITH STAGE 1 CHRONIC KIDNEY DISEASE, WITH LONG-TERM CURRENT USE OF INSULIN: Primary | ICD-10-CM

## 2021-07-27 DIAGNOSIS — R94.39 ABNORMAL THALLIUM STRESS TEST: ICD-10-CM

## 2021-07-27 DIAGNOSIS — N18.1 CONTROLLED TYPE 2 DIABETES MELLITUS WITH STAGE 1 CHRONIC KIDNEY DISEASE, WITH LONG-TERM CURRENT USE OF INSULIN: Primary | ICD-10-CM

## 2021-07-27 DIAGNOSIS — Z79.4 CONTROLLED TYPE 2 DIABETES MELLITUS WITH STAGE 1 CHRONIC KIDNEY DISEASE, WITH LONG-TERM CURRENT USE OF INSULIN: Primary | ICD-10-CM

## 2021-07-27 PROCEDURE — 1160F RVW MEDS BY RX/DR IN RCRD: CPT | Mod: CPTII,S$GLB,, | Performed by: INTERNAL MEDICINE

## 2021-07-27 PROCEDURE — 1126F AMNT PAIN NOTED NONE PRSNT: CPT | Mod: CPTII,S$GLB,, | Performed by: INTERNAL MEDICINE

## 2021-07-27 PROCEDURE — 3051F PR MOST RECENT HEMOGLOBIN A1C LEVEL 7.0 - < 8.0%: ICD-10-PCS | Mod: CPTII,S$GLB,, | Performed by: INTERNAL MEDICINE

## 2021-07-27 PROCEDURE — 99214 PR OFFICE/OUTPT VISIT, EST, LEVL IV, 30-39 MIN: ICD-10-PCS | Mod: S$GLB,,, | Performed by: INTERNAL MEDICINE

## 2021-07-27 PROCEDURE — 3051F HG A1C>EQUAL 7.0%<8.0%: CPT | Mod: CPTII,S$GLB,, | Performed by: INTERNAL MEDICINE

## 2021-07-27 PROCEDURE — 3008F PR BODY MASS INDEX (BMI) DOCUMENTED: ICD-10-PCS | Mod: CPTII,S$GLB,, | Performed by: INTERNAL MEDICINE

## 2021-07-27 PROCEDURE — 99999 PR PBB SHADOW E&M-EST. PATIENT-LVL IV: CPT | Mod: PBBFAC,,, | Performed by: INTERNAL MEDICINE

## 2021-07-27 PROCEDURE — 1160F PR REVIEW ALL MEDS BY PRESCRIBER/CLIN PHARMACIST DOCUMENTED: ICD-10-PCS | Mod: CPTII,S$GLB,, | Performed by: INTERNAL MEDICINE

## 2021-07-27 PROCEDURE — 1159F MED LIST DOCD IN RCRD: CPT | Mod: CPTII,S$GLB,, | Performed by: INTERNAL MEDICINE

## 2021-07-27 PROCEDURE — 1159F PR MEDICATION LIST DOCUMENTED IN MEDICAL RECORD: ICD-10-PCS | Mod: CPTII,S$GLB,, | Performed by: INTERNAL MEDICINE

## 2021-07-27 PROCEDURE — 1126F PR PAIN SEVERITY QUANTIFIED, NO PAIN PRESENT: ICD-10-PCS | Mod: CPTII,S$GLB,, | Performed by: INTERNAL MEDICINE

## 2021-07-27 PROCEDURE — 3008F BODY MASS INDEX DOCD: CPT | Mod: CPTII,S$GLB,, | Performed by: INTERNAL MEDICINE

## 2021-07-27 PROCEDURE — 99999 PR PBB SHADOW E&M-EST. PATIENT-LVL IV: ICD-10-PCS | Mod: PBBFAC,,, | Performed by: INTERNAL MEDICINE

## 2021-07-27 PROCEDURE — 99214 OFFICE O/P EST MOD 30 MIN: CPT | Mod: S$GLB,,, | Performed by: INTERNAL MEDICINE

## 2021-08-06 DIAGNOSIS — U07.1 COVID-19: Primary | ICD-10-CM

## 2021-08-08 ENCOUNTER — NURSE TRIAGE (OUTPATIENT)
Dept: ADMINISTRATIVE | Facility: CLINIC | Age: 66
End: 2021-08-08

## 2021-08-08 ENCOUNTER — INFUSION (OUTPATIENT)
Dept: INFECTIOUS DISEASES | Facility: HOSPITAL | Age: 66
End: 2021-08-08
Attending: EMERGENCY MEDICINE
Payer: COMMERCIAL

## 2021-08-08 VITALS
TEMPERATURE: 99 F | HEART RATE: 74 BPM | DIASTOLIC BLOOD PRESSURE: 70 MMHG | OXYGEN SATURATION: 98 % | SYSTOLIC BLOOD PRESSURE: 148 MMHG | RESPIRATION RATE: 18 BRPM

## 2021-08-08 DIAGNOSIS — U07.1 COVID-19: Primary | ICD-10-CM

## 2021-08-08 PROCEDURE — M0243 CASIRIVI AND IMDEVI INFUSION: HCPCS | Performed by: EMERGENCY MEDICINE

## 2021-08-08 PROCEDURE — 25000003 PHARM REV CODE 250: Performed by: EMERGENCY MEDICINE

## 2021-08-08 PROCEDURE — 63600175 PHARM REV CODE 636 W HCPCS: Performed by: EMERGENCY MEDICINE

## 2021-08-08 RX ORDER — ACETAMINOPHEN 325 MG/1
650 TABLET ORAL ONCE AS NEEDED
Status: DISCONTINUED | OUTPATIENT
Start: 2021-08-08 | End: 2023-03-24 | Stop reason: HOSPADM

## 2021-08-08 RX ORDER — SODIUM CHLORIDE 0.9 % (FLUSH) 0.9 %
10 SYRINGE (ML) INJECTION
Status: DISCONTINUED | OUTPATIENT
Start: 2021-08-08 | End: 2021-10-26

## 2021-08-08 RX ORDER — ALBUTEROL SULFATE 90 UG/1
2 AEROSOL, METERED RESPIRATORY (INHALATION)
Status: DISCONTINUED | OUTPATIENT
Start: 2021-08-08 | End: 2021-10-26

## 2021-08-08 RX ORDER — DIPHENHYDRAMINE HYDROCHLORIDE 50 MG/ML
25 INJECTION INTRAMUSCULAR; INTRAVENOUS ONCE AS NEEDED
Status: DISCONTINUED | OUTPATIENT
Start: 2021-08-08 | End: 2021-10-26

## 2021-08-08 RX ORDER — ONDANSETRON 4 MG/1
4 TABLET, ORALLY DISINTEGRATING ORAL ONCE AS NEEDED
Status: DISCONTINUED | OUTPATIENT
Start: 2021-08-08 | End: 2023-03-24 | Stop reason: HOSPADM

## 2021-08-08 RX ORDER — EPINEPHRINE 0.3 MG/.3ML
0.3 INJECTION SUBCUTANEOUS
Status: DISCONTINUED | OUTPATIENT
Start: 2021-08-08 | End: 2021-10-26

## 2021-08-08 RX ADMIN — CASIRIVIMAB AND IMDEVIMAB 600 MG: 600; 600 INJECTION, SOLUTION, CONCENTRATE INTRAVENOUS at 09:08

## 2021-08-08 RX ADMIN — SODIUM CHLORIDE: 0.9 INJECTION, SOLUTION INTRAVENOUS at 09:08

## 2021-08-16 ENCOUNTER — TELEPHONE (OUTPATIENT)
Dept: PODIATRY | Facility: CLINIC | Age: 66
End: 2021-08-16

## 2021-08-27 ENCOUNTER — PATIENT OUTREACH (OUTPATIENT)
Dept: ADMINISTRATIVE | Facility: HOSPITAL | Age: 66
End: 2021-08-27

## 2021-09-03 ENCOUNTER — PATIENT OUTREACH (OUTPATIENT)
Dept: ADMINISTRATIVE | Facility: OTHER | Age: 66
End: 2021-09-03

## 2021-09-08 ENCOUNTER — OFFICE VISIT (OUTPATIENT)
Dept: PODIATRY | Facility: CLINIC | Age: 66
End: 2021-09-08
Payer: COMMERCIAL

## 2021-09-08 VITALS — BODY MASS INDEX: 28.49 KG/M2 | HEIGHT: 74 IN | WEIGHT: 222 LBS

## 2021-09-08 DIAGNOSIS — L97.512 DIABETIC ULCER OF TOE OF RIGHT FOOT ASSOCIATED WITH TYPE 2 DIABETES MELLITUS, WITH FAT LAYER EXPOSED: Primary | ICD-10-CM

## 2021-09-08 DIAGNOSIS — E11.42 DIABETIC POLYNEUROPATHY ASSOCIATED WITH TYPE 2 DIABETES MELLITUS: ICD-10-CM

## 2021-09-08 DIAGNOSIS — E11.621 DIABETIC ULCER OF TOE OF RIGHT FOOT ASSOCIATED WITH TYPE 2 DIABETES MELLITUS, WITH FAT LAYER EXPOSED: Primary | ICD-10-CM

## 2021-09-08 PROCEDURE — 1159F PR MEDICATION LIST DOCUMENTED IN MEDICAL RECORD: ICD-10-PCS | Mod: CPTII,S$GLB,, | Performed by: PODIATRIST

## 2021-09-08 PROCEDURE — 3051F PR MOST RECENT HEMOGLOBIN A1C LEVEL 7.0 - < 8.0%: ICD-10-PCS | Mod: CPTII,S$GLB,, | Performed by: PODIATRIST

## 2021-09-08 PROCEDURE — 3008F BODY MASS INDEX DOCD: CPT | Mod: CPTII,S$GLB,, | Performed by: PODIATRIST

## 2021-09-08 PROCEDURE — 3066F NEPHROPATHY DOC TX: CPT | Mod: CPTII,S$GLB,, | Performed by: PODIATRIST

## 2021-09-08 PROCEDURE — 87077 CULTURE AEROBIC IDENTIFY: CPT | Performed by: PODIATRIST

## 2021-09-08 PROCEDURE — 99203 PR OFFICE/OUTPT VISIT, NEW, LEVL III, 30-44 MIN: ICD-10-PCS | Mod: 25,S$GLB,, | Performed by: PODIATRIST

## 2021-09-08 PROCEDURE — 87186 SC STD MICRODIL/AGAR DIL: CPT | Performed by: PODIATRIST

## 2021-09-08 PROCEDURE — 3008F PR BODY MASS INDEX (BMI) DOCUMENTED: ICD-10-PCS | Mod: CPTII,S$GLB,, | Performed by: PODIATRIST

## 2021-09-08 PROCEDURE — 87075 CULTR BACTERIA EXCEPT BLOOD: CPT | Performed by: PODIATRIST

## 2021-09-08 PROCEDURE — 3066F PR DOCUMENTATION OF TREATMENT FOR NEPHROPATHY: ICD-10-PCS | Mod: CPTII,S$GLB,, | Performed by: PODIATRIST

## 2021-09-08 PROCEDURE — 99203 OFFICE O/P NEW LOW 30 MIN: CPT | Mod: 25,S$GLB,, | Performed by: PODIATRIST

## 2021-09-08 PROCEDURE — 3062F PR POS MACROALBUMINURIA RESULT DOCUMENTED/REVIEW: ICD-10-PCS | Mod: CPTII,S$GLB,, | Performed by: PODIATRIST

## 2021-09-08 PROCEDURE — 11042 WOUND DEBRIDEMENT: ICD-10-PCS | Mod: S$GLB,,, | Performed by: PODIATRIST

## 2021-09-08 PROCEDURE — 3051F HG A1C>EQUAL 7.0%<8.0%: CPT | Mod: CPTII,S$GLB,, | Performed by: PODIATRIST

## 2021-09-08 PROCEDURE — 99999 PR PBB SHADOW E&M-EST. PATIENT-LVL IV: ICD-10-PCS | Mod: PBBFAC,,, | Performed by: PODIATRIST

## 2021-09-08 PROCEDURE — 99999 PR PBB SHADOW E&M-EST. PATIENT-LVL IV: CPT | Mod: PBBFAC,,, | Performed by: PODIATRIST

## 2021-09-08 PROCEDURE — 11042 DBRDMT SUBQ TIS 1ST 20SQCM/<: CPT | Mod: S$GLB,,, | Performed by: PODIATRIST

## 2021-09-08 PROCEDURE — 1159F MED LIST DOCD IN RCRD: CPT | Mod: CPTII,S$GLB,, | Performed by: PODIATRIST

## 2021-09-08 PROCEDURE — 87070 CULTURE OTHR SPECIMN AEROBIC: CPT | Performed by: PODIATRIST

## 2021-09-08 PROCEDURE — 3062F POS MACROALBUMINURIA REV: CPT | Mod: CPTII,S$GLB,, | Performed by: PODIATRIST

## 2021-09-12 LAB — BACTERIA SPEC AEROBE CULT: ABNORMAL

## 2021-09-13 DIAGNOSIS — E11.628 DIABETIC FOOT INFECTION: Primary | ICD-10-CM

## 2021-09-13 DIAGNOSIS — L08.9 DIABETIC FOOT INFECTION: Primary | ICD-10-CM

## 2021-09-13 LAB — BACTERIA SPEC ANAEROBE CULT: NORMAL

## 2021-09-13 RX ORDER — CLINDAMYCIN HYDROCHLORIDE 300 MG/1
300 CAPSULE ORAL EVERY 8 HOURS
Qty: 21 CAPSULE | Refills: 0 | OUTPATIENT
Start: 2021-09-13 | End: 2022-04-07

## 2021-09-14 ENCOUNTER — OFFICE VISIT (OUTPATIENT)
Dept: PODIATRY | Facility: CLINIC | Age: 66
End: 2021-09-14
Payer: COMMERCIAL

## 2021-09-14 VITALS — WEIGHT: 222 LBS | HEIGHT: 74 IN | BODY MASS INDEX: 28.49 KG/M2

## 2021-09-14 DIAGNOSIS — L97.511 DIABETIC ULCER OF TOE OF RIGHT FOOT ASSOCIATED WITH TYPE 2 DIABETES MELLITUS, LIMITED TO BREAKDOWN OF SKIN: Primary | ICD-10-CM

## 2021-09-14 DIAGNOSIS — E11.42 DIABETIC POLYNEUROPATHY ASSOCIATED WITH TYPE 2 DIABETES MELLITUS: ICD-10-CM

## 2021-09-14 DIAGNOSIS — E11.621 DIABETIC ULCER OF TOE OF RIGHT FOOT ASSOCIATED WITH TYPE 2 DIABETES MELLITUS, LIMITED TO BREAKDOWN OF SKIN: Primary | ICD-10-CM

## 2021-09-14 PROCEDURE — 99499 NO LOS: ICD-10-PCS | Mod: S$GLB,,, | Performed by: PODIATRIST

## 2021-09-14 PROCEDURE — 1159F PR MEDICATION LIST DOCUMENTED IN MEDICAL RECORD: ICD-10-PCS | Mod: CPTII,S$GLB,, | Performed by: PODIATRIST

## 2021-09-14 PROCEDURE — 3062F POS MACROALBUMINURIA REV: CPT | Mod: CPTII,S$GLB,, | Performed by: PODIATRIST

## 2021-09-14 PROCEDURE — 3008F BODY MASS INDEX DOCD: CPT | Mod: CPTII,S$GLB,, | Performed by: PODIATRIST

## 2021-09-14 PROCEDURE — 97597 DBRDMT OPN WND 1ST 20 CM/<: CPT | Mod: S$GLB,,, | Performed by: PODIATRIST

## 2021-09-14 PROCEDURE — 99999 PR PBB SHADOW E&M-EST. PATIENT-LVL II: CPT | Mod: PBBFAC,,, | Performed by: PODIATRIST

## 2021-09-14 PROCEDURE — 1159F MED LIST DOCD IN RCRD: CPT | Mod: CPTII,S$GLB,, | Performed by: PODIATRIST

## 2021-09-14 PROCEDURE — 97597 WOUND DEBRIDEMENT: ICD-10-PCS | Mod: S$GLB,,, | Performed by: PODIATRIST

## 2021-09-14 PROCEDURE — 3051F HG A1C>EQUAL 7.0%<8.0%: CPT | Mod: CPTII,S$GLB,, | Performed by: PODIATRIST

## 2021-09-14 PROCEDURE — 3062F PR POS MACROALBUMINURIA RESULT DOCUMENTED/REVIEW: ICD-10-PCS | Mod: CPTII,S$GLB,, | Performed by: PODIATRIST

## 2021-09-14 PROCEDURE — 3066F PR DOCUMENTATION OF TREATMENT FOR NEPHROPATHY: ICD-10-PCS | Mod: CPTII,S$GLB,, | Performed by: PODIATRIST

## 2021-09-14 PROCEDURE — 3066F NEPHROPATHY DOC TX: CPT | Mod: CPTII,S$GLB,, | Performed by: PODIATRIST

## 2021-09-14 PROCEDURE — 99499 UNLISTED E&M SERVICE: CPT | Mod: S$GLB,,, | Performed by: PODIATRIST

## 2021-09-14 PROCEDURE — 3051F PR MOST RECENT HEMOGLOBIN A1C LEVEL 7.0 - < 8.0%: ICD-10-PCS | Mod: CPTII,S$GLB,, | Performed by: PODIATRIST

## 2021-09-14 PROCEDURE — 99999 PR PBB SHADOW E&M-EST. PATIENT-LVL II: ICD-10-PCS | Mod: PBBFAC,,, | Performed by: PODIATRIST

## 2021-09-14 PROCEDURE — 3008F PR BODY MASS INDEX (BMI) DOCUMENTED: ICD-10-PCS | Mod: CPTII,S$GLB,, | Performed by: PODIATRIST

## 2021-09-20 ENCOUNTER — OFFICE VISIT (OUTPATIENT)
Dept: PODIATRY | Facility: CLINIC | Age: 66
End: 2021-09-20
Payer: COMMERCIAL

## 2021-09-20 VITALS — HEIGHT: 74 IN | BODY MASS INDEX: 28.49 KG/M2 | WEIGHT: 222 LBS

## 2021-09-20 DIAGNOSIS — Z87.2 HEALED ULCER OF RIGHT FOOT ON EXAMINATION: Primary | ICD-10-CM

## 2021-09-20 DIAGNOSIS — E11.42 DIABETIC POLYNEUROPATHY ASSOCIATED WITH TYPE 2 DIABETES MELLITUS: ICD-10-CM

## 2021-09-20 PROCEDURE — 3066F NEPHROPATHY DOC TX: CPT | Mod: CPTII,S$GLB,, | Performed by: PODIATRIST

## 2021-09-20 PROCEDURE — 3051F PR MOST RECENT HEMOGLOBIN A1C LEVEL 7.0 - < 8.0%: ICD-10-PCS | Mod: CPTII,S$GLB,, | Performed by: PODIATRIST

## 2021-09-20 PROCEDURE — 3062F PR POS MACROALBUMINURIA RESULT DOCUMENTED/REVIEW: ICD-10-PCS | Mod: CPTII,S$GLB,, | Performed by: PODIATRIST

## 2021-09-20 PROCEDURE — 1159F PR MEDICATION LIST DOCUMENTED IN MEDICAL RECORD: ICD-10-PCS | Mod: CPTII,S$GLB,, | Performed by: PODIATRIST

## 2021-09-20 PROCEDURE — 3008F BODY MASS INDEX DOCD: CPT | Mod: CPTII,S$GLB,, | Performed by: PODIATRIST

## 2021-09-20 PROCEDURE — 3066F PR DOCUMENTATION OF TREATMENT FOR NEPHROPATHY: ICD-10-PCS | Mod: CPTII,S$GLB,, | Performed by: PODIATRIST

## 2021-09-20 PROCEDURE — 1159F MED LIST DOCD IN RCRD: CPT | Mod: CPTII,S$GLB,, | Performed by: PODIATRIST

## 2021-09-20 PROCEDURE — 3051F HG A1C>EQUAL 7.0%<8.0%: CPT | Mod: CPTII,S$GLB,, | Performed by: PODIATRIST

## 2021-09-20 PROCEDURE — 3062F POS MACROALBUMINURIA REV: CPT | Mod: CPTII,S$GLB,, | Performed by: PODIATRIST

## 2021-09-20 PROCEDURE — 99212 PR OFFICE/OUTPT VISIT, EST, LEVL II, 10-19 MIN: ICD-10-PCS | Mod: S$GLB,,, | Performed by: PODIATRIST

## 2021-09-20 PROCEDURE — 99999 PR PBB SHADOW E&M-EST. PATIENT-LVL III: ICD-10-PCS | Mod: PBBFAC,,, | Performed by: PODIATRIST

## 2021-09-20 PROCEDURE — 99999 PR PBB SHADOW E&M-EST. PATIENT-LVL III: CPT | Mod: PBBFAC,,, | Performed by: PODIATRIST

## 2021-09-20 PROCEDURE — 3008F PR BODY MASS INDEX (BMI) DOCUMENTED: ICD-10-PCS | Mod: CPTII,S$GLB,, | Performed by: PODIATRIST

## 2021-09-20 PROCEDURE — 99212 OFFICE O/P EST SF 10 MIN: CPT | Mod: S$GLB,,, | Performed by: PODIATRIST

## 2021-09-27 ENCOUNTER — OFFICE VISIT (OUTPATIENT)
Dept: PODIATRY | Facility: CLINIC | Age: 66
End: 2021-09-27
Payer: COMMERCIAL

## 2021-09-27 VITALS — BODY MASS INDEX: 28.49 KG/M2 | HEIGHT: 74 IN | WEIGHT: 222 LBS

## 2021-09-27 DIAGNOSIS — Z87.2 HEALED ULCER OF RIGHT FOOT ON EXAMINATION: Primary | ICD-10-CM

## 2021-09-27 DIAGNOSIS — M20.41 HAMMER TOE OF RIGHT FOOT: ICD-10-CM

## 2021-09-27 DIAGNOSIS — E11.42 DIABETIC POLYNEUROPATHY ASSOCIATED WITH TYPE 2 DIABETES MELLITUS: ICD-10-CM

## 2021-09-27 PROCEDURE — 3066F NEPHROPATHY DOC TX: CPT | Mod: CPTII,S$GLB,, | Performed by: PODIATRIST

## 2021-09-27 PROCEDURE — 3062F PR POS MACROALBUMINURIA RESULT DOCUMENTED/REVIEW: ICD-10-PCS | Mod: CPTII,S$GLB,, | Performed by: PODIATRIST

## 2021-09-27 PROCEDURE — 99212 PR OFFICE/OUTPT VISIT, EST, LEVL II, 10-19 MIN: ICD-10-PCS | Mod: S$GLB,,, | Performed by: PODIATRIST

## 2021-09-27 PROCEDURE — 3062F POS MACROALBUMINURIA REV: CPT | Mod: CPTII,S$GLB,, | Performed by: PODIATRIST

## 2021-09-27 PROCEDURE — 1159F MED LIST DOCD IN RCRD: CPT | Mod: CPTII,S$GLB,, | Performed by: PODIATRIST

## 2021-09-27 PROCEDURE — 3051F HG A1C>EQUAL 7.0%<8.0%: CPT | Mod: CPTII,S$GLB,, | Performed by: PODIATRIST

## 2021-09-27 PROCEDURE — 1159F PR MEDICATION LIST DOCUMENTED IN MEDICAL RECORD: ICD-10-PCS | Mod: CPTII,S$GLB,, | Performed by: PODIATRIST

## 2021-09-27 PROCEDURE — 99999 PR PBB SHADOW E&M-EST. PATIENT-LVL IV: ICD-10-PCS | Mod: PBBFAC,,, | Performed by: PODIATRIST

## 2021-09-27 PROCEDURE — 3051F PR MOST RECENT HEMOGLOBIN A1C LEVEL 7.0 - < 8.0%: ICD-10-PCS | Mod: CPTII,S$GLB,, | Performed by: PODIATRIST

## 2021-09-27 PROCEDURE — 3008F PR BODY MASS INDEX (BMI) DOCUMENTED: ICD-10-PCS | Mod: CPTII,S$GLB,, | Performed by: PODIATRIST

## 2021-09-27 PROCEDURE — 99999 PR PBB SHADOW E&M-EST. PATIENT-LVL IV: CPT | Mod: PBBFAC,,, | Performed by: PODIATRIST

## 2021-09-27 PROCEDURE — 3066F PR DOCUMENTATION OF TREATMENT FOR NEPHROPATHY: ICD-10-PCS | Mod: CPTII,S$GLB,, | Performed by: PODIATRIST

## 2021-09-27 PROCEDURE — 99212 OFFICE O/P EST SF 10 MIN: CPT | Mod: S$GLB,,, | Performed by: PODIATRIST

## 2021-09-27 PROCEDURE — 3008F BODY MASS INDEX DOCD: CPT | Mod: CPTII,S$GLB,, | Performed by: PODIATRIST

## 2021-10-13 ENCOUNTER — PATIENT OUTREACH (OUTPATIENT)
Dept: ADMINISTRATIVE | Facility: OTHER | Age: 66
End: 2021-10-13

## 2021-10-15 ENCOUNTER — OFFICE VISIT (OUTPATIENT)
Dept: PODIATRY | Facility: CLINIC | Age: 66
End: 2021-10-15
Payer: COMMERCIAL

## 2021-10-15 VITALS — WEIGHT: 222 LBS | BODY MASS INDEX: 28.49 KG/M2 | HEIGHT: 74 IN

## 2021-10-15 DIAGNOSIS — M20.41 HAMMER TOE OF RIGHT FOOT: Primary | ICD-10-CM

## 2021-10-15 DIAGNOSIS — E11.42 DIABETIC POLYNEUROPATHY ASSOCIATED WITH TYPE 2 DIABETES MELLITUS: ICD-10-CM

## 2021-10-15 PROCEDURE — 99999 PR PBB SHADOW E&M-EST. PATIENT-LVL IV: ICD-10-PCS | Mod: PBBFAC,,, | Performed by: PODIATRIST

## 2021-10-15 PROCEDURE — 99999 PR PBB SHADOW E&M-EST. PATIENT-LVL IV: CPT | Mod: PBBFAC,,, | Performed by: PODIATRIST

## 2021-10-15 PROCEDURE — 3008F PR BODY MASS INDEX (BMI) DOCUMENTED: ICD-10-PCS | Mod: CPTII,S$GLB,, | Performed by: PODIATRIST

## 2021-10-15 PROCEDURE — 3062F PR POS MACROALBUMINURIA RESULT DOCUMENTED/REVIEW: ICD-10-PCS | Mod: CPTII,S$GLB,, | Performed by: PODIATRIST

## 2021-10-15 PROCEDURE — 3008F BODY MASS INDEX DOCD: CPT | Mod: CPTII,S$GLB,, | Performed by: PODIATRIST

## 2021-10-15 PROCEDURE — 99499 UNLISTED E&M SERVICE: CPT | Mod: S$GLB,,, | Performed by: PODIATRIST

## 2021-10-15 PROCEDURE — 28232 INCISION OF TOE TENDON: CPT | Mod: S$GLB,,, | Performed by: PODIATRIST

## 2021-10-15 PROCEDURE — 1159F PR MEDICATION LIST DOCUMENTED IN MEDICAL RECORD: ICD-10-PCS | Mod: CPTII,S$GLB,, | Performed by: PODIATRIST

## 2021-10-15 PROCEDURE — 3051F HG A1C>EQUAL 7.0%<8.0%: CPT | Mod: CPTII,S$GLB,, | Performed by: PODIATRIST

## 2021-10-15 PROCEDURE — 3066F NEPHROPATHY DOC TX: CPT | Mod: CPTII,S$GLB,, | Performed by: PODIATRIST

## 2021-10-15 PROCEDURE — 3062F POS MACROALBUMINURIA REV: CPT | Mod: CPTII,S$GLB,, | Performed by: PODIATRIST

## 2021-10-15 PROCEDURE — 3066F PR DOCUMENTATION OF TREATMENT FOR NEPHROPATHY: ICD-10-PCS | Mod: CPTII,S$GLB,, | Performed by: PODIATRIST

## 2021-10-15 PROCEDURE — 3051F PR MOST RECENT HEMOGLOBIN A1C LEVEL 7.0 - < 8.0%: ICD-10-PCS | Mod: CPTII,S$GLB,, | Performed by: PODIATRIST

## 2021-10-15 PROCEDURE — 1159F MED LIST DOCD IN RCRD: CPT | Mod: CPTII,S$GLB,, | Performed by: PODIATRIST

## 2021-10-15 PROCEDURE — 99499 NO LOS: ICD-10-PCS | Mod: S$GLB,,, | Performed by: PODIATRIST

## 2021-10-15 PROCEDURE — 28232 PR INCISION FLEX TOE TENDON: ICD-10-PCS | Mod: S$GLB,,, | Performed by: PODIATRIST

## 2021-10-18 ENCOUNTER — TELEPHONE (OUTPATIENT)
Dept: PODIATRY | Facility: CLINIC | Age: 66
End: 2021-10-18

## 2021-10-19 ENCOUNTER — OFFICE VISIT (OUTPATIENT)
Dept: PODIATRY | Facility: CLINIC | Age: 66
End: 2021-10-19
Payer: COMMERCIAL

## 2021-10-19 VITALS — WEIGHT: 222 LBS | BODY MASS INDEX: 28.49 KG/M2 | HEIGHT: 74 IN

## 2021-10-19 DIAGNOSIS — Z98.890 POSTOPERATIVE STATE: Primary | ICD-10-CM

## 2021-10-19 PROCEDURE — 3051F HG A1C>EQUAL 7.0%<8.0%: CPT | Mod: CPTII,S$GLB,, | Performed by: PODIATRIST

## 2021-10-19 PROCEDURE — 3051F PR MOST RECENT HEMOGLOBIN A1C LEVEL 7.0 - < 8.0%: ICD-10-PCS | Mod: CPTII,S$GLB,, | Performed by: PODIATRIST

## 2021-10-19 PROCEDURE — 99024 POSTOP FOLLOW-UP VISIT: CPT | Mod: S$GLB,,, | Performed by: PODIATRIST

## 2021-10-19 PROCEDURE — 3066F PR DOCUMENTATION OF TREATMENT FOR NEPHROPATHY: ICD-10-PCS | Mod: CPTII,S$GLB,, | Performed by: PODIATRIST

## 2021-10-19 PROCEDURE — 3066F NEPHROPATHY DOC TX: CPT | Mod: CPTII,S$GLB,, | Performed by: PODIATRIST

## 2021-10-19 PROCEDURE — 1159F PR MEDICATION LIST DOCUMENTED IN MEDICAL RECORD: ICD-10-PCS | Mod: CPTII,S$GLB,, | Performed by: PODIATRIST

## 2021-10-19 PROCEDURE — 3062F PR POS MACROALBUMINURIA RESULT DOCUMENTED/REVIEW: ICD-10-PCS | Mod: CPTII,S$GLB,, | Performed by: PODIATRIST

## 2021-10-19 PROCEDURE — 3008F PR BODY MASS INDEX (BMI) DOCUMENTED: ICD-10-PCS | Mod: CPTII,S$GLB,, | Performed by: PODIATRIST

## 2021-10-19 PROCEDURE — 3062F POS MACROALBUMINURIA REV: CPT | Mod: CPTII,S$GLB,, | Performed by: PODIATRIST

## 2021-10-19 PROCEDURE — 3008F BODY MASS INDEX DOCD: CPT | Mod: CPTII,S$GLB,, | Performed by: PODIATRIST

## 2021-10-19 PROCEDURE — 99024 PR POST-OP FOLLOW-UP VISIT: ICD-10-PCS | Mod: S$GLB,,, | Performed by: PODIATRIST

## 2021-10-19 PROCEDURE — 99999 PR PBB SHADOW E&M-EST. PATIENT-LVL IV: CPT | Mod: PBBFAC,,, | Performed by: PODIATRIST

## 2021-10-19 PROCEDURE — 1159F MED LIST DOCD IN RCRD: CPT | Mod: CPTII,S$GLB,, | Performed by: PODIATRIST

## 2021-10-19 PROCEDURE — 99999 PR PBB SHADOW E&M-EST. PATIENT-LVL IV: ICD-10-PCS | Mod: PBBFAC,,, | Performed by: PODIATRIST

## 2021-10-26 ENCOUNTER — OFFICE VISIT (OUTPATIENT)
Dept: NEPHROLOGY | Facility: CLINIC | Age: 66
End: 2021-10-26
Payer: COMMERCIAL

## 2021-10-26 VITALS
DIASTOLIC BLOOD PRESSURE: 70 MMHG | SYSTOLIC BLOOD PRESSURE: 154 MMHG | HEIGHT: 74 IN | OXYGEN SATURATION: 98 % | BODY MASS INDEX: 28.64 KG/M2 | HEART RATE: 80 BPM | WEIGHT: 223.13 LBS

## 2021-10-26 DIAGNOSIS — N18.2 CONTROLLED TYPE 2 DIABETES MELLITUS WITH STAGE 2 CHRONIC KIDNEY DISEASE, WITH LONG-TERM CURRENT USE OF INSULIN: ICD-10-CM

## 2021-10-26 DIAGNOSIS — E11.22 CONTROLLED TYPE 2 DIABETES MELLITUS WITH STAGE 2 CHRONIC KIDNEY DISEASE, WITH LONG-TERM CURRENT USE OF INSULIN: ICD-10-CM

## 2021-10-26 DIAGNOSIS — Z79.4 CONTROLLED TYPE 2 DIABETES MELLITUS WITH STAGE 2 CHRONIC KIDNEY DISEASE, WITH LONG-TERM CURRENT USE OF INSULIN: ICD-10-CM

## 2021-10-26 DIAGNOSIS — I10 PRIMARY HYPERTENSION: ICD-10-CM

## 2021-10-26 DIAGNOSIS — N18.2 CKD (CHRONIC KIDNEY DISEASE) STAGE 2, GFR 60-89 ML/MIN: Primary | ICD-10-CM

## 2021-10-26 PROCEDURE — 99204 OFFICE O/P NEW MOD 45 MIN: CPT | Mod: S$GLB,,, | Performed by: INTERNAL MEDICINE

## 2021-10-26 PROCEDURE — 3066F NEPHROPATHY DOC TX: CPT | Mod: CPTII,S$GLB,, | Performed by: INTERNAL MEDICINE

## 2021-10-26 PROCEDURE — 3077F PR MOST RECENT SYSTOLIC BLOOD PRESSURE >= 140 MM HG: ICD-10-PCS | Mod: CPTII,S$GLB,, | Performed by: INTERNAL MEDICINE

## 2021-10-26 PROCEDURE — 1159F PR MEDICATION LIST DOCUMENTED IN MEDICAL RECORD: ICD-10-PCS | Mod: CPTII,S$GLB,, | Performed by: INTERNAL MEDICINE

## 2021-10-26 PROCEDURE — 3062F PR POS MACROALBUMINURIA RESULT DOCUMENTED/REVIEW: ICD-10-PCS | Mod: CPTII,S$GLB,, | Performed by: INTERNAL MEDICINE

## 2021-10-26 PROCEDURE — 99999 PR PBB SHADOW E&M-EST. PATIENT-LVL V: CPT | Mod: PBBFAC,,, | Performed by: INTERNAL MEDICINE

## 2021-10-26 PROCEDURE — 3051F PR MOST RECENT HEMOGLOBIN A1C LEVEL 7.0 - < 8.0%: ICD-10-PCS | Mod: CPTII,S$GLB,, | Performed by: INTERNAL MEDICINE

## 2021-10-26 PROCEDURE — 3008F PR BODY MASS INDEX (BMI) DOCUMENTED: ICD-10-PCS | Mod: CPTII,S$GLB,, | Performed by: INTERNAL MEDICINE

## 2021-10-26 PROCEDURE — 3077F SYST BP >= 140 MM HG: CPT | Mod: CPTII,S$GLB,, | Performed by: INTERNAL MEDICINE

## 2021-10-26 PROCEDURE — 99204 PR OFFICE/OUTPT VISIT, NEW, LEVL IV, 45-59 MIN: ICD-10-PCS | Mod: S$GLB,,, | Performed by: INTERNAL MEDICINE

## 2021-10-26 PROCEDURE — 3078F PR MOST RECENT DIASTOLIC BLOOD PRESSURE < 80 MM HG: ICD-10-PCS | Mod: CPTII,S$GLB,, | Performed by: INTERNAL MEDICINE

## 2021-10-26 PROCEDURE — 3008F BODY MASS INDEX DOCD: CPT | Mod: CPTII,S$GLB,, | Performed by: INTERNAL MEDICINE

## 2021-10-26 PROCEDURE — 3078F DIAST BP <80 MM HG: CPT | Mod: CPTII,S$GLB,, | Performed by: INTERNAL MEDICINE

## 2021-10-26 PROCEDURE — 3066F PR DOCUMENTATION OF TREATMENT FOR NEPHROPATHY: ICD-10-PCS | Mod: CPTII,S$GLB,, | Performed by: INTERNAL MEDICINE

## 2021-10-26 PROCEDURE — 1159F MED LIST DOCD IN RCRD: CPT | Mod: CPTII,S$GLB,, | Performed by: INTERNAL MEDICINE

## 2021-10-26 PROCEDURE — 3051F HG A1C>EQUAL 7.0%<8.0%: CPT | Mod: CPTII,S$GLB,, | Performed by: INTERNAL MEDICINE

## 2021-10-26 PROCEDURE — 3062F POS MACROALBUMINURIA REV: CPT | Mod: CPTII,S$GLB,, | Performed by: INTERNAL MEDICINE

## 2021-10-26 PROCEDURE — 99999 PR PBB SHADOW E&M-EST. PATIENT-LVL V: ICD-10-PCS | Mod: PBBFAC,,, | Performed by: INTERNAL MEDICINE

## 2021-10-26 RX ORDER — OLMESARTAN MEDOXOMIL AND HYDROCHLOROTHIAZIDE 40/25 40; 25 MG/1; MG/1
1 TABLET ORAL DAILY
COMMUNITY
Start: 2021-10-17 | End: 2022-05-16

## 2021-10-26 RX ORDER — POTASSIUM CHLORIDE 750 MG/1
TABLET, EXTENDED RELEASE ORAL
Status: ON HOLD | COMMUNITY
Start: 2021-08-17 | End: 2023-05-10 | Stop reason: HOSPADM

## 2021-10-27 ENCOUNTER — OFFICE VISIT (OUTPATIENT)
Dept: PODIATRY | Facility: CLINIC | Age: 66
End: 2021-10-27
Payer: COMMERCIAL

## 2021-10-27 DIAGNOSIS — Z98.890 POSTOPERATIVE STATE: Primary | ICD-10-CM

## 2021-10-27 DIAGNOSIS — E11.42 DIABETIC POLYNEUROPATHY ASSOCIATED WITH TYPE 2 DIABETES MELLITUS: ICD-10-CM

## 2021-10-27 PROCEDURE — 1159F MED LIST DOCD IN RCRD: CPT | Mod: CPTII,S$GLB,, | Performed by: PODIATRIST

## 2021-10-27 PROCEDURE — 3066F NEPHROPATHY DOC TX: CPT | Mod: CPTII,S$GLB,, | Performed by: PODIATRIST

## 2021-10-27 PROCEDURE — 3051F HG A1C>EQUAL 7.0%<8.0%: CPT | Mod: CPTII,S$GLB,, | Performed by: PODIATRIST

## 2021-10-27 PROCEDURE — 1159F PR MEDICATION LIST DOCUMENTED IN MEDICAL RECORD: ICD-10-PCS | Mod: CPTII,S$GLB,, | Performed by: PODIATRIST

## 2021-10-27 PROCEDURE — 3062F PR POS MACROALBUMINURIA RESULT DOCUMENTED/REVIEW: ICD-10-PCS | Mod: CPTII,S$GLB,, | Performed by: PODIATRIST

## 2021-10-27 PROCEDURE — 99999 PR PBB SHADOW E&M-EST. PATIENT-LVL I: CPT | Mod: PBBFAC,,, | Performed by: PODIATRIST

## 2021-10-27 PROCEDURE — 99999 PR PBB SHADOW E&M-EST. PATIENT-LVL I: ICD-10-PCS | Mod: PBBFAC,,, | Performed by: PODIATRIST

## 2021-10-27 PROCEDURE — 99024 PR POST-OP FOLLOW-UP VISIT: ICD-10-PCS | Mod: S$GLB,,, | Performed by: PODIATRIST

## 2021-10-27 PROCEDURE — 3062F POS MACROALBUMINURIA REV: CPT | Mod: CPTII,S$GLB,, | Performed by: PODIATRIST

## 2021-10-27 PROCEDURE — 3051F PR MOST RECENT HEMOGLOBIN A1C LEVEL 7.0 - < 8.0%: ICD-10-PCS | Mod: CPTII,S$GLB,, | Performed by: PODIATRIST

## 2021-10-27 PROCEDURE — 99024 POSTOP FOLLOW-UP VISIT: CPT | Mod: S$GLB,,, | Performed by: PODIATRIST

## 2021-10-27 PROCEDURE — 3066F PR DOCUMENTATION OF TREATMENT FOR NEPHROPATHY: ICD-10-PCS | Mod: CPTII,S$GLB,, | Performed by: PODIATRIST

## 2021-11-18 ENCOUNTER — TELEPHONE (OUTPATIENT)
Dept: NEPHROLOGY | Facility: CLINIC | Age: 66
End: 2021-11-18
Payer: COMMERCIAL

## 2021-11-24 ENCOUNTER — DOCUMENTATION ONLY (OUTPATIENT)
Dept: NEPHROLOGY | Facility: CLINIC | Age: 66
End: 2021-11-24
Payer: COMMERCIAL

## 2021-12-21 RX ORDER — METFORMIN HYDROCHLORIDE 1000 MG/1
1000 TABLET ORAL 2 TIMES DAILY WITH MEALS
Qty: 180 TABLET | Refills: 0 | Status: SHIPPED | OUTPATIENT
Start: 2021-12-21 | End: 2022-12-20 | Stop reason: SDUPTHER

## 2022-01-10 ENCOUNTER — PATIENT MESSAGE (OUTPATIENT)
Dept: ADMINISTRATIVE | Facility: HOSPITAL | Age: 67
End: 2022-01-10
Payer: COMMERCIAL

## 2022-01-14 ENCOUNTER — OFFICE VISIT (OUTPATIENT)
Dept: INTERNAL MEDICINE | Facility: CLINIC | Age: 67
End: 2022-01-14
Payer: COMMERCIAL

## 2022-01-14 VITALS
SYSTOLIC BLOOD PRESSURE: 115 MMHG | BODY MASS INDEX: 28.58 KG/M2 | TEMPERATURE: 98 F | WEIGHT: 222.69 LBS | HEART RATE: 80 BPM | DIASTOLIC BLOOD PRESSURE: 80 MMHG | RESPIRATION RATE: 18 BRPM | OXYGEN SATURATION: 98 % | HEIGHT: 74 IN

## 2022-01-14 DIAGNOSIS — N18.1 CONTROLLED TYPE 2 DIABETES MELLITUS WITH STAGE 1 CHRONIC KIDNEY DISEASE, WITH LONG-TERM CURRENT USE OF INSULIN: ICD-10-CM

## 2022-01-14 DIAGNOSIS — E11.22 CONTROLLED TYPE 2 DIABETES MELLITUS WITH STAGE 1 CHRONIC KIDNEY DISEASE, WITH LONG-TERM CURRENT USE OF INSULIN: ICD-10-CM

## 2022-01-14 DIAGNOSIS — Z79.4 TYPE 2 DIABETES MELLITUS WITH DIABETIC POLYNEUROPATHY, WITH LONG-TERM CURRENT USE OF INSULIN: ICD-10-CM

## 2022-01-14 DIAGNOSIS — I11.9 HYPERTENSIVE HEART DISEASE WITHOUT HEART FAILURE: ICD-10-CM

## 2022-01-14 DIAGNOSIS — E11.42 TYPE 2 DIABETES MELLITUS WITH DIABETIC POLYNEUROPATHY, WITH LONG-TERM CURRENT USE OF INSULIN: ICD-10-CM

## 2022-01-14 DIAGNOSIS — I25.10 CORONARY ARTERY DISEASE INVOLVING NATIVE CORONARY ARTERY OF NATIVE HEART WITHOUT ANGINA PECTORIS: ICD-10-CM

## 2022-01-14 DIAGNOSIS — Z79.4 CONTROLLED TYPE 2 DIABETES MELLITUS WITH STAGE 1 CHRONIC KIDNEY DISEASE, WITH LONG-TERM CURRENT USE OF INSULIN: ICD-10-CM

## 2022-01-14 PROCEDURE — 3079F DIAST BP 80-89 MM HG: CPT | Mod: CPTII,S$GLB,, | Performed by: INTERNAL MEDICINE

## 2022-01-14 PROCEDURE — 3008F BODY MASS INDEX DOCD: CPT | Mod: CPTII,S$GLB,, | Performed by: INTERNAL MEDICINE

## 2022-01-14 PROCEDURE — 1160F RVW MEDS BY RX/DR IN RCRD: CPT | Mod: CPTII,S$GLB,, | Performed by: INTERNAL MEDICINE

## 2022-01-14 PROCEDURE — 99999 PR PBB SHADOW E&M-EST. PATIENT-LVL III: CPT | Mod: PBBFAC,,, | Performed by: INTERNAL MEDICINE

## 2022-01-14 PROCEDURE — 3074F PR MOST RECENT SYSTOLIC BLOOD PRESSURE < 130 MM HG: ICD-10-PCS | Mod: CPTII,S$GLB,, | Performed by: INTERNAL MEDICINE

## 2022-01-14 PROCEDURE — 99214 OFFICE O/P EST MOD 30 MIN: CPT | Mod: S$GLB,,, | Performed by: INTERNAL MEDICINE

## 2022-01-14 PROCEDURE — 3079F PR MOST RECENT DIASTOLIC BLOOD PRESSURE 80-89 MM HG: ICD-10-PCS | Mod: CPTII,S$GLB,, | Performed by: INTERNAL MEDICINE

## 2022-01-14 PROCEDURE — 3051F HG A1C>EQUAL 7.0%<8.0%: CPT | Mod: CPTII,S$GLB,, | Performed by: INTERNAL MEDICINE

## 2022-01-14 PROCEDURE — 3008F PR BODY MASS INDEX (BMI) DOCUMENTED: ICD-10-PCS | Mod: CPTII,S$GLB,, | Performed by: INTERNAL MEDICINE

## 2022-01-14 PROCEDURE — 3051F PR MOST RECENT HEMOGLOBIN A1C LEVEL 7.0 - < 8.0%: ICD-10-PCS | Mod: CPTII,S$GLB,, | Performed by: INTERNAL MEDICINE

## 2022-01-14 PROCEDURE — 99214 PR OFFICE/OUTPT VISIT, EST, LEVL IV, 30-39 MIN: ICD-10-PCS | Mod: S$GLB,,, | Performed by: INTERNAL MEDICINE

## 2022-01-14 PROCEDURE — 1159F MED LIST DOCD IN RCRD: CPT | Mod: CPTII,S$GLB,, | Performed by: INTERNAL MEDICINE

## 2022-01-14 PROCEDURE — 3074F SYST BP LT 130 MM HG: CPT | Mod: CPTII,S$GLB,, | Performed by: INTERNAL MEDICINE

## 2022-01-14 PROCEDURE — 99999 PR PBB SHADOW E&M-EST. PATIENT-LVL III: ICD-10-PCS | Mod: PBBFAC,,, | Performed by: INTERNAL MEDICINE

## 2022-01-14 PROCEDURE — 1159F PR MEDICATION LIST DOCUMENTED IN MEDICAL RECORD: ICD-10-PCS | Mod: CPTII,S$GLB,, | Performed by: INTERNAL MEDICINE

## 2022-01-14 PROCEDURE — 1160F PR REVIEW ALL MEDS BY PRESCRIBER/CLIN PHARMACIST DOCUMENTED: ICD-10-PCS | Mod: CPTII,S$GLB,, | Performed by: INTERNAL MEDICINE

## 2022-01-14 RX ORDER — GABAPENTIN 300 MG/1
300 CAPSULE ORAL 3 TIMES DAILY
Qty: 90 CAPSULE | Refills: 5 | Status: SHIPPED | OUTPATIENT
Start: 2022-01-14 | End: 2022-08-08

## 2022-01-14 NOTE — PROGRESS NOTES
[facility] Note    Chief Complaint      Chief Complaint   Patient presents with    Follow-up       History of Present Illness      Yobany Richardson is a 66 y.o. male who presents today for   Chief Complaint   Patient presents with    Follow-up   .  Patient comes to appointment here for 6m checkup for chronic issues as below . He is seeing dr quiñonez for his diabetic control . He still needs optho eval for dm2 . He is otherwise feeling well and is compliant with all meds and specialist f/u. He has recovered from omicron variant 2 weeks ago .    HPI    No problem-specific Assessment & Plan notes found for this encounter.       Problem List Items Addressed This Visit        Cardiac/Vascular    Hypertensive heart disease without heart failure    Overview     bp at home 115/80 cont current          CAD (coronary artery disease)    Overview     Dr pradhan managing  with him labs all reviewed is on statin , b blocker and plavix for sec prevention . Has no pain currently . Will be getting nuclear stress test             Endocrine    Type 2 diabetes mellitus with diabetic polyneuropathy, with long-term current use of insulin    Overview     7.4 a1c with dr quiñonez . Needs refill on gabapentin   optho done 12/30/21         Controlled type 2 diabetes mellitus with stage 1 chronic kidney disease    Overview     Stable . Now seeing dr cleveland nephrology has f/u in April                 Past Medical History:  Past Medical History:   Diagnosis Date    Abnormal thallium stress test     Arrhythmia     Atrial flutter, paroxysmal 2/11/2016    CAD (coronary artery disease) 3/9/2016    5/2016 [facility] Left main:NL LAD: 35% proximal LAD. two small diagonals with minimal disease.  Circumflex/Large branching first obtuse marginal: with minimal disease.   RCA: The vessel was large sized (dominant) with a 60% proximal lesion. 100 mcgs of intracoronary Nitroglycerin were given with no change in the lesion. Thus FFR was  performed. FFR was 0.79  2/2016 cor CTA ~~ 50% LAD    Coronary artery disease     Diabetes mellitus     Diabetes mellitus, type 2     Disorder of kidney and ureter     Hypertension     Neuropathy     Paroxysmal atrial flutter     PE (pulmonary embolism)     Pulmonary embolism 2/11/2016 1/2016     Rheumatoid arthritis     Shortness of breath     Stented coronary artery 6/15/2016    5/2016 RCA- synergy 3.5x12    Wears contact lenses     Wears prescription eyeglasses        Past Surgical History:  Past Surgical History:   Procedure Laterality Date    CARDIAC CATHETERIZATION      with stent placed x 1    KNEE ARTHROSCOPY      TONSILLECTOMY         Family History:  family history includes Angina in his mother; Bell's palsy in his sister; Cancer in his mother; Depression in his sister and sister; Heart disease in his mother; Hypertension in his mother; Kidney disease in his father; Thyroid disease in his sister and sister.     Social History:  Social History     Socioeconomic History    Marital status:    Tobacco Use    Smoking status: Never Smoker    Smokeless tobacco: Never Used   Substance and Sexual Activity    Alcohol use: No    Drug use: No     Social Determinants of Health     Financial Resource Strain: Low Risk     Difficulty of Paying Living Expenses: Not hard at all   Food Insecurity: No Food Insecurity    Worried About Running Out of Food in the Last Year: Never true    Ran Out of Food in the Last Year: Never true   Transportation Needs: No Transportation Needs    Lack of Transportation (Medical): No    Lack of Transportation (Non-Medical): No   Physical Activity: Unknown    Days of Exercise per Week: 0 days   Stress: No Stress Concern Present    Feeling of Stress : Not at all   Social Connections: Unknown    Frequency of Communication with Friends and Family: Never    Frequency of Social Gatherings with Friends and Family: Never    Active Member of Clubs or  "Organizations: No    Attends Club or Organization Meetings: Never    Marital Status:        Review of Systems:   Review of Systems   HENT: Negative for hearing loss.    Eyes: Negative for discharge.   Respiratory: Negative for wheezing.    Cardiovascular: Negative for chest pain and palpitations.   Gastrointestinal: Negative for blood in stool, constipation, diarrhea and vomiting.   Genitourinary: Negative for hematuria and urgency.   Musculoskeletal: Negative for neck pain.   Neurological: Negative for weakness and headaches.   Endo/Heme/Allergies: Negative for polydipsia.        Medications:  Outpatient Encounter Medications as of 1/14/2022   Medication Sig Note Dispense Refill    acetaminophen (TYLENOL) 325 MG tablet Take 1 tablet (325 mg total) by mouth every 6 (six) hours as needed for Pain. 10/4/2019: Take as needed      amLODIPine (NORVASC) 10 MG tablet TK 1 T PO D       APPLE CIDER VINEGAR ORAL Take by mouth.       ascorbic acid (VITAMIN C) 500 MG tablet Take 500 mg by mouth once daily.       aspirin 81 MG Chew Take 81 mg by mouth once daily.       b complex vitamins capsule Take 1 capsule by mouth once daily.       BD ULTRA-FINE AMAN PEN NEEDLE 32 gauge x 5/32" Ndle Inject 1 Syringe into the skin once daily.  100 each 3    carvediloL (COREG) 6.25 MG tablet TAKE 1 TABLET(6.25 MG) BY MOUTH TWICE DAILY  180 tablet 4    clindamycin (CLEOCIN) 300 MG capsule Take 1 capsule (300 mg total) by mouth every 8 (eight) hours.  21 capsule 0    clopidogreL (PLAVIX) 75 mg tablet TAKE 1 TABLET BY MOUTH EVERY DAY  90 tablet 4    cyanocobalamin, vitamin B-12, 1,000 mcg/15 mL Liqd Take by mouth.       FARXIGA 5 mg Tab tablet Take 5 mg by mouth every evening.       fish,bora,flax oils-om3,6,9no1 1,200 mg Cap Take by mouth.       FREESTYLE PASCUAL 14 DAY SENSOR Kit APPLY NEW SENSOR EVERY 14 DAYS AS DIRECTED       FREESTYLE LITE STRIPS Strp TEST 2 TO 3 TIMES D   2    LANTUS SOLOSTAR U-100 INSULIN glargine " 100 units/mL (3mL) SubQ pen ADMINISTER 45 UNITS UNDER THE SKIN EVERY DAY  15 mL 5    metFORMIN (GLUCOPHAGE) 1000 MG tablet Take 1 tablet (1,000 mg total) by mouth 2 (two) times daily with meals.  180 tablet 0    multivit-min/iron/folic acid/K (ADULTS MULTIVITAMIN ORAL) Take by mouth.       olmesartan-hydrochlorothiazide (BENICAR HCT) 40-12.5 mg Tab Take 1 tablet by mouth once daily.       olmesartan-hydrochlorothiazide (BENICAR HCT) 40-25 mg per tablet Take 1 tablet by mouth once daily.       ondansetron (ZOFRAN) 4 MG tablet Take 1 tablet (4 mg total) by mouth every 6 (six) hours.  12 tablet 0    ONETOUCH DELICA PLUS LANCET 33 gauge Misc TEST ONCE D   0    potassium chloride SA (K-DUR,KLOR-CON) 10 MEQ tablet Take by mouth.       pravastatin (PRAVACHOL) 40 MG tablet TAKE 1 TABLET(40 MG) BY MOUTH EVERY EVENING  90 tablet 3    RYBELSUS 7 mg tablet Take 7 mg by mouth every morning.       UNABLE TO FIND medication name: Tart Cherry Extract       vitamin E 400 UNIT capsule Take 400 Units by mouth once daily.       [DISCONTINUED] gabapentin (NEURONTIN) 300 MG capsule TAKE 1 CAPSULE(300 MG) BY MOUTH THREE TIMES DAILY  90 capsule 3    gabapentin (NEURONTIN) 300 MG capsule Take 1 capsule (300 mg total) by mouth 3 (three) times daily.  90 capsule 5     Facility-Administered Encounter Medications as of 1/14/2022   Medication Dose Route Frequency Provider Last Rate Last Admin    acetaminophen tablet 650 mg  650 mg Oral Once PRN Robert Landrum MD        ondansetron disintegrating tablet 4 mg  4 mg Oral Once PRN Robert Landrum MD           Allergies:  Review of patient's allergies indicates:  No Known Allergies      Physical Exam      Vitals:    01/14/22 1523   BP: 115/80   Pulse:    Resp:    Temp:         Vital Signs  Temp: 98 °F (36.7 °C)  Temp src: Oral  Pulse: 80  Resp: 18  SpO2: 98 %  BP: 115/80 (home bp reading)  BP Location: Right arm  Patient Position: Sitting  Pain Score: 0-No pain  Height and  "Weight  Height: 6' 2" (188 cm)  Weight: 101 kg (222 lb 10.6 oz)  BSA (Calculated - sq m): 2.3 sq meters  BMI (Calculated): 28.6  Weight in (lb) to have BMI = 25: 194.3]     Body mass index is 28.59 kg/m².    Physical Exam  Constitutional:       Appearance: He is well-developed and well-nourished.   HENT:      Head: Normocephalic.   Eyes:      Pupils: Pupils are equal, round, and reactive to light.   Neck:      Thyroid: No thyromegaly.   Cardiovascular:      Rate and Rhythm: Normal rate and regular rhythm.      Heart sounds: Murmur heard.    Systolic murmur is present with a grade of 2/6.  No friction rub. No gallop.    Pulmonary:      Effort: Pulmonary effort is normal.      Breath sounds: Normal breath sounds.   Abdominal:      General: Bowel sounds are normal.      Palpations: Abdomen is soft.   Musculoskeletal:         General: No edema. Normal range of motion.      Cervical back: Normal range of motion.   Skin:     General: Skin is warm and dry.   Neurological:      Mental Status: He is alert and oriented to person, place, and time.      Sensory: No sensory deficit.   Psychiatric:         Mood and Affect: Mood and affect normal.         Behavior: Behavior normal.          Laboratory:  CBC:  No results for input(s): WBC, RBC, HGB, HCT, PLT, MCV, MCH, MCHC in the last 2160 hours.  CMP:  No results for input(s): GLU, CALCIUM, ALBUMIN, PROT, NA, K, CO2, CL, BUN, ALKPHOS, ALT, AST, BILITOT in the last 2160 hours.    Invalid input(s): CREATININ  URINALYSIS:  No results for input(s): COLORU, CLARITYU, SPECGRAV, PHUR, PROTEINUA, GLUCOSEU, BILIRUBINCON, BLOODU, WBCU, RBCU, BACTERIA, MUCUS, NITRITE, LEUKOCYTESUR, UROBILINOGEN, HYALINECASTS in the last 2160 hours.   LIPIDS:  No results for input(s): TSH, HDL, CHOL, TRIG, LDLCALC, CHOLHDL, NONHDLCHOL, TOTALCHOLEST in the last 2160 hours.  TSH:  No results for input(s): TSH in the last 2160 hours.  A1C:  No results for input(s): HGBA1C in the last 2160 hours.    Radiology:   "      Assessment:     Yobany Richardson is a 66 y.o.male with:    Type 2 diabetes mellitus with diabetic polyneuropathy, with long-term current use of insulin  -     gabapentin (NEURONTIN) 300 MG capsule; Take 1 capsule (300 mg total) by mouth 3 (three) times daily.  Dispense: 90 capsule; Refill: 5    Controlled type 2 diabetes mellitus with stage 1 chronic kidney disease, with long-term current use of insulin    Coronary artery disease involving native coronary artery of native heart without angina pectoris    Hypertensive heart disease without heart failure                Plan:     Problem List Items Addressed This Visit        Cardiac/Vascular    Hypertensive heart disease without heart failure    Overview     bp at home 115/80 cont current          CAD (coronary artery disease)    Overview     Dr pradhan managing  with him labs all reviewed is on statin , b blocker and plavix for sec prevention . Has no pain currently . Will be getting nuclear stress test             Endocrine    Type 2 diabetes mellitus with diabetic polyneuropathy, with long-term current use of insulin    Overview     7.4 a1c with dr quiñonez . Needs refill on gabapentin   optho done 12/30/21         Controlled type 2 diabetes mellitus with stage 1 chronic kidney disease    Overview     Stable . Now seeing dr cleveland nephrology has f/u in April                As above, continue current medications and maintain follow up with specialists.  Return to clinic in 6  months.      Frederick W Dantagnan [facility] [place]                  Answers for HPI/ROS submitted by the patient on 1/13/2022  activity change: No  unexpected weight change: No  rhinorrhea: No  trouble swallowing: No  visual disturbance: No  chest tightness: No  polyuria: No  difficulty urinating: No  joint swelling: No  arthralgias: No  confusion: No  dysphoric mood: No

## 2022-01-25 DIAGNOSIS — I48.92 ATRIAL FLUTTER, PAROXYSMAL: ICD-10-CM

## 2022-01-25 RX ORDER — PRAVASTATIN SODIUM 40 MG/1
40 TABLET ORAL NIGHTLY
Qty: 90 TABLET | Refills: 3 | Status: SHIPPED | OUTPATIENT
Start: 2022-01-25 | End: 2022-11-04

## 2022-01-25 NOTE — TELEPHONE ENCOUNTER
No new care gaps identified.  Powered by [employer]. Reference number: [id number].   1/25/2022 10:33:25 AM CST

## 2022-01-25 NOTE — TELEPHONE ENCOUNTER
----- Message from Xiomara Schmidt sent at 1/25/2022 10:29 AM CST -----  Contact: Self/[phone]  Requesting an RX refill or new RX.  Is this a refill or new RX: New  RX name and strength :  pravastatin (PRAVACHOL) 40 MG tablet  Is this a 30 day or 90 day RX: 90  Patient advised that in the future they can use their [facility] account to request a refill?:  na  Patient advised that RX refills can take up to 72 hours?:yes  [id number] - [place] [address] [address] [facility] [address] & [employer] [address] [place] [address] [employer] [address] [place] [id number]  Phone: [phone] Fax: [phone]

## 2022-04-07 ENCOUNTER — PATIENT OUTREACH (OUTPATIENT)
Dept: ADMINISTRATIVE | Facility: OTHER | Age: 67
End: 2022-04-07
Payer: COMMERCIAL

## 2022-04-07 ENCOUNTER — TELEPHONE (OUTPATIENT)
Dept: PODIATRY | Facility: CLINIC | Age: 67
End: 2022-04-07
Payer: COMMERCIAL

## 2022-04-07 NOTE — TELEPHONE ENCOUNTER
----- Message from Es Anne MA sent at 4/7/2022  1:58 PM CDT -----  Regarding: pt requesting a call back  Name of Who is Calling: HERMES VIGIL [[id number]]           What is the request in detail: pt requesting a call back in regards to er follow up            Can the clinic reply by [facility]: n           [facility]: [phone]

## 2022-04-07 NOTE — PROGRESS NOTES
Health Maintenance Due   Topic Date Due    HIV Screening  Never done    Shingles Vaccine (1 of 2) Never done    TETANUS VACCINE  06/01/2017    Influenza Vaccine (1) Never done    Foot Exam  01/22/2022    Diabetes Urine Screening  04/20/2022     Updates were requested from care everywhere.  Chart was reviewed for overdue [facility] ([facility]) topics (CRS, Breast Cancer Screening, Eye exam)  Health Maintenance has been updated.  LINKS immunization registry triggered.  Immunizations were reconciled.

## 2022-04-07 NOTE — TELEPHONE ENCOUNTER
Called and scheduled patient an appt per Dr Simeon for tomorrow. I did let him know that the appt may change due too Dr Simeon having to do Sx tomorrow.

## 2022-04-08 ENCOUNTER — OFFICE VISIT (OUTPATIENT)
Dept: PODIATRY | Facility: CLINIC | Age: 67
End: 2022-04-08
Payer: COMMERCIAL

## 2022-04-08 DIAGNOSIS — L97.512 DIABETIC ULCER OF OTHER PART OF RIGHT FOOT ASSOCIATED WITH TYPE 2 DIABETES MELLITUS, WITH FAT LAYER EXPOSED: Primary | ICD-10-CM

## 2022-04-08 DIAGNOSIS — E11.42 DIABETIC POLYNEUROPATHY ASSOCIATED WITH TYPE 2 DIABETES MELLITUS: ICD-10-CM

## 2022-04-08 DIAGNOSIS — E11.621 DIABETIC ULCER OF OTHER PART OF RIGHT FOOT ASSOCIATED WITH TYPE 2 DIABETES MELLITUS, WITH FAT LAYER EXPOSED: Primary | ICD-10-CM

## 2022-04-08 PROCEDURE — 87075 CULTR BACTERIA EXCEPT BLOOD: CPT | Performed by: PODIATRIST

## 2022-04-08 PROCEDURE — 87077 CULTURE AEROBIC IDENTIFY: CPT | Performed by: PODIATRIST

## 2022-04-08 PROCEDURE — 99999 PR PBB SHADOW E&M-EST. PATIENT-LVL I: CPT | Mod: PBBFAC,,, | Performed by: PODIATRIST

## 2022-04-08 PROCEDURE — 99213 OFFICE O/P EST LOW 20 MIN: CPT | Mod: 25,S$GLB,, | Performed by: PODIATRIST

## 2022-04-08 PROCEDURE — 87070 CULTURE OTHR SPECIMN AEROBIC: CPT | Performed by: PODIATRIST

## 2022-04-08 PROCEDURE — 11042 WOUND DEBRIDEMENT: ICD-10-PCS | Mod: S$GLB,,, | Performed by: PODIATRIST

## 2022-04-08 PROCEDURE — 11042 DBRDMT SUBQ TIS 1ST 20SQCM/<: CPT | Mod: S$GLB,,, | Performed by: PODIATRIST

## 2022-04-08 PROCEDURE — 3051F PR MOST RECENT HEMOGLOBIN A1C LEVEL 7.0 - < 8.0%: ICD-10-PCS | Mod: CPTII,S$GLB,, | Performed by: PODIATRIST

## 2022-04-08 PROCEDURE — 99999 PR PBB SHADOW E&M-EST. PATIENT-LVL I: ICD-10-PCS | Mod: PBBFAC,,, | Performed by: PODIATRIST

## 2022-04-08 PROCEDURE — 87186 SC STD MICRODIL/AGAR DIL: CPT | Performed by: PODIATRIST

## 2022-04-08 PROCEDURE — 99213 PR OFFICE/OUTPT VISIT, EST, LEVL III, 20-29 MIN: ICD-10-PCS | Mod: 25,S$GLB,, | Performed by: PODIATRIST

## 2022-04-08 PROCEDURE — 3051F HG A1C>EQUAL 7.0%<8.0%: CPT | Mod: CPTII,S$GLB,, | Performed by: PODIATRIST

## 2022-04-08 RX ORDER — MUPIROCIN 20 MG/G
OINTMENT TOPICAL 3 TIMES DAILY
Qty: 30 G | Refills: 2 | Status: SHIPPED | OUTPATIENT
Start: 2022-04-08 | End: 2022-05-04 | Stop reason: SDUPTHER

## 2022-04-09 NOTE — PROCEDURES
"Wound Debridement    Date/Time: 4/8/2022 11:00 AM  Performed by: Augusto Simeon DPM  Authorized by: Augusto Simeon DPM     Time out: Immediately prior to procedure a "time out" was called to verify the correct patient, procedure, equipment, support staff and site/side marked as required.    Consent Done?:  Yes (Written)    Preparation: Patient was prepped and draped in usual sterile fashion    Local anesthesia used?: No      Wound Details:    Location:  Right foot    Location:  Right 4th Metatarsal Head    Type of Debridement:  Excisional       Length (cm):  4       Area (sq cm):  4       Width (cm):  1       Percent Debrided (%):  100       Depth (cm):  0.2       Total Area Debrided (sq cm):  4    Depth of debridement:  Subcutaneous tissue    Tissue debrided:  Subcutaneous    Devitalized tissue debrided:  Fibrin    Instruments:  Blade    Bleeding:  Minimal  Hemostasis Achieved: Yes    Method Used:  Pressure  Patient tolerance:  Patient tolerated the procedure well with no immediate complications      "

## 2022-04-09 NOTE — PROGRESS NOTES
Subjective:      Patient ID: Yobany Richardson is a 66 y.o. male.    Chief Complaint: No chief complaint on file.    Yobany is a 66 y.o. male with a past medical history of Abnormal thallium stress test, Arrhythmia, Atrial flutter, paroxysmal (2/11/2016), CAD (coronary artery disease) (3/9/2016), Coronary artery disease, Diabetes mellitus, Diabetes mellitus, type 2, Disorder of kidney and ureter, Hypertension, Neuropathy, Paroxysmal atrial flutter, PE (pulmonary embolism), Pulmonary embolism (2/11/2016), Rheumatoid arthritis, Shortness of breath, Stented coronary artery (6/15/2016), Wears contact lenses, and Wears prescription eyeglasses. Patient's chief complaint consists of a Rt. Plantar forefoot wound that developed several days ago.  States the site initially developed as a blister.  Suspects this occurred on account of his sock rolling up in the shoe with ambulation.  States the blister ruptured with subsequent bleeding to the site.  Relates presenting to the ED, where he received a bolus of IV antibiotics and was sent home with oral clinda.  He has been keeping the site covered with a bandage since that time.  Denies this being a source of pain on account of his neuropathy.  Denies experiencing N/V/F/C/D.  Denies noticing pus from the site.  Denies any additional pedal complaints.        Hemoglobin A1C   Date Value Ref Range Status   07/16/2021 7.3 (H) 4.0 - 5.6 % Final     Comment:     ADA Screening Guidelines:  5.7-6.4%  Consistent with prediabetes  >or=6.5%  Consistent with diabetes    High levels of fetal hemoglobin interfere with the HbA1C  assay. Heterozygous hemoglobin variants (HbS, HgC, etc)do  not significantly interfere with this assay.   However, presence of multiple variants may affect accuracy.     06/21/2021 6.9 (H) <5.7 % of total Hgb Final     Comment:     For someone without known diabetes, a hemoglobin A1c  value of 6.5% or greater indicates that they may have   diabetes and this should be confirmed  with a follow-up   test.     For someone with known diabetes, a value <7% indicates   that their diabetes is well controlled and a value   greater than or equal to 7% indicates suboptimal   control. A1c targets should be individualized based on   duration of diabetes, age, comorbid conditions, and   other considerations.     Currently, no consensus exists regarding use of  hemoglobin A1c for diagnosis of diabetes for children.         04/20/2021 7.7 (A) 4.0 - 6.0 % Final           Past Medical History:   Diagnosis Date    Abnormal thallium stress test     Arrhythmia     Atrial flutter, paroxysmal 2/11/2016    CAD (coronary artery disease) 3/9/2016    5/2016 [facility] Left main:NL LAD: 35% proximal LAD. two small diagonals with minimal disease.  Circumflex/Large branching first obtuse marginal: with minimal disease.   RCA: The vessel was large sized (dominant) with a 60% proximal lesion. 100 mcgs of intracoronary Nitroglycerin were given with no change in the lesion. Thus FFR was performed. FFR was 0.79  2/2016 cor CTA ~~ 50% LAD    Coronary artery disease     Diabetes mellitus     Diabetes mellitus, type 2     Disorder of kidney and ureter     Hypertension     Neuropathy     Paroxysmal atrial flutter     PE (pulmonary embolism)     Pulmonary embolism 2/11/2016 1/2016     Rheumatoid arthritis     Shortness of breath     Stented coronary artery 6/15/2016    5/2016 RCA- synergy 3.5x12    Wears contact lenses     Wears prescription eyeglasses        Past Surgical History:   Procedure Laterality Date    CARDIAC CATHETERIZATION      with stent placed x 1    KNEE ARTHROSCOPY      TONSILLECTOMY         Family History   Problem Relation Age of Onset    Cancer Mother     Angina Mother     Heart disease Mother     Hypertension Mother     Kidney disease Father     Thyroid disease Sister     Bell's palsy Sister     Thyroid disease Sister     Depression Sister     Depression Sister        Social  "History     Socioeconomic History    Marital status:    Tobacco Use    Smoking status: Never Smoker    Smokeless tobacco: Never Used   Substance and Sexual Activity    Alcohol use: No    Drug use: No       Current Outpatient Medications   Medication Sig Dispense Refill    acetaminophen (TYLENOL) 325 MG tablet Take 1 tablet (325 mg total) by mouth every 6 (six) hours as needed for Pain.      amLODIPine (NORVASC) 10 MG tablet TK 1 T PO D      APPLE CIDER VINEGAR ORAL Take by mouth.      ascorbic acid (VITAMIN C) 500 MG tablet Take 500 mg by mouth once daily.      aspirin 81 MG Chew Take 81 mg by mouth once daily.      b complex vitamins capsule Take 1 capsule by mouth once daily.      BD ULTRA-FINE AMAN PEN NEEDLE 32 gauge x 5/32" Ndle Inject 1 Syringe into the skin once daily. 100 each 3    carvediloL (COREG) 6.25 MG tablet TAKE 1 TABLET(6.25 MG) BY MOUTH TWICE DAILY 180 tablet 4    clindamycin (CLEOCIN) 300 MG capsule Take 2 capsules (600 mg total) by mouth every 8 (eight) hours. for 7 days 42 capsule 0    clopidogreL (PLAVIX) 75 mg tablet TAKE 1 TABLET BY MOUTH EVERY DAY 90 tablet 4    cyanocobalamin, vitamin B-12, 1,000 mcg/15 mL Liqd Take by mouth.      FARXIGA 5 mg Tab tablet Take 5 mg by mouth every evening.      fish,bora,flax oils-om3,6,9no1 1,200 mg Cap Take by mouth.      FREESTYLE PASCUAL 14 DAY SENSOR Kit APPLY NEW SENSOR EVERY 14 DAYS AS DIRECTED      FREESTYLE LITE STRIPS Strp TEST 2 TO 3 TIMES D  2    gabapentin (NEURONTIN) 300 MG capsule Take 1 capsule (300 mg total) by mouth 3 (three) times daily. 90 capsule 5    LANTUS SOLOSTAR U-100 INSULIN glargine 100 units/mL (3mL) SubQ pen ADMINISTER 45 UNITS UNDER THE SKIN EVERY DAY 15 mL 5    metFORMIN (GLUCOPHAGE) 1000 MG tablet Take 1 tablet (1,000 mg total) by mouth 2 (two) times daily with meals. 180 tablet 0    multivit-min/iron/folic acid/K (ADULTS MULTIVITAMIN ORAL) Take by mouth.      mupirocin (BACTROBAN) 2 % ointment " Apply topically 3 (three) times daily. 30 g 2    olmesartan-hydrochlorothiazide (BENICAR HCT) 40-12.5 mg Tab Take 1 tablet by mouth once daily.      olmesartan-hydrochlorothiazide (BENICAR HCT) 40-25 mg per tablet Take 1 tablet by mouth once daily.      ondansetron (ZOFRAN) 4 MG tablet Take 1 tablet (4 mg total) by mouth every 6 (six) hours. 12 tablet 0    ONETOUCH DELICA PLUS LANCET 33 gauge Misc TEST ONCE D  0    potassium chloride SA (K-DUR,KLOR-CON) 10 MEQ tablet Take by mouth.      pravastatin (PRAVACHOL) 40 MG tablet Take 1 tablet (40 mg total) by mouth every evening. 90 tablet 3    RYBELSUS 7 mg tablet Take 7 mg by mouth every morning.      UNABLE TO FIND medication name: Tart Cherry Extract      vitamin E 400 UNIT capsule Take 400 Units by mouth once daily.       Current Facility-Administered Medications   Medication Dose Route Frequency Provider Last Rate Last Admin    acetaminophen tablet 650 mg  650 mg Oral Once PRN Robert Landrum MD        ondansetron disintegrating tablet 4 mg  4 mg Oral Once PRN Robert Landrum MD           Review of patient's allergies indicates:  No Known Allergies      Review of Systems   Constitutional: Negative for chills and fever.   Cardiovascular: Negative for claudication and leg swelling.   Skin: Positive for color change, nail changes and poor wound healing.   Musculoskeletal: Positive for arthritis. Negative for joint pain and joint swelling.   Gastrointestinal: Negative for nausea and vomiting.   Neurological: Positive for numbness. Negative for paresthesias.   Psychiatric/Behavioral: Negative for altered mental status.           Objective:      Physical Exam  Constitutional:       General: He is not in acute distress.     Appearance: He is well-developed. He is not diaphoretic.   Cardiovascular:      Pulses:           Dorsalis pedis pulses are 2+ on the right side and 2+ on the left side.        Posterior tibial pulses are 2+ on the right side and 2+  on the left side.      Comments: CFT < 3 seconds bilateral.  Pedal hair growth present bilateral.   No varicosities noted bilateral. No lower extremity edema noted bilateral.  Toes are warm to touch bilateral.    Musculoskeletal:         General: Deformity present. No tenderness.      Right lower leg: No edema.      Left lower leg: No edema.      Comments: Muscle strength 5/5 in all muscle groups bilateral.  No tenderness nor crepitation with ROM of foot/ankle joints bilateral.  Arthritic changes changes noted to the dorsal and medial aspect of the Rt. 1st mtp joint.  Bilateral pes planus foot type.  Bilateral hallux abducto valgus.  Bilateral semi-rigid contracture of toes 2-5 with better reduction of the Rt. 3rd and 4th DIP joints.  (-) for pain with palpation to the site of the wound.   Skin:     General: Skin is warm and dry.      Capillary Refill: Capillary refill takes less than 2 seconds.      Coloration: Skin is not pale.      Findings: No abrasion, bruising, burn, ecchymosis, erythema, lesion, petechiae, rash or wound.      Nails: There is no clubbing.      Comments: Pedal skin has normal turgor, temperature, and texture bilateral.  Toenails x 10 appear mycotic but well maintained.      Location: Open wound noted to the Rt. Sub 4th met head.  Base: Down to subQ and comprised of fibrin.    Drainage: None  Tiffany wound: Devoid of erythema, edema, fluctuance, purulence, and malodor.   Pre-debridement measurement: 4 x 1 x 0.2cm  Post-debridement measurement: 4.2 x 1.2 x 0.2cm     Neurological:      Mental Status: He is alert and oriented to person, place, and time.      Sensory: Sensory deficit present.      Motor: No weakness or atrophy.      Comments: Protective sensation per [place]-Yash monofilament absent bilateral.    Light touch intact bilateral.               Assessment:       Encounter Diagnoses   Name Primary?    Diabetic ulcer of other part of right foot associated with type 2 diabetes mellitus,  with fat layer exposed Yes    Diabetic polyneuropathy associated with type 2 diabetes mellitus          Plan:       Diagnoses and all orders for this visit:    Diabetic ulcer of other part of right foot associated with type 2 diabetes mellitus, with fat layer exposed  -     Aerobic culture  -     Culture, Anaerobic  -     mupirocin (BACTROBAN) 2 % ointment; Apply topically 3 (three) times daily.    Diabetic polyneuropathy associated with type 2 diabetes mellitus      I counseled the patient on his conditions, their implications and medical management.    Discussed with patient the associated risks and benefits associated with a selective excisional debridement of the Rt. foot/ankle.  Consent was read, signed, and witnessed.  See attached procedure note.      Wound cultures were obtained.     Advised that a football wrap would be the best form of offloading, however, the patient declined said wrap with today's exam.    The wound base was covered with iodosorb and a mepilex border.  Instructed to keep intact for today.      Patient to change the bandage three times daily starting tomorrow.  To apply bactroban at each dressing change.      Advised to rest and elevate the affected extremity.    Instructed to minimize weight bearing to facilitate wound healing.    May wear a casual stiff soled shoe to facilitate healing.    To continue with the current Rx for Clinda until final sensitivities result.    Follow up in about 1 week (around 4/15/2022).    Augusto Simeon DPM

## 2022-04-11 LAB — BACTERIA SPEC AEROBE CULT: ABNORMAL

## 2022-04-13 LAB — BACTERIA SPEC ANAEROBE CULT: NORMAL

## 2022-04-14 ENCOUNTER — OFFICE VISIT (OUTPATIENT)
Dept: PODIATRY | Facility: CLINIC | Age: 67
End: 2022-04-14
Payer: COMMERCIAL

## 2022-04-14 VITALS — WEIGHT: 215.38 LBS | HEIGHT: 74 IN | BODY MASS INDEX: 27.64 KG/M2

## 2022-04-14 DIAGNOSIS — L97.512 DIABETIC ULCER OF OTHER PART OF RIGHT FOOT ASSOCIATED WITH TYPE 2 DIABETES MELLITUS, WITH FAT LAYER EXPOSED: Primary | ICD-10-CM

## 2022-04-14 DIAGNOSIS — E11.42 DIABETIC POLYNEUROPATHY ASSOCIATED WITH TYPE 2 DIABETES MELLITUS: ICD-10-CM

## 2022-04-14 DIAGNOSIS — E11.621 DIABETIC ULCER OF OTHER PART OF RIGHT FOOT ASSOCIATED WITH TYPE 2 DIABETES MELLITUS, WITH FAT LAYER EXPOSED: Primary | ICD-10-CM

## 2022-04-14 PROCEDURE — 11042 DBRDMT SUBQ TIS 1ST 20SQCM/<: CPT | Mod: S$GLB,,, | Performed by: PODIATRIST

## 2022-04-14 PROCEDURE — 11042 WOUND DEBRIDEMENT: ICD-10-PCS | Mod: S$GLB,,, | Performed by: PODIATRIST

## 2022-04-14 PROCEDURE — 99999 PR PBB SHADOW E&M-EST. PATIENT-LVL II: ICD-10-PCS | Mod: PBBFAC,,, | Performed by: PODIATRIST

## 2022-04-14 PROCEDURE — 3008F PR BODY MASS INDEX (BMI) DOCUMENTED: ICD-10-PCS | Mod: CPTII,S$GLB,, | Performed by: PODIATRIST

## 2022-04-14 PROCEDURE — 99499 NO LOS: ICD-10-PCS | Mod: S$GLB,,, | Performed by: PODIATRIST

## 2022-04-14 PROCEDURE — 99999 PR PBB SHADOW E&M-EST. PATIENT-LVL II: CPT | Mod: PBBFAC,,, | Performed by: PODIATRIST

## 2022-04-14 PROCEDURE — 1159F PR MEDICATION LIST DOCUMENTED IN MEDICAL RECORD: ICD-10-PCS | Mod: CPTII,S$GLB,, | Performed by: PODIATRIST

## 2022-04-14 PROCEDURE — 1159F MED LIST DOCD IN RCRD: CPT | Mod: CPTII,S$GLB,, | Performed by: PODIATRIST

## 2022-04-14 PROCEDURE — 3008F BODY MASS INDEX DOCD: CPT | Mod: CPTII,S$GLB,, | Performed by: PODIATRIST

## 2022-04-14 PROCEDURE — 99499 UNLISTED E&M SERVICE: CPT | Mod: S$GLB,,, | Performed by: PODIATRIST

## 2022-04-14 NOTE — PROCEDURES
"Wound Debridement    Date/Time: 4/14/2022 8:20 AM  Performed by: Augusto Simeon DPM  Authorized by: Augusto Simeon DPM     Time out: Immediately prior to procedure a "time out" was called to verify the correct patient, procedure, equipment, support staff and site/side marked as required.    Consent Done?:  Yes (Verbal)    Preparation: Patient was prepped and draped in usual sterile fashion    Local anesthesia used?: No      Wound Details:    Location:  Right foot    Location:  Right 4th Metatarsal Head    Type of Debridement:  Excisional       Length (cm):  3       Area (sq cm):  3       Width (cm):  1       Percent Debrided (%):  100       Depth (cm):  0.2       Total Area Debrided (sq cm):  3    Depth of debridement:  Subcutaneous tissue    Tissue debrided:  Subcutaneous    Devitalized tissue debrided:  Fibrin    Instruments:  Blade    Bleeding:  Minimal  Hemostasis Achieved: Yes    Method Used:  Pressure  Patient tolerance:  Patient tolerated the procedure well with no immediate complications      "

## 2022-04-14 NOTE — PROGRESS NOTES
Subjective:      Patient ID: Yobany Richardson is a 66 y.o. male.    Chief Complaint: Wound Care (Blister on bottom of right foot)    Patient presents to clinic for a 1 week wound check of the Rt. Forefoot.  Denies pain from the extremity with today's exam.  Has been applying bactroban and a bandage to the site daily.  Denies noticing further signs of infection to the area nor purulent drainage.  Has been taking clindamycin as instructed.  Denies experiencing N/V/F/C/D.  Denies any additional pedal complaints.        Hemoglobin A1C   Date Value Ref Range Status   07/16/2021 7.3 (H) 4.0 - 5.6 % Final     Comment:     ADA Screening Guidelines:  5.7-6.4%  Consistent with prediabetes  >or=6.5%  Consistent with diabetes    High levels of fetal hemoglobin interfere with the HbA1C  assay. Heterozygous hemoglobin variants (HbS, HgC, etc)do  not significantly interfere with this assay.   However, presence of multiple variants may affect accuracy.     06/21/2021 6.9 (H) <5.7 % of total Hgb Final     Comment:     For someone without known diabetes, a hemoglobin A1c  value of 6.5% or greater indicates that they may have   diabetes and this should be confirmed with a follow-up   test.     For someone with known diabetes, a value <7% indicates   that their diabetes is well controlled and a value   greater than or equal to 7% indicates suboptimal   control. A1c targets should be individualized based on   duration of diabetes, age, comorbid conditions, and   other considerations.     Currently, no consensus exists regarding use of  hemoglobin A1c for diagnosis of diabetes for children.         04/20/2021 7.7 (A) 4.0 - 6.0 % Final           Past Medical History:   Diagnosis Date    Abnormal thallium stress test     Arrhythmia     Atrial flutter, paroxysmal 2/11/2016    CAD (coronary artery disease) 3/9/2016    5/2016 [facility] Left main:NL LAD: 35% proximal LAD. two small diagonals with minimal disease.  Circumflex/Large branching first  [facility] called and provided updated that PA is in the appeals process which was completed on 6/22/20 and awaiting response back    See 6/18/20 med PA "obtuse marginal: with minimal disease.   RCA: The vessel was large sized (dominant) with a 60% proximal lesion. 100 mcgs of intracoronary Nitroglycerin were given with no change in the lesion. Thus FFR was performed. FFR was 0.79  2/2016 cor CTA ~~ 50% LAD    Coronary artery disease     Diabetes mellitus     Diabetes mellitus, type 2     Disorder of kidney and ureter     Hypertension     Neuropathy     Paroxysmal atrial flutter     PE (pulmonary embolism)     Pulmonary embolism 2/11/2016 1/2016     Rheumatoid arthritis     Shortness of breath     Stented coronary artery 6/15/2016    5/2016 RCA- synergy 3.5x12    Wears contact lenses     Wears prescription eyeglasses        Past Surgical History:   Procedure Laterality Date    CARDIAC CATHETERIZATION      with stent placed x 1    KNEE ARTHROSCOPY      TONSILLECTOMY         Family History   Problem Relation Age of Onset    Cancer Mother     Angina Mother     Heart disease Mother     Hypertension Mother     Kidney disease Father     Thyroid disease Sister     Bell's palsy Sister     Thyroid disease Sister     Depression Sister     Depression Sister        Social History     Socioeconomic History    Marital status:    Tobacco Use    Smoking status: Never Smoker    Smokeless tobacco: Never Used   Substance and Sexual Activity    Alcohol use: No    Drug use: No       Current Outpatient Medications   Medication Sig Dispense Refill    acetaminophen (TYLENOL) 325 MG tablet Take 1 tablet (325 mg total) by mouth every 6 (six) hours as needed for Pain.      amLODIPine (NORVASC) 10 MG tablet TK 1 T PO D      APPLE CIDER VINEGAR ORAL Take by mouth.      ascorbic acid (VITAMIN C) 500 MG tablet Take 500 mg by mouth once daily.      aspirin 81 MG Chew Take 81 mg by mouth once daily.      b complex vitamins capsule Take 1 capsule by mouth once daily.      BD ULTRA-FINE AMAN PEN NEEDLE 32 gauge x 5/32" Ndle Inject 1 Syringe into the " skin once daily. 100 each 3    carvediloL (COREG) 6.25 MG tablet TAKE 1 TABLET(6.25 MG) BY MOUTH TWICE DAILY 180 tablet 4    clindamycin (CLEOCIN) 300 MG capsule Take 2 capsules (600 mg total) by mouth every 8 (eight) hours. for 7 days 42 capsule 0    clopidogreL (PLAVIX) 75 mg tablet TAKE 1 TABLET BY MOUTH EVERY DAY 90 tablet 4    cyanocobalamin, vitamin B-12, 1,000 mcg/15 mL Liqd Take by mouth.      FARXIGA 5 mg Tab tablet Take 5 mg by mouth every evening.      fish,bora,flax oils-om3,6,9no1 1,200 mg Cap Take by mouth.      FREESTYLE PASCUAL 14 DAY SENSOR Kit APPLY NEW SENSOR EVERY 14 DAYS AS DIRECTED      FREESTYLE LITE STRIPS Strp TEST 2 TO 3 TIMES D  2    gabapentin (NEURONTIN) 300 MG capsule Take 1 capsule (300 mg total) by mouth 3 (three) times daily. 90 capsule 5    LANTUS SOLOSTAR U-100 INSULIN glargine 100 units/mL (3mL) SubQ pen ADMINISTER 45 UNITS UNDER THE SKIN EVERY DAY 15 mL 5    metFORMIN (GLUCOPHAGE) 1000 MG tablet Take 1 tablet (1,000 mg total) by mouth 2 (two) times daily with meals. 180 tablet 0    multivit-min/iron/folic acid/K (ADULTS MULTIVITAMIN ORAL) Take by mouth.      mupirocin (BACTROBAN) 2 % ointment Apply topically 3 (three) times daily. 30 g 2    olmesartan-hydrochlorothiazide (BENICAR HCT) 40-12.5 mg Tab Take 1 tablet by mouth once daily.      olmesartan-hydrochlorothiazide (BENICAR HCT) 40-25 mg per tablet Take 1 tablet by mouth once daily.      ondansetron (ZOFRAN) 4 MG tablet Take 1 tablet (4 mg total) by mouth every 6 (six) hours. 12 tablet 0    ONETOUCH DELICA PLUS LANCET 33 gauge Misc TEST ONCE D  0    potassium chloride SA (K-DUR,KLOR-CON) 10 MEQ tablet Take by mouth.      pravastatin (PRAVACHOL) 40 MG tablet Take 1 tablet (40 mg total) by mouth every evening. 90 tablet 3    RYBELSUS 7 mg tablet Take 7 mg by mouth every morning.      UNABLE TO FIND medication name: Tart Cherry Extract      vitamin E 400 UNIT capsule Take 400 Units by mouth once daily.        Current Facility-Administered Medications   Medication Dose Route Frequency Provider Last Rate Last Admin    acetaminophen tablet 650 mg  650 mg Oral Once PRN Robert Landrum MD        ondansetron disintegrating tablet 4 mg  4 mg Oral Once PRN Robert Landrum MD           Review of patient's allergies indicates:  No Known Allergies      Review of Systems   Constitutional: Negative for chills and fever.   Cardiovascular: Negative for claudication and leg swelling.   Skin: Positive for color change and nail changes. Negative for poor wound healing.   Musculoskeletal: Positive for arthritis. Negative for joint pain and joint swelling.   Gastrointestinal: Negative for nausea and vomiting.   Neurological: Positive for numbness. Negative for paresthesias.   Psychiatric/Behavioral: Negative for altered mental status.           Objective:      Physical Exam  Constitutional:       General: He is not in acute distress.     Appearance: He is well-developed. He is not diaphoretic.   Cardiovascular:      Pulses:           Dorsalis pedis pulses are 2+ on the right side and 2+ on the left side.        Posterior tibial pulses are 2+ on the right side and 2+ on the left side.      Comments: CFT < 3 seconds bilateral.  Pedal hair growth present bilateral.   No varicosities noted bilateral. No lower extremity edema noted bilateral.  Toes are warm to touch bilateral.    Musculoskeletal:         General: Deformity present. No tenderness.      Right lower leg: No edema.      Left lower leg: No edema.      Comments: Muscle strength 5/5 in all muscle groups bilateral.  No tenderness nor crepitation with ROM of foot/ankle joints bilateral.  Arthritic changes changes noted to the dorsal and medial aspect of the Rt. 1st mtp joint.  Bilateral pes planus foot type.  Bilateral hallux abducto valgus.  Bilateral semi-rigid contracture of toes 2-5 with better reduction of the Rt. 3rd and 4th DIP joints.  (-) for pain with palpation to  the site of the wound.   Skin:     General: Skin is warm and dry.      Capillary Refill: Capillary refill takes less than 2 seconds.      Coloration: Skin is not pale.      Findings: No abrasion, bruising, burn, ecchymosis, erythema, lesion, petechiae, rash or wound.      Nails: There is no clubbing.      Comments: Pedal skin has normal turgor, temperature, and texture bilateral.  Toenails x 10 appear mycotic but well maintained.      Location: Open wound noted to the Rt. Sub 4th met head.  Base: Down to subQ and comprised of fibrin.    Drainage: None  Tiffany wound: Devoid of erythema, edema, fluctuance, purulence, and malodor.   Pre-debridement measurement: 3 x 1 x 0.2cm  Post-debridement measurement: 3.2 x 1.2 x 0.2cm     Neurological:      Mental Status: He is alert and oriented to person, place, and time.      Sensory: Sensory deficit present.      Motor: No weakness or atrophy.      Comments: Protective sensation per [place]-Yash monofilament absent bilateral.    Light touch intact bilateral.               Assessment:       Encounter Diagnoses   Name Primary?    Diabetic ulcer of other part of right foot associated with type 2 diabetes mellitus, with fat layer exposed Yes    Diabetic polyneuropathy associated with type 2 diabetes mellitus          Plan:       Yobany was seen today for wound care.    Diagnoses and all orders for this visit:    Diabetic ulcer of other part of right foot associated with type 2 diabetes mellitus, with fat layer exposed    Diabetic polyneuropathy associated with type 2 diabetes mellitus      I counseled the patient on his conditions, their implications and medical management.    Performed a selective excisional debridement of the Rt. foot/ankle.  See attached procedure note.      The wound base was covered with triple antibiotic ointment and a mepilex border.  Instructed to keep intact for today.      Patient to resume daily dressing changes with use of bactroban  ointment.    Advised to rest and elevate the affected extremity.    Instructed to minimize weight bearing to facilitate wound healing.    To continue wearing a casual stiff soled shoe to facilitate healing.    To finish the current course of clinda.  No further oral antibiotics warranted.    Follow up in about 5 days (around 4/19/2022).    Augusto Simeon DPM

## 2022-04-19 ENCOUNTER — OFFICE VISIT (OUTPATIENT)
Dept: PODIATRY | Facility: CLINIC | Age: 67
End: 2022-04-19
Payer: COMMERCIAL

## 2022-04-19 ENCOUNTER — LAB VISIT (OUTPATIENT)
Dept: LAB | Facility: HOSPITAL | Age: 67
End: 2022-04-19
Attending: INTERNAL MEDICINE
Payer: COMMERCIAL

## 2022-04-19 VITALS — BODY MASS INDEX: 27.64 KG/M2 | WEIGHT: 215.38 LBS | HEIGHT: 74 IN

## 2022-04-19 DIAGNOSIS — Z79.4 CONTROLLED TYPE 2 DIABETES MELLITUS WITH STAGE 2 CHRONIC KIDNEY DISEASE, WITH LONG-TERM CURRENT USE OF INSULIN: ICD-10-CM

## 2022-04-19 DIAGNOSIS — L97.512 DIABETIC ULCER OF OTHER PART OF RIGHT FOOT ASSOCIATED WITH TYPE 2 DIABETES MELLITUS, WITH FAT LAYER EXPOSED: Primary | ICD-10-CM

## 2022-04-19 DIAGNOSIS — E11.42 DIABETIC POLYNEUROPATHY ASSOCIATED WITH TYPE 2 DIABETES MELLITUS: ICD-10-CM

## 2022-04-19 DIAGNOSIS — E11.22 CONTROLLED TYPE 2 DIABETES MELLITUS WITH STAGE 2 CHRONIC KIDNEY DISEASE, WITH LONG-TERM CURRENT USE OF INSULIN: ICD-10-CM

## 2022-04-19 DIAGNOSIS — N18.2 CONTROLLED TYPE 2 DIABETES MELLITUS WITH STAGE 2 CHRONIC KIDNEY DISEASE, WITH LONG-TERM CURRENT USE OF INSULIN: ICD-10-CM

## 2022-04-19 DIAGNOSIS — E11.621 DIABETIC ULCER OF OTHER PART OF RIGHT FOOT ASSOCIATED WITH TYPE 2 DIABETES MELLITUS, WITH FAT LAYER EXPOSED: Primary | ICD-10-CM

## 2022-04-19 LAB
ALBUMIN SERPL BCP-MCNC: 4 G/DL (ref 3.5–5.2)
ANION GAP SERPL CALC-SCNC: 14 MMOL/L (ref 8–16)
BUN SERPL-MCNC: 26 MG/DL (ref 8–23)
CALCIUM SERPL-MCNC: 9.8 MG/DL (ref 8.7–10.5)
CHLORIDE SERPL-SCNC: 103 MMOL/L (ref 95–110)
CO2 SERPL-SCNC: 22 MMOL/L (ref 23–29)
CREAT SERPL-MCNC: 1.4 MG/DL (ref 0.5–1.4)
EST. GFR  (AFRICAN AMERICAN): >60 ML/MIN/1.73 M^2
EST. GFR  (NON AFRICAN AMERICAN): 52.7 ML/MIN/1.73 M^2
GLUCOSE SERPL-MCNC: 134 MG/DL (ref 70–110)
PHOSPHATE SERPL-MCNC: 3.4 MG/DL (ref 2.7–4.5)
POTASSIUM SERPL-SCNC: 4.9 MMOL/L (ref 3.5–5.1)
PTH-INTACT SERPL-MCNC: 65.6 PG/ML (ref 9–77)
SODIUM SERPL-SCNC: 139 MMOL/L (ref 136–145)

## 2022-04-19 PROCEDURE — 11042 WOUND DEBRIDEMENT: ICD-10-PCS | Mod: S$GLB,,, | Performed by: PODIATRIST

## 2022-04-19 PROCEDURE — 99999 PR PBB SHADOW E&M-EST. PATIENT-LVL IV: ICD-10-PCS | Mod: PBBFAC,,, | Performed by: PODIATRIST

## 2022-04-19 PROCEDURE — 99999 PR PBB SHADOW E&M-EST. PATIENT-LVL IV: CPT | Mod: PBBFAC,,, | Performed by: PODIATRIST

## 2022-04-19 PROCEDURE — 99499 NO LOS: ICD-10-PCS | Mod: S$GLB,,, | Performed by: PODIATRIST

## 2022-04-19 PROCEDURE — 3008F BODY MASS INDEX DOCD: CPT | Mod: CPTII,S$GLB,, | Performed by: PODIATRIST

## 2022-04-19 PROCEDURE — 36415 COLL VENOUS BLD VENIPUNCTURE: CPT | Mod: PO | Performed by: INTERNAL MEDICINE

## 2022-04-19 PROCEDURE — 80069 RENAL FUNCTION PANEL: CPT | Performed by: INTERNAL MEDICINE

## 2022-04-19 PROCEDURE — 1159F MED LIST DOCD IN RCRD: CPT | Mod: CPTII,S$GLB,, | Performed by: PODIATRIST

## 2022-04-19 PROCEDURE — 3008F PR BODY MASS INDEX (BMI) DOCUMENTED: ICD-10-PCS | Mod: CPTII,S$GLB,, | Performed by: PODIATRIST

## 2022-04-19 PROCEDURE — 99499 UNLISTED E&M SERVICE: CPT | Mod: S$GLB,,, | Performed by: PODIATRIST

## 2022-04-19 PROCEDURE — 1159F PR MEDICATION LIST DOCUMENTED IN MEDICAL RECORD: ICD-10-PCS | Mod: CPTII,S$GLB,, | Performed by: PODIATRIST

## 2022-04-19 PROCEDURE — 11042 DBRDMT SUBQ TIS 1ST 20SQCM/<: CPT | Mod: S$GLB,,, | Performed by: PODIATRIST

## 2022-04-19 PROCEDURE — 83970 ASSAY OF PARATHORMONE: CPT | Performed by: INTERNAL MEDICINE

## 2022-04-19 NOTE — PROGRESS NOTES
Subjective:      Patient ID: Yobany Richardson is a 66 y.o. male.    Chief Complaint: Wound Check    Patient presents to clinic for a 1 week wound check of the Rt. Forefoot.  Denies pain from the extremity with today's exam.  Continues applying bactroban and a bandage to the site daily.  He has also been applying this to the callus along the plantar aspect of the Rt. Great toe as well.  Denies noticing signs of infection. Denies experiencing N/V/F/C/D.  Denies any additional pedal complaints.        Hemoglobin A1C   Date Value Ref Range Status   07/16/2021 7.3 (H) 4.0 - 5.6 % Final     Comment:     ADA Screening Guidelines:  5.7-6.4%  Consistent with prediabetes  >or=6.5%  Consistent with diabetes    High levels of fetal hemoglobin interfere with the HbA1C  assay. Heterozygous hemoglobin variants (HbS, HgC, etc)do  not significantly interfere with this assay.   However, presence of multiple variants may affect accuracy.     06/21/2021 6.9 (H) <5.7 % of total Hgb Final     Comment:     For someone without known diabetes, a hemoglobin A1c  value of 6.5% or greater indicates that they may have   diabetes and this should be confirmed with a follow-up   test.     For someone with known diabetes, a value <7% indicates   that their diabetes is well controlled and a value   greater than or equal to 7% indicates suboptimal   control. A1c targets should be individualized based on   duration of diabetes, age, comorbid conditions, and   other considerations.     Currently, no consensus exists regarding use of  hemoglobin A1c for diagnosis of diabetes for children.         04/20/2021 7.7 (A) 4.0 - 6.0 % Final           Past Medical History:   Diagnosis Date    Abnormal thallium stress test     Arrhythmia     Atrial flutter, paroxysmal 2/11/2016    CAD (coronary artery disease) 3/9/2016    5/2016 [facility] Left main:NL LAD: 35% proximal LAD. two small diagonals with minimal disease.  Circumflex/Large branching first obtuse marginal: with  "minimal disease.   RCA: The vessel was large sized (dominant) with a 60% proximal lesion. 100 mcgs of intracoronary Nitroglycerin were given with no change in the lesion. Thus FFR was performed. FFR was 0.79  2/2016 cor CTA ~~ 50% LAD    Coronary artery disease     Diabetes mellitus     Diabetes mellitus, type 2     Disorder of kidney and ureter     Hypertension     Neuropathy     Paroxysmal atrial flutter     PE (pulmonary embolism)     Pulmonary embolism 2/11/2016 1/2016     Rheumatoid arthritis     Shortness of breath     Stented coronary artery 6/15/2016    5/2016 RCA- synergy 3.5x12    Wears contact lenses     Wears prescription eyeglasses        Past Surgical History:   Procedure Laterality Date    CARDIAC CATHETERIZATION      with stent placed x 1    KNEE ARTHROSCOPY      TONSILLECTOMY         Family History   Problem Relation Age of Onset    Cancer Mother     Angina Mother     Heart disease Mother     Hypertension Mother     Kidney disease Father     Thyroid disease Sister     Bell's palsy Sister     Thyroid disease Sister     Depression Sister     Depression Sister        Social History     Socioeconomic History    Marital status:    Tobacco Use    Smoking status: Never Smoker    Smokeless tobacco: Never Used   Substance and Sexual Activity    Alcohol use: No    Drug use: No       Current Outpatient Medications   Medication Sig Dispense Refill    acetaminophen (TYLENOL) 325 MG tablet Take 1 tablet (325 mg total) by mouth every 6 (six) hours as needed for Pain.      amLODIPine (NORVASC) 10 MG tablet TK 1 T PO D      APPLE CIDER VINEGAR ORAL Take by mouth.      ascorbic acid (VITAMIN C) 500 MG tablet Take 500 mg by mouth once daily.      aspirin 81 MG Chew Take 81 mg by mouth once daily.      b complex vitamins capsule Take 1 capsule by mouth once daily.      BD ULTRA-FINE AMAN PEN NEEDLE 32 gauge x 5/32" Ndle Inject 1 Syringe into the skin once daily. 100 each " 3    carvediloL (COREG) 6.25 MG tablet TAKE 1 TABLET(6.25 MG) BY MOUTH TWICE DAILY 180 tablet 4    clopidogreL (PLAVIX) 75 mg tablet TAKE 1 TABLET BY MOUTH EVERY DAY 90 tablet 4    cyanocobalamin, vitamin B-12, 1,000 mcg/15 mL Liqd Take by mouth.      FARXIGA 5 mg Tab tablet Take 5 mg by mouth every evening.      fish,bora,flax oils-om3,6,9no1 1,200 mg Cap Take by mouth.      FREESTYLE PASCUAL 14 DAY SENSOR Kit APPLY NEW SENSOR EVERY 14 DAYS AS DIRECTED      FREESTYLE LITE STRIPS Strp TEST 2 TO 3 TIMES D  2    gabapentin (NEURONTIN) 300 MG capsule Take 1 capsule (300 mg total) by mouth 3 (three) times daily. 90 capsule 5    LANTUS SOLOSTAR U-100 INSULIN glargine 100 units/mL (3mL) SubQ pen ADMINISTER 45 UNITS UNDER THE SKIN EVERY DAY 15 mL 5    metFORMIN (GLUCOPHAGE) 1000 MG tablet Take 1 tablet (1,000 mg total) by mouth 2 (two) times daily with meals. 180 tablet 0    multivit-min/iron/folic acid/K (ADULTS MULTIVITAMIN ORAL) Take by mouth.      mupirocin (BACTROBAN) 2 % ointment Apply topically 3 (three) times daily. 30 g 2    olmesartan-hydrochlorothiazide (BENICAR HCT) 40-12.5 mg Tab Take 1 tablet by mouth once daily.      olmesartan-hydrochlorothiazide (BENICAR HCT) 40-25 mg per tablet Take 1 tablet by mouth once daily.      ondansetron (ZOFRAN) 4 MG tablet Take 1 tablet (4 mg total) by mouth every 6 (six) hours. 12 tablet 0    ONETOUCH DELICA PLUS LANCET 33 gauge Misc TEST ONCE D  0    potassium chloride SA (K-DUR,KLOR-CON) 10 MEQ tablet Take by mouth.      pravastatin (PRAVACHOL) 40 MG tablet Take 1 tablet (40 mg total) by mouth every evening. 90 tablet 3    RYBELSUS 7 mg tablet Take 7 mg by mouth every morning.      UNABLE TO FIND medication name: Tart Cherry Extract      vitamin E 400 UNIT capsule Take 400 Units by mouth once daily.       Current Facility-Administered Medications   Medication Dose Route Frequency Provider Last Rate Last Admin    acetaminophen tablet 650 mg  650 mg Oral  Once PRN Robert Landurm MD        ondansetron disintegrating tablet 4 mg  4 mg Oral Once PRN Robert Landrum MD           Review of patient's allergies indicates:  No Known Allergies      Review of Systems   Constitutional: Negative for chills and fever.   Cardiovascular: Negative for claudication and leg swelling.   Skin: Positive for color change and nail changes. Negative for poor wound healing.   Musculoskeletal: Positive for arthritis. Negative for joint pain and joint swelling.   Gastrointestinal: Negative for nausea and vomiting.   Neurological: Positive for numbness. Negative for paresthesias.   Psychiatric/Behavioral: Negative for altered mental status.           Objective:      Physical Exam  Constitutional:       General: He is not in acute distress.     Appearance: He is well-developed. He is not diaphoretic.   Cardiovascular:      Pulses:           Dorsalis pedis pulses are 2+ on the right side and 2+ on the left side.        Posterior tibial pulses are 2+ on the right side and 2+ on the left side.      Comments: CFT < 3 seconds bilateral.  Pedal hair growth present bilateral.   No varicosities noted bilateral. No lower extremity edema noted bilateral.  Toes are warm to touch bilateral.    Musculoskeletal:         General: Deformity present. No tenderness.      Right lower leg: No edema.      Left lower leg: No edema.      Comments: Muscle strength 5/5 in all muscle groups bilateral.  No tenderness nor crepitation with ROM of foot/ankle joints bilateral.  Arthritic changes changes noted to the dorsal and medial aspect of the Rt. 1st mtp joint.  Bilateral pes planus foot type.  Bilateral hallux abducto valgus.  Bilateral semi-rigid contracture of toes 2-5 with better reduction of the Rt. 3rd and 4th DIP joints.  (-) for pain with palpation to the site of the wound.   Skin:     General: Skin is warm and dry.      Capillary Refill: Capillary refill takes less than 2 seconds.      Coloration: Skin  is not pale.      Findings: Wound present. No abrasion, bruising, burn, ecchymosis, erythema, lesion, petechiae or rash.      Nails: There is no clubbing.      Comments: Pedal skin has normal turgor, temperature, and texture bilateral.  Toenails x 10 appear mycotic but well maintained.      Location: Open wound noted to the Rt. Sub 4th met head.  Base: Down to subQ and comprised of fibrin.    Drainage: None  Tiffany wound: Devoid of erythema, edema, fluctuance, purulence, and malodor.   Pre-debridement measurement: 2.5 x 1 x 0.2cm  Post-debridement measurement: 2.7 x 1.2 x 0.2cm    Location: Open wound noted to the plantar aspect of the Rt. Great toe.  Base: Down to dermis and comprised of granulation.    Drainage: None  Tiffany wound: Devoid of erythema, edema, fluctuance, purulence, and malodor.   Measurement: 0.3 x 0.3 x 0.1cm       Neurological:      Mental Status: He is alert and oriented to person, place, and time.      Sensory: Sensory deficit present.      Motor: No weakness or atrophy.      Comments: Protective sensation per [place] monofilament absent bilateral.    Light touch intact bilateral.               Assessment:       Encounter Diagnoses   Name Primary?    Diabetic ulcer of other part of right foot associated with type 2 diabetes mellitus, with fat layer exposed Yes    Diabetic polyneuropathy associated with type 2 diabetes mellitus          Plan:       Yobany was seen today for wound check.    Diagnoses and all orders for this visit:    Diabetic ulcer of other part of right foot associated with type 2 diabetes mellitus, with fat layer exposed    Diabetic polyneuropathy associated with type 2 diabetes mellitus      I counseled the patient on his conditions, their implications and medical management.    Performed a selective excisional debridement of the Rt. foot/ankle.  See attached procedure note.      Wound bases were covered with triple antibiotic ointment and a mepilex border.  Instructed to  keep intact for today.      Patient to resume daily dressing changes with use of bactroban ointment.    Advised to rest and elevate the affected extremity.    Instructed to minimize weight bearing to facilitate wound healing.    To continue wearing a casual stiff soled shoe to facilitate healing.    RTC in 2 weeks for follow up.    Augusto Simeon DPM

## 2022-04-20 NOTE — PROCEDURES
"Wound Debridement    Date/Time: 4/19/2022 2:00 PM  Performed by: Augusto Simeon DPM  Authorized by: Augusto Simeon DPM     Time out: Immediately prior to procedure a "time out" was called to verify the correct patient, procedure, equipment, support staff and site/side marked as required.    Consent Done?:  Yes (Verbal)    Preparation: Patient was prepped and draped in usual sterile fashion    Local anesthesia used?: No      Wound Details:    Location:  Right foot    Location:  Right 4th Metatarsal Head    Type of Debridement:  Excisional       Length (cm):  2.5       Area (sq cm):  2.5       Width (cm):  1       Percent Debrided (%):  100       Depth (cm):  0.2       Total Area Debrided (sq cm):  2.5    Depth of debridement:  Subcutaneous tissue    Tissue debrided:  Subcutaneous    Devitalized tissue debrided:  Fibrin    Instruments:  Blade    Bleeding:  Minimal  Hemostasis Achieved: Yes    Method Used:  Pressure  Patient tolerance:  Patient tolerated the procedure well with no immediate complications      "

## 2022-05-02 ENCOUNTER — OFFICE VISIT (OUTPATIENT)
Dept: NEPHROLOGY | Facility: CLINIC | Age: 67
End: 2022-05-02
Payer: COMMERCIAL

## 2022-05-02 VITALS
OXYGEN SATURATION: 98 % | SYSTOLIC BLOOD PRESSURE: 132 MMHG | BODY MASS INDEX: 27.59 KG/M2 | HEIGHT: 74 IN | DIASTOLIC BLOOD PRESSURE: 66 MMHG | HEART RATE: 73 BPM | WEIGHT: 215 LBS

## 2022-05-02 DIAGNOSIS — I10 PRIMARY HYPERTENSION: ICD-10-CM

## 2022-05-02 DIAGNOSIS — E11.22 CONTROLLED TYPE 2 DIABETES MELLITUS WITH STAGE 2 CHRONIC KIDNEY DISEASE, WITH LONG-TERM CURRENT USE OF INSULIN: ICD-10-CM

## 2022-05-02 DIAGNOSIS — N18.2 CKD (CHRONIC KIDNEY DISEASE) STAGE 2, GFR 60-89 ML/MIN: Primary | ICD-10-CM

## 2022-05-02 DIAGNOSIS — N18.2 CONTROLLED TYPE 2 DIABETES MELLITUS WITH STAGE 2 CHRONIC KIDNEY DISEASE, WITH LONG-TERM CURRENT USE OF INSULIN: ICD-10-CM

## 2022-05-02 DIAGNOSIS — Z79.4 CONTROLLED TYPE 2 DIABETES MELLITUS WITH STAGE 2 CHRONIC KIDNEY DISEASE, WITH LONG-TERM CURRENT USE OF INSULIN: ICD-10-CM

## 2022-05-02 PROCEDURE — 99999 PR PBB SHADOW E&M-EST. PATIENT-LVL V: CPT | Mod: PBBFAC,,, | Performed by: INTERNAL MEDICINE

## 2022-05-02 PROCEDURE — 1159F PR MEDICATION LIST DOCUMENTED IN MEDICAL RECORD: ICD-10-PCS | Mod: CPTII,S$GLB,, | Performed by: INTERNAL MEDICINE

## 2022-05-02 PROCEDURE — 1159F MED LIST DOCD IN RCRD: CPT | Mod: CPTII,S$GLB,, | Performed by: INTERNAL MEDICINE

## 2022-05-02 PROCEDURE — 3051F PR MOST RECENT HEMOGLOBIN A1C LEVEL 7.0 - < 8.0%: ICD-10-PCS | Mod: CPTII,S$GLB,, | Performed by: INTERNAL MEDICINE

## 2022-05-02 PROCEDURE — 1160F RVW MEDS BY RX/DR IN RCRD: CPT | Mod: CPTII,S$GLB,, | Performed by: INTERNAL MEDICINE

## 2022-05-02 PROCEDURE — 99214 OFFICE O/P EST MOD 30 MIN: CPT | Mod: S$GLB,,, | Performed by: INTERNAL MEDICINE

## 2022-05-02 PROCEDURE — 3075F PR MOST RECENT SYSTOLIC BLOOD PRESS GE 130-139MM HG: ICD-10-PCS | Mod: CPTII,S$GLB,, | Performed by: INTERNAL MEDICINE

## 2022-05-02 PROCEDURE — 99214 PR OFFICE/OUTPT VISIT, EST, LEVL IV, 30-39 MIN: ICD-10-PCS | Mod: S$GLB,,, | Performed by: INTERNAL MEDICINE

## 2022-05-02 PROCEDURE — 1160F PR REVIEW ALL MEDS BY PRESCRIBER/CLIN PHARMACIST DOCUMENTED: ICD-10-PCS | Mod: CPTII,S$GLB,, | Performed by: INTERNAL MEDICINE

## 2022-05-02 PROCEDURE — 3078F PR MOST RECENT DIASTOLIC BLOOD PRESSURE < 80 MM HG: ICD-10-PCS | Mod: CPTII,S$GLB,, | Performed by: INTERNAL MEDICINE

## 2022-05-02 PROCEDURE — 3008F PR BODY MASS INDEX (BMI) DOCUMENTED: ICD-10-PCS | Mod: CPTII,S$GLB,, | Performed by: INTERNAL MEDICINE

## 2022-05-02 PROCEDURE — 99999 PR PBB SHADOW E&M-EST. PATIENT-LVL V: ICD-10-PCS | Mod: PBBFAC,,, | Performed by: INTERNAL MEDICINE

## 2022-05-02 PROCEDURE — 3078F DIAST BP <80 MM HG: CPT | Mod: CPTII,S$GLB,, | Performed by: INTERNAL MEDICINE

## 2022-05-02 PROCEDURE — 3066F PR DOCUMENTATION OF TREATMENT FOR NEPHROPATHY: ICD-10-PCS | Mod: CPTII,S$GLB,, | Performed by: INTERNAL MEDICINE

## 2022-05-02 PROCEDURE — 3075F SYST BP GE 130 - 139MM HG: CPT | Mod: CPTII,S$GLB,, | Performed by: INTERNAL MEDICINE

## 2022-05-02 PROCEDURE — 3008F BODY MASS INDEX DOCD: CPT | Mod: CPTII,S$GLB,, | Performed by: INTERNAL MEDICINE

## 2022-05-02 PROCEDURE — 3051F HG A1C>EQUAL 7.0%<8.0%: CPT | Mod: CPTII,S$GLB,, | Performed by: INTERNAL MEDICINE

## 2022-05-02 PROCEDURE — 3066F NEPHROPATHY DOC TX: CPT | Mod: CPTII,S$GLB,, | Performed by: INTERNAL MEDICINE

## 2022-05-02 NOTE — PROGRESS NOTES
"Subjective:       Patient ID: Yobany Richardson is a 66 y.o. White male who presents for return patient evaluation for chronic renal failure.    He has no uremic or urinary symptoms and is in his usual state of health.  There have been no recent illnesses, hospitalizations or procedures.  He infrequently takes NSAIDs.  He did have COVID in August 2021 and states he has had some fatigue and mild brain fog since then.        Review of Systems   Constitutional: Positive for fatigue. Negative for appetite change, chills and fever.   HENT: Negative for congestion.    Eyes: Negative for visual disturbance.   Respiratory: Negative for cough and shortness of breath.    Cardiovascular: Positive for leg swelling (mild occasionally). Negative for chest pain.   Gastrointestinal: Negative for abdominal pain, diarrhea, nausea and vomiting.   Genitourinary: Negative for difficulty urinating, dysuria and hematuria.   Musculoskeletal: Negative for myalgias.   Skin: Negative for rash.   Neurological: Negative for headaches.   Psychiatric/Behavioral: Positive for decreased concentration. Negative for sleep disturbance.       The past medical, family and social histories were reviewed for this encounter.     /66 (BP Location: Right arm, Patient Position: Sitting, BP Method: Medium (Manual))   Pulse 73   Ht 6' 2" (1.88 m)   Wt 97.5 kg (215 lb)   SpO2 98%   BMI 27.60 kg/m²     Objective:      Physical Exam  Vitals reviewed.   Constitutional:       General: He is not in acute distress.     Appearance: He is well-developed.   HENT:      Head: Normocephalic and atraumatic.   Eyes:      General: No scleral icterus.     Conjunctiva/sclera: Conjunctivae normal.   Neck:      Vascular: No JVD.   Cardiovascular:      Rate and Rhythm: Normal rate and regular rhythm.      Heart sounds: Normal heart sounds. No murmur heard.    No friction rub. No gallop.   Pulmonary:      Effort: Pulmonary effort is normal. No respiratory distress.      Breath " sounds: Normal breath sounds. No wheezing or rales.   Abdominal:      General: Bowel sounds are normal. There is no distension.      Palpations: Abdomen is soft.      Tenderness: There is no abdominal tenderness.   Musculoskeletal:      Cervical back: Normal range of motion.      Right lower leg: No edema.      Left lower leg: No edema.   Skin:     General: Skin is warm and dry.      Findings: No rash.   Neurological:      Mental Status: He is alert and oriented to person, place, and time.   Psychiatric:         Mood and Affect: Mood normal.         Behavior: Behavior normal.         Assessment:       1. CKD (chronic kidney disease) stage 2, GFR 60-89 ml/min    2. Primary hypertension    3. Controlled type 2 diabetes mellitus with stage 2 chronic kidney disease, with long-term current use of insulin        Plan:   Return to clinic in 6 months.  Labs for next visit include rp pth upc uric per so.  Baseline creatinine is 1.0-1.3 since 2016.  Renal US shows R 10.8 cm L 10.3 cm.  PTH is 65 with a calcium of 9.8.  UPC is negative on an ARB.  Continue current medications as prescribed and reviewed.   Blood pressure is controlled on the current regimen.  Please have patient follow-up with your PCP or Endocrinology regarding the control and management of diabetes.

## 2022-05-04 ENCOUNTER — OFFICE VISIT (OUTPATIENT)
Dept: PODIATRY | Facility: CLINIC | Age: 67
End: 2022-05-04
Payer: COMMERCIAL

## 2022-05-04 VITALS — WEIGHT: 214.94 LBS | BODY MASS INDEX: 27.59 KG/M2 | HEIGHT: 74 IN

## 2022-05-04 DIAGNOSIS — L97.511 DIABETIC ULCER OF OTHER PART OF RIGHT FOOT ASSOCIATED WITH TYPE 2 DIABETES MELLITUS, LIMITED TO BREAKDOWN OF SKIN: ICD-10-CM

## 2022-05-04 DIAGNOSIS — E11.621 DIABETIC ULCER OF OTHER PART OF RIGHT FOOT ASSOCIATED WITH TYPE 2 DIABETES MELLITUS, LIMITED TO BREAKDOWN OF SKIN: ICD-10-CM

## 2022-05-04 DIAGNOSIS — L97.511 DIABETIC ULCER OF TOE OF RIGHT FOOT ASSOCIATED WITH TYPE 2 DIABETES MELLITUS, LIMITED TO BREAKDOWN OF SKIN: Primary | ICD-10-CM

## 2022-05-04 DIAGNOSIS — E11.621 DIABETIC ULCER OF TOE OF RIGHT FOOT ASSOCIATED WITH TYPE 2 DIABETES MELLITUS, LIMITED TO BREAKDOWN OF SKIN: Primary | ICD-10-CM

## 2022-05-04 PROCEDURE — 99499 UNLISTED E&M SERVICE: CPT | Mod: S$GLB,,, | Performed by: PODIATRIST

## 2022-05-04 PROCEDURE — 99999 PR PBB SHADOW E&M-EST. PATIENT-LVL II: ICD-10-PCS | Mod: PBBFAC,,, | Performed by: PODIATRIST

## 2022-05-04 PROCEDURE — 1159F PR MEDICATION LIST DOCUMENTED IN MEDICAL RECORD: ICD-10-PCS | Mod: CPTII,S$GLB,, | Performed by: PODIATRIST

## 2022-05-04 PROCEDURE — 97597 DBRDMT OPN WND 1ST 20 CM/<: CPT | Mod: S$GLB,,, | Performed by: PODIATRIST

## 2022-05-04 PROCEDURE — 3008F BODY MASS INDEX DOCD: CPT | Mod: CPTII,S$GLB,, | Performed by: PODIATRIST

## 2022-05-04 PROCEDURE — 3008F PR BODY MASS INDEX (BMI) DOCUMENTED: ICD-10-PCS | Mod: CPTII,S$GLB,, | Performed by: PODIATRIST

## 2022-05-04 PROCEDURE — 99999 PR PBB SHADOW E&M-EST. PATIENT-LVL II: CPT | Mod: PBBFAC,,, | Performed by: PODIATRIST

## 2022-05-04 PROCEDURE — 1159F MED LIST DOCD IN RCRD: CPT | Mod: CPTII,S$GLB,, | Performed by: PODIATRIST

## 2022-05-04 PROCEDURE — 99499 NO LOS: ICD-10-PCS | Mod: S$GLB,,, | Performed by: PODIATRIST

## 2022-05-04 PROCEDURE — 3066F PR DOCUMENTATION OF TREATMENT FOR NEPHROPATHY: ICD-10-PCS | Mod: CPTII,S$GLB,, | Performed by: PODIATRIST

## 2022-05-04 PROCEDURE — 97597 WOUND DEBRIDEMENT: ICD-10-PCS | Mod: S$GLB,,, | Performed by: PODIATRIST

## 2022-05-04 PROCEDURE — 3066F NEPHROPATHY DOC TX: CPT | Mod: CPTII,S$GLB,, | Performed by: PODIATRIST

## 2022-05-04 RX ORDER — MUPIROCIN 20 MG/G
OINTMENT TOPICAL 3 TIMES DAILY
Qty: 30 G | Refills: 2 | Status: SHIPPED | OUTPATIENT
Start: 2022-05-04 | End: 2023-03-21 | Stop reason: CLARIF

## 2022-05-05 NOTE — PROGRESS NOTES
Subjective:      Patient ID: Yobany Richardson is a 66 y.o. male.    Chief Complaint: Wound Care (Rt foot)    Patient presents to clinic for a 2 week wound check of the Rt. Forefoot.  Denies pain from the extremity with today's exam.  Continues applying bactroban and a bandage to the sites daily. Notes modifying his activity level.  Denies noticing signs of infection. Denies experiencing N/V/F/C/D.  Denies any additional pedal complaints.        Hemoglobin A1C   Date Value Ref Range Status   07/16/2021 7.3 (H) 4.0 - 5.6 % Final     Comment:     ADA Screening Guidelines:  5.7-6.4%  Consistent with prediabetes  >or=6.5%  Consistent with diabetes    High levels of fetal hemoglobin interfere with the HbA1C  assay. Heterozygous hemoglobin variants (HbS, HgC, etc)do  not significantly interfere with this assay.   However, presence of multiple variants may affect accuracy.     06/21/2021 6.9 (H) <5.7 % of total Hgb Final     Comment:     For someone without known diabetes, a hemoglobin A1c  value of 6.5% or greater indicates that they may have   diabetes and this should be confirmed with a follow-up   test.     For someone with known diabetes, a value <7% indicates   that their diabetes is well controlled and a value   greater than or equal to 7% indicates suboptimal   control. A1c targets should be individualized based on   duration of diabetes, age, comorbid conditions, and   other considerations.     Currently, no consensus exists regarding use of  hemoglobin A1c for diagnosis of diabetes for children.         04/20/2021 7.7 (A) 4.0 - 6.0 % Final           Past Medical History:   Diagnosis Date    Abnormal thallium stress test     Arrhythmia     Atrial flutter, paroxysmal 2/11/2016    CAD (coronary artery disease) 3/9/2016    5/2016 [facility] Left main:NL LAD: 35% proximal LAD. two small diagonals with minimal disease.  Circumflex/Large branching first obtuse marginal: with minimal disease.   RCA: The vessel was large sized  "(dominant) with a 60% proximal lesion. 100 mcgs of intracoronary Nitroglycerin were given with no change in the lesion. Thus FFR was performed. FFR was 0.79  2/2016 cor CTA ~~ 50% LAD    Coronary artery disease     Diabetes mellitus     Diabetes mellitus, type 2     Disorder of kidney and ureter     Hypertension     Neuropathy     Paroxysmal atrial flutter     PE (pulmonary embolism)     Pulmonary embolism 2/11/2016 1/2016     Rheumatoid arthritis     Shortness of breath     Stented coronary artery 6/15/2016    5/2016 RCA- synergy 3.5x12    Wears contact lenses     Wears prescription eyeglasses        Past Surgical History:   Procedure Laterality Date    CARDIAC CATHETERIZATION      with stent placed x 1    KNEE ARTHROSCOPY      TONSILLECTOMY         Family History   Problem Relation Age of Onset    Cancer Mother     Angina Mother     Heart disease Mother     Hypertension Mother     Kidney disease Father     Thyroid disease Sister     Bell's palsy Sister     Thyroid disease Sister     Depression Sister     Depression Sister        Social History     Socioeconomic History    Marital status:    Tobacco Use    Smoking status: Never Smoker    Smokeless tobacco: Never Used   Substance and Sexual Activity    Alcohol use: No    Drug use: No       Current Outpatient Medications   Medication Sig Dispense Refill    acetaminophen (TYLENOL) 325 MG tablet Take 1 tablet (325 mg total) by mouth every 6 (six) hours as needed for Pain.      amLODIPine (NORVASC) 10 MG tablet TK 1 T PO D      APPLE CIDER VINEGAR ORAL Take by mouth.      ascorbic acid (VITAMIN C) 500 MG tablet Take 500 mg by mouth once daily.      aspirin 81 MG Chew Take 81 mg by mouth once daily.      b complex vitamins capsule Take 1 capsule by mouth once daily.      BD ULTRA-FINE AMAN PEN NEEDLE 32 gauge x 5/32" Ndle Inject 1 Syringe into the skin once daily. 100 each 3    carvediloL (COREG) 6.25 MG tablet TAKE 1 " TABLET(6.25 MG) BY MOUTH TWICE DAILY 180 tablet 4    clopidogreL (PLAVIX) 75 mg tablet TAKE 1 TABLET BY MOUTH EVERY DAY 90 tablet 4    cyanocobalamin, vitamin B-12, 1,000 mcg/15 mL Liqd Take by mouth.      FARXIGA 5 mg Tab tablet Take 5 mg by mouth every evening.      fish,bora,flax oils-om3,6,9no1 1,200 mg Cap Take by mouth.      FREESTYLE PASCUAL 14 DAY SENSOR Kit APPLY NEW SENSOR EVERY 14 DAYS AS DIRECTED      FREESTYLE LITE STRIPS Strp TEST 2 TO 3 TIMES D  2    gabapentin (NEURONTIN) 300 MG capsule Take 1 capsule (300 mg total) by mouth 3 (three) times daily. 90 capsule 5    LANTUS SOLOSTAR U-100 INSULIN glargine 100 units/mL (3mL) SubQ pen ADMINISTER 45 UNITS UNDER THE SKIN EVERY DAY 15 mL 5    metFORMIN (GLUCOPHAGE) 1000 MG tablet Take 1 tablet (1,000 mg total) by mouth 2 (two) times daily with meals. 180 tablet 0    multivit-min/iron/folic acid/K (ADULTS MULTIVITAMIN ORAL) Take by mouth.      olmesartan-hydrochlorothiazide (BENICAR HCT) 40-12.5 mg Tab Take 1 tablet by mouth once daily.      olmesartan-hydrochlorothiazide (BENICAR HCT) 40-25 mg per tablet Take 1 tablet by mouth once daily.      ondansetron (ZOFRAN) 4 MG tablet Take 1 tablet (4 mg total) by mouth every 6 (six) hours. 12 tablet 0    ONETOUCH DELICA PLUS LANCET 33 gauge Misc TEST ONCE D  0    potassium chloride SA (K-DUR,KLOR-CON) 10 MEQ tablet Take by mouth.      pravastatin (PRAVACHOL) 40 MG tablet Take 1 tablet (40 mg total) by mouth every evening. 90 tablet 3    RYBELSUS 7 mg tablet Take 7 mg by mouth every morning.      UNABLE TO FIND medication name: Tart Cherry Extract      vitamin E 400 UNIT capsule Take 400 Units by mouth once daily.      mupirocin (BACTROBAN) 2 % ointment Apply topically 3 (three) times daily. 30 g 2     Current Facility-Administered Medications   Medication Dose Route Frequency Provider Last Rate Last Admin    acetaminophen tablet 650 mg  650 mg Oral Once PRN Robert Landrum MD         ondansetron disintegrating tablet 4 mg  4 mg Oral Once PRN Robert Landrum MD           Review of patient's allergies indicates:  No Active Allergies      Review of Systems   Constitutional: Negative for chills and fever.   Cardiovascular: Negative for claudication and leg swelling.   Skin: Positive for color change and nail changes. Negative for poor wound healing.   Musculoskeletal: Positive for arthritis. Negative for joint pain and joint swelling.   Gastrointestinal: Negative for nausea and vomiting.   Neurological: Positive for numbness. Negative for paresthesias.   Psychiatric/Behavioral: Negative for altered mental status.           Objective:      Physical Exam  Constitutional:       General: He is not in acute distress.     Appearance: He is well-developed. He is not diaphoretic.   Cardiovascular:      Pulses:           Dorsalis pedis pulses are 2+ on the right side and 2+ on the left side.        Posterior tibial pulses are 2+ on the right side and 2+ on the left side.      Comments: CFT < 3 seconds bilateral.  Pedal hair growth present bilateral.   No varicosities noted bilateral. No lower extremity edema noted bilateral.  Toes are warm to touch bilateral.    Musculoskeletal:         General: Deformity present. No tenderness.      Right lower leg: No edema.      Left lower leg: No edema.      Comments: Muscle strength 5/5 in all muscle groups bilateral.  No tenderness nor crepitation with ROM of foot/ankle joints bilateral.  Arthritic changes changes noted to the dorsal and medial aspect of the Rt. 1st mtp joint.  Bilateral pes planus foot type.  Bilateral hallux abducto valgus.  Bilateral semi-rigid contracture of toes 2-5 with better reduction of the Rt. 3rd and 4th DIP joints.  (-) for pain with palpation to the site of the wound.   Skin:     General: Skin is warm and dry.      Capillary Refill: Capillary refill takes less than 2 seconds.      Coloration: Skin is not pale.      Findings: Wound  present. No abrasion, bruising, burn, ecchymosis, erythema, lesion, petechiae or rash.      Nails: There is no clubbing.      Comments: Pedal skin has normal turgor, temperature, and texture bilateral.  Toenails x 10 appear mycotic but well maintained.      Location: Open wound noted to the Rt. Sub 4th met head.  Base: Down to dermis and comprised of fibrin.    Drainage: None  Tiffany wound: Devoid of erythema, edema, fluctuance, purulence, and malodor.   Pre-debridement measurement: 1.5 x 0.5 x 0.1cm  Post-debridement measurement: 1.7 x 0.7 x 0.1cm    Location: Open wound noted to the plantar aspect of the Rt. Great toe.  Base: Down to dermis and comprised of granulation.    Drainage: None  Tiffany wound: Devoid of erythema, edema, fluctuance, purulence, and malodor.   Pre-debridement measurement: 0.2 x 0.4 x 0.1cm  Post-debridement measurement: 0.4 x 0.6 x 0.1cm     Neurological:      Mental Status: He is alert and oriented to person, place, and time.      Sensory: Sensory deficit present.      Motor: No weakness or atrophy.      Comments: Protective sensation per [place]-Yash monofilament absent bilateral.    Light touch intact bilateral.               Assessment:       Encounter Diagnoses   Name Primary?    Diabetic ulcer of toe of right foot associated with type 2 diabetes mellitus, limited to breakdown of skin Yes    Diabetic ulcer of other part of right foot associated with type 2 diabetes mellitus, limited to breakdown of skin          Plan:       Yobany was seen today for wound care.    Diagnoses and all orders for this visit:    Diabetic ulcer of toe of right foot associated with type 2 diabetes mellitus, limited to breakdown of skin    Diabetic ulcer of other part of right foot associated with type 2 diabetes mellitus, limited to breakdown of skin  -     mupirocin (BACTROBAN) 2 % ointment; Apply topically 3 (three) times daily.      I counseled the patient on his conditions, their implications and medical  management.    Performed a selective excisional debridement of the Rt. foot/ankle.  See attached procedure note.      Wound bases were covered with triple antibiotic ointment and a mepilex border.  Instructed to keep intact for today.      Patient to continue daily dressing changes with use of bactroban ointment.  Refill written for the medication.    Advised to rest and elevate the affected extremity.    Instructed to minimize weight bearing to facilitate wound healing.    To continue wearing a casual stiff soled shoe to facilitate healing.    RTC in 2 weeks for follow up.    Augusto Simeon DPM

## 2022-05-05 NOTE — PROCEDURES
"Wound Debridement    Date/Time: 5/4/2022 3:40 PM  Performed by: Augusto Simeon DPM  Authorized by: Augusto Simeon DPM     Time out: Immediately prior to procedure a "time out" was called to verify the correct patient, procedure, equipment, support staff and site/side marked as required.    Consent Done?:  Yes (Verbal)    Preparation: Patient was prepped and draped in usual sterile fashion    Local anesthesia used?: No      Wound Details:    Location:  Right foot    Location:  Right 1st Toe    Type of Debridement:  Excisional       Length (cm):  0.2       Area (sq cm):  0.08       Width (cm):  0.4       Percent Debrided (%):  100       Depth (cm):  0.1       Total Area Debrided (sq cm):  0.08    Depth of debridement:  Epidermis/Dermis    Tissue debrided:  Dermis    Devitalized tissue debrided:  Fibrin    Instruments:  Blade    2nd Wound Details:     Location:  Right foot    Location:  Right 4th Metatarsal Head    Location:  Right 4th Metatarsal Head    Type of Debridement:  Excisional       Length (cm):  1.5       Area (sq cm):  0.75       Width (cm):  0.5       Percent Debrided (%):  100       Depth (cm):  0.1       Total Area Debrided (sq cm):  0.75    Depth of debridement:  Epidermis/Dermis    Tissue debrided:  Dermis    Devitalized tissue debrided:  Fibrin    Instruments:  Blade    Bleeding:  Minimal  Hemostasis Achieved: Yes    Method Used:  Pressure  Patient tolerance:  Patient tolerated the procedure well with no immediate complications      "

## 2022-05-16 ENCOUNTER — PATIENT OUTREACH (OUTPATIENT)
Dept: ADMINISTRATIVE | Facility: OTHER | Age: 67
End: 2022-05-16
Payer: COMMERCIAL

## 2022-05-16 NOTE — PROGRESS NOTES
Health Maintenance Due   Topic Date Due    HIV Screening  Never done    Shingles Vaccine (1 of 2) Never done    TETANUS VACCINE  06/01/2017    Foot Exam  01/22/2022    Diabetes Urine Screening  04/20/2022     Updates were requested from care everywhere.  Chart was reviewed for overdue Proactive [facility] Encounters ([place]) topics (CRS, Breast Cancer Screening, Eye exam)  Health Maintenance has been updated.  [place] immunization registry triggered.  Immunizations were reconciled.

## 2022-05-18 ENCOUNTER — OFFICE VISIT (OUTPATIENT)
Dept: PODIATRY | Facility: CLINIC | Age: 67
End: 2022-05-18
Payer: COMMERCIAL

## 2022-05-18 VITALS — HEIGHT: 74 IN | BODY MASS INDEX: 27.59 KG/M2 | WEIGHT: 214.94 LBS

## 2022-05-18 DIAGNOSIS — E11.621 DIABETIC ULCER OF TOE OF RIGHT FOOT ASSOCIATED WITH TYPE 2 DIABETES MELLITUS, LIMITED TO BREAKDOWN OF SKIN: Primary | ICD-10-CM

## 2022-05-18 DIAGNOSIS — L97.511 DIABETIC ULCER OF TOE OF RIGHT FOOT ASSOCIATED WITH TYPE 2 DIABETES MELLITUS, LIMITED TO BREAKDOWN OF SKIN: Primary | ICD-10-CM

## 2022-05-18 DIAGNOSIS — E11.621 DIABETIC ULCER OF OTHER PART OF RIGHT FOOT ASSOCIATED WITH TYPE 2 DIABETES MELLITUS, LIMITED TO BREAKDOWN OF SKIN: ICD-10-CM

## 2022-05-18 DIAGNOSIS — L97.511 DIABETIC ULCER OF OTHER PART OF RIGHT FOOT ASSOCIATED WITH TYPE 2 DIABETES MELLITUS, LIMITED TO BREAKDOWN OF SKIN: ICD-10-CM

## 2022-05-18 DIAGNOSIS — E11.42 DIABETIC POLYNEUROPATHY ASSOCIATED WITH TYPE 2 DIABETES MELLITUS: ICD-10-CM

## 2022-05-18 PROCEDURE — 3008F BODY MASS INDEX DOCD: CPT | Mod: CPTII,S$GLB,, | Performed by: PODIATRIST

## 2022-05-18 PROCEDURE — 1159F PR MEDICATION LIST DOCUMENTED IN MEDICAL RECORD: ICD-10-PCS | Mod: CPTII,S$GLB,, | Performed by: PODIATRIST

## 2022-05-18 PROCEDURE — 99499 NO LOS: ICD-10-PCS | Mod: S$GLB,,, | Performed by: PODIATRIST

## 2022-05-18 PROCEDURE — 99499 UNLISTED E&M SERVICE: CPT | Mod: S$GLB,,, | Performed by: PODIATRIST

## 2022-05-18 PROCEDURE — 97597 DBRDMT OPN WND 1ST 20 CM/<: CPT | Mod: S$GLB,,, | Performed by: PODIATRIST

## 2022-05-18 PROCEDURE — 97597 WOUND DEBRIDEMENT: ICD-10-PCS | Mod: S$GLB,,, | Performed by: PODIATRIST

## 2022-05-18 PROCEDURE — 3008F PR BODY MASS INDEX (BMI) DOCUMENTED: ICD-10-PCS | Mod: CPTII,S$GLB,, | Performed by: PODIATRIST

## 2022-05-18 PROCEDURE — 3066F NEPHROPATHY DOC TX: CPT | Mod: CPTII,S$GLB,, | Performed by: PODIATRIST

## 2022-05-18 PROCEDURE — 3066F PR DOCUMENTATION OF TREATMENT FOR NEPHROPATHY: ICD-10-PCS | Mod: CPTII,S$GLB,, | Performed by: PODIATRIST

## 2022-05-18 PROCEDURE — 99999 PR PBB SHADOW E&M-EST. PATIENT-LVL II: ICD-10-PCS | Mod: PBBFAC,,, | Performed by: PODIATRIST

## 2022-05-18 PROCEDURE — 1159F MED LIST DOCD IN RCRD: CPT | Mod: CPTII,S$GLB,, | Performed by: PODIATRIST

## 2022-05-18 PROCEDURE — 99999 PR PBB SHADOW E&M-EST. PATIENT-LVL II: CPT | Mod: PBBFAC,,, | Performed by: PODIATRIST

## 2022-05-19 NOTE — PROGRESS NOTES
Subjective:      Patient ID: Yobany Richardson is a 66 y.o. male.    Chief Complaint: Wound Care and Diabetes Mellitus    Patient presents to clinic for a 2 week wound check of the Rt. Forefoot.  Denies pain from the extremity with today's exam.  Continues treating wounds with bactroban on a daily basis.. Notes modifying his activity level.  Denies noticing signs of infection. Denies experiencing N/V/F/C/D.  Denies any additional pedal complaints.        Hemoglobin A1C   Date Value Ref Range Status   07/16/2021 7.3 (H) 4.0 - 5.6 % Final     Comment:     ADA Screening Guidelines:  5.7-6.4%  Consistent with prediabetes  >or=6.5%  Consistent with diabetes    High levels of fetal hemoglobin interfere with the HbA1C  assay. Heterozygous hemoglobin variants (HbS, HgC, etc)do  not significantly interfere with this assay.   However, presence of multiple variants may affect accuracy.     06/21/2021 6.9 (H) <5.7 % of total Hgb Final     Comment:     For someone without known diabetes, a hemoglobin A1c  value of 6.5% or greater indicates that they may have   diabetes and this should be confirmed with a follow-up   test.     For someone with known diabetes, a value <7% indicates   that their diabetes is well controlled and a value   greater than or equal to 7% indicates suboptimal   control. A1c targets should be individualized based on   duration of diabetes, age, comorbid conditions, and   other considerations.     Currently, no consensus exists regarding use of  hemoglobin A1c for diagnosis of diabetes for children.         04/20/2021 7.7 (A) 4.0 - 6.0 % Final           Past Medical History:   Diagnosis Date    Abnormal thallium stress test     Arrhythmia     Atrial flutter, paroxysmal 2/11/2016    CAD (coronary artery disease) 3/9/2016    5/2016 [facility] Left main:NL LAD: 35% proximal LAD. two small diagonals with minimal disease.  Circumflex/Large branching first obtuse marginal: with minimal disease.   RCA: The vessel was large  "sized (dominant) with a 60% proximal lesion. 100 mcgs of intracoronary Nitroglycerin were given with no change in the lesion. Thus FFR was performed. FFR was 0.79  2/2016 cor CTA ~~ 50% LAD    Coronary artery disease     Diabetes mellitus     Diabetes mellitus, type 2     Disorder of kidney and ureter     Hypertension     Neuropathy     Paroxysmal atrial flutter     PE (pulmonary embolism)     Pulmonary embolism 2/11/2016 1/2016     Rheumatoid arthritis     Shortness of breath     Stented coronary artery 6/15/2016    5/2016 RCA- synergy 3.5x12    Wears contact lenses     Wears prescription eyeglasses        Past Surgical History:   Procedure Laterality Date    CARDIAC CATHETERIZATION      with stent placed x 1    KNEE ARTHROSCOPY      TONSILLECTOMY         Family History   Problem Relation Age of Onset    Cancer Mother     Angina Mother     Heart disease Mother     Hypertension Mother     Kidney disease Father     Thyroid disease Sister     Bell's palsy Sister     Thyroid disease Sister     Depression Sister     Depression Sister        Social History     Socioeconomic History    Marital status:    Tobacco Use    Smoking status: Never Smoker    Smokeless tobacco: Never Used   Substance and Sexual Activity    Alcohol use: No    Drug use: No       Current Outpatient Medications   Medication Sig Dispense Refill    acetaminophen (TYLENOL) 325 MG tablet Take 1 tablet (325 mg total) by mouth every 6 (six) hours as needed for Pain.      amLODIPine (NORVASC) 10 MG tablet TK 1 T PO D      APPLE CIDER VINEGAR ORAL Take by mouth.      ascorbic acid (VITAMIN C) 500 MG tablet Take 500 mg by mouth once daily.      aspirin 81 MG Chew Take 81 mg by mouth once daily.      b complex vitamins capsule Take 1 capsule by mouth once daily.      BD ULTRA-FINE AMAN PEN NEEDLE 32 gauge x 5/32" Ndle Inject 1 Syringe into the skin once daily. 100 each 3    carvediloL (COREG) 6.25 MG tablet TAKE " 1 TABLET(6.25 MG) BY MOUTH TWICE DAILY 180 tablet 4    clopidogreL (PLAVIX) 75 mg tablet TAKE 1 TABLET BY MOUTH EVERY DAY 90 tablet 4    cyanocobalamin, vitamin B-12, 1,000 mcg/15 mL Liqd Take by mouth.      FARXIGA 5 mg Tab tablet Take 5 mg by mouth every evening.      fish,bora,flax oils-om3,6,9no1 1,200 mg Cap Take by mouth.      FREESTYLE PASCUAL 14 DAY SENSOR Kit APPLY NEW SENSOR EVERY 14 DAYS AS DIRECTED      FREESTYLE LITE STRIPS Strp TEST 2 TO 3 TIMES D  2    gabapentin (NEURONTIN) 300 MG capsule Take 1 capsule (300 mg total) by mouth 3 (three) times daily. 90 capsule 5    LANTUS SOLOSTAR U-100 INSULIN glargine 100 units/mL (3mL) SubQ pen ADMINISTER 45 UNITS UNDER THE SKIN EVERY DAY 15 mL 5    metFORMIN (GLUCOPHAGE) 1000 MG tablet Take 1 tablet (1,000 mg total) by mouth 2 (two) times daily with meals. 180 tablet 0    multivit-min/iron/folic acid/K (ADULTS MULTIVITAMIN ORAL) Take by mouth.      mupirocin (BACTROBAN) 2 % ointment Apply topically 3 (three) times daily. 30 g 2    olmesartan-hydrochlorothiazide (BENICAR HCT) 40-12.5 mg Tab Take 1 tablet by mouth once daily.      olmesartan-hydrochlorothiazide (BENICAR HCT) 40-25 mg per tablet TAKE 1 TABLET BY MOUTH DAILY 90 tablet 1    ondansetron (ZOFRAN) 4 MG tablet Take 1 tablet (4 mg total) by mouth every 6 (six) hours. 12 tablet 0    ONETOUCH DELICA PLUS LANCET 33 gauge Misc TEST ONCE D  0    potassium chloride SA (K-DUR,KLOR-CON) 10 MEQ tablet Take by mouth.      pravastatin (PRAVACHOL) 40 MG tablet Take 1 tablet (40 mg total) by mouth every evening. 90 tablet 3    RYBELSUS 7 mg tablet Take 7 mg by mouth every morning.      UNABLE TO FIND medication name: Tart Cherry Extract      vitamin E 400 UNIT capsule Take 400 Units by mouth once daily.       Current Facility-Administered Medications   Medication Dose Route Frequency Provider Last Rate Last Admin    acetaminophen tablet 650 mg  650 mg Oral Once PRN Robert Landrum MD         ondansetron disintegrating tablet 4 mg  4 mg Oral Once PRN Robert Landrum MD           Review of patient's allergies indicates:  No Known Allergies      Review of Systems   Constitutional: Negative for chills and fever.   Cardiovascular: Negative for claudication and leg swelling.   Skin: Positive for color change and nail changes. Negative for poor wound healing.   Musculoskeletal: Positive for arthritis. Negative for joint pain and joint swelling.   Gastrointestinal: Negative for nausea and vomiting.   Neurological: Positive for numbness. Negative for paresthesias.   Psychiatric/Behavioral: Negative for altered mental status.           Objective:      Physical Exam  Constitutional:       General: He is not in acute distress.     Appearance: He is well-developed. He is not diaphoretic.   Cardiovascular:      Pulses:           Dorsalis pedis pulses are 2+ on the right side and 2+ on the left side.        Posterior tibial pulses are 2+ on the right side and 2+ on the left side.      Comments: CFT < 3 seconds bilateral.  Pedal hair growth present bilateral.   No varicosities noted bilateral. No lower extremity edema noted bilateral.  Toes are warm to touch bilateral.    Musculoskeletal:         General: Deformity present. No tenderness.      Right lower leg: No edema.      Left lower leg: No edema.      Comments: Muscle strength 5/5 in all muscle groups bilateral.  No tenderness nor crepitation with ROM of foot/ankle joints bilateral.  Arthritic changes changes noted to the dorsal and medial aspect of the Rt. 1st mtp joint.  Bilateral pes planus foot type.  Bilateral hallux abducto valgus.  Bilateral semi-rigid contracture of toes 2-5 with better reduction of the Rt. 3rd and 4th DIP joints.  (-) for pain with palpation to the site of the wound.   Skin:     General: Skin is warm and dry.      Capillary Refill: Capillary refill takes less than 2 seconds.      Coloration: Skin is not pale.      Findings: Wound  present. No abrasion, bruising, burn, ecchymosis, erythema, lesion, petechiae or rash.      Nails: There is no clubbing.      Comments: Pedal skin has normal turgor, temperature, and texture bilateral.  Toenails x 10 appear mycotic but well maintained.      Location: Open wound noted to the Rt. Sub 4th met head.  Base: Down to dermis and comprised of fibrin.    Drainage: None  Tiffany wound: Devoid of erythema, edema, fluctuance, purulence, and malodor.   Pre-debridement measurement: 0.1 x 0.1 x 0.1cm  Post-debridement measurement: 0.3 x 0.3 x 0.1cm    Location: Open wound noted to the plantar aspect of the Rt. Great toe.  Base: Down to dermis and comprised of fibrin.   Drainage: None  Tiffany wound: Devoid of erythema, edema, fluctuance, purulence, and malodor.   Pre-debridement measurement: 0.2 x 0.4 x 0.1cm  Post-debridement measurement: 0.4 x 0.7 x 0.1cm     Neurological:      Mental Status: He is alert and oriented to person, place, and time.      Sensory: Sensory deficit present.      Motor: No weakness or atrophy.      Comments: Protective sensation per [place]-Yash monofilament absent bilateral.    Light touch intact bilateral.               Assessment:       Encounter Diagnoses   Name Primary?    Diabetic ulcer of toe of right foot associated with type 2 diabetes mellitus, limited to breakdown of skin Yes    Diabetic ulcer of other part of right foot associated with type 2 diabetes mellitus, limited to breakdown of skin     Diabetic polyneuropathy associated with type 2 diabetes mellitus          Plan:       Yobany was seen today for wound care and diabetes mellitus.    Diagnoses and all orders for this visit:    Diabetic ulcer of toe of right foot associated with type 2 diabetes mellitus, limited to breakdown of skin    Diabetic ulcer of other part of right foot associated with type 2 diabetes mellitus, limited to breakdown of skin    Diabetic polyneuropathy associated with type 2 diabetes mellitus      I  counseled the patient on his conditions, their implications and medical management.    Performed a selective excisional debridement of the Rt. foot/ankle.  See attached procedure note.      Wound bases were covered with triple antibiotic ointment and a mepilex border.  Instructed to keep intact for today.      Patient to continue daily dressing changes with use of bactroban ointment.     Advised to rest and elevate the affected extremity.    Instructed to minimize weight bearing to facilitate wound healing.    To continue wearing a casual stiff soled shoes to facilitate healing.    RTC in 2 weeks for follow up.    Augusto Simeon DPM

## 2022-05-19 NOTE — PROCEDURES
"Wound Debridement    Date/Time: 5/18/2022 4:00 PM  Performed by: Augusto Simeon DPM  Authorized by: Augusto Simeon DPM     Time out: Immediately prior to procedure a "time out" was called to verify the correct patient, procedure, equipment, support staff and site/side marked as required.    Consent Done?:  Yes (Verbal)    Preparation: Patient was prepped and draped in usual sterile fashion    Local anesthesia used?: No      Wound Details:    Location:  Right foot    Location:  Right 1st Toe    Type of Debridement:  Excisional       Length (cm):  0.2       Area (sq cm):  0.08       Width (cm):  0.4       Percent Debrided (%):  100       Depth (cm):  0.1       Total Area Debrided (sq cm):  0.08    Depth of debridement:  Epidermis/Dermis    Tissue debrided:  Dermis    Devitalized tissue debrided:  Fibrin    Instruments:  Blade    2nd Wound Details:     Location:  Right foot    Location:  Right 4th Metatarsal Head    Location:  Right 4th Metatarsal Head    Type of Debridement:  Excisional       Length (cm):  0.1       Area (sq cm):  0.01       Width (cm):  0.1       Percent Debrided (%):  100       Depth (cm):  0.1       Total Area Debrided (sq cm):  0.01    Depth of debridement:  Epidermis/Dermis    Tissue debrided:  Dermis    Devitalized tissue debrided:  Fibrin    Instruments:  Blade    Bleeding:  Minimal  Hemostasis Achieved: Yes    Method Used:  Pressure  Patient tolerance:  Patient tolerated the procedure well with no immediate complications      "

## 2022-06-01 ENCOUNTER — OFFICE VISIT (OUTPATIENT)
Dept: PODIATRY | Facility: CLINIC | Age: 67
End: 2022-06-01
Payer: COMMERCIAL

## 2022-06-01 VITALS — BODY MASS INDEX: 27.59 KG/M2 | HEIGHT: 74 IN | WEIGHT: 214.94 LBS

## 2022-06-01 DIAGNOSIS — E11.621 DIABETIC ULCER OF TOE OF RIGHT FOOT ASSOCIATED WITH TYPE 2 DIABETES MELLITUS, LIMITED TO BREAKDOWN OF SKIN: Primary | ICD-10-CM

## 2022-06-01 DIAGNOSIS — E11.42 DIABETIC POLYNEUROPATHY ASSOCIATED WITH TYPE 2 DIABETES MELLITUS: ICD-10-CM

## 2022-06-01 DIAGNOSIS — L97.511 DIABETIC ULCER OF TOE OF RIGHT FOOT ASSOCIATED WITH TYPE 2 DIABETES MELLITUS, LIMITED TO BREAKDOWN OF SKIN: Primary | ICD-10-CM

## 2022-06-01 PROCEDURE — 99499 UNLISTED E&M SERVICE: CPT | Mod: S$GLB,,, | Performed by: PODIATRIST

## 2022-06-01 PROCEDURE — 3008F BODY MASS INDEX DOCD: CPT | Mod: CPTII,S$GLB,, | Performed by: PODIATRIST

## 2022-06-01 PROCEDURE — 1159F MED LIST DOCD IN RCRD: CPT | Mod: CPTII,S$GLB,, | Performed by: PODIATRIST

## 2022-06-01 PROCEDURE — 99999 PR PBB SHADOW E&M-EST. PATIENT-LVL II: CPT | Mod: PBBFAC,,, | Performed by: PODIATRIST

## 2022-06-01 PROCEDURE — 3008F PR BODY MASS INDEX (BMI) DOCUMENTED: ICD-10-PCS | Mod: CPTII,S$GLB,, | Performed by: PODIATRIST

## 2022-06-01 PROCEDURE — 97597 WOUND DEBRIDEMENT: ICD-10-PCS | Mod: S$GLB,,, | Performed by: PODIATRIST

## 2022-06-01 PROCEDURE — 87076 CULTURE ANAEROBE IDENT EACH: CPT | Performed by: PODIATRIST

## 2022-06-01 PROCEDURE — 3066F PR DOCUMENTATION OF TREATMENT FOR NEPHROPATHY: ICD-10-PCS | Mod: CPTII,S$GLB,, | Performed by: PODIATRIST

## 2022-06-01 PROCEDURE — 99999 PR PBB SHADOW E&M-EST. PATIENT-LVL II: ICD-10-PCS | Mod: PBBFAC,,, | Performed by: PODIATRIST

## 2022-06-01 PROCEDURE — 87070 CULTURE OTHR SPECIMN AEROBIC: CPT | Performed by: PODIATRIST

## 2022-06-01 PROCEDURE — 99499 NO LOS: ICD-10-PCS | Mod: S$GLB,,, | Performed by: PODIATRIST

## 2022-06-01 PROCEDURE — 1159F PR MEDICATION LIST DOCUMENTED IN MEDICAL RECORD: ICD-10-PCS | Mod: CPTII,S$GLB,, | Performed by: PODIATRIST

## 2022-06-01 PROCEDURE — 87075 CULTR BACTERIA EXCEPT BLOOD: CPT | Performed by: PODIATRIST

## 2022-06-01 PROCEDURE — 97597 DBRDMT OPN WND 1ST 20 CM/<: CPT | Mod: S$GLB,,, | Performed by: PODIATRIST

## 2022-06-01 PROCEDURE — 3066F NEPHROPATHY DOC TX: CPT | Mod: CPTII,S$GLB,, | Performed by: PODIATRIST

## 2022-06-02 ENCOUNTER — HOSPITAL ENCOUNTER (OUTPATIENT)
Dept: RADIOLOGY | Facility: HOSPITAL | Age: 67
Discharge: HOME | End: 2022-06-02
Attending: PODIATRIST
Payer: COMMERCIAL

## 2022-06-02 DIAGNOSIS — L97.511 DIABETIC ULCER OF TOE OF RIGHT FOOT ASSOCIATED WITH TYPE 2 DIABETES MELLITUS, LIMITED TO BREAKDOWN OF SKIN: ICD-10-CM

## 2022-06-02 DIAGNOSIS — E11.621 DIABETIC ULCER OF TOE OF RIGHT FOOT ASSOCIATED WITH TYPE 2 DIABETES MELLITUS, LIMITED TO BREAKDOWN OF SKIN: ICD-10-CM

## 2022-06-02 PROCEDURE — 73630 X-RAY EXAM OF FOOT: CPT | Mod: 26,RT,, | Performed by: RADIOLOGY

## 2022-06-02 PROCEDURE — 73630 X-RAY EXAM OF FOOT: CPT | Mod: TC,PO,RT

## 2022-06-02 PROCEDURE — 73630 XR FOOT COMPLETE 3 VIEW RIGHT: ICD-10-PCS | Mod: 26,RT,, | Performed by: RADIOLOGY

## 2022-06-02 NOTE — PROGRESS NOTES
Subjective:      Patient ID: Yobany Richardson is a 66 y.o. male.    Chief Complaint: Wound Care    Patient presents to clinic for a 2 week wound check of the Rt. Forefoot.  Denies pain from the extremity with today's exam.  Notes the usual application of bactroban and a bandage to wounds of the Rt. Plantar forefoot and plantar hallux.  States the forefoot wound appears healed, however, he has been noticing an increase in drainage from the still open wound of the plantar hallux.  Denies this being a source of pain.  Denies noticing signs of infection nor pus from the area.  Denies experiencing N/V/F/C/D.  Denies any additional pedal complaints.        Hemoglobin A1C   Date Value Ref Range Status   07/16/2021 7.3 (H) 4.0 - 5.6 % Final     Comment:     ADA Screening Guidelines:  5.7-6.4%  Consistent with prediabetes  >or=6.5%  Consistent with diabetes    High levels of fetal hemoglobin interfere with the HbA1C  assay. Heterozygous hemoglobin variants (HbS, HgC, etc)do  not significantly interfere with this assay.   However, presence of multiple variants may affect accuracy.     06/21/2021 6.9 (H) <5.7 % of total Hgb Final     Comment:     For someone without known diabetes, a hemoglobin A1c  value of 6.5% or greater indicates that they may have   diabetes and this should be confirmed with a follow-up   test.     For someone with known diabetes, a value <7% indicates   that their diabetes is well controlled and a value   greater than or equal to 7% indicates suboptimal   control. A1c targets should be individualized based on   duration of diabetes, age, comorbid conditions, and   other considerations.     Currently, no consensus exists regarding use of  hemoglobin A1c for diagnosis of diabetes for children.         04/20/2021 7.7 (A) 4.0 - 6.0 % Final           Past Medical History:   Diagnosis Date    Abnormal thallium stress test     Arrhythmia     Atrial flutter, paroxysmal 2/11/2016    CAD (coronary artery disease)  3/9/2016    5/2016 [facility] Left main:NL LAD: 35% proximal LAD. two small diagonals with minimal disease.  Circumflex/Large branching first obtuse marginal: with minimal disease.   RCA: The vessel was large sized (dominant) with a 60% proximal lesion. 100 mcgs of intracoronary Nitroglycerin were given with no change in the lesion. Thus FFR was performed. FFR was 0.79  2/2016 cor CTA ~~ 50% LAD    Coronary artery disease     Diabetes mellitus     Diabetes mellitus, type 2     Disorder of kidney and ureter     Hypertension     Neuropathy     Paroxysmal atrial flutter     PE (pulmonary embolism)     Pulmonary embolism 2/11/2016 1/2016     Rheumatoid arthritis     Shortness of breath     Stented coronary artery 6/15/2016    5/2016 RCA- synergy 3.5x12    Wears contact lenses     Wears prescription eyeglasses        Past Surgical History:   Procedure Laterality Date    CARDIAC CATHETERIZATION      with stent placed x 1    KNEE ARTHROSCOPY      TONSILLECTOMY         Family History   Problem Relation Age of Onset    Cancer Mother     Angina Mother     Heart disease Mother     Hypertension Mother     Kidney disease Father     Thyroid disease Sister     Bell's palsy Sister     Thyroid disease Sister     Depression Sister     Depression Sister        Social History     Socioeconomic History    Marital status:    Tobacco Use    Smoking status: Never Smoker    Smokeless tobacco: Never Used   Substance and Sexual Activity    Alcohol use: No    Drug use: No       Current Outpatient Medications   Medication Sig Dispense Refill    acetaminophen (TYLENOL) 325 MG tablet Take 1 tablet (325 mg total) by mouth every 6 (six) hours as needed for Pain.      amLODIPine (NORVASC) 10 MG tablet TAKE 1 TABLET BY MOUTH DAILY 30 tablet 5    APPLE CIDER VINEGAR ORAL Take by mouth.      ascorbic acid (VITAMIN C) 500 MG tablet Take 500 mg by mouth once daily.      aspirin 81 MG Chew Take 81 mg by mouth once  "daily.      b complex vitamins capsule Take 1 capsule by mouth once daily.      BD ULTRA-FINE AMAN PEN NEEDLE 32 gauge x 5/32" Ndle Inject 1 Syringe into the skin once daily. 100 each 3    carvediloL (COREG) 6.25 MG tablet TAKE 1 TABLET(6.25 MG) BY MOUTH TWICE DAILY 180 tablet 4    clopidogreL (PLAVIX) 75 mg tablet TAKE 1 TABLET BY MOUTH EVERY DAY 90 tablet 4    cyanocobalamin, vitamin B-12, 1,000 mcg/15 mL Liqd Take by mouth.      FARXIGA 5 mg Tab tablet Take 5 mg by mouth every evening.      fish,bora,flax oils-om3,6,9no1 1,200 mg Cap Take by mouth.      FREESTYLE PASCUAL 14 DAY SENSOR Kit APPLY NEW SENSOR EVERY 14 DAYS AS DIRECTED      FREESTYLE LITE STRIPS Strp TEST 2 TO 3 TIMES D  2    gabapentin (NEURONTIN) 300 MG capsule Take 1 capsule (300 mg total) by mouth 3 (three) times daily. 90 capsule 5    LANTUS SOLOSTAR U-100 INSULIN glargine 100 units/mL (3mL) SubQ pen ADMINISTER 45 UNITS UNDER THE SKIN EVERY DAY 15 mL 5    metFORMIN (GLUCOPHAGE) 1000 MG tablet Take 1 tablet (1,000 mg total) by mouth 2 (two) times daily with meals. 180 tablet 0    multivit-min/iron/folic acid/K (ADULTS MULTIVITAMIN ORAL) Take by mouth.      mupirocin (BACTROBAN) 2 % ointment Apply topically 3 (three) times daily. 30 g 2    olmesartan-hydrochlorothiazide (BENICAR HCT) 40-12.5 mg Tab Take 1 tablet by mouth once daily.      olmesartan-hydrochlorothiazide (BENICAR HCT) 40-25 mg per tablet TAKE 1 TABLET BY MOUTH DAILY 90 tablet 1    ondansetron (ZOFRAN) 4 MG tablet Take 1 tablet (4 mg total) by mouth every 6 (six) hours. 12 tablet 0    ONETOUCH DELICA PLUS LANCET 33 gauge Misc TEST ONCE D  0    potassium chloride SA (K-DUR,KLOR-CON) 10 MEQ tablet Take by mouth.      pravastatin (PRAVACHOL) 40 MG tablet Take 1 tablet (40 mg total) by mouth every evening. 90 tablet 3    RYBELSUS 7 mg tablet Take 7 mg by mouth every morning.      UNABLE TO FIND medication name: Tart Cherry Extract      vitamin E 400 UNIT capsule Take " 400 Units by mouth once daily.       Current Facility-Administered Medications   Medication Dose Route Frequency Provider Last Rate Last Admin    acetaminophen tablet 650 mg  650 mg Oral Once PRN Robert Landrum MD        ondansetron disintegrating tablet 4 mg  4 mg Oral Once PRN Robert Landrum MD           Review of patient's allergies indicates:  No Known Allergies      Review of Systems   Constitutional: Negative for chills and fever.   Cardiovascular: Negative for claudication and leg swelling.   Skin: Positive for color change and nail changes. Negative for poor wound healing.   Musculoskeletal: Positive for arthritis. Negative for joint pain and joint swelling.   Gastrointestinal: Negative for nausea and vomiting.   Neurological: Positive for numbness. Negative for paresthesias.   Psychiatric/Behavioral: Negative for altered mental status.           Objective:      Physical Exam  Constitutional:       General: He is not in acute distress.     Appearance: He is well-developed. He is not diaphoretic.   Cardiovascular:      Pulses:           Dorsalis pedis pulses are 2+ on the right side and 2+ on the left side.        Posterior tibial pulses are 2+ on the right side and 2+ on the left side.      Comments: CFT < 3 seconds bilateral.  Pedal hair growth present bilateral.   No varicosities noted bilateral. No lower extremity edema noted bilateral.  Toes are warm to touch bilateral.    Musculoskeletal:         General: Deformity present. No tenderness.      Right lower leg: No edema.      Left lower leg: No edema.      Comments: Muscle strength 5/5 in all muscle groups bilateral.  No tenderness nor crepitation with ROM of foot/ankle joints bilateral.  Arthritic changes changes noted to the dorsal and medial aspect of the Rt. 1st mtp joint.  Rt. hallux malleus noted.  Bilateral pes planus foot type.  Bilateral hallux abducto valgus.  Bilateral semi-rigid contracture of toes 2-5 with better reduction of the  Rt. 3rd and 4th DIP joints.  (-) for pain with palpation to the site of the wound.   Skin:     General: Skin is warm and dry.      Capillary Refill: Capillary refill takes less than 2 seconds.      Coloration: Skin is not pale.      Findings: Wound present. No abrasion, bruising, burn, ecchymosis, erythema, lesion, petechiae or rash.      Nails: There is no clubbing.      Comments: Pedal skin has normal turgor, temperature, and texture bilateral.  Toenails x 10 appear mycotic but well maintained.      Mature epithelium noted to the Rt. Sub 4th met head.    Location: Open wound noted to the plantar aspect of the Rt. Great toe.  Base: Down to dermis and comprised of fibrin.   Drainage: None  Tiffany wound: Devoid of erythema, edema, fluctuance, purulence, and malodor.   Pre-debridement measurement: 0.2 x 0.4 x 0.1cm  Post-debridement measurement: 0.4 x 0.6 x 0.1cm     Neurological:      Mental Status: He is alert and oriented to person, place, and time.      Sensory: Sensory deficit present.      Motor: No weakness or atrophy.      Comments: Protective sensation per [place] monofilament absent bilateral.    Light touch intact bilateral.               Assessment:       Encounter Diagnoses   Name Primary?    Diabetic ulcer of toe of right foot associated with type 2 diabetes mellitus, limited to breakdown of skin Yes    Diabetic polyneuropathy associated with type 2 diabetes mellitus          Plan:       Yobany was seen today for wound care.    Diagnoses and all orders for this visit:    Diabetic ulcer of toe of right foot associated with type 2 diabetes mellitus, limited to breakdown of skin  -     X-Ray Foot Complete Right; Future  -     Aerobic culture  -     Culture, Anaerobic    Diabetic polyneuropathy associated with type 2 diabetes mellitus      I counseled the patient on his conditions, their implications and medical management.    Performed a selective excisional debridement of the Rt. foot/ankle.  See  attached procedure note.      Wound cultures were obtained from the hallux wound.    Orders written for an x-ray of the Rt. Foot to rule out a bone spur as a contributing factor to wound development of the Rt. Hallux.    Wound base was covered with triple antibiotic ointment and a mepilex border.  Instructed to keep intact for today.      Patient to continue daily dressing changes with use of bactroban ointment.  No further application required to the forefoot wound, as this site is fully epithelialized.      Advised to rest and elevate the affected extremity.    Instructed to minimize weight bearing to facilitate wound healing.    To continue wearing a casual stiff soled shoes to facilitate healing.    Will consider a percutaneous flexor tenotomy of the Rt. Great toe, should the site fail to heal with the usual wound care and offloading.      RTC in 2 weeks for follow up.    Augusto Simeon DPM

## 2022-06-02 NOTE — PROCEDURES
"Wound Debridement    Date/Time: 6/1/2022 4:40 PM  Performed by: Augusto Simeon DPM  Authorized by: Augusto Simeon DPM     Time out: Immediately prior to procedure a "time out" was called to verify the correct patient, procedure, equipment, support staff and site/side marked as required.    Consent Done?:  Yes (Verbal)    Preparation: Patient was prepped and draped in usual sterile fashion    Local anesthesia used?: No      Wound Details:    Location:  Right foot    Location:  Right 1st Toe    Type of Debridement:  Excisional       Length (cm):  0.2       Area (sq cm):  0.08       Width (cm):  0.4       Percent Debrided (%):  100       Depth (cm):  0.1       Total Area Debrided (sq cm):  0.08    Depth of debridement:  Epidermis/Dermis    Tissue debrided:  Dermis    Devitalized tissue debrided:  Fibrin    Instruments:  Blade    Bleeding:  Minimal  Hemostasis Achieved: Yes    Method Used:  Pressure  Patient tolerance:  Patient tolerated the procedure well with no immediate complications      "

## 2022-06-04 LAB — BACTERIA SPEC AEROBE CULT: NORMAL

## 2022-06-06 LAB — BACTERIA SPEC ANAEROBE CULT: ABNORMAL

## 2022-06-07 DIAGNOSIS — E11.628 DIABETIC FOOT INFECTION: Primary | ICD-10-CM

## 2022-06-07 DIAGNOSIS — L08.9 DIABETIC FOOT INFECTION: Primary | ICD-10-CM

## 2022-06-07 RX ORDER — AMOXICILLIN AND CLAVULANATE POTASSIUM 875; 125 MG/1; MG/1
1 TABLET, FILM COATED ORAL EVERY 12 HOURS
Qty: 14 TABLET | Refills: 0 | Status: SHIPPED | OUTPATIENT
Start: 2022-06-07 | End: 2022-09-01 | Stop reason: ALTCHOICE

## 2022-06-15 ENCOUNTER — OFFICE VISIT (OUTPATIENT)
Dept: PODIATRY | Facility: CLINIC | Age: 67
End: 2022-06-15
Payer: COMMERCIAL

## 2022-06-15 DIAGNOSIS — M19.079 ARTHRITIS OF BIG TOE: ICD-10-CM

## 2022-06-15 DIAGNOSIS — L97.511 DIABETIC ULCER OF TOE OF RIGHT FOOT ASSOCIATED WITH TYPE 2 DIABETES MELLITUS, LIMITED TO BREAKDOWN OF SKIN: Primary | ICD-10-CM

## 2022-06-15 DIAGNOSIS — E11.621 DIABETIC ULCER OF TOE OF RIGHT FOOT ASSOCIATED WITH TYPE 2 DIABETES MELLITUS, LIMITED TO BREAKDOWN OF SKIN: Primary | ICD-10-CM

## 2022-06-15 DIAGNOSIS — E11.42 DIABETIC POLYNEUROPATHY ASSOCIATED WITH TYPE 2 DIABETES MELLITUS: ICD-10-CM

## 2022-06-15 PROCEDURE — 97597 WOUND DEBRIDEMENT: ICD-10-PCS | Mod: S$GLB,,, | Performed by: PODIATRIST

## 2022-06-15 PROCEDURE — 99499 NO LOS: ICD-10-PCS | Mod: S$GLB,,, | Performed by: PODIATRIST

## 2022-06-15 PROCEDURE — 3066F NEPHROPATHY DOC TX: CPT | Mod: CPTII,S$GLB,, | Performed by: PODIATRIST

## 2022-06-15 PROCEDURE — 97597 DBRDMT OPN WND 1ST 20 CM/<: CPT | Mod: S$GLB,,, | Performed by: PODIATRIST

## 2022-06-15 PROCEDURE — 99999 PR PBB SHADOW E&M-EST. PATIENT-LVL III: CPT | Mod: PBBFAC,,, | Performed by: PODIATRIST

## 2022-06-15 PROCEDURE — 3066F PR DOCUMENTATION OF TREATMENT FOR NEPHROPATHY: ICD-10-PCS | Mod: CPTII,S$GLB,, | Performed by: PODIATRIST

## 2022-06-15 PROCEDURE — 99999 PR PBB SHADOW E&M-EST. PATIENT-LVL III: ICD-10-PCS | Mod: PBBFAC,,, | Performed by: PODIATRIST

## 2022-06-15 PROCEDURE — 99499 UNLISTED E&M SERVICE: CPT | Mod: S$GLB,,, | Performed by: PODIATRIST

## 2022-06-16 ENCOUNTER — OFFICE VISIT (OUTPATIENT)
Dept: INTERNAL MEDICINE | Facility: CLINIC | Age: 67
End: 2022-06-16
Payer: COMMERCIAL

## 2022-06-16 VITALS
SYSTOLIC BLOOD PRESSURE: 138 MMHG | HEART RATE: 60 BPM | BODY MASS INDEX: 26.81 KG/M2 | HEIGHT: 74 IN | DIASTOLIC BLOOD PRESSURE: 80 MMHG | OXYGEN SATURATION: 98 % | TEMPERATURE: 99 F | WEIGHT: 208.88 LBS | RESPIRATION RATE: 18 BRPM

## 2022-06-16 DIAGNOSIS — I25.10 CORONARY ARTERY DISEASE INVOLVING NATIVE CORONARY ARTERY OF NATIVE HEART WITHOUT ANGINA PECTORIS: ICD-10-CM

## 2022-06-16 DIAGNOSIS — Z01.818 PREOP EXAM FOR INTERNAL MEDICINE: Primary | ICD-10-CM

## 2022-06-16 DIAGNOSIS — M77.9 BONE SPUR: ICD-10-CM

## 2022-06-16 PROCEDURE — 3066F PR DOCUMENTATION OF TREATMENT FOR NEPHROPATHY: ICD-10-PCS | Mod: CPTII,S$GLB,, | Performed by: INTERNAL MEDICINE

## 2022-06-16 PROCEDURE — 99999 PR PBB SHADOW E&M-EST. PATIENT-LVL V: ICD-10-PCS | Mod: PBBFAC,,, | Performed by: INTERNAL MEDICINE

## 2022-06-16 PROCEDURE — 3075F PR MOST RECENT SYSTOLIC BLOOD PRESS GE 130-139MM HG: ICD-10-PCS | Mod: CPTII,S$GLB,, | Performed by: INTERNAL MEDICINE

## 2022-06-16 PROCEDURE — 3079F DIAST BP 80-89 MM HG: CPT | Mod: CPTII,S$GLB,, | Performed by: INTERNAL MEDICINE

## 2022-06-16 PROCEDURE — 3066F NEPHROPATHY DOC TX: CPT | Mod: CPTII,S$GLB,, | Performed by: INTERNAL MEDICINE

## 2022-06-16 PROCEDURE — 1160F RVW MEDS BY RX/DR IN RCRD: CPT | Mod: CPTII,S$GLB,, | Performed by: INTERNAL MEDICINE

## 2022-06-16 PROCEDURE — 99214 OFFICE O/P EST MOD 30 MIN: CPT | Mod: S$GLB,,, | Performed by: INTERNAL MEDICINE

## 2022-06-16 PROCEDURE — 1159F MED LIST DOCD IN RCRD: CPT | Mod: CPTII,S$GLB,, | Performed by: INTERNAL MEDICINE

## 2022-06-16 PROCEDURE — 1159F PR MEDICATION LIST DOCUMENTED IN MEDICAL RECORD: ICD-10-PCS | Mod: CPTII,S$GLB,, | Performed by: INTERNAL MEDICINE

## 2022-06-16 PROCEDURE — 1160F PR REVIEW ALL MEDS BY PRESCRIBER/CLIN PHARMACIST DOCUMENTED: ICD-10-PCS | Mod: CPTII,S$GLB,, | Performed by: INTERNAL MEDICINE

## 2022-06-16 PROCEDURE — 99214 PR OFFICE/OUTPT VISIT, EST, LEVL IV, 30-39 MIN: ICD-10-PCS | Mod: S$GLB,,, | Performed by: INTERNAL MEDICINE

## 2022-06-16 PROCEDURE — 3008F BODY MASS INDEX DOCD: CPT | Mod: CPTII,S$GLB,, | Performed by: INTERNAL MEDICINE

## 2022-06-16 PROCEDURE — 99999 PR PBB SHADOW E&M-EST. PATIENT-LVL V: CPT | Mod: PBBFAC,,, | Performed by: INTERNAL MEDICINE

## 2022-06-16 PROCEDURE — 3008F PR BODY MASS INDEX (BMI) DOCUMENTED: ICD-10-PCS | Mod: CPTII,S$GLB,, | Performed by: INTERNAL MEDICINE

## 2022-06-16 PROCEDURE — 3075F SYST BP GE 130 - 139MM HG: CPT | Mod: CPTII,S$GLB,, | Performed by: INTERNAL MEDICINE

## 2022-06-16 PROCEDURE — 3079F PR MOST RECENT DIASTOLIC BLOOD PRESSURE 80-89 MM HG: ICD-10-PCS | Mod: CPTII,S$GLB,, | Performed by: INTERNAL MEDICINE

## 2022-06-16 NOTE — PROGRESS NOTES
[facility] Note    Chief Complaint      Chief Complaint   Patient presents with    Pre-op Exam       History of Present Illness      Yobany Richardson is a 66 y.o. male who presents today for   Chief Complaint   Patient presents with    Pre-op Exam   .  Patient comes to appointment here for preop int med visit for bone spur of right great toe . Will be having surgery with dr juares . This procedure is non general .he will need to hold his blood thinner for 5 days prior to procedure. he also will half doseof insulin the night prior to surgery .      HPI    No problem-specific Assessment & Plan notes found for this encounter.       Problem List Items Addressed This Visit        Cardiac/Vascular    CAD (coronary artery disease)    Overview     Will need new cardiologist as his previous is no longer practicing. Will refer to cards on [facility]               Orthopedic    Bone spur    Overview     For surgical correction with dr juares               Other    Preop exam for internal medicine - Primary    Overview     Pt is cleared for non general procedure with low risk cardiac complication   He will hold plavix for 5 days prior to procedure   He will half dose of insulin night prior to procedure                     Past Medical History:  Past Medical History:   Diagnosis Date    Abnormal thallium stress test     Arrhythmia     Atrial flutter, paroxysmal 2/11/2016    CAD (coronary artery disease) 3/9/2016    5/2016 [facility] Left main:NL LAD: 35% proximal LAD. two small diagonals with minimal disease.  Circumflex/Large branching first obtuse marginal: with minimal disease.   RCA: The vessel was large sized (dominant) with a 60% proximal lesion. 100 mcgs of intracoronary Nitroglycerin were given with no change in the lesion. Thus FFR was performed. FFR was 0.79  2/2016 cor CTA ~~ 50% LAD    Coronary artery disease     Diabetes mellitus     Diabetes mellitus, type 2     Disorder of kidney and  ureter     Hypertension     Neuropathy     Paroxysmal atrial flutter     PE (pulmonary embolism)     Pulmonary embolism 2/11/2016 1/2016     Rheumatoid arthritis     Shortness of breath     Stented coronary artery 6/15/2016    5/2016 RCA- synergy 3.5x12    Wears contact lenses     Wears prescription eyeglasses        Past Surgical History:  Past Surgical History:   Procedure Laterality Date    CARDIAC CATHETERIZATION      with stent placed x 1    KNEE ARTHROSCOPY      TONSILLECTOMY         Family History:  family history includes Angina in his mother; Bell's palsy in his sister; Cancer in his mother; Depression in his sister and sister; Heart disease in his mother; Hypertension in his mother; Kidney disease in his father; Thyroid disease in his sister and sister.     Social History:  Social History     Socioeconomic History    Marital status:    Tobacco Use    Smoking status: Never Smoker    Smokeless tobacco: Never Used   Substance and Sexual Activity    Alcohol use: No    Drug use: No       Review of Systems:   Review of Systems   Constitutional: Negative for fever and weight loss.   HENT: Negative for congestion, hearing loss and sore throat.    Eyes: Negative for blurred vision.   Respiratory: Negative for cough and shortness of breath.    Cardiovascular: Negative for chest pain, palpitations, claudication and leg swelling.   Gastrointestinal: Negative for abdominal pain, constipation, diarrhea and heartburn.   Genitourinary: Negative for dysuria.   Musculoskeletal: Negative for back pain and myalgias.   Skin: Negative for rash.   Neurological: Positive for tingling. Negative for focal weakness and headaches.   Psychiatric/Behavioral: Negative for depression and suicidal ideas. The patient is not nervous/anxious.         Medications:  Outpatient Encounter Medications as of 6/16/2022   Medication Sig Note Dispense Refill    acetaminophen (TYLENOL) 325 MG tablet Take 1 tablet (325 mg  "total) by mouth every 6 (six) hours as needed for Pain. 10/4/2019: Take as needed      amLODIPine (NORVASC) 10 MG tablet TAKE 1 TABLET BY MOUTH DAILY  30 tablet 5    amoxicillin-clavulanate 875-125mg (AUGMENTIN) 875-125 mg per tablet Take 1 tablet by mouth every 12 (twelve) hours.  14 tablet 0    APPLE CIDER VINEGAR ORAL Take by mouth.       ascorbic acid (VITAMIN C) 500 MG tablet Take 500 mg by mouth once daily.       aspirin 81 MG Chew Take 81 mg by mouth once daily.       b complex vitamins capsule Take 1 capsule by mouth once daily.       BD ULTRA-FINE AMAN PEN NEEDLE 32 gauge x 5/32" Ndle Inject 1 Syringe into the skin once daily.  100 each 3    carvediloL (COREG) 6.25 MG tablet TAKE 1 TABLET(6.25 MG) BY MOUTH TWICE DAILY  180 tablet 4    clopidogreL (PLAVIX) 75 mg tablet TAKE 1 TABLET BY MOUTH EVERY DAY  90 tablet 4    cyanocobalamin, vitamin B-12, 1,000 mcg/15 mL Liqd Take by mouth.       FARXIGA 5 mg Tab tablet Take 5 mg by mouth every evening.       fish,bora,flax oils-om3,6,9no1 1,200 mg Cap Take by mouth.       FREESTYLE PASCUAL 14 DAY SENSOR Kit APPLY NEW SENSOR EVERY 14 DAYS AS DIRECTED       FREESTYLE LITE STRIPS Strp TEST 2 TO 3 TIMES D   2    gabapentin (NEURONTIN) 300 MG capsule Take 1 capsule (300 mg total) by mouth 3 (three) times daily.  90 capsule 5    LANTUS SOLOSTAR U-100 INSULIN glargine 100 units/mL (3mL) SubQ pen ADMINISTER 45 UNITS UNDER THE SKIN EVERY DAY  15 mL 5    metFORMIN (GLUCOPHAGE) 1000 MG tablet Take 1 tablet (1,000 mg total) by mouth 2 (two) times daily with meals.  180 tablet 0    multivit-min/iron/folic acid/K (ADULTS MULTIVITAMIN ORAL) Take by mouth.       mupirocin (BACTROBAN) 2 % ointment Apply topically 3 (three) times daily.  30 g 2    olmesartan-hydrochlorothiazide (BENICAR HCT) 40-12.5 mg Tab Take 1 tablet by mouth once daily.       olmesartan-hydrochlorothiazide (BENICAR HCT) 40-25 mg per tablet TAKE 1 TABLET BY MOUTH DAILY  90 tablet 1    " "ondansetron (ZOFRAN) 4 MG tablet Take 1 tablet (4 mg total) by mouth every 6 (six) hours.  12 tablet 0    ONETOUCH DELICA PLUS LANCET 33 gauge Misc TEST ONCE D   0    potassium chloride SA (K-DUR,KLOR-CON) 10 MEQ tablet Take by mouth.       pravastatin (PRAVACHOL) 40 MG tablet Take 1 tablet (40 mg total) by mouth every evening.  90 tablet 3    RYBELSUS 7 mg tablet Take 7 mg by mouth every morning.       UNABLE TO FIND medication name: Tart Cherry Extract       vitamin E 400 UNIT capsule Take 400 Units by mouth once daily.        Facility-Administered Encounter Medications as of 6/16/2022   Medication Dose Route Frequency Provider Last Rate Last Admin    acetaminophen tablet 650 mg  650 mg Oral Once PRN Robert Landrum MD        ondansetron disintegrating tablet 4 mg  4 mg Oral Once PRN Robert Landrum MD           Allergies:  Review of patient's allergies indicates:  No Known Allergies      Physical Exam      Vitals:    06/16/22 1013   BP: 138/80   Pulse: 60   Resp: 18   Temp: 98.6 °F (37 °C)        Vital Signs  Temp: 98.6 °F (37 °C)  Temp src: Oral  Pulse: 60  Resp: 18  SpO2: 98 %  BP: 138/80  BP Location: Right arm  Patient Position: Sitting  Pain Score: 0-No pain  Height and Weight  Height: 6' 2" (188 cm)  Weight: 94.7 kg (208 lb 14.2 oz)  BSA (Calculated - sq m): 2.22 sq meters  BMI (Calculated): 26.8  Weight in (lb) to have BMI = 25: 194.3]     Body mass index is 26.82 kg/m².    Physical Exam  Constitutional:       Appearance: He is well-developed.   HENT:      Head: Normocephalic.   Eyes:      Pupils: Pupils are equal, round, and reactive to light.   Neck:      Thyroid: No thyromegaly.   Cardiovascular:      Rate and Rhythm: Normal rate. Rhythm irregularly irregular.      Heart sounds: No murmur heard.    No friction rub. No gallop.   Pulmonary:      Effort: Pulmonary effort is normal.      Breath sounds: Normal breath sounds.   Abdominal:      General: Bowel sounds are normal.      Palpations: " Abdomen is soft.   Musculoskeletal:         General: Normal range of motion.      Cervical back: Normal range of motion.   Skin:     General: Skin is warm and dry.   Neurological:      Mental Status: He is alert and oriented to person, place, and time.      Sensory: No sensory deficit.   Psychiatric:         Behavior: Behavior normal.          Laboratory:  CBC:  Recent Labs   Lab Result Units 04/06/22 2108   WBC K/uL 13.05*   RBC M/uL 3.28*   Hemoglobin g/dL 10.7*   Hematocrit % 31.7*   Platelets K/uL 230   MCV fL 97   MCH pg 32.6*   MCHC g/dL 33.8     CMP:  Recent Labs   Lab Result Units 04/06/22 2108 04/19/22  0717   Glucose mg/dL 140* 134*   Calcium mg/dL 9.6 9.8   Albumin g/dL 4.7 4.0   Total Protein g/dL 7.9  --    Sodium mmol/L 135* 139   Potassium mmol/L 4.6 4.9   CO2 mmol/L 23 22*   Chloride mmol/L 101 103   BUN mg/dL 30* 26*   Alkaline Phosphatase U/L 113  --    ALT U/L 23  --    AST U/L 31  --    Total Bilirubin mg/dL 0.4  --      URINALYSIS:  No results for input(s): COLORU, CLARITYU, SPECGRAV, PHUR, PROTEINUA, GLUCOSEU, BILIRUBINCON, BLOODU, WBCU, RBCU, BACTERIA, MUCUS, NITRITE, LEUKOCYTESUR, UROBILINOGEN, HYALINECASTS in the last 2160 hours.   LIPIDS:  No results for input(s): TSH, HDL, CHOL, TRIG, LDLCALC, CHOLHDL, NONHDLCHOL, TOTALCHOLEST in the last 2160 hours.  TSH:  No results for input(s): TSH in the last 2160 hours.  A1C:  No results for input(s): HGBA1C in the last 2160 hours.    Radiology:        Assessment:     Yobany Richardson is a 66 y.o.male with:    Preop exam for internal medicine    Bone spur    Coronary artery disease involving native coronary artery of native heart without angina pectoris  -     Ambulatory referral/consult to Cardiology; Future; Expected date: 06/23/2022                Plan:     Problem List Items Addressed This Visit        Cardiac/Vascular    CAD (coronary artery disease)    Overview     Will need new cardiologist as his previous is no longer practicing. Will refer to  cards on [facility]               Orthopedic    Bone spur    Overview     For surgical correction with dr juares               Other    Preop exam for internal medicine - Primary    Overview     Pt is cleared for non general procedure with low risk cardiac complication   He will hold plavix for 5 days prior to procedure   He will half dose of insulin night prior to procedure                    As above, continue current medications and maintain follow up with specialists.  Return to clinic as scheduled       Frederick W Dantagnan [facility] [place]

## 2022-06-19 NOTE — PROCEDURES
"Wound Debridement    Date/Time: 6/15/2022 4:40 PM  Performed by: Augusto Simeon DPM  Authorized by: Augusto Simeon DPM     Time out: Immediately prior to procedure a "time out" was called to verify the correct patient, procedure, equipment, support staff and site/side marked as required.    Consent Done?:  Yes (Verbal)    Preparation: Patient was prepped and draped in usual sterile fashion    Local anesthesia used?: No      Wound Details:    Location:  Right foot    Location:  Right 1st Toe    Type of Debridement:  Excisional       Length (cm):  0.2       Area (sq cm):  0.08       Width (cm):  0.4       Percent Debrided (%):  100       Depth (cm):  0.1       Total Area Debrided (sq cm):  0.08    Depth of debridement:  Epidermis/Dermis    Tissue debrided:  Dermis    Devitalized tissue debrided:  Fibrin    Instruments:  Blade    Bleeding:  Minimal  Hemostasis Achieved: Yes    Method Used:  Pressure  Patient tolerance:  Patient tolerated the procedure well with no immediate complications      "

## 2022-06-19 NOTE — PROGRESS NOTES
Subjective:      Patient ID: Yobany Richardson is a 66 y.o. male.    Chief Complaint: Diabetes Mellitus and Wound Care    Patient presents to clinic for a 2 week wound check of the Rt. Foot.  Denies pain from the extremity with today's exam.  Continues applying bactroban and a bandage to the wound of the Rt. Great toe.  Notes the forefoot wound continues to remain healed on exam.  Denies experiencing pain from the wound.  Denies noticing infection to the digit.  Denies experiencing N/V/F/C/D.  Denies any additional pedal complaints.        Hemoglobin A1C   Date Value Ref Range Status   07/16/2021 7.3 (H) 4.0 - 5.6 % Final     Comment:     ADA Screening Guidelines:  5.7-6.4%  Consistent with prediabetes  >or=6.5%  Consistent with diabetes    High levels of fetal hemoglobin interfere with the HbA1C  assay. Heterozygous hemoglobin variants (HbS, HgC, etc)do  not significantly interfere with this assay.   However, presence of multiple variants may affect accuracy.     06/21/2021 6.9 (H) <5.7 % of total Hgb Final     Comment:     For someone without known diabetes, a hemoglobin A1c  value of 6.5% or greater indicates that they may have   diabetes and this should be confirmed with a follow-up   test.     For someone with known diabetes, a value <7% indicates   that their diabetes is well controlled and a value   greater than or equal to 7% indicates suboptimal   control. A1c targets should be individualized based on   duration of diabetes, age, comorbid conditions, and   other considerations.     Currently, no consensus exists regarding use of  hemoglobin A1c for diagnosis of diabetes for children.         04/20/2021 7.7 (A) 4.0 - 6.0 % Final           Past Medical History:   Diagnosis Date    Abnormal thallium stress test     Arrhythmia     Atrial flutter, paroxysmal 2/11/2016    CAD (coronary artery disease) 3/9/2016    5/2016 [facility] Left main:NL LAD: 35% proximal LAD. two small diagonals with minimal disease.   Circumflex/Large branching first obtuse marginal: with minimal disease.   RCA: The vessel was large sized (dominant) with a 60% proximal lesion. 100 mcgs of intracoronary Nitroglycerin were given with no change in the lesion. Thus FFR was performed. FFR was 0.79  2/2016 cor CTA ~~ 50% LAD    Coronary artery disease     Diabetes mellitus     Diabetes mellitus, type 2     Disorder of kidney and ureter     Hypertension     Neuropathy     Paroxysmal atrial flutter     PE (pulmonary embolism)     Pulmonary embolism 2/11/2016 1/2016     Rheumatoid arthritis     Shortness of breath     Stented coronary artery 6/15/2016    5/2016 RCA- synergy 3.5x12    Wears contact lenses     Wears prescription eyeglasses        Past Surgical History:   Procedure Laterality Date    CARDIAC CATHETERIZATION      with stent placed x 1    KNEE ARTHROSCOPY      TONSILLECTOMY         Family History   Problem Relation Age of Onset    Cancer Mother     Angina Mother     Heart disease Mother     Hypertension Mother     Kidney disease Father     Thyroid disease Sister     Bell's palsy Sister     Thyroid disease Sister     Depression Sister     Depression Sister        Social History     Socioeconomic History    Marital status:    Tobacco Use    Smoking status: Never Smoker    Smokeless tobacco: Never Used   Substance and Sexual Activity    Alcohol use: No    Drug use: No       Current Outpatient Medications   Medication Sig Dispense Refill    acetaminophen (TYLENOL) 325 MG tablet Take 1 tablet (325 mg total) by mouth every 6 (six) hours as needed for Pain.      amLODIPine (NORVASC) 10 MG tablet TAKE 1 TABLET BY MOUTH DAILY 30 tablet 5    amoxicillin-clavulanate 875-125mg (AUGMENTIN) 875-125 mg per tablet Take 1 tablet by mouth every 12 (twelve) hours. 14 tablet 0    APPLE CIDER VINEGAR ORAL Take by mouth.      ascorbic acid (VITAMIN C) 500 MG tablet Take 500 mg by mouth once daily.      aspirin 81 MG  "Chew Take 81 mg by mouth once daily.      b complex vitamins capsule Take 1 capsule by mouth once daily.      BD ULTRA-FINE AMAN PEN NEEDLE 32 gauge x 5/32" Ndle Inject 1 Syringe into the skin once daily. 100 each 3    carvediloL (COREG) 6.25 MG tablet TAKE 1 TABLET(6.25 MG) BY MOUTH TWICE DAILY 180 tablet 4    clopidogreL (PLAVIX) 75 mg tablet TAKE 1 TABLET BY MOUTH EVERY DAY 90 tablet 4    cyanocobalamin, vitamin B-12, 1,000 mcg/15 mL Liqd Take by mouth.      FARXIGA 5 mg Tab tablet Take 5 mg by mouth every evening.      fish,bora,flax oils-om3,6,9no1 1,200 mg Cap Take by mouth.      FREESTYLE PASCUAL 14 DAY SENSOR Kit APPLY NEW SENSOR EVERY 14 DAYS AS DIRECTED      FREESTYLE LITE STRIPS Strp TEST 2 TO 3 TIMES D  2    gabapentin (NEURONTIN) 300 MG capsule Take 1 capsule (300 mg total) by mouth 3 (three) times daily. 90 capsule 5    LANTUS SOLOSTAR U-100 INSULIN glargine 100 units/mL (3mL) SubQ pen ADMINISTER 45 UNITS UNDER THE SKIN EVERY DAY 15 mL 5    metFORMIN (GLUCOPHAGE) 1000 MG tablet Take 1 tablet (1,000 mg total) by mouth 2 (two) times daily with meals. 180 tablet 0    multivit-min/iron/folic acid/K (ADULTS MULTIVITAMIN ORAL) Take by mouth.      mupirocin (BACTROBAN) 2 % ointment Apply topically 3 (three) times daily. 30 g 2    olmesartan-hydrochlorothiazide (BENICAR HCT) 40-12.5 mg Tab Take 1 tablet by mouth once daily.      olmesartan-hydrochlorothiazide (BENICAR HCT) 40-25 mg per tablet TAKE 1 TABLET BY MOUTH DAILY 90 tablet 1    ondansetron (ZOFRAN) 4 MG tablet Take 1 tablet (4 mg total) by mouth every 6 (six) hours. 12 tablet 0    ONETOUCH DELICA PLUS LANCET 33 gauge Misc TEST ONCE D  0    potassium chloride SA (K-DUR,KLOR-CON) 10 MEQ tablet Take by mouth.      pravastatin (PRAVACHOL) 40 MG tablet Take 1 tablet (40 mg total) by mouth every evening. 90 tablet 3    RYBELSUS 7 mg tablet Take 7 mg by mouth every morning.      UNABLE TO FIND medication name: Tart Cherry Extract      " vitamin E 400 UNIT capsule Take 400 Units by mouth once daily.       Current Facility-Administered Medications   Medication Dose Route Frequency Provider Last Rate Last Admin    acetaminophen tablet 650 mg  650 mg Oral Once PRN Robert Landrum MD        ondansetron disintegrating tablet 4 mg  4 mg Oral Once PRN Robert Landrum MD           Review of patient's allergies indicates:  No Known Allergies      Review of Systems   Constitutional: Negative for chills and fever.   Cardiovascular: Negative for claudication and leg swelling.   Skin: Positive for color change and nail changes. Negative for poor wound healing.   Musculoskeletal: Positive for arthritis. Negative for joint pain and joint swelling.   Gastrointestinal: Negative for nausea and vomiting.   Neurological: Positive for numbness. Negative for paresthesias.   Psychiatric/Behavioral: Negative for altered mental status.           Objective:      Physical Exam  Constitutional:       General: He is not in acute distress.     Appearance: He is well-developed. He is not diaphoretic.   Cardiovascular:      Pulses:           Dorsalis pedis pulses are 2+ on the right side and 2+ on the left side.        Posterior tibial pulses are 2+ on the right side and 2+ on the left side.      Comments: CFT < 3 seconds bilateral.  Pedal hair growth present bilateral.   No varicosities noted bilateral. No lower extremity edema noted bilateral.  Toes are warm to touch bilateral.    Musculoskeletal:         General: Deformity present. No tenderness.      Right lower leg: No edema.      Left lower leg: No edema.      Comments: Muscle strength 5/5 in all muscle groups bilateral.  No tenderness nor crepitation with ROM of foot/ankle joints bilateral.  Arthritic changes changes noted to the dorsal and medial aspect of the Rt. 1st mtp joint.  Rt. hallux malleus noted.  Bilateral pes planus foot type.  Bilateral hallux abducto valgus.  Bilateral semi-rigid contracture of toes  2-5 with better reduction of the Rt. 3rd and 4th DIP joints.  (-) for pain with palpation to the site of the wound.   Skin:     General: Skin is warm and dry.      Capillary Refill: Capillary refill takes less than 2 seconds.      Coloration: Skin is not pale.      Findings: Wound present. No abrasion, bruising, burn, ecchymosis, erythema, lesion, petechiae or rash.      Nails: There is no clubbing.      Comments: Pedal skin has normal turgor, temperature, and texture bilateral.  Toenails x 10 appear mycotic but well maintained.      Mature epithelium noted to the Rt. Sub 4th met head.    Location: Open wound noted to the plantar aspect of the Rt. Great toe.  Base: Down to dermis and comprised of fibrin.   Drainage: None  Tiffany wound: Devoid of erythema, edema, fluctuance, purulence, and malodor.   Pre-debridement measurement: 0.2 x 0.4 x 0.1cm  Post-debridement measurement: 0.4 x 0.6 x 0.1cm  Unchanged in comparison to the prior exam.       Neurological:      Mental Status: He is alert and oriented to person, place, and time.      Sensory: Sensory deficit present.      Motor: No weakness or atrophy.      Comments: Protective sensation per [place]-Yash monofilament absent bilateral.    Light touch intact bilateral.               Assessment:       Encounter Diagnoses   Name Primary?    Diabetic ulcer of toe of right foot associated with type 2 diabetes mellitus, limited to breakdown of skin Yes    Diabetic polyneuropathy associated with type 2 diabetes mellitus     Arthritis of big toe          Plan:       Yobany was seen today for diabetes mellitus and wound care.    Diagnoses and all orders for this visit:    Diabetic ulcer of toe of right foot associated with type 2 diabetes mellitus, limited to breakdown of skin  -     Wound Debridement    Diabetic polyneuropathy associated with type 2 diabetes mellitus    Arthritis of big toe      I counseled the patient on his conditions, their implications and medical  management.    Reviewed most recent x-ray results.  Consistent with arthritis of the Rt. Hallux IP joint secondary to hallux malleus.      Performed a selective excisional debridement of the Rt. foot/ankle.  See attached procedure note.      Wound base was covered with triple antibiotic ointment and a mepilex border.  Instructed to keep intact for today.      Patient to continue daily dressing changes with use of bactroban ointment.     Advised to rest and elevate the affected extremity.    Instructed to minimize weight bearing to facilitate wound healing.    To continue wearing a casual stiff soled shoes to facilitate healing.    Based on current films, I feel a hallux interphalangeal joint arthroplasty would be more appropriate for resolving ulceration to the tip of the digit.  Explained this would be an outpatient procedure under local MAC.  Patient is amenable to said plan.    Advised to begin the process of obtaining surgical clearance.    Will obtain informed written consent the day of the procedure.      Will place a case request after clearance has been obtained.  Likely this will take place in early July.      RTC in 2 weeks for a wound check.    Augusto Simeon DPM

## 2022-06-29 ENCOUNTER — OFFICE VISIT (OUTPATIENT)
Dept: PODIATRY | Facility: CLINIC | Age: 67
End: 2022-06-29
Payer: COMMERCIAL

## 2022-06-29 VITALS — BODY MASS INDEX: 26.79 KG/M2 | HEIGHT: 74 IN | WEIGHT: 208.75 LBS

## 2022-06-29 DIAGNOSIS — M19.079 ARTHRITIS OF BIG TOE: ICD-10-CM

## 2022-06-29 DIAGNOSIS — E08.621 DIABETIC ULCER OF FOOT ASSOCIATED WITH DIABETES MELLITUS DUE TO UNDERLYING CONDITION, LIMITED TO BREAKDOWN OF SKIN: Primary | ICD-10-CM

## 2022-06-29 DIAGNOSIS — E11.42 DIABETIC POLYNEUROPATHY ASSOCIATED WITH TYPE 2 DIABETES MELLITUS: ICD-10-CM

## 2022-06-29 DIAGNOSIS — L97.501 DIABETIC ULCER OF FOOT ASSOCIATED WITH DIABETES MELLITUS DUE TO UNDERLYING CONDITION, LIMITED TO BREAKDOWN OF SKIN: Primary | ICD-10-CM

## 2022-06-29 PROCEDURE — 99999 PR PBB SHADOW E&M-EST. PATIENT-LVL III: CPT | Mod: PBBFAC,,, | Performed by: PODIATRIST

## 2022-06-29 PROCEDURE — 99499 NO LOS: ICD-10-PCS | Mod: S$GLB,,, | Performed by: PODIATRIST

## 2022-06-29 PROCEDURE — 99999 PR PBB SHADOW E&M-EST. PATIENT-LVL III: ICD-10-PCS | Mod: PBBFAC,,, | Performed by: PODIATRIST

## 2022-06-29 PROCEDURE — 1159F MED LIST DOCD IN RCRD: CPT | Mod: CPTII,S$GLB,, | Performed by: PODIATRIST

## 2022-06-29 PROCEDURE — 1159F PR MEDICATION LIST DOCUMENTED IN MEDICAL RECORD: ICD-10-PCS | Mod: CPTII,S$GLB,, | Performed by: PODIATRIST

## 2022-06-29 PROCEDURE — 97597 WOUND DEBRIDEMENT: ICD-10-PCS | Mod: S$GLB,,, | Performed by: PODIATRIST

## 2022-06-29 PROCEDURE — 97597 DBRDMT OPN WND 1ST 20 CM/<: CPT | Mod: S$GLB,,, | Performed by: PODIATRIST

## 2022-06-29 PROCEDURE — 3066F PR DOCUMENTATION OF TREATMENT FOR NEPHROPATHY: ICD-10-PCS | Mod: CPTII,S$GLB,, | Performed by: PODIATRIST

## 2022-06-29 PROCEDURE — 99499 UNLISTED E&M SERVICE: CPT | Mod: S$GLB,,, | Performed by: PODIATRIST

## 2022-06-29 PROCEDURE — 3008F PR BODY MASS INDEX (BMI) DOCUMENTED: ICD-10-PCS | Mod: CPTII,S$GLB,, | Performed by: PODIATRIST

## 2022-06-29 PROCEDURE — 3008F BODY MASS INDEX DOCD: CPT | Mod: CPTII,S$GLB,, | Performed by: PODIATRIST

## 2022-06-29 PROCEDURE — 3066F NEPHROPATHY DOC TX: CPT | Mod: CPTII,S$GLB,, | Performed by: PODIATRIST

## 2022-06-29 NOTE — PROCEDURES
"Wound Debridement    Date/Time: 6/29/2022 9:40 AM  Performed by: Augusto Simeon DPM  Authorized by: Augusto Simeon DPM     Time out: Immediately prior to procedure a "time out" was called to verify the correct patient, procedure, equipment, support staff and site/side marked as required.    Consent Done?:  Yes (Verbal)    Preparation: Patient was prepped and draped in usual sterile fashion    Local anesthesia used?: No      Wound Details:    Location:  Right foot    Location:  Right 1st Toe    Type of Debridement:  Excisional       Length (cm):  0.4       Area (sq cm):  0.16       Width (cm):  0.4       Percent Debrided (%):  100       Depth (cm):  0.1       Total Area Debrided (sq cm):  0.16    Depth of debridement:  Epidermis/Dermis    Tissue debrided:  Dermis    Devitalized tissue debrided:  Fibrin    Instruments:  Blade    Bleeding:  Minimal  Hemostasis Achieved: Yes    Method Used:  Pressure  Patient tolerance:  Patient tolerated the procedure well with no immediate complications      "

## 2022-06-29 NOTE — H&P (VIEW-ONLY)
Subjective:      Patient ID: Yobany Richardson is a 66 y.o. male.    Chief Complaint: No chief complaint on file.    Patient presents to clinic for a 2 week wound check of the Rt. Foot.  Denies pain from the extremity with today's exam.  Continues daily application of bactroban and a bandage to the wound of the Rt. Great toe.  Denies noticing infection to the digit.  Denies experiencing N/V/F/C/D.  Notes obtaining surgical clearance to help resolve the current issue.  Denies any additional pedal complaints.        Hemoglobin A1C   Date Value Ref Range Status   07/16/2021 7.3 (H) 4.0 - 5.6 % Final     Comment:     ADA Screening Guidelines:  5.7-6.4%  Consistent with prediabetes  >or=6.5%  Consistent with diabetes    High levels of fetal hemoglobin interfere with the HbA1C  assay. Heterozygous hemoglobin variants (HbS, HgC, etc)do  not significantly interfere with this assay.   However, presence of multiple variants may affect accuracy.     06/21/2021 6.9 (H) <5.7 % of total Hgb Final     Comment:     For someone without known diabetes, a hemoglobin A1c  value of 6.5% or greater indicates that they may have   diabetes and this should be confirmed with a follow-up   test.     For someone with known diabetes, a value <7% indicates   that their diabetes is well controlled and a value   greater than or equal to 7% indicates suboptimal   control. A1c targets should be individualized based on   duration of diabetes, age, comorbid conditions, and   other considerations.     Currently, no consensus exists regarding use of  hemoglobin A1c for diagnosis of diabetes for children.         04/20/2021 7.7 (A) 4.0 - 6.0 % Final           Past Medical History:   Diagnosis Date    Abnormal thallium stress test     Arrhythmia     Atrial flutter, paroxysmal 2/11/2016    CAD (coronary artery disease) 3/9/2016    5/2016 [facility] Left main:NL LAD: 35% proximal LAD. two small diagonals with minimal disease.  Circumflex/Large branching first obtuse  marginal: with minimal disease.   RCA: The vessel was large sized (dominant) with a 60% proximal lesion. 100 mcgs of intracoronary Nitroglycerin were given with no change in the lesion. Thus FFR was performed. FFR was 0.79  2/2016 cor CTA ~~ 50% LAD    Coronary artery disease     Diabetes mellitus     Diabetes mellitus, type 2     Disorder of kidney and ureter     Hypertension     Neuropathy     Paroxysmal atrial flutter     PE (pulmonary embolism)     Pulmonary embolism 2/11/2016 1/2016     Rheumatoid arthritis     Shortness of breath     Stented coronary artery 6/15/2016    5/2016 RCA- synergy 3.5x12    Wears contact lenses     Wears prescription eyeglasses        Past Surgical History:   Procedure Laterality Date    CARDIAC CATHETERIZATION      with stent placed x 1    KNEE ARTHROSCOPY      TONSILLECTOMY         Family History   Problem Relation Age of Onset    Cancer Mother     Angina Mother     Heart disease Mother     Hypertension Mother     Kidney disease Father     Thyroid disease Sister     Bell's palsy Sister     Thyroid disease Sister     Depression Sister     Depression Sister        Social History     Socioeconomic History    Marital status:    Tobacco Use    Smoking status: Never Smoker    Smokeless tobacco: Never Used   Substance and Sexual Activity    Alcohol use: No    Drug use: No       Current Outpatient Medications   Medication Sig Dispense Refill    acetaminophen (TYLENOL) 325 MG tablet Take 1 tablet (325 mg total) by mouth every 6 (six) hours as needed for Pain.      amLODIPine (NORVASC) 10 MG tablet TAKE 1 TABLET BY MOUTH DAILY 30 tablet 5    amoxicillin-clavulanate 875-125mg (AUGMENTIN) 875-125 mg per tablet Take 1 tablet by mouth every 12 (twelve) hours. 14 tablet 0    APPLE CIDER VINEGAR ORAL Take by mouth.      ascorbic acid (VITAMIN C) 500 MG tablet Take 500 mg by mouth once daily.      aspirin 81 MG Chew Take 81 mg by mouth once daily.    "   b complex vitamins capsule Take 1 capsule by mouth once daily.      BD ULTRA-FINE AMAN PEN NEEDLE 32 gauge x 5/32" Ndle Inject 1 Syringe into the skin once daily. 100 each 3    carvediloL (COREG) 6.25 MG tablet TAKE 1 TABLET(6.25 MG) BY MOUTH TWICE DAILY 180 tablet 4    clopidogreL (PLAVIX) 75 mg tablet TAKE 1 TABLET BY MOUTH EVERY DAY 90 tablet 4    cyanocobalamin, vitamin B-12, 1,000 mcg/15 mL Liqd Take by mouth.      FARXIGA 5 mg Tab tablet Take 5 mg by mouth every evening.      fish,bora,flax oils-om3,6,9no1 1,200 mg Cap Take by mouth.      FREESTYLE PASCUAL 14 DAY SENSOR Kit APPLY NEW SENSOR EVERY 14 DAYS AS DIRECTED      FREESTYLE LITE STRIPS Strp TEST 2 TO 3 TIMES D  2    gabapentin (NEURONTIN) 300 MG capsule Take 1 capsule (300 mg total) by mouth 3 (three) times daily. 90 capsule 5    LANTUS SOLOSTAR U-100 INSULIN glargine 100 units/mL (3mL) SubQ pen ADMINISTER 45 UNITS UNDER THE SKIN EVERY DAY 15 mL 5    metFORMIN (GLUCOPHAGE) 1000 MG tablet Take 1 tablet (1,000 mg total) by mouth 2 (two) times daily with meals. 180 tablet 0    multivit-min/iron/folic acid/K (ADULTS MULTIVITAMIN ORAL) Take by mouth.      mupirocin (BACTROBAN) 2 % ointment Apply topically 3 (three) times daily. 30 g 2    olmesartan-hydrochlorothiazide (BENICAR HCT) 40-12.5 mg Tab Take 1 tablet by mouth once daily.      olmesartan-hydrochlorothiazide (BENICAR HCT) 40-25 mg per tablet TAKE 1 TABLET BY MOUTH DAILY 90 tablet 1    ondansetron (ZOFRAN) 4 MG tablet Take 1 tablet (4 mg total) by mouth every 6 (six) hours. 12 tablet 0    ONETOUCH DELICA PLUS LANCET 33 gauge Misc TEST ONCE D  0    potassium chloride SA (K-DUR,KLOR-CON) 10 MEQ tablet Take by mouth.      pravastatin (PRAVACHOL) 40 MG tablet Take 1 tablet (40 mg total) by mouth every evening. 90 tablet 3    RYBELSUS 7 mg tablet Take 7 mg by mouth every morning.      UNABLE TO FIND medication name: Tart Cherry Extract      vitamin E 400 UNIT capsule Take 400 Units " by mouth once daily.       Current Facility-Administered Medications   Medication Dose Route Frequency Provider Last Rate Last Admin    acetaminophen tablet 650 mg  650 mg Oral Once PRN Robert Landrum MD        ondansetron disintegrating tablet 4 mg  4 mg Oral Once PRN Robert Landrum MD           Review of patient's allergies indicates:  No Known Allergies      Review of Systems   Constitutional: Negative for chills and fever.   Cardiovascular: Negative for claudication and leg swelling.   Skin: Positive for color change and nail changes. Negative for poor wound healing.   Musculoskeletal: Positive for arthritis. Negative for joint pain and joint swelling.   Gastrointestinal: Negative for nausea and vomiting.   Neurological: Positive for numbness. Negative for paresthesias.   Psychiatric/Behavioral: Negative for altered mental status.           Objective:      Physical Exam  Constitutional:       General: He is not in acute distress.     Appearance: He is well-developed. He is not diaphoretic.   Cardiovascular:      Pulses:           Dorsalis pedis pulses are 2+ on the right side and 2+ on the left side.        Posterior tibial pulses are 2+ on the right side and 2+ on the left side.      Comments: CFT < 3 seconds bilateral.  Pedal hair growth present bilateral.   No varicosities noted bilateral. No lower extremity edema noted bilateral.  Toes are warm to touch bilateral.    Musculoskeletal:         General: Deformity present. No tenderness.      Right lower leg: No edema.      Left lower leg: No edema.      Comments: Muscle strength 5/5 in all muscle groups bilateral.  No tenderness nor crepitation with ROM of foot/ankle joints bilateral.  Arthritic changes changes noted to the dorsal and medial aspect of the Rt. 1st mtp joint.  Rt. hallux malleus with arthritic changes noted to the dorsal IP joint.  Bilateral pes planus foot type.  Bilateral hallux abducto valgus.  Bilateral semi-rigid contracture of  toes 2-5 with better reduction of the Rt. 3rd and 4th DIP joints.  (-) for pain with palpation to the site of the wound.   Skin:     General: Skin is warm and dry.      Capillary Refill: Capillary refill takes less than 2 seconds.      Coloration: Skin is not pale.      Findings: Wound present. No abrasion, bruising, burn, ecchymosis, erythema, lesion, petechiae or rash.      Nails: There is no clubbing.      Comments: Pedal skin has normal turgor, temperature, and texture bilateral.  Toenails x 10 appear mycotic but well maintained.      Location: Open wound noted to the plantar aspect of the Rt. Great toe.  Base: Down to dermis and comprised of fibrin.   Drainage: None  Tiffany wound: Devoid of erythema, edema, fluctuance, purulence, and malodor.   Pre-debridement measurement: 0.4 x 0.4 x 0.1cm  Post-debridement measurement: 0.6 x 0.6 x 0.1cm       Neurological:      Mental Status: He is alert and oriented to person, place, and time.      Sensory: Sensory deficit present.      Motor: No weakness or atrophy.      Comments: Protective sensation per [place]-Yash monofilament absent bilateral.    Light touch intact bilateral.               Assessment:       Encounter Diagnoses   Name Primary?    Diabetic ulcer of foot associated with diabetes mellitus due to underlying condition, limited to breakdown of skin Yes    Arthritis of big toe     Diabetic polyneuropathy associated with type 2 diabetes mellitus          Plan:       Diagnoses and all orders for this visit:    Diabetic ulcer of foot associated with diabetes mellitus due to underlying condition, limited to breakdown of skin  -     Ambulatory referral/consult to Podiatry  -     Case Request Operating Room: ARTHROPLASTY, TOE  -     Wound Debridement    Arthritis of big toe  -     Case Request Operating Room: ARTHROPLASTY, TOE    Diabetic polyneuropathy associated with type 2 diabetes mellitus      I counseled the patient on his conditions, their implications  and medical management.    Performed a selective excisional debridement of the Rt. foot/ankle.  See attached procedure note.      Wound base was covered with triple antibiotic ointment and a mepilex border.  Instructed to keep intact for today.      Patient to continue daily dressing changes with use of bactroban ointment.     Advised to rest and elevate the affected extremity.    Instructed to minimize weight bearing to facilitate wound healing.    To continue wearing a casual stiff soled shoes to facilitate healing.    Case request placed for an arthroplasty of the Rt. Hallux IP joint to be performed on 7/14/22.      Patient has obtained clearance from his PCP.      Will obtain informed written consent the day of the procedure.      Patient to be NPO after midnight the day of surgery.    RTC 1 week postop.    Augusto Simeon DPM

## 2022-06-29 NOTE — PROGRESS NOTES
Subjective:      Patient ID: Yobany Richardson is a 66 y.o. male.    Chief Complaint: No chief complaint on file.    Patient presents to clinic for a 2 week wound check of the Rt. Foot.  Denies pain from the extremity with today's exam.  Continues daily application of bactroban and a bandage to the wound of the Rt. Great toe.  Denies noticing infection to the digit.  Denies experiencing N/V/F/C/D.  Notes obtaining surgical clearance to help resolve the current issue.  Denies any additional pedal complaints.        Hemoglobin A1C   Date Value Ref Range Status   07/16/2021 7.3 (H) 4.0 - 5.6 % Final     Comment:     ADA Screening Guidelines:  5.7-6.4%  Consistent with prediabetes  >or=6.5%  Consistent with diabetes    High levels of fetal hemoglobin interfere with the HbA1C  assay. Heterozygous hemoglobin variants (HbS, HgC, etc)do  not significantly interfere with this assay.   However, presence of multiple variants may affect accuracy.     06/21/2021 6.9 (H) <5.7 % of total Hgb Final     Comment:     For someone without known diabetes, a hemoglobin A1c  value of 6.5% or greater indicates that they may have   diabetes and this should be confirmed with a follow-up   test.     For someone with known diabetes, a value <7% indicates   that their diabetes is well controlled and a value   greater than or equal to 7% indicates suboptimal   control. A1c targets should be individualized based on   duration of diabetes, age, comorbid conditions, and   other considerations.     Currently, no consensus exists regarding use of  hemoglobin A1c for diagnosis of diabetes for children.         04/20/2021 7.7 (A) 4.0 - 6.0 % Final           Past Medical History:   Diagnosis Date    Abnormal thallium stress test     Arrhythmia     Atrial flutter, paroxysmal 2/11/2016    CAD (coronary artery disease) 3/9/2016    5/2016 [facility] Left main:NL LAD: 35% proximal LAD. two small diagonals with minimal disease.  Circumflex/Large branching first obtuse  marginal: with minimal disease.   RCA: The vessel was large sized (dominant) with a 60% proximal lesion. 100 mcgs of intracoronary Nitroglycerin were given with no change in the lesion. Thus FFR was performed. FFR was 0.79  2/2016 cor CTA ~~ 50% LAD    Coronary artery disease     Diabetes mellitus     Diabetes mellitus, type 2     Disorder of kidney and ureter     Hypertension     Neuropathy     Paroxysmal atrial flutter     PE (pulmonary embolism)     Pulmonary embolism 2/11/2016 1/2016     Rheumatoid arthritis     Shortness of breath     Stented coronary artery 6/15/2016    5/2016 RCA- synergy 3.5x12    Wears contact lenses     Wears prescription eyeglasses        Past Surgical History:   Procedure Laterality Date    CARDIAC CATHETERIZATION      with stent placed x 1    KNEE ARTHROSCOPY      TONSILLECTOMY         Family History   Problem Relation Age of Onset    Cancer Mother     Angina Mother     Heart disease Mother     Hypertension Mother     Kidney disease Father     Thyroid disease Sister     Bell's palsy Sister     Thyroid disease Sister     Depression Sister     Depression Sister        Social History     Socioeconomic History    Marital status:    Tobacco Use    Smoking status: Never Smoker    Smokeless tobacco: Never Used   Substance and Sexual Activity    Alcohol use: No    Drug use: No       Current Outpatient Medications   Medication Sig Dispense Refill    acetaminophen (TYLENOL) 325 MG tablet Take 1 tablet (325 mg total) by mouth every 6 (six) hours as needed for Pain.      amLODIPine (NORVASC) 10 MG tablet TAKE 1 TABLET BY MOUTH DAILY 30 tablet 5    amoxicillin-clavulanate 875-125mg (AUGMENTIN) 875-125 mg per tablet Take 1 tablet by mouth every 12 (twelve) hours. 14 tablet 0    APPLE CIDER VINEGAR ORAL Take by mouth.      ascorbic acid (VITAMIN C) 500 MG tablet Take 500 mg by mouth once daily.      aspirin 81 MG Chew Take 81 mg by mouth once daily.    "   b complex vitamins capsule Take 1 capsule by mouth once daily.      BD ULTRA-FINE AMAN PEN NEEDLE 32 gauge x 5/32" Ndle Inject 1 Syringe into the skin once daily. 100 each 3    carvediloL (COREG) 6.25 MG tablet TAKE 1 TABLET(6.25 MG) BY MOUTH TWICE DAILY 180 tablet 4    clopidogreL (PLAVIX) 75 mg tablet TAKE 1 TABLET BY MOUTH EVERY DAY 90 tablet 4    cyanocobalamin, vitamin B-12, 1,000 mcg/15 mL Liqd Take by mouth.      FARXIGA 5 mg Tab tablet Take 5 mg by mouth every evening.      fish,bora,flax oils-om3,6,9no1 1,200 mg Cap Take by mouth.      FREESTYLE PASCUAL 14 DAY SENSOR Kit APPLY NEW SENSOR EVERY 14 DAYS AS DIRECTED      FREESTYLE LITE STRIPS Strp TEST 2 TO 3 TIMES D  2    gabapentin (NEURONTIN) 300 MG capsule Take 1 capsule (300 mg total) by mouth 3 (three) times daily. 90 capsule 5    LANTUS SOLOSTAR U-100 INSULIN glargine 100 units/mL (3mL) SubQ pen ADMINISTER 45 UNITS UNDER THE SKIN EVERY DAY 15 mL 5    metFORMIN (GLUCOPHAGE) 1000 MG tablet Take 1 tablet (1,000 mg total) by mouth 2 (two) times daily with meals. 180 tablet 0    multivit-min/iron/folic acid/K (ADULTS MULTIVITAMIN ORAL) Take by mouth.      mupirocin (BACTROBAN) 2 % ointment Apply topically 3 (three) times daily. 30 g 2    olmesartan-hydrochlorothiazide (BENICAR HCT) 40-12.5 mg Tab Take 1 tablet by mouth once daily.      olmesartan-hydrochlorothiazide (BENICAR HCT) 40-25 mg per tablet TAKE 1 TABLET BY MOUTH DAILY 90 tablet 1    ondansetron (ZOFRAN) 4 MG tablet Take 1 tablet (4 mg total) by mouth every 6 (six) hours. 12 tablet 0    ONETOUCH DELICA PLUS LANCET 33 gauge Misc TEST ONCE D  0    potassium chloride SA (K-DUR,KLOR-CON) 10 MEQ tablet Take by mouth.      pravastatin (PRAVACHOL) 40 MG tablet Take 1 tablet (40 mg total) by mouth every evening. 90 tablet 3    RYBELSUS 7 mg tablet Take 7 mg by mouth every morning.      UNABLE TO FIND medication name: Tart Cherry Extract      vitamin E 400 UNIT capsule Take 400 Units " by mouth once daily.       Current Facility-Administered Medications   Medication Dose Route Frequency Provider Last Rate Last Admin    acetaminophen tablet 650 mg  650 mg Oral Once PRN Robert Landrum MD        ondansetron disintegrating tablet 4 mg  4 mg Oral Once PRN Robert Landrum MD           Review of patient's allergies indicates:  No Known Allergies      Review of Systems   Constitutional: Negative for chills and fever.   Cardiovascular: Negative for claudication and leg swelling.   Skin: Positive for color change and nail changes. Negative for poor wound healing.   Musculoskeletal: Positive for arthritis. Negative for joint pain and joint swelling.   Gastrointestinal: Negative for nausea and vomiting.   Neurological: Positive for numbness. Negative for paresthesias.   Psychiatric/Behavioral: Negative for altered mental status.           Objective:      Physical Exam  Constitutional:       General: He is not in acute distress.     Appearance: He is well-developed. He is not diaphoretic.   Cardiovascular:      Pulses:           Dorsalis pedis pulses are 2+ on the right side and 2+ on the left side.        Posterior tibial pulses are 2+ on the right side and 2+ on the left side.      Comments: CFT < 3 seconds bilateral.  Pedal hair growth present bilateral.   No varicosities noted bilateral. No lower extremity edema noted bilateral.  Toes are warm to touch bilateral.    Musculoskeletal:         General: Deformity present. No tenderness.      Right lower leg: No edema.      Left lower leg: No edema.      Comments: Muscle strength 5/5 in all muscle groups bilateral.  No tenderness nor crepitation with ROM of foot/ankle joints bilateral.  Arthritic changes changes noted to the dorsal and medial aspect of the Rt. 1st mtp joint.  Rt. hallux malleus with arthritic changes noted to the dorsal IP joint.  Bilateral pes planus foot type.  Bilateral hallux abducto valgus.  Bilateral semi-rigid contracture of  toes 2-5 with better reduction of the Rt. 3rd and 4th DIP joints.  (-) for pain with palpation to the site of the wound.   Skin:     General: Skin is warm and dry.      Capillary Refill: Capillary refill takes less than 2 seconds.      Coloration: Skin is not pale.      Findings: Wound present. No abrasion, bruising, burn, ecchymosis, erythema, lesion, petechiae or rash.      Nails: There is no clubbing.      Comments: Pedal skin has normal turgor, temperature, and texture bilateral.  Toenails x 10 appear mycotic but well maintained.      Location: Open wound noted to the plantar aspect of the Rt. Great toe.  Base: Down to dermis and comprised of fibrin.   Drainage: None  Tiffany wound: Devoid of erythema, edema, fluctuance, purulence, and malodor.   Pre-debridement measurement: 0.4 x 0.4 x 0.1cm  Post-debridement measurement: 0.6 x 0.6 x 0.1cm       Neurological:      Mental Status: He is alert and oriented to person, place, and time.      Sensory: Sensory deficit present.      Motor: No weakness or atrophy.      Comments: Protective sensation per Orrum-Yash monofilament absent bilateral.    Light touch intact bilateral.               Assessment:       Encounter Diagnoses   Name Primary?    Diabetic ulcer of foot associated with diabetes mellitus due to underlying condition, limited to breakdown of skin Yes    Arthritis of big toe     Diabetic polyneuropathy associated with type 2 diabetes mellitus          Plan:       Diagnoses and all orders for this visit:    Diabetic ulcer of foot associated with diabetes mellitus due to underlying condition, limited to breakdown of skin  -     Ambulatory referral/consult to Podiatry  -     Case Request Operating Room: ARTHROPLASTY, TOE  -     Wound Debridement    Arthritis of big toe  -     Case Request Operating Room: ARTHROPLASTY, TOE    Diabetic polyneuropathy associated with type 2 diabetes mellitus      I counseled the patient on his conditions, their implications  and medical management.    Performed a selective excisional debridement of the Rt. foot/ankle.  See attached procedure note.      Wound base was covered with triple antibiotic ointment and a mepilex border.  Instructed to keep intact for today.      Patient to continue daily dressing changes with use of bactroban ointment.     Advised to rest and elevate the affected extremity.    Instructed to minimize weight bearing to facilitate wound healing.    To continue wearing a casual stiff soled shoes to facilitate healing.    Case request placed for an arthroplasty of the Rt. Hallux IP joint to be performed on 7/14/22.      Patient has obtained clearance from his PCP.      Will obtain informed written consent the day of the procedure.      Patient to be NPO after midnight the day of surgery.    RTC 1 week postop.    Augusto Simeon DPM

## 2022-06-30 DIAGNOSIS — Z01.818 PREOP TESTING: Primary | ICD-10-CM

## 2022-07-05 RX ORDER — CARVEDILOL 6.25 MG/1
TABLET ORAL
Qty: 180 TABLET | Refills: 4 | Status: SHIPPED | OUTPATIENT
Start: 2022-07-05 | End: 2023-06-28 | Stop reason: SDUPTHER

## 2022-07-11 ENCOUNTER — LAB VISIT (OUTPATIENT)
Dept: FAMILY MEDICINE | Facility: CLINIC | Age: 67
End: 2022-07-11
Payer: COMMERCIAL

## 2022-07-11 ENCOUNTER — TELEPHONE (OUTPATIENT)
Dept: PODIATRY | Facility: CLINIC | Age: 67
End: 2022-07-11
Payer: COMMERCIAL

## 2022-07-11 DIAGNOSIS — Z01.818 PREOP TESTING: ICD-10-CM

## 2022-07-11 PROCEDURE — U0005 INFEC AGEN DETEC AMPLI PROBE: HCPCS | Performed by: PODIATRIST

## 2022-07-11 PROCEDURE — U0003 INFECTIOUS AGENT DETECTION BY NUCLEIC ACID (DNA OR RNA); SEVERE ACUTE RESPIRATORY SYNDROME CORONAVIRUS 2 (SARS-COV-2) (CORONAVIRUS DISEASE [COVID-19]), AMPLIFIED PROBE TECHNIQUE, MAKING USE OF HIGH THROUGHPUT TECHNOLOGIES AS DESCRIBED BY CMS-2020-01-R: HCPCS | Performed by: PODIATRIST

## 2022-07-11 NOTE — TELEPHONE ENCOUNTER
----- Message from Imelda De La Garza sent at 7/11/2022  9:50 AM CDT -----  Contact: Self  Patient called to let you know his Covid testing was originally scheduled in [place] for this Morning and I went ahead and rescheduled for the [place] location. Patient was wanting to speak to someone to make sure this was ok. Please call back to confirm at [phone]. Thank You.

## 2022-07-11 NOTE — TELEPHONE ENCOUNTER
Returned patients' call, I told him I was sorry for scheduling him at the wrong place. He said it was no problem. It was corrected. Answered his question about his Sx appts. And changed the times on the appts.

## 2022-07-12 LAB — SARS-COV-2 RNA RESP QL NAA+PROBE: NOT DETECTED

## 2022-07-13 ENCOUNTER — ANESTHESIA EVENT (OUTPATIENT)
Dept: SURGERY | Facility: HOSPITAL | Age: 67
End: 2022-07-13
Payer: COMMERCIAL

## 2022-07-13 RX ORDER — OXYCODONE HYDROCHLORIDE 5 MG/1
5 TABLET ORAL
Status: CANCELLED | OUTPATIENT
Start: 2022-07-13

## 2022-07-13 RX ORDER — MEPERIDINE HYDROCHLORIDE 50 MG/ML
12.5 INJECTION INTRAMUSCULAR; INTRAVENOUS; SUBCUTANEOUS ONCE AS NEEDED
Status: CANCELLED | OUTPATIENT
Start: 2022-07-13 | End: 2022-07-14

## 2022-07-13 RX ORDER — SODIUM CHLORIDE 0.9 % (FLUSH) 0.9 %
3 SYRINGE (ML) INJECTION
Status: CANCELLED | OUTPATIENT
Start: 2022-07-13

## 2022-07-13 RX ORDER — HYDROMORPHONE HYDROCHLORIDE 2 MG/ML
0.2 INJECTION, SOLUTION INTRAMUSCULAR; INTRAVENOUS; SUBCUTANEOUS EVERY 5 MIN PRN
Status: CANCELLED | OUTPATIENT
Start: 2022-07-13

## 2022-07-13 RX ORDER — FENTANYL CITRATE 50 UG/ML
25 INJECTION, SOLUTION INTRAMUSCULAR; INTRAVENOUS EVERY 5 MIN PRN
Status: CANCELLED | OUTPATIENT
Start: 2022-07-13

## 2022-07-13 RX ORDER — DIPHENHYDRAMINE HYDROCHLORIDE 50 MG/ML
25 INJECTION INTRAMUSCULAR; INTRAVENOUS EVERY 6 HOURS PRN
Status: CANCELLED | OUTPATIENT
Start: 2022-07-13

## 2022-07-14 ENCOUNTER — HOSPITAL ENCOUNTER (OUTPATIENT)
Facility: HOSPITAL | Age: 67
Discharge: HOME | End: 2022-07-14
Attending: PODIATRIST | Admitting: PODIATRIST
Payer: COMMERCIAL

## 2022-07-14 ENCOUNTER — ANESTHESIA (OUTPATIENT)
Dept: SURGERY | Facility: HOSPITAL | Age: 67
End: 2022-07-14
Payer: COMMERCIAL

## 2022-07-14 VITALS
TEMPERATURE: 97 F | RESPIRATION RATE: 17 BRPM | WEIGHT: 200 LBS | SYSTOLIC BLOOD PRESSURE: 134 MMHG | HEIGHT: 74 IN | BODY MASS INDEX: 25.67 KG/M2 | DIASTOLIC BLOOD PRESSURE: 77 MMHG | HEART RATE: 66 BPM | OXYGEN SATURATION: 100 %

## 2022-07-14 DIAGNOSIS — M19.079 ARTHRITIS OF BIG TOE: Primary | ICD-10-CM

## 2022-07-14 DIAGNOSIS — Z87.898 HISTORY OF ULCERATION: ICD-10-CM

## 2022-07-14 LAB — GLUCOSE SERPL-MCNC: 127 MG/DL (ref 70–110)

## 2022-07-14 PROCEDURE — 28160 PARTIAL REMOVAL OF TOE: CPT | Mod: T5,,, | Performed by: PODIATRIST

## 2022-07-14 PROCEDURE — D9220A PRA ANESTHESIA: ICD-10-PCS | Mod: ANES,,, | Performed by: ANESTHESIOLOGY

## 2022-07-14 PROCEDURE — 25000003 PHARM REV CODE 250: Mod: PO | Performed by: PODIATRIST

## 2022-07-14 PROCEDURE — 25000003 PHARM REV CODE 250: Mod: PO | Performed by: NURSE ANESTHETIST, CERTIFIED REGISTERED

## 2022-07-14 PROCEDURE — 63600175 PHARM REV CODE 636 W HCPCS: Mod: PO | Performed by: PODIATRIST

## 2022-07-14 PROCEDURE — D9220A PRA ANESTHESIA: Mod: CRNA,,, | Performed by: NURSE ANESTHETIST, CERTIFIED REGISTERED

## 2022-07-14 PROCEDURE — 37000008 HC ANESTHESIA 1ST 15 MINUTES: Mod: PO | Performed by: PODIATRIST

## 2022-07-14 PROCEDURE — 36000708 HC OR TIME LEV III 1ST 15 MIN: Mod: PO | Performed by: PODIATRIST

## 2022-07-14 PROCEDURE — D9220A PRA ANESTHESIA: ICD-10-PCS | Mod: CRNA,,, | Performed by: NURSE ANESTHETIST, CERTIFIED REGISTERED

## 2022-07-14 PROCEDURE — 63600175 PHARM REV CODE 636 W HCPCS: Mod: PO | Performed by: NURSE ANESTHETIST, CERTIFIED REGISTERED

## 2022-07-14 PROCEDURE — 36000709 HC OR TIME LEV III EA ADD 15 MIN: Mod: PO | Performed by: PODIATRIST

## 2022-07-14 PROCEDURE — 28160 PR RESEC TOE AT I-P JT,SINGLE,EA: ICD-10-PCS | Mod: T5,,, | Performed by: PODIATRIST

## 2022-07-14 PROCEDURE — 37000009 HC ANESTHESIA EA ADD 15 MINS: Mod: PO | Performed by: PODIATRIST

## 2022-07-14 PROCEDURE — D9220A PRA ANESTHESIA: Mod: ANES,,, | Performed by: ANESTHESIOLOGY

## 2022-07-14 PROCEDURE — 82962 GLUCOSE BLOOD TEST: CPT | Mod: PO | Performed by: PODIATRIST

## 2022-07-14 PROCEDURE — 71000033 HC RECOVERY, INTIAL HOUR: Mod: PO | Performed by: PODIATRIST

## 2022-07-14 PROCEDURE — 27201423 OPTIME MED/SURG SUP & DEVICES STERILE SUPPLY: Mod: PO | Performed by: PODIATRIST

## 2022-07-14 PROCEDURE — 63600175 PHARM REV CODE 636 W HCPCS: Mod: PO | Performed by: ANESTHESIOLOGY

## 2022-07-14 RX ORDER — SODIUM CHLORIDE, SODIUM LACTATE, POTASSIUM CHLORIDE, CALCIUM CHLORIDE 600; 310; 30; 20 MG/100ML; MG/100ML; MG/100ML; MG/100ML
INJECTION, SOLUTION INTRAVENOUS CONTINUOUS
Status: DISCONTINUED | OUTPATIENT
Start: 2022-07-14 | End: 2023-04-27

## 2022-07-14 RX ORDER — PROPOFOL 10 MG/ML
VIAL (ML) INTRAVENOUS CONTINUOUS PRN
Status: DISCONTINUED | OUTPATIENT
Start: 2022-07-14 | End: 2022-07-14

## 2022-07-14 RX ORDER — BUPIVACAINE HYDROCHLORIDE 5 MG/ML
INJECTION, SOLUTION EPIDURAL; INTRACAUDAL
Status: DISCONTINUED | OUTPATIENT
Start: 2022-07-14 | End: 2022-07-14 | Stop reason: HOSPADM

## 2022-07-14 RX ORDER — HYDROCODONE BITARTRATE AND ACETAMINOPHEN 5; 325 MG/1; MG/1
1 TABLET ORAL EVERY 6 HOURS PRN
Qty: 28 TABLET | Refills: 0 | Status: SHIPPED | OUTPATIENT
Start: 2022-07-14 | End: 2023-01-10

## 2022-07-14 RX ORDER — EPHEDRINE SULFATE 50 MG/ML
INJECTION, SOLUTION INTRAVENOUS
Status: DISCONTINUED | OUTPATIENT
Start: 2022-07-14 | End: 2022-07-14

## 2022-07-14 RX ORDER — MIDAZOLAM HYDROCHLORIDE 1 MG/ML
INJECTION, SOLUTION INTRAMUSCULAR; INTRAVENOUS
Status: DISCONTINUED | OUTPATIENT
Start: 2022-07-14 | End: 2022-07-14

## 2022-07-14 RX ORDER — LIDOCAINE HYDROCHLORIDE 10 MG/ML
1 INJECTION, SOLUTION EPIDURAL; INFILTRATION; INTRACAUDAL; PERINEURAL ONCE
Status: DISCONTINUED | OUTPATIENT
Start: 2022-07-14 | End: 2023-04-27

## 2022-07-14 RX ORDER — FENTANYL CITRATE 50 UG/ML
INJECTION, SOLUTION INTRAMUSCULAR; INTRAVENOUS
Status: DISCONTINUED | OUTPATIENT
Start: 2022-07-14 | End: 2022-07-14

## 2022-07-14 RX ORDER — LIDOCAINE HYDROCHLORIDE 20 MG/ML
INJECTION INTRAVENOUS
Status: DISCONTINUED | OUTPATIENT
Start: 2022-07-14 | End: 2022-07-14

## 2022-07-14 RX ORDER — HYDROCODONE BITARTRATE AND ACETAMINOPHEN 5; 325 MG/1; MG/1
1 TABLET ORAL EVERY 4 HOURS PRN
Status: CANCELLED | OUTPATIENT
Start: 2022-07-14

## 2022-07-14 RX ORDER — PROPOFOL 10 MG/ML
VIAL (ML) INTRAVENOUS
Status: DISCONTINUED | OUTPATIENT
Start: 2022-07-14 | End: 2022-07-14

## 2022-07-14 RX ORDER — ONDANSETRON 2 MG/ML
INJECTION INTRAMUSCULAR; INTRAVENOUS
Status: DISCONTINUED | OUTPATIENT
Start: 2022-07-14 | End: 2022-07-14

## 2022-07-14 RX ORDER — ACETAMINOPHEN 10 MG/ML
INJECTION, SOLUTION INTRAVENOUS
Status: DISCONTINUED | OUTPATIENT
Start: 2022-07-14 | End: 2022-07-14

## 2022-07-14 RX ADMIN — PROPOFOL 50 MG: 10 INJECTION, EMULSION INTRAVENOUS at 01:07

## 2022-07-14 RX ADMIN — ONDANSETRON 4 MG: 2 INJECTION, SOLUTION INTRAMUSCULAR; INTRAVENOUS at 01:07

## 2022-07-14 RX ADMIN — SODIUM CHLORIDE, SODIUM LACTATE, POTASSIUM CHLORIDE, AND CALCIUM CHLORIDE: .6; .31; .03; .02 INJECTION, SOLUTION INTRAVENOUS at 12:07

## 2022-07-14 RX ADMIN — PROPOFOL 75 MCG/KG/MIN: 10 INJECTION, EMULSION INTRAVENOUS at 01:07

## 2022-07-14 RX ADMIN — MIDAZOLAM HYDROCHLORIDE 2 MG: 1 INJECTION, SOLUTION INTRAMUSCULAR; INTRAVENOUS at 01:07

## 2022-07-14 RX ADMIN — GLYCOPYRROLATE 0.2 MG: 0.2 INJECTION, SOLUTION INTRAMUSCULAR; INTRAVITREAL at 01:07

## 2022-07-14 RX ADMIN — EPHEDRINE SULFATE 5 MG: 50 INJECTION INTRAVENOUS at 02:07

## 2022-07-14 RX ADMIN — FENTANYL CITRATE 50 MCG: 50 INJECTION, SOLUTION INTRAMUSCULAR; INTRAVENOUS at 01:07

## 2022-07-14 RX ADMIN — DEXTROSE 2 G: 50 INJECTION, SOLUTION INTRAVENOUS at 01:07

## 2022-07-14 RX ADMIN — LIDOCAINE HYDROCHLORIDE 100 MG: 20 INJECTION, SOLUTION INTRAVENOUS at 01:07

## 2022-07-14 RX ADMIN — ACETAMINOPHEN 1000 MG: 10 INJECTION, SOLUTION INTRAVENOUS at 01:07

## 2022-07-14 NOTE — ANESTHESIA PREPROCEDURE EVALUATION
07/14/2022  Yobany Richardson is a 66 y.o., male.      Pre-op Assessment    I have reviewed the NPO Status.   I have reviewed the Medications.     Review of Systems  Anesthesia Hx:  No problems with previous Anesthesia    Social:  Non-Smoker    Cardiovascular:   Exercise tolerance: good Hypertension CAD  CABG/stent   Denies Angina.    Pulmonary:   Shortness of breath    Renal/:   Chronic Renal Disease, CRI    Hepatic/GI:  Hepatic/GI Normal    Neurological:   Neuromuscular Disease,   Peripheral Neuropathy    Endocrine:   Diabetes, type 2, using insulin        Physical Exam  General: Well nourished    Airway:  Mallampati: II / I  Mouth Opening: Normal  TM Distance: Normal  Tongue: Normal  Neck ROM: Normal ROM    Dental:  Intact    Chest/Lungs:  Clear to auscultation, Normal Respiratory Rate        Anesthesia Plan  Type of Anesthesia, risks & benefits discussed:    Anesthesia Type: Gen Natural Airway  Intra-op Monitoring Plan: Standard ASA Monitors  Post Op Pain Control Plan: multimodal analgesia and IV/PO Opioids PRN  Induction:  IV  Informed Consent: Informed consent signed with the Patient and all parties understand the risks and agree with anesthesia plan.  All questions answered. Patient consented to blood products? No  ASA Score: 3    Ready For Surgery From Anesthesia Perspective.     .

## 2022-07-14 NOTE — DISCHARGE INSTRUCTIONS
FOOT SURGERY  After surgery:    DO:  Keep leg elevated for first 48-72 hours.  Keep ice pack on the foot for 48-72 hours.  Ice on for 20 minutes of each hour while awake.  Keep dressing clean and dry.  Advanced diet as tolerated.   Check circulation frequently in toes by pressing down on toenail. Nail should turn white and then pink when released.  May walk on foot to go to the bathroom only.  Wear surgical shoe. May remove shoe to sleep.  Resume home medications.    DO NOT:  Do not remove your dressing.  Do not get dressing wet.  Do not drive for 48 hours or while taking narcotic pain medications.  Do not take additional tylenol/acetaminophen while taking narcotic pain medication that contains tylenol/acetaminophen.     CALL PHYSICIAN FOR:  Pain, burning, or numbness of the toes not relieved by elevation of the leg.  Pale or cold toes.  Blue colored nail beds.  Redness, swelling, or bleeding.  Fever greater than 101.  Drainage (pus) from the puncture sites.  Pain unrelieved by pain medication.    FOR EMERGENCIES CONTACT YOUR DOCTOR [phone]

## 2022-07-14 NOTE — TRANSFER OF CARE
"Anesthesia Transfer of Care Note    Patient: Yobany Richardson    Procedure(s) Performed: Procedure(s) (LRB):  ARTHROPLASTY, TOE (Right)    Patient location: PACU    Anesthesia Type: MAC    Transport from OR: Transported from OR on 2-3 L/min O2 by NC with adequate spontaneous ventilation    Post pain: adequate analgesia    Post assessment: no apparent anesthetic complications and tolerated procedure well    Post vital signs: stable    Level of consciousness: awake, alert and oriented    Nausea/Vomiting: no nausea/vomiting    Complications: none    Transfer of care protocol was followed      Last vitals:   Visit Vitals  BP (!) 143/71 (BP Location: Right arm, Patient Position: Lying)   Pulse 66   Temp 36.2 °C (97.1 °F) (Skin)   Resp 18   Ht 6' 2" (1.88 m)   Wt 90.7 kg (200 lb)   SpO2 99%   BMI 25.68 kg/m²     "

## 2022-07-14 NOTE — ANESTHESIA POSTPROCEDURE EVALUATION
Anesthesia Post Evaluation    Patient: Yobany Richardson    Procedure(s) Performed: Procedure(s) (LRB):  ARTHROPLASTY, TOE (Right)    Final Anesthesia Type: MAC      Patient location during evaluation: PACU  Patient participation: Yes- Able to Participate  Level of consciousness: awake and alert and oriented  Post-procedure vital signs: reviewed and stable  Pain management: adequate  Airway patency: patent  CASSY mitigation strategies: Multimodal analgesia  PONV status at discharge: No PONV  Anesthetic complications: no      Cardiovascular status: blood pressure returned to baseline  Respiratory status: unassisted, spontaneous ventilation and room air  Hydration status: euvolemic  Follow-up not needed.          Vitals Value Taken Time   /77 07/14/22 1444   Temp 36.4 07/14/22 1602   Pulse 66 07/14/22 1444   Resp 17 07/14/22 1444   SpO2 100 % 07/14/22 1444         Event Time   Out of Recovery 14:54:00         Pain/Shanique Score: Shanique Score: 10 (7/14/2022  2:45 PM)

## 2022-07-15 ENCOUNTER — TELEPHONE (OUTPATIENT)
Dept: PODIATRY | Facility: CLINIC | Age: 67
End: 2022-07-15
Payer: COMMERCIAL

## 2022-07-15 NOTE — BRIEF OP NOTE
[facility] - Surgery  Brief Operative Note    Surgery Date: 7/14/2022     Surgeon(s) and Role:     * Augusto Simeon DPM - Primary    Assisting Surgeon: None    Pre-op Diagnosis:  Diabetic ulcer of foot associated with diabetes mellitus due to underlying condition, limited to breakdown of skin [E08.621, L97.501]  Arthritis of big toe [M19.079]    Post-op Diagnosis:  Post-Op Diagnosis Codes:     * Diabetic ulcer of foot associated with diabetes mellitus due to underlying condition, limited to breakdown of skin [E08.621, L97.501]     * Arthritis of big toe [M19.079]    Procedure(s) (LRB):  ARTHROPLASTY, TOE (Right)    Anesthesia: Local MAC    Operative Findings: Arthritis of hallux IP joint.    Estimated Blood Loss: < 1 mL         Specimens:   Specimen (24h ago, onward)            None            Discharge Note    OUTCOME: Patient tolerated treatment/procedure well without complication and is now ready for discharge.    DISPOSITION: Home or Self Care    FINAL DIAGNOSIS: As above    FOLLOWUP: In clinic    DISCHARGE INSTRUCTIONS:    Discharge Procedure Orders   Diet general     Keep surgical extremity elevated     Ice to affected area     Leave dressing on - Keep it clean, dry, and intact until clinic visit     Call MD for:  temperature >100.4     Call MD for:  persistent nausea and vomiting     Call MD for:  severe uncontrolled pain     Call MD for:  difficulty breathing, headache or visual disturbances     Call MD for:  redness, tenderness, or signs of infection (pain, swelling, redness, odor or green/yellow discharge around incision site)     Call MD for:  hives     Call MD for:  persistent dizziness or light-headedness     Call MD for:  extreme fatigue     Call MD for:     Weight bearing restrictions (specify)   Order Comments: Partial heel weight bearing for transfers

## 2022-07-15 NOTE — TELEPHONE ENCOUNTER
Called to check on patient after surgery. He stated he is not having any pain ,keeping his foot elevated, and he will call if he has any issues or questions.

## 2022-07-17 NOTE — OP NOTE
Op Note:    Patient name: Yobany Richardson  MRN: [id number]  Date of surgery: 7/14/22    Surgeon: Dr. Cailin DPM    Preoperative diagnosis: 1. Arthritis of great toe, Rt.  2. Diabetic ulcer of great toe, Rt.    Postoperative diagnosis: Same  Procedure: Arthroplasty of hallux IP joint, Rt.  Anesthesia: Local MAC  Hemostasis: Pneumatic tourniquet  Estimated blood loss: < 1 mL  Specimen: None  Culture: None  Complications: None  Condition upon discharge: Stable    Procedure in detail: Under mild sedation, patient was brought into the OR and placed supine on the OR table.  A pneumatic tourniquet was applied to the Rt. Ankle.  Following IV sedation, 10 mL of a 1:1 mixture of 1% lidocaine plain and 0.5% bupivacaine plain was infiltrated in an ankle block.  A timeout was then performed taking care to identify the correct patient, procedure, and extremity to which all present were in agreement.  The limb was then scrubbed, draped, and prepped in the usual aseptic manner.  An esmarch bandage was used to exsanguinate the extremity, with the tourniquet then inflated to 250 mmHg.      Attention was directed to the dorsum of the Rt. Hallux IP joint.  A #15 scalpel was used to make a lazy S-shaped incision overlying said joint.  Dissection was carried through subcutaneous tissues taking care to identify and retract all major neurovascular structures.  The extensor hallucis longus tendon was identified and transected to allow for better exposure of the IP joint capsule.  This too was transversely incised taking care to reflect the joint capsule and underlying periosteum both proximal and distal to allow better exposure of the arthritic joint.  A sagittal bone saw was then used to resect the head of the proximal phalanx.  This was then removed from the operative field.  All rough edges at the site of resection were smoothed with a bone rasp. The site was then irrigated with normal sterile saline.  The EHL tendon, joint capsule, and subQ  tissues were re-approximated with 3-0 and 4-0 vicryl.  The skin was re-approximated with 3-0 nylon utilizing a simple interrupted suture technique.  The tourniquet was then deflated with a prompt hyperemic response noted to all digits of the surgical extremity.      Following the procedure, the incision was covered with xerform, and a toe sulcus pad was applied to the plantar portion of the surgical digit.  A football dressing was then applied.  The patient was noted to have tolerated the procedure and anesthesia quite well.  He was then transported to recovery with all vitals and vascular status noted to be intact.  After a brief period of postoperative monitoring, he was discharged with the following verbal and written instructions:     1. Keep dressings, clean, dry, and intact to surgical extremity.  2. Rest, ice, and elevate the surgical extremity.  3. Weight bearing to heel of surgical extremity in postoperative shoe.  4. Take all medication as discussed at discharge.  5. Contact the clinic with any postoperative concerns or complications.  6. Follow up in one week for 1st post op.    Augusto Simeon DPM

## 2022-07-21 DIAGNOSIS — E11.9 TYPE 2 DIABETES MELLITUS WITHOUT COMPLICATION: ICD-10-CM

## 2022-07-27 ENCOUNTER — PATIENT OUTREACH (OUTPATIENT)
Dept: ADMINISTRATIVE | Facility: HOSPITAL | Age: 67
End: 2022-07-27
Payer: COMMERCIAL

## 2022-07-27 ENCOUNTER — OFFICE VISIT (OUTPATIENT)
Dept: PODIATRY | Facility: CLINIC | Age: 67
End: 2022-07-27
Payer: COMMERCIAL

## 2022-07-27 VITALS — WEIGHT: 199.94 LBS | HEIGHT: 74 IN | BODY MASS INDEX: 25.66 KG/M2

## 2022-07-27 DIAGNOSIS — E11.42 DIABETIC POLYNEUROPATHY ASSOCIATED WITH TYPE 2 DIABETES MELLITUS: ICD-10-CM

## 2022-07-27 DIAGNOSIS — Z98.890 POSTOPERATIVE STATE: Primary | ICD-10-CM

## 2022-07-27 DIAGNOSIS — N18.1 CONTROLLED TYPE 2 DIABETES MELLITUS WITH STAGE 1 CHRONIC KIDNEY DISEASE, UNSPECIFIED WHETHER LONG TERM INSULIN USE: Primary | ICD-10-CM

## 2022-07-27 DIAGNOSIS — E11.22 CONTROLLED TYPE 2 DIABETES MELLITUS WITH STAGE 1 CHRONIC KIDNEY DISEASE, UNSPECIFIED WHETHER LONG TERM INSULIN USE: Primary | ICD-10-CM

## 2022-07-27 PROCEDURE — 3008F PR BODY MASS INDEX (BMI) DOCUMENTED: ICD-10-PCS | Mod: CPTII,S$GLB,, | Performed by: PODIATRIST

## 2022-07-27 PROCEDURE — 3066F PR DOCUMENTATION OF TREATMENT FOR NEPHROPATHY: ICD-10-PCS | Mod: CPTII,S$GLB,, | Performed by: PODIATRIST

## 2022-07-27 PROCEDURE — 3008F BODY MASS INDEX DOCD: CPT | Mod: CPTII,S$GLB,, | Performed by: PODIATRIST

## 2022-07-27 PROCEDURE — 99999 PR PBB SHADOW E&M-EST. PATIENT-LVL II: CPT | Mod: PBBFAC,,, | Performed by: PODIATRIST

## 2022-07-27 PROCEDURE — 99024 POSTOP FOLLOW-UP VISIT: CPT | Mod: S$GLB,,, | Performed by: PODIATRIST

## 2022-07-27 PROCEDURE — 99024 PR POST-OP FOLLOW-UP VISIT: ICD-10-PCS | Mod: S$GLB,,, | Performed by: PODIATRIST

## 2022-07-27 PROCEDURE — 3066F NEPHROPATHY DOC TX: CPT | Mod: CPTII,S$GLB,, | Performed by: PODIATRIST

## 2022-07-27 PROCEDURE — 99999 PR PBB SHADOW E&M-EST. PATIENT-LVL II: ICD-10-PCS | Mod: PBBFAC,,, | Performed by: PODIATRIST

## 2022-07-27 NOTE — PROGRESS NOTES
Health Maintenance Due   Topic Date Due    HIV Screening  Never done    Colorectal Cancer Screening  Never done    Shingles Vaccine (1 of 2) Never done    TETANUS VACCINE  06/01/2017    Foot Exam  01/22/2022    Diabetes Urine Screening  04/20/2022     Chart review done. HM updated. Immunizations reviewed & updated. Care Everywhere updated.  Labs linked to existing lab appt.

## 2022-07-27 NOTE — PROGRESS NOTES
Subjective:      Patient ID: Yobany Richardson is a 66 y.o. male.    Chief Complaint: Post-op Evaluation    Patient presents to clinic 2 weeks S/P arthroplasty of the Rt. Hallux IP joint.   Denies experiencing pain from the surgical extremity with today's exam.  Has kept the prior surgical dressing clean, dry, and intact for the past two weeks.  Has been ambulating to tolerance while in the postoperative shoe.  Denies experiencing N/V/F/C/D.  Denies any additional pedal complaints.        Hemoglobin A1C   Date Value Ref Range Status   07/16/2021 7.3 (H) 4.0 - 5.6 % Final     Comment:     ADA Screening Guidelines:  5.7-6.4%  Consistent with prediabetes  >or=6.5%  Consistent with diabetes    High levels of fetal hemoglobin interfere with the HbA1C  assay. Heterozygous hemoglobin variants (HbS, HgC, etc)do  not significantly interfere with this assay.   However, presence of multiple variants may affect accuracy.     06/21/2021 6.9 (H) <5.7 % of total Hgb Final     Comment:     For someone without known diabetes, a hemoglobin A1c  value of 6.5% or greater indicates that they may have   diabetes and this should be confirmed with a follow-up   test.     For someone with known diabetes, a value <7% indicates   that their diabetes is well controlled and a value   greater than or equal to 7% indicates suboptimal   control. A1c targets should be individualized based on   duration of diabetes, age, comorbid conditions, and   other considerations.     Currently, no consensus exists regarding use of  hemoglobin A1c for diagnosis of diabetes for children.         04/20/2021 7.7 (A) 4.0 - 6.0 % Final           Past Medical History:   Diagnosis Date    Abnormal thallium stress test     Arrhythmia     Atrial flutter, paroxysmal 2/11/2016    CAD (coronary artery disease) 3/9/2016    5/2016 [facility] Left main:NL LAD: 35% proximal LAD. two small diagonals with minimal disease.  Circumflex/Large branching first obtuse marginal: with minimal  disease.   RCA: The vessel was large sized (dominant) with a 60% proximal lesion. 100 mcgs of intracoronary Nitroglycerin were given with no change in the lesion. Thus FFR was performed. FFR was 0.79  2/2016 cor CTA ~~ 50% LAD    Coronary artery disease     Diabetes mellitus     Diabetes mellitus, type 2     Disorder of kidney and ureter     Hypertension     Neuropathy     Paroxysmal atrial flutter     PE (pulmonary embolism)     Pulmonary embolism 2/11/2016 1/2016     Rheumatoid arthritis     Shortness of breath     Stented coronary artery 6/15/2016    5/2016 RCA- synergy 3.5x12    Wears contact lenses     Wears prescription eyeglasses        Past Surgical History:   Procedure Laterality Date    ARTHROPLASTY OF TOE Right 7/14/2022    Procedure: ARTHROPLASTY, TOE;  Surgeon: Augusto Simeon DPM;  Location: [facility] OR;  Service: Podiatry;  Laterality: Right;  Hallux    CARDIAC CATHETERIZATION      with stent placed x 1    KNEE ARTHROSCOPY      TONSILLECTOMY         Family History   Problem Relation Age of Onset    Cancer Mother     Angina Mother     Heart disease Mother     Hypertension Mother     Kidney disease Father     Thyroid disease Sister     Bell's palsy Sister     Thyroid disease Sister     Depression Sister     Depression Sister        Social History     Socioeconomic History    Marital status:    Tobacco Use    Smoking status: Never Smoker    Smokeless tobacco: Never Used   Substance and Sexual Activity    Alcohol use: No    Drug use: No       Current Outpatient Medications   Medication Sig Dispense Refill    acetaminophen (TYLENOL) 325 MG tablet Take 1 tablet (325 mg total) by mouth every 6 (six) hours as needed for Pain.      amLODIPine (NORVASC) 10 MG tablet TAKE 1 TABLET BY MOUTH DAILY 30 tablet 5    amoxicillin-clavulanate 875-125mg (AUGMENTIN) 875-125 mg per tablet Take 1 tablet by mouth every 12 (twelve) hours. 14 tablet 0    APPLE CIDER VINEGAR ORAL Take by  "mouth.      ascorbic acid (VITAMIN C) 500 MG tablet Take 500 mg by mouth once daily.      aspirin 81 MG Chew Take 81 mg by mouth once daily.      b complex vitamins capsule Take 1 capsule by mouth once daily.      BD ULTRA-FINE AMAN PEN NEEDLE 32 gauge x 5/32" Ndle Inject 1 Syringe into the skin once daily. 100 each 3    carvediloL (COREG) 6.25 MG tablet TAKE 1 TABLET(6.25 MG) BY MOUTH TWICE DAILY 180 tablet 4    clopidogreL (PLAVIX) 75 mg tablet TAKE 1 TABLET BY MOUTH EVERY DAY 90 tablet 4    cyanocobalamin, vitamin B-12, 1,000 mcg/15 mL Liqd Take by mouth.      FARXIGA 5 mg Tab tablet Take 5 mg by mouth every evening.      fish,bora,flax oils-om3,6,9no1 1,200 mg Cap Take by mouth.      FREESTYLE PASCUAL 14 DAY SENSOR Kit APPLY NEW SENSOR EVERY 14 DAYS AS DIRECTED      FREESTYLE LITE STRIPS Strp TEST 2 TO 3 TIMES D  2    gabapentin (NEURONTIN) 300 MG capsule Take 1 capsule (300 mg total) by mouth 3 (three) times daily. 90 capsule 5    HYDROcodone-acetaminophen (NORCO) 5-325 mg per tablet Take 1 tablet by mouth every 6 (six) hours as needed for Pain. 28 tablet 0    LANTUS SOLOSTAR U-100 INSULIN glargine 100 units/mL (3mL) SubQ pen ADMINISTER 45 UNITS UNDER THE SKIN EVERY DAY 15 mL 5    metFORMIN (GLUCOPHAGE) 1000 MG tablet Take 1 tablet (1,000 mg total) by mouth 2 (two) times daily with meals. 180 tablet 0    multivit-min/iron/folic acid/K (ADULTS MULTIVITAMIN ORAL) Take by mouth.      mupirocin (BACTROBAN) 2 % ointment Apply topically 3 (three) times daily. 30 g 2    olmesartan-hydrochlorothiazide (BENICAR HCT) 40-25 mg per tablet TAKE 1 TABLET BY MOUTH DAILY (Patient taking differently: Take 1 tablet by mouth every evening.) 90 tablet 1    ondansetron (ZOFRAN) 4 MG tablet Take 1 tablet (4 mg total) by mouth every 6 (six) hours. 12 tablet 0    ONETOUCH DELICA PLUS LANCET 33 gauge Misc TEST ONCE D  0    potassium chloride SA (K-DUR,KLOR-CON) 10 MEQ tablet Take by mouth.      pravastatin " (PRAVACHOL) 40 MG tablet Take 1 tablet (40 mg total) by mouth every evening. 90 tablet 3    RYBELSUS 7 mg tablet Take 7 mg by mouth every morning.      UNABLE TO FIND medication name: Tart Cherry Extract      vitamin E 400 UNIT capsule Take 400 Units by mouth once daily.       Current Facility-Administered Medications   Medication Dose Route Frequency Provider Last Rate Last Admin    acetaminophen tablet 650 mg  650 mg Oral Once PRN Robert Landrum MD        ondansetron disintegrating tablet 4 mg  4 mg Oral Once PRN Robert Landrum MD         Facility-Administered Medications Ordered in Other Visits   Medication Dose Route Frequency Provider Last Rate Last Admin    lactated ringers infusion   Intravenous Continuous Reyes Liz MD 10 mL/hr at 07/14/22 1239 Restarted at 07/14/22 1424    LIDOcaine (PF) 10 mg/ml (1%) injection 10 mg  1 mL Intradermal Once Reyes Liz MD           Review of patient's allergies indicates:  No Known Allergies      Review of Systems   Constitutional: Negative for chills and fever.   Cardiovascular: Negative for claudication and leg swelling.   Skin: Positive for color change and nail changes. Negative for poor wound healing.   Musculoskeletal: Positive for arthritis. Negative for joint pain and joint swelling.   Gastrointestinal: Negative for nausea and vomiting.   Neurological: Positive for numbness. Negative for paresthesias.   Psychiatric/Behavioral: Negative for altered mental status.           Objective:      Physical Exam  Constitutional:       General: He is not in acute distress.     Appearance: He is well-developed. He is not diaphoretic.   Cardiovascular:      Pulses:           Dorsalis pedis pulses are 2+ on the right side and 2+ on the left side.        Posterior tibial pulses are 2+ on the right side and 2+ on the left side.      Comments: CFT < 3 seconds bilateral.  Pedal hair growth present bilateral.   No varicosities noted bilateral.  Mild to  moderate edema to the IP joint of the Rt. Hallux.  Toes are warm to touch bilateral.    Musculoskeletal:         General: Deformity present. No tenderness.      Right lower leg: No edema.      Left lower leg: No edema.      Comments: Muscle strength 5/5 in all muscle groups bilateral.  No tenderness nor crepitation with ROM of foot/ankle joints bilateral.  Arthritic changes changes noted to the dorsal and medial aspect of the Rt. 1st mtp joint.   More rectus alignment of the Rt. Hallux with increased motion of the IP joint S/P arthroplasty.     Skin:     General: Skin is warm and dry.      Capillary Refill: Capillary refill takes less than 2 seconds.      Coloration: Skin is not pale.      Findings: No abrasion, bruising, burn, ecchymosis, erythema, lesion, petechiae, rash or wound.      Nails: There is no clubbing.      Comments:     Incision to the dorsum of the Rt. Hallux IP joint is well approximated with all suture intact.  No evidence of dehiscence, necrosis, ischemia, or infection the length of the incision.     Neurological:      Mental Status: He is alert and oriented to person, place, and time.      Sensory: Sensory deficit present.      Motor: No weakness or atrophy.      Comments: Protective sensation per [place]ash monofilament absent bilateral.    Light touch intact bilateral.               Assessment:       Encounter Diagnoses   Name Primary?    Postoperative state Yes    Diabetic polyneuropathy associated with type 2 diabetes mellitus          Plan:       Yobany was seen today for post-op evaluation.    Diagnoses and all orders for this visit:    Postoperative state    Diabetic polyneuropathy associated with type 2 diabetes mellitus      I counseled the patient on his conditions, their implications and medical management.    Overall, satisfactory postoperative results noted.  Incision remains well maintained with suture, with no evidence of postoperative infection.    The incision site was  painted with betadine, and a football dressing was applied    Advised to keep the dressing CDI x 1 week.    Advised to rest and elevate the affected extremity.    Instructed to minimize weight bearing to facilitate continued healing.    May ambulate around his home in a slipper to tolerance.    RTC in 1 week for suture removal.      Augusto Simeon DPM

## 2022-08-02 ENCOUNTER — OFFICE VISIT (OUTPATIENT)
Dept: PODIATRY | Facility: CLINIC | Age: 67
End: 2022-08-02
Payer: COMMERCIAL

## 2022-08-02 VITALS — BODY MASS INDEX: 25.66 KG/M2 | HEIGHT: 74 IN | WEIGHT: 199.94 LBS

## 2022-08-02 DIAGNOSIS — Z98.890 POSTOPERATIVE STATE: Primary | ICD-10-CM

## 2022-08-02 DIAGNOSIS — E11.42 DIABETIC POLYNEUROPATHY ASSOCIATED WITH TYPE 2 DIABETES MELLITUS: ICD-10-CM

## 2022-08-02 PROCEDURE — 3008F BODY MASS INDEX DOCD: CPT | Mod: CPTII,S$GLB,, | Performed by: PODIATRIST

## 2022-08-02 PROCEDURE — 3066F PR DOCUMENTATION OF TREATMENT FOR NEPHROPATHY: ICD-10-PCS | Mod: CPTII,S$GLB,, | Performed by: PODIATRIST

## 2022-08-02 PROCEDURE — 99024 POSTOP FOLLOW-UP VISIT: CPT | Mod: S$GLB,,, | Performed by: PODIATRIST

## 2022-08-02 PROCEDURE — 99999 PR PBB SHADOW E&M-EST. PATIENT-LVL II: ICD-10-PCS | Mod: PBBFAC,,, | Performed by: PODIATRIST

## 2022-08-02 PROCEDURE — 3066F NEPHROPATHY DOC TX: CPT | Mod: CPTII,S$GLB,, | Performed by: PODIATRIST

## 2022-08-02 PROCEDURE — 99999 PR PBB SHADOW E&M-EST. PATIENT-LVL II: CPT | Mod: PBBFAC,,, | Performed by: PODIATRIST

## 2022-08-02 PROCEDURE — 99024 PR POST-OP FOLLOW-UP VISIT: ICD-10-PCS | Mod: S$GLB,,, | Performed by: PODIATRIST

## 2022-08-02 PROCEDURE — 3008F PR BODY MASS INDEX (BMI) DOCUMENTED: ICD-10-PCS | Mod: CPTII,S$GLB,, | Performed by: PODIATRIST

## 2022-08-03 NOTE — PROGRESS NOTES
Subjective:      Patient ID: Yobany Richardson is a 66 y.o. male.    Chief Complaint: Post-op Evaluation    Patient presents to clinic 3 weeks S/P arthroplasty of the Rt. Hallux IP joint.   Denies experiencing pain from the surgical extremity with today's exam.  Has kept the football dressing clean, dry, and intact x 1 week.   Has been ambulating to tolerance while in the postoperative shoe.  Notes this has resulted in hip pain and is hoping to transition back into casual shoe gear following today's exam.  Denies experiencing N/V/F/C/D.  Denies any additional pedal complaints.        Hemoglobin A1C   Date Value Ref Range Status   07/16/2021 7.3 (H) 4.0 - 5.6 % Final     Comment:     ADA Screening Guidelines:  5.7-6.4%  Consistent with prediabetes  >or=6.5%  Consistent with diabetes    High levels of fetal hemoglobin interfere with the HbA1C  assay. Heterozygous hemoglobin variants (HbS, HgC, etc)do  not significantly interfere with this assay.   However, presence of multiple variants may affect accuracy.     06/21/2021 6.9 (H) <5.7 % of total Hgb Final     Comment:     For someone without known diabetes, a hemoglobin A1c  value of 6.5% or greater indicates that they may have   diabetes and this should be confirmed with a follow-up   test.     For someone with known diabetes, a value <7% indicates   that their diabetes is well controlled and a value   greater than or equal to 7% indicates suboptimal   control. A1c targets should be individualized based on   duration of diabetes, age, comorbid conditions, and   other considerations.     Currently, no consensus exists regarding use of  hemoglobin A1c for diagnosis of diabetes for children.         04/20/2021 7.7 (A) 4.0 - 6.0 % Final           Past Medical History:   Diagnosis Date    Abnormal thallium stress test     Arrhythmia     Atrial flutter, paroxysmal 2/11/2016    CAD (coronary artery disease) 3/9/2016    5/2016 [facility] Left main:NL LAD: 35% proximal LAD. two small  diagonals with minimal disease.  Circumflex/Large branching first obtuse marginal: with minimal disease.   RCA: The vessel was large sized (dominant) with a 60% proximal lesion. 100 mcgs of intracoronary Nitroglycerin were given with no change in the lesion. Thus FFR was performed. FFR was 0.79  2/2016 cor CTA ~~ 50% LAD    Coronary artery disease     Diabetes mellitus     Diabetes mellitus, type 2     Disorder of kidney and ureter     Hypertension     Neuropathy     Paroxysmal atrial flutter     PE (pulmonary embolism)     Pulmonary embolism 2/11/2016 1/2016     Rheumatoid arthritis     Shortness of breath     Stented coronary artery 6/15/2016    5/2016 RCA- synergy 3.5x12    Wears contact lenses     Wears prescription eyeglasses        Past Surgical History:   Procedure Laterality Date    ARTHROPLASTY OF TOE Right 7/14/2022    Procedure: ARTHROPLASTY, TOE;  Surgeon: Augusto Simeon DPM;  Location: [facility];  Service: Podiatry;  Laterality: Right;  Hallux    CARDIAC CATHETERIZATION      with stent placed x 1    KNEE ARTHROSCOPY      TONSILLECTOMY         Family History   Problem Relation Age of Onset    Cancer Mother     Angina Mother     Heart disease Mother     Hypertension Mother     Kidney disease Father     Thyroid disease Sister     Bell's palsy Sister     Thyroid disease Sister     Depression Sister     Depression Sister        Social History     Socioeconomic History    Marital status:    Tobacco Use    Smoking status: Never Smoker    Smokeless tobacco: Never Used   Substance and Sexual Activity    Alcohol use: No    Drug use: No       Current Outpatient Medications   Medication Sig Dispense Refill    acetaminophen (TYLENOL) 325 MG tablet Take 1 tablet (325 mg total) by mouth every 6 (six) hours as needed for Pain.      amLODIPine (NORVASC) 10 MG tablet TAKE 1 TABLET BY MOUTH DAILY 30 tablet 5    amoxicillin-clavulanate 875-125mg (AUGMENTIN) 875-125 mg per tablet  "Take 1 tablet by mouth every 12 (twelve) hours. 14 tablet 0    APPLE CIDER VINEGAR ORAL Take by mouth.      ascorbic acid (VITAMIN C) 500 MG tablet Take 500 mg by mouth once daily.      aspirin 81 MG Chew Take 81 mg by mouth once daily.      b complex vitamins capsule Take 1 capsule by mouth once daily.      BD ULTRA-FINE AMAN PEN NEEDLE 32 gauge x 5/32" Ndle Inject 1 Syringe into the skin once daily. 100 each 3    carvediloL (COREG) 6.25 MG tablet TAKE 1 TABLET(6.25 MG) BY MOUTH TWICE DAILY 180 tablet 4    clopidogreL (PLAVIX) 75 mg tablet TAKE 1 TABLET BY MOUTH EVERY DAY 90 tablet 4    cyanocobalamin, vitamin B-12, 1,000 mcg/15 mL Liqd Take by mouth.      FARXIGA 5 mg Tab tablet Take 5 mg by mouth every evening.      fish,bora,flax oils-om3,6,9no1 1,200 mg Cap Take by mouth.      FREESTYLE PASCUAL 14 DAY SENSOR Kit APPLY NEW SENSOR EVERY 14 DAYS AS DIRECTED      FREESTYLE LITE STRIPS Strp TEST 2 TO 3 TIMES D  2    gabapentin (NEURONTIN) 300 MG capsule Take 1 capsule (300 mg total) by mouth 3 (three) times daily. 90 capsule 5    HYDROcodone-acetaminophen (NORCO) 5-325 mg per tablet Take 1 tablet by mouth every 6 (six) hours as needed for Pain. 28 tablet 0    LANTUS SOLOSTAR U-100 INSULIN glargine 100 units/mL (3mL) SubQ pen ADMINISTER 45 UNITS UNDER THE SKIN EVERY DAY 15 mL 5    metFORMIN (GLUCOPHAGE) 1000 MG tablet Take 1 tablet (1,000 mg total) by mouth 2 (two) times daily with meals. 180 tablet 0    multivit-min/iron/folic acid/K (ADULTS MULTIVITAMIN ORAL) Take by mouth.      mupirocin (BACTROBAN) 2 % ointment Apply topically 3 (three) times daily. 30 g 2    olmesartan-hydrochlorothiazide (BENICAR HCT) 40-25 mg per tablet TAKE 1 TABLET BY MOUTH DAILY (Patient taking differently: Take 1 tablet by mouth every evening.) 90 tablet 1    ondansetron (ZOFRAN) 4 MG tablet Take 1 tablet (4 mg total) by mouth every 6 (six) hours. 12 tablet 0    ONETOUCH DELICA PLUS LANCET 33 gauge Misc TEST ONCE D  0 "    potassium chloride SA (K-DUR,KLOR-CON) 10 MEQ tablet Take by mouth.      pravastatin (PRAVACHOL) 40 MG tablet Take 1 tablet (40 mg total) by mouth every evening. 90 tablet 3    RYBELSUS 7 mg tablet Take 7 mg by mouth every morning.      UNABLE TO FIND medication name: Tart Cherry Extract      vitamin E 400 UNIT capsule Take 400 Units by mouth once daily.       Current Facility-Administered Medications   Medication Dose Route Frequency Provider Last Rate Last Admin    acetaminophen tablet 650 mg  650 mg Oral Once PRN Robert Landrum MD        ondansetron disintegrating tablet 4 mg  4 mg Oral Once PRN Robert Landrum MD         Facility-Administered Medications Ordered in Other Visits   Medication Dose Route Frequency Provider Last Rate Last Admin    lactated ringers infusion   Intravenous Continuous Reyes Liz MD 10 mL/hr at 07/14/22 1239 Restarted at 07/14/22 1424    LIDOcaine (PF) 10 mg/ml (1%) injection 10 mg  1 mL Intradermal Once Reyes Liz MD           Review of patient's allergies indicates:  No Known Allergies      Review of Systems   Constitutional: Negative for chills and fever.   Cardiovascular: Negative for claudication and leg swelling.   Skin: Positive for color change and nail changes. Negative for poor wound healing.   Musculoskeletal: Positive for arthritis. Negative for joint pain and joint swelling.   Gastrointestinal: Negative for nausea and vomiting.   Neurological: Positive for numbness. Negative for paresthesias.   Psychiatric/Behavioral: Negative for altered mental status.           Objective:      Physical Exam  Constitutional:       General: He is not in acute distress.     Appearance: He is well-developed. He is not diaphoretic.   Cardiovascular:      Pulses:           Dorsalis pedis pulses are 2+ on the right side and 2+ on the left side.        Posterior tibial pulses are 2+ on the right side and 2+ on the left side.      Comments: CFT < 3 seconds  bilateral.  Pedal hair growth present bilateral.   No varicosities noted bilateral.  Moderate edema to the IP joint of the Rt. Hallux.  Toes are warm to touch bilateral.    Musculoskeletal:         General: Deformity present. No tenderness.      Right lower leg: Edema present.      Left lower leg: No edema.      Comments: Muscle strength 5/5 in all muscle groups bilateral.  No tenderness nor crepitation with ROM of foot/ankle joints bilateral.  Arthritic changes changes noted to the dorsal and medial aspect of the Rt. 1st mtp joint.   More rectus alignment of the Rt. Hallux with increased motion of the IP joint S/P arthroplasty.     Skin:     General: Skin is warm and dry.      Capillary Refill: Capillary refill takes less than 2 seconds.      Coloration: Skin is not pale.      Findings: No abrasion, bruising, burn, ecchymosis, erythema, lesion, petechiae, rash or wound.      Nails: There is no clubbing.      Comments: Incision to the dorsum of the Rt. Hallux IP joint is well approximated with all suture intact.  No evidence of dehiscence, necrosis, ischemia, or infection the length of the incision.     Neurological:      Mental Status: He is alert and oriented to person, place, and time.      Sensory: Sensory deficit present.      Motor: No weakness or atrophy.      Comments: Protective sensation per [place]-Yash monofilament absent bilateral.    Light touch intact bilateral.               Assessment:       Encounter Diagnoses   Name Primary?    Postoperative state Yes    Diabetic polyneuropathy associated with type 2 diabetes mellitus          Plan:       Yobany was seen today for post-op evaluation.    Diagnoses and all orders for this visit:    Postoperative state    Diabetic polyneuropathy associated with type 2 diabetes mellitus      I counseled the patient on his conditions, their implications and medical management.    Overall, satisfactory postoperative results noted.  Incision remains well maintained  with suture, with no evidence of postoperative infection.  There is an increase in postoperative edema, however, he has been more ambulatory for his job since the last evaluation.  Explained edema can be present up to 12 months postop.      With the patient's verbal consent, a sterile suture removal kit was used to remove stitches without incident.  Patient tolerated this quite well.      No further bandaging required at this point.      May transition back into casual shoe gear with activity to tolerance.  Advised to make sure the shoe is not laced too tightly, on account of current peripheral edema.    Patient to resume his normal showering routine but discouraged from soaking/scrubbing x 2 weeks.    Recommend elevation of the limb after work to minimize edema.      RTC in 1 week for a final check.    Augusto Simeon DPM

## 2022-08-08 DIAGNOSIS — Z79.4 TYPE 2 DIABETES MELLITUS WITH DIABETIC POLYNEUROPATHY, WITH LONG-TERM CURRENT USE OF INSULIN: ICD-10-CM

## 2022-08-08 DIAGNOSIS — E11.42 TYPE 2 DIABETES MELLITUS WITH DIABETIC POLYNEUROPATHY, WITH LONG-TERM CURRENT USE OF INSULIN: ICD-10-CM

## 2022-08-08 RX ORDER — GABAPENTIN 300 MG/1
CAPSULE ORAL
Qty: 90 CAPSULE | Refills: 0 | Status: SHIPPED | OUTPATIENT
Start: 2022-08-08 | End: 2022-08-24

## 2022-08-08 NOTE — TELEPHONE ENCOUNTER
Care Due:                  Date            Visit Type   Department     Provider  --------------------------------------------------------------------------------                                 -                              PRIMARY      [facility] PRIMARY  Last Visit: 06-      CARE ([facility])   CARE           Alirio Walters                              [facility] PRIMARY  Next Visit: 12-      CARE ([facility])   [facility]           Alirio Walters                                                            Last  Test          Frequency    Reason                     Performed    Due Date  --------------------------------------------------------------------------------    HBA1C.......  6 months...  LANTUS, metFORMIN........  07- 01-    Lipid Panel.  12 months..  pravastatin..............  07- 07-    [facility] Embedded Care Gaps. Reference number: [id number]. 8/08/2022   11:21:38 AM CDT

## 2022-08-10 ENCOUNTER — OFFICE VISIT (OUTPATIENT)
Dept: PODIATRY | Facility: CLINIC | Age: 67
End: 2022-08-10
Payer: COMMERCIAL

## 2022-08-10 VITALS — WEIGHT: 199.94 LBS | BODY MASS INDEX: 25.66 KG/M2 | HEIGHT: 74 IN

## 2022-08-10 DIAGNOSIS — Z98.890 POSTOPERATIVE STATE: Primary | ICD-10-CM

## 2022-08-10 DIAGNOSIS — R60.9 POSTOPERATIVE EDEMA: ICD-10-CM

## 2022-08-10 DIAGNOSIS — E11.42 DIABETIC POLYNEUROPATHY ASSOCIATED WITH TYPE 2 DIABETES MELLITUS: ICD-10-CM

## 2022-08-10 PROCEDURE — 99999 PR PBB SHADOW E&M-EST. PATIENT-LVL II: CPT | Mod: PBBFAC,,, | Performed by: PODIATRIST

## 2022-08-10 PROCEDURE — 3066F PR DOCUMENTATION OF TREATMENT FOR NEPHROPATHY: ICD-10-PCS | Mod: CPTII,S$GLB,, | Performed by: PODIATRIST

## 2022-08-10 PROCEDURE — 99999 PR PBB SHADOW E&M-EST. PATIENT-LVL II: ICD-10-PCS | Mod: PBBFAC,,, | Performed by: PODIATRIST

## 2022-08-10 PROCEDURE — 3008F BODY MASS INDEX DOCD: CPT | Mod: CPTII,S$GLB,, | Performed by: PODIATRIST

## 2022-08-10 PROCEDURE — 99024 PR POST-OP FOLLOW-UP VISIT: ICD-10-PCS | Mod: S$GLB,,, | Performed by: PODIATRIST

## 2022-08-10 PROCEDURE — 3066F NEPHROPATHY DOC TX: CPT | Mod: CPTII,S$GLB,, | Performed by: PODIATRIST

## 2022-08-10 PROCEDURE — 99024 POSTOP FOLLOW-UP VISIT: CPT | Mod: S$GLB,,, | Performed by: PODIATRIST

## 2022-08-10 PROCEDURE — 3008F PR BODY MASS INDEX (BMI) DOCUMENTED: ICD-10-PCS | Mod: CPTII,S$GLB,, | Performed by: PODIATRIST

## 2022-08-10 NOTE — PROGRESS NOTES
Subjective:      Patient ID: Yobany Richardson is a 66 y.o. male.    Chief Complaint: Foot Swelling    Patient presents to clinic 4 weeks S/P arthroplasty of the Rt. Hallux IP joint.   He has noted a twinge of pain, but states this occurs infrequently.  Relates ambulation to tolerance while in his tennis shoe.  Notes some concern as the digit has been staying swollen in conjunction with some swelling of the ankle.  Attempts to elevate periodically to mitigate this issue.   Has noted a small blood blister that developed to the plantar medial tip of the Rt. 2nd toe, but is unsure if this is on account of trauma or due to having more mobility of the IP joint is causing pressure to the adjacent toe.  States the blood appears dry and is non painful. Denies any additional pedal complaints.        Hemoglobin A1C   Date Value Ref Range Status   07/16/2021 7.3 (H) 4.0 - 5.6 % Final     Comment:     ADA Screening Guidelines:  5.7-6.4%  Consistent with prediabetes  >or=6.5%  Consistent with diabetes    High levels of fetal hemoglobin interfere with the HbA1C  assay. Heterozygous hemoglobin variants (HbS, HgC, etc)do  not significantly interfere with this assay.   However, presence of multiple variants may affect accuracy.     06/21/2021 6.9 (H) <5.7 % of total Hgb Final     Comment:     For someone without known diabetes, a hemoglobin A1c  value of 6.5% or greater indicates that they may have   diabetes and this should be confirmed with a follow-up   test.     For someone with known diabetes, a value <7% indicates   that their diabetes is well controlled and a value   greater than or equal to 7% indicates suboptimal   control. A1c targets should be individualized based on   duration of diabetes, age, comorbid conditions, and   other considerations.     Currently, no consensus exists regarding use of  hemoglobin A1c for diagnosis of diabetes for children.         04/20/2021 7.7 (A) 4.0 - 6.0 % Final           Past Medical History:    Diagnosis Date    Abnormal thallium stress test     Arrhythmia     Atrial flutter, paroxysmal 2/11/2016    CAD (coronary artery disease) 3/9/2016    5/2016 [facility] Left main:NL LAD: 35% proximal LAD. two small diagonals with minimal disease.  Circumflex/Large branching first obtuse marginal: with minimal disease.   RCA: The vessel was large sized (dominant) with a 60% proximal lesion. 100 mcgs of intracoronary Nitroglycerin were given with no change in the lesion. Thus FFR was performed. FFR was 0.79  2/2016 cor CTA ~~ 50% LAD    Coronary artery disease     Diabetes mellitus     Diabetes mellitus, type 2     Disorder of kidney and ureter     Hypertension     Neuropathy     Paroxysmal atrial flutter     PE (pulmonary embolism)     Pulmonary embolism 2/11/2016 1/2016     Rheumatoid arthritis     Shortness of breath     Stented coronary artery 6/15/2016    5/2016 RCA- synergy 3.5x12    Wears contact lenses     Wears prescription eyeglasses        Past Surgical History:   Procedure Laterality Date    ARTHROPLASTY OF TOE Right 7/14/2022    Procedure: ARTHROPLASTY, TOE;  Surgeon: Augusto Simeon DPM;  Location: [facility] OR;  Service: Podiatry;  Laterality: Right;  Hallux    CARDIAC CATHETERIZATION      with stent placed x 1    KNEE ARTHROSCOPY      TONSILLECTOMY         Family History   Problem Relation Age of Onset    Cancer Mother     Angina Mother     Heart disease Mother     Hypertension Mother     Kidney disease Father     Thyroid disease Sister     Bell's palsy Sister     Thyroid disease Sister     Depression Sister     Depression Sister        Social History     Socioeconomic History    Marital status:    Tobacco Use    Smoking status: Never Smoker    Smokeless tobacco: Never Used   Substance and Sexual Activity    Alcohol use: No    Drug use: No       Current Outpatient Medications   Medication Sig Dispense Refill    acetaminophen (TYLENOL) 325 MG tablet Take 1 tablet (325  "mg total) by mouth every 6 (six) hours as needed for Pain.      amLODIPine (NORVASC) 10 MG tablet TAKE 1 TABLET BY MOUTH DAILY 30 tablet 5    amoxicillin-clavulanate 875-125mg (AUGMENTIN) 875-125 mg per tablet Take 1 tablet by mouth every 12 (twelve) hours. 14 tablet 0    APPLE CIDER VINEGAR ORAL Take by mouth.      ascorbic acid (VITAMIN C) 500 MG tablet Take 500 mg by mouth once daily.      aspirin 81 MG Chew Take 81 mg by mouth once daily.      b complex vitamins capsule Take 1 capsule by mouth once daily.      BD ULTRA-FINE AMAN PEN NEEDLE 32 gauge x 5/32" Ndle Inject 1 Syringe into the skin once daily. 100 each 3    carvediloL (COREG) 6.25 MG tablet TAKE 1 TABLET(6.25 MG) BY MOUTH TWICE DAILY 180 tablet 4    clopidogreL (PLAVIX) 75 mg tablet TAKE 1 TABLET BY MOUTH EVERY DAY 90 tablet 4    cyanocobalamin, vitamin B-12, 1,000 mcg/15 mL Liqd Take by mouth.      FARXIGA 5 mg Tab tablet Take 5 mg by mouth every evening.      fish,bora,flax oils-om3,6,9no1 1,200 mg Cap Take by mouth.      FREESTYLE PASCUAL 14 DAY SENSOR Kit APPLY NEW SENSOR EVERY 14 DAYS AS DIRECTED      FREESTYLE LITE STRIPS Strp TEST 2 TO 3 TIMES D  2    gabapentin (NEURONTIN) 300 MG capsule TAKE 1 CAPSULE(300 MG) BY MOUTH THREE TIMES DAILY 90 capsule 0    HYDROcodone-acetaminophen (NORCO) 5-325 mg per tablet Take 1 tablet by mouth every 6 (six) hours as needed for Pain. 28 tablet 0    LANTUS SOLOSTAR U-100 INSULIN glargine 100 units/mL (3mL) SubQ pen ADMINISTER 45 UNITS UNDER THE SKIN EVERY DAY 15 mL 5    metFORMIN (GLUCOPHAGE) 1000 MG tablet Take 1 tablet (1,000 mg total) by mouth 2 (two) times daily with meals. 180 tablet 0    multivit-min/iron/folic acid/K (ADULTS MULTIVITAMIN ORAL) Take by mouth.      mupirocin (BACTROBAN) 2 % ointment Apply topically 3 (three) times daily. 30 g 2    olmesartan-hydrochlorothiazide (BENICAR HCT) 40-25 mg per tablet TAKE 1 TABLET BY MOUTH DAILY (Patient taking differently: Take 1 tablet by " mouth every evening.) 90 tablet 1    ondansetron (ZOFRAN) 4 MG tablet Take 1 tablet (4 mg total) by mouth every 6 (six) hours. 12 tablet 0    ONETOUCH DELICA PLUS LANCET 33 gauge Misc TEST ONCE D  0    potassium chloride SA (K-DUR,KLOR-CON) 10 MEQ tablet Take by mouth.      pravastatin (PRAVACHOL) 40 MG tablet Take 1 tablet (40 mg total) by mouth every evening. 90 tablet 3    RYBELSUS 7 mg tablet Take 7 mg by mouth every morning.      UNABLE TO FIND medication name: Tart Cherry Extract      vitamin E 400 UNIT capsule Take 400 Units by mouth once daily.       Current Facility-Administered Medications   Medication Dose Route Frequency Provider Last Rate Last Admin    acetaminophen tablet 650 mg  650 mg Oral Once PRN Robert Landrum MD        ondansetron disintegrating tablet 4 mg  4 mg Oral Once PRN Robert Landrum MD         Facility-Administered Medications Ordered in Other Visits   Medication Dose Route Frequency Provider Last Rate Last Admin    lactated ringers infusion   Intravenous Continuous Reyes Liz MD 10 mL/hr at 07/14/22 1239 Restarted at 07/14/22 1424    LIDOcaine (PF) 10 mg/ml (1%) injection 10 mg  1 mL Intradermal Once Reyes Liz MD           Review of patient's allergies indicates:  No Known Allergies      Review of Systems   Constitutional: Negative for chills and fever.   Cardiovascular: Negative for claudication and leg swelling.   Skin: Positive for color change and nail changes. Negative for poor wound healing.   Musculoskeletal: Positive for arthritis. Negative for joint pain and joint swelling.   Gastrointestinal: Negative for nausea and vomiting.   Neurological: Positive for numbness. Negative for paresthesias.   Psychiatric/Behavioral: Negative for altered mental status.           Objective:      Physical Exam  Constitutional:       General: He is not in acute distress.     Appearance: He is well-developed. He is not diaphoretic.   Cardiovascular:       Pulses:           Dorsalis pedis pulses are 2+ on the right side and 2+ on the left side.        Posterior tibial pulses are 2+ on the right side and 2+ on the left side.      Comments: CFT < 3 seconds bilateral.  Pedal hair growth present bilateral.   No varicosities noted bilateral.  Moderate edema to the IP joint of the Rt. Hallux.  Mild nonpitting edema of the Rt. Ankle.  Toes are warm to touch bilateral.    Musculoskeletal:         General: Deformity present. No tenderness.      Right lower leg: Edema present.      Left lower leg: No edema.      Comments: Muscle strength 5/5 in all muscle groups bilateral.  No tenderness nor crepitation with ROM of foot/ankle joints bilateral.  Arthritic changes changes noted to the dorsal and medial aspect of the Rt. 1st mtp joint.   More rectus alignment of the Rt. Hallux with increased motion of the IP joint S/P arthroplasty.     Skin:     General: Skin is warm and dry.      Capillary Refill: Capillary refill takes less than 2 seconds.      Coloration: Skin is not pale.      Findings: No abrasion, bruising, burn, ecchymosis, erythema, lesion, petechiae, rash or wound.      Nails: There is no clubbing.      Comments: Incision to the dorsum of the Rt. Hallux IP joint remains well healed.     Dry, blood blister noted to the plantar medial aspect of the Rt. 2nd toe.  No localized sign of infection noted.     Neurological:      Mental Status: He is alert and oriented to person, place, and time.      Sensory: Sensory deficit present.      Motor: No weakness or atrophy.      Comments: Protective sensation per [place]-Yash monofilament absent bilateral.    Light touch intact bilateral.               Assessment:       Encounter Diagnoses   Name Primary?    Postoperative state Yes    Postoperative edema     Diabetic polyneuropathy associated with type 2 diabetes mellitus          Plan:       Yobany was seen today for foot swelling.    Diagnoses and all orders for this  visit:    Postoperative state    Postoperative edema    Diabetic polyneuropathy associated with type 2 diabetes mellitus      I counseled the patient on his conditions, their implications and medical management.    Overall, satisfactory postoperative results noted.  Incision remains well healed.  Explained that postoperative edema is still within the realm of normal and directly correlates with his increase in weight bearing activity.       May continue with use of casual shoe gear with activity to tolerance.  Advised to make sure the shoe is not laced too tightly, on account of current peripheral edema.    Patient to resume his normal showering routine but discouraged from soaking/scrubbing x 1 week.    Recommend elevation of the limb after work to minimize edema.      Regarding the blood blister of the Rt. 2nd toe, this is dry and stable.  No further intervention is warranted.  May have to consider a flexor tenotomy of said digit should he begin to notice excessive shearing following the arthroplasty of the adjacent digit.    RTC in 2 weeks for final evaluation.    Augusto Simeon DPM

## 2022-08-24 ENCOUNTER — OFFICE VISIT (OUTPATIENT)
Dept: PODIATRY | Facility: CLINIC | Age: 67
End: 2022-08-24
Payer: COMMERCIAL

## 2022-08-24 DIAGNOSIS — R60.0 PERIPHERAL EDEMA: ICD-10-CM

## 2022-08-24 DIAGNOSIS — Z98.890 POSTOPERATIVE STATE: Primary | ICD-10-CM

## 2022-08-24 DIAGNOSIS — E11.42 DIABETIC POLYNEUROPATHY ASSOCIATED WITH TYPE 2 DIABETES MELLITUS: ICD-10-CM

## 2022-08-24 PROCEDURE — 99024 PR POST-OP FOLLOW-UP VISIT: ICD-10-PCS | Mod: S$GLB,,, | Performed by: PODIATRIST

## 2022-08-24 PROCEDURE — 99024 POSTOP FOLLOW-UP VISIT: CPT | Mod: S$GLB,,, | Performed by: PODIATRIST

## 2022-08-24 PROCEDURE — 1159F PR MEDICATION LIST DOCUMENTED IN MEDICAL RECORD: ICD-10-PCS | Mod: CPTII,S$GLB,, | Performed by: PODIATRIST

## 2022-08-24 PROCEDURE — 99999 PR PBB SHADOW E&M-EST. PATIENT-LVL IV: ICD-10-PCS | Mod: PBBFAC,,, | Performed by: PODIATRIST

## 2022-08-24 PROCEDURE — 3066F PR DOCUMENTATION OF TREATMENT FOR NEPHROPATHY: ICD-10-PCS | Mod: CPTII,S$GLB,, | Performed by: PODIATRIST

## 2022-08-24 PROCEDURE — 1159F MED LIST DOCD IN RCRD: CPT | Mod: CPTII,S$GLB,, | Performed by: PODIATRIST

## 2022-08-24 PROCEDURE — 99999 PR PBB SHADOW E&M-EST. PATIENT-LVL IV: CPT | Mod: PBBFAC,,, | Performed by: PODIATRIST

## 2022-08-24 PROCEDURE — 3066F NEPHROPATHY DOC TX: CPT | Mod: CPTII,S$GLB,, | Performed by: PODIATRIST

## 2022-08-24 RX ORDER — GABAPENTIN 600 MG/1
600 TABLET ORAL 2 TIMES DAILY
Qty: 60 TABLET | Refills: 11 | Status: SHIPPED | OUTPATIENT
Start: 2022-08-24 | End: 2023-08-28

## 2022-08-24 NOTE — PROGRESS NOTES
Subjective:      Patient ID: Yobany Richardson is a 66 y.o. male.    Chief Complaint: Post-op Evaluation (Some swelling, no pain )    Patient presents to clinic 6 weeks S/P arthroplasty of the Rt. Hallux IP joint. Notes occasional pain from the surgical site, however, he attributes this to his neuropathy.  Notes the increased dosage of gabapentin was never sent to the pharmacy.  Requests said medication to be filled following today's.  Relates continued swelling of the great toe.  Denies this being a source of discomfort.  He continues wearing shoe gear that accommodates for the swelling.  Has not fully resumed his activity level, as he was waiting for the all clear. Denies any additional pedal complaints.        Hemoglobin A1C   Date Value Ref Range Status   07/16/2021 7.3 (H) 4.0 - 5.6 % Final     Comment:     ADA Screening Guidelines:  5.7-6.4%  Consistent with prediabetes  >or=6.5%  Consistent with diabetes    High levels of fetal hemoglobin interfere with the HbA1C  assay. Heterozygous hemoglobin variants (HbS, HgC, etc)do  not significantly interfere with this assay.   However, presence of multiple variants may affect accuracy.     06/21/2021 6.9 (H) <5.7 % of total Hgb Final     Comment:     For someone without known diabetes, a hemoglobin A1c  value of 6.5% or greater indicates that they may have   diabetes and this should be confirmed with a follow-up   test.     For someone with known diabetes, a value <7% indicates   that their diabetes is well controlled and a value   greater than or equal to 7% indicates suboptimal   control. A1c targets should be individualized based on   duration of diabetes, age, comorbid conditions, and   other considerations.     Currently, no consensus exists regarding use of  hemoglobin A1c for diagnosis of diabetes for children.         04/20/2021 7.7 (A) 4.0 - 6.0 % Final           Past Medical History:   Diagnosis Date    Abnormal thallium stress test     Arrhythmia     Atrial  flutter, paroxysmal 2/11/2016    CAD (coronary artery disease) 3/9/2016    5/2016 [facility] Left main:NL LAD: 35% proximal LAD. two small diagonals with minimal disease.  Circumflex/Large branching first obtuse marginal: with minimal disease.   RCA: The vessel was large sized (dominant) with a 60% proximal lesion. 100 mcgs of intracoronary Nitroglycerin were given with no change in the lesion. Thus FFR was performed. FFR was 0.79  2/2016 cor CTA ~~ 50% LAD    Coronary artery disease     Diabetes mellitus     Diabetes mellitus, type 2     Disorder of kidney and ureter     Hypertension     Neuropathy     Paroxysmal atrial flutter     PE (pulmonary embolism)     Pulmonary embolism 2/11/2016 1/2016     Rheumatoid arthritis     Shortness of breath     Stented coronary artery 6/15/2016    5/2016 RCA- synergy 3.5x12    Wears contact lenses     Wears prescription eyeglasses        Past Surgical History:   Procedure Laterality Date    ARTHROPLASTY OF TOE Right 7/14/2022    Procedure: ARTHROPLASTY, TOE;  Surgeon: Augusto Simeon DPM;  Location: [facility];  Service: Podiatry;  Laterality: Right;  Hallux    CARDIAC CATHETERIZATION      with stent placed x 1    KNEE ARTHROSCOPY      TONSILLECTOMY         Family History   Problem Relation Age of Onset    Cancer Mother     Angina Mother     Heart disease Mother     Hypertension Mother     Kidney disease Father     Thyroid disease Sister     Bell's palsy Sister     Thyroid disease Sister     Depression Sister     Depression Sister        Social History     Socioeconomic History    Marital status:    Tobacco Use    Smoking status: Never Smoker    Smokeless tobacco: Never Used   Substance and Sexual Activity    Alcohol use: No    Drug use: No       Current Outpatient Medications   Medication Sig Dispense Refill    acetaminophen (TYLENOL) 325 MG tablet Take 1 tablet (325 mg total) by mouth every 6 (six) hours as needed for Pain.      amLODIPine  "(NORVASC) 10 MG tablet TAKE 1 TABLET BY MOUTH DAILY 30 tablet 5    APPLE CIDER VINEGAR ORAL Take by mouth.      ascorbic acid (VITAMIN C) 500 MG tablet Take 500 mg by mouth once daily.      aspirin 81 MG Chew Take 81 mg by mouth once daily.      b complex vitamins capsule Take 1 capsule by mouth once daily.      BD ULTRA-FINE AMAN PEN NEEDLE 32 gauge x 5/32" Ndle Inject 1 Syringe into the skin once daily. 100 each 3    carvediloL (COREG) 6.25 MG tablet TAKE 1 TABLET(6.25 MG) BY MOUTH TWICE DAILY 180 tablet 4    clopidogreL (PLAVIX) 75 mg tablet TAKE 1 TABLET BY MOUTH EVERY DAY 90 tablet 4    cyanocobalamin, vitamin B-12, 1,000 mcg/15 mL Liqd Take by mouth.      FARXIGA 5 mg Tab tablet Take 5 mg by mouth every evening.      fish,bora,flax oils-om3,6,9no1 1,200 mg Cap Take by mouth.      FREESTYLE PASCUAL 14 DAY SENSOR Kit APPLY NEW SENSOR EVERY 14 DAYS AS DIRECTED      FREESTYLE LITE STRIPS Strp TEST 2 TO 3 TIMES D  2    LANTUS SOLOSTAR U-100 INSULIN glargine 100 units/mL (3mL) SubQ pen ADMINISTER 45 UNITS UNDER THE SKIN EVERY DAY 15 mL 5    metFORMIN (GLUCOPHAGE) 1000 MG tablet Take 1 tablet (1,000 mg total) by mouth 2 (two) times daily with meals. 180 tablet 0    multivit-min/iron/folic acid/K (ADULTS MULTIVITAMIN ORAL) Take by mouth.      mupirocin (BACTROBAN) 2 % ointment Apply topically 3 (three) times daily. 30 g 2    olmesartan-hydrochlorothiazide (BENICAR HCT) 40-25 mg per tablet TAKE 1 TABLET BY MOUTH DAILY (Patient taking differently: Take 1 tablet by mouth every evening.) 90 tablet 1    ondansetron (ZOFRAN) 4 MG tablet Take 1 tablet (4 mg total) by mouth every 6 (six) hours. 12 tablet 0    ONETOUCH DELICA PLUS LANCET 33 gauge Misc TEST ONCE D  0    potassium chloride SA (K-DUR,KLOR-CON) 10 MEQ tablet Take by mouth.      pravastatin (PRAVACHOL) 40 MG tablet Take 1 tablet (40 mg total) by mouth every evening. 90 tablet 3    RYBELSUS 7 mg tablet Take 7 mg by mouth every morning.      " UNABLE TO FIND medication name: Tart Cherry Extract      vitamin E 400 UNIT capsule Take 400 Units by mouth once daily.      amoxicillin-clavulanate 875-125mg (AUGMENTIN) 875-125 mg per tablet Take 1 tablet by mouth every 12 (twelve) hours. (Patient not taking: Reported on 8/24/2022) 14 tablet 0    gabapentin (NEURONTIN) 600 MG tablet Take 1 tablet (600 mg total) by mouth 2 (two) times daily. 60 tablet 11    HYDROcodone-acetaminophen (NORCO) 5-325 mg per tablet Take 1 tablet by mouth every 6 (six) hours as needed for Pain. (Patient not taking: Reported on 8/24/2022) 28 tablet 0     Current Facility-Administered Medications   Medication Dose Route Frequency Provider Last Rate Last Admin    acetaminophen tablet 650 mg  650 mg Oral Once PRN Robert Landrum MD        ondansetron disintegrating tablet 4 mg  4 mg Oral Once PRN Robert Landrum MD         Facility-Administered Medications Ordered in Other Visits   Medication Dose Route Frequency Provider Last Rate Last Admin    lactated ringers infusion   Intravenous Continuous Reyes Liz MD 10 mL/hr at 07/14/22 1239 Restarted at 07/14/22 1424    LIDOcaine (PF) 10 mg/ml (1%) injection 10 mg  1 mL Intradermal Once Reyes Liz MD           Review of patient's allergies indicates:  No Known Allergies      Review of Systems   Constitutional: Negative for chills and fever.   Cardiovascular: Negative for claudication and leg swelling.   Skin: Positive for color change and nail changes. Negative for poor wound healing.   Musculoskeletal: Positive for arthritis. Negative for joint pain and joint swelling.   Gastrointestinal: Negative for nausea and vomiting.   Neurological: Positive for numbness. Negative for paresthesias.   Psychiatric/Behavioral: Negative for altered mental status.           Objective:      Physical Exam  Constitutional:       General: He is not in acute distress.     Appearance: He is well-developed. He is not diaphoretic.    Cardiovascular:      Pulses:           Dorsalis pedis pulses are 2+ on the right side and 2+ on the left side.        Posterior tibial pulses are 2+ on the right side and 2+ on the left side.      Comments: CFT < 3 seconds bilateral.  Pedal hair growth present bilateral.   No varicosities noted bilateral.  Moderate edema to the IP joint of the Rt. Hallux.  Mild nonpitting edema of the Rt. Ankle.  Toes are warm to touch bilateral.    Musculoskeletal:         General: Deformity present. No tenderness.      Right lower leg: Edema present.      Left lower leg: No edema.      Comments: Muscle strength 5/5 in all muscle groups bilateral.  No tenderness nor crepitation with ROM of foot/ankle joints bilateral.  Arthritic changes changes noted to the dorsal and medial aspect of the Rt. 1st mtp joint.   More rectus alignment of the Rt. Hallux with increased motion of the IP joint S/P arthroplasty.     Skin:     General: Skin is warm and dry.      Capillary Refill: Capillary refill takes less than 2 seconds.      Coloration: Skin is not pale.      Findings: No abrasion, bruising, burn, ecchymosis, erythema, lesion, petechiae, rash or wound.      Nails: There is no clubbing.      Comments: Incision to the dorsum of the Rt. Hallux IP joint remains well healed.     Resolution of prior blood blister to the distal tip of the Rt. 2nd toe.    Neurological:      Mental Status: He is alert and oriented to person, place, and time.      Sensory: Sensory deficit present.      Motor: No weakness or atrophy.      Comments: Protective sensation per [place]ash monofilament absent bilateral.    Light touch intact bilateral.               Assessment:       Encounter Diagnoses   Name Primary?    Postoperative state Yes    Peripheral edema     Diabetic polyneuropathy associated with type 2 diabetes mellitus          Plan:       Yobany was seen today for post-op evaluation.    Diagnoses and all orders for this visit:    Postoperative  state    Peripheral edema    Diabetic polyneuropathy associated with type 2 diabetes mellitus  -     gabapentin (NEURONTIN) 600 MG tablet; Take 1 tablet (600 mg total) by mouth 2 (two) times daily.      I counseled the patient on his conditions, their implications and medical management.    Overall, satisfactory postoperative results noted.  Incision remains well healed.  He continues to have postoperative edema at the site, however, this is still within the normal timeframe.      May resume his normal activity level with use of casual shoe gear.      Refill written for increased dosage of gabapentin to address LE paresthesias.      RTC in 2 months for a routine diabetic foot exam.    Augusto Simeon DPM

## 2022-09-06 DIAGNOSIS — Z79.4 TYPE 2 DIABETES MELLITUS WITH DIABETIC POLYNEUROPATHY, WITH LONG-TERM CURRENT USE OF INSULIN: ICD-10-CM

## 2022-09-06 DIAGNOSIS — E11.42 TYPE 2 DIABETES MELLITUS WITH DIABETIC POLYNEUROPATHY, WITH LONG-TERM CURRENT USE OF INSULIN: ICD-10-CM

## 2022-09-06 RX ORDER — GABAPENTIN 300 MG/1
CAPSULE ORAL
Qty: 90 CAPSULE | Refills: 0 | OUTPATIENT
Start: 2022-09-06

## 2022-09-06 NOTE — TELEPHONE ENCOUNTER
No new care gaps identified.  [facility] Embedded Care Gaps. Reference number: [id number]. 9/06/2022   5:53:04 AM RENNYT

## 2022-09-13 ENCOUNTER — TELEPHONE (OUTPATIENT)
Dept: INTERNAL MEDICINE | Facility: CLINIC | Age: 67
End: 2022-09-13
Payer: COMMERCIAL

## 2022-10-06 RX ORDER — PEN NEEDLE, DIABETIC 32GX 5/32"
NEEDLE, DISPOSABLE MISCELLANEOUS
Qty: 100 EACH | Refills: 3 | Status: SHIPPED | OUTPATIENT
Start: 2022-10-06 | End: 2023-09-25

## 2022-10-06 NOTE — TELEPHONE ENCOUNTER
No new care gaps identified.  [facility] Embedded Care Gaps. Reference number: [id number]. 10/06/2022   5:53:02 AM RENNYT

## 2022-10-06 NOTE — TELEPHONE ENCOUNTER
Refill Decision Note   Yobany Richardson  is requesting a refill authorization.  Brief Assessment and Rationale for Refill:  Approve     Medication Therapy Plan:       Medication Reconciliation Completed: No   Comments:     No Care Gaps recommended.     Note composed:12:46 PM 10/06/2022

## 2022-10-10 ENCOUNTER — PATIENT MESSAGE (OUTPATIENT)
Dept: ADMINISTRATIVE | Facility: HOSPITAL | Age: 67
End: 2022-10-10
Payer: COMMERCIAL

## 2022-10-15 LAB
CHOL/HDLC RATIO: 2.8
CHOLESTEROL, TOTAL: 127
HBA1C MFR BLD: 6.4 % (ref ?–5.7)
HDLC SERPL-MCNC: 46 MG/DL
LDLC SERPL CALC-MCNC: 61 MG/DL
NON HDL CHOL (CALC): 81
TRIGL SERPL-MCNC: 117 MG/DL

## 2022-10-19 ENCOUNTER — PATIENT OUTREACH (OUTPATIENT)
Dept: ADMINISTRATIVE | Facility: HOSPITAL | Age: 67
End: 2022-10-19
Payer: COMMERCIAL

## 2022-10-19 NOTE — PROGRESS NOTES
Health Maintenance Due   Topic Date Due    HIV Screening  Never done    Colorectal Cancer Screening  Never done    Shingles Vaccine (1 of 2) Never done    TETANUS VACCINE  06/01/2017    Foot Exam  01/22/2022    Diabetes Urine Screening  04/20/2022    Influenza Vaccine (1) Never done     A1c and Lipid Panel from 10/14/22 uploaded into chart.  Chart review done.  updated. Immunizations reviewed & updated. Care Everywhere updated.

## 2022-10-26 ENCOUNTER — OFFICE VISIT (OUTPATIENT)
Dept: PODIATRY | Facility: CLINIC | Age: 67
End: 2022-10-26
Payer: COMMERCIAL

## 2022-10-26 VITALS — HEIGHT: 74 IN | BODY MASS INDEX: 27.27 KG/M2 | WEIGHT: 212.5 LBS

## 2022-10-26 DIAGNOSIS — Z98.890 POSTOPERATIVE STATE: Primary | ICD-10-CM

## 2022-10-26 DIAGNOSIS — E11.42 DIABETIC POLYNEUROPATHY ASSOCIATED WITH TYPE 2 DIABETES MELLITUS: ICD-10-CM

## 2022-10-26 PROCEDURE — 1159F MED LIST DOCD IN RCRD: CPT | Mod: CPTII,S$GLB,, | Performed by: PODIATRIST

## 2022-10-26 PROCEDURE — 99212 PR OFFICE/OUTPT VISIT, EST, LEVL II, 10-19 MIN: ICD-10-PCS | Mod: S$GLB,,, | Performed by: PODIATRIST

## 2022-10-26 PROCEDURE — 3044F PR MOST RECENT HEMOGLOBIN A1C LEVEL <7.0%: ICD-10-PCS | Mod: CPTII,S$GLB,, | Performed by: PODIATRIST

## 2022-10-26 PROCEDURE — 99212 OFFICE O/P EST SF 10 MIN: CPT | Mod: S$GLB,,, | Performed by: PODIATRIST

## 2022-10-26 PROCEDURE — 99999 PR PBB SHADOW E&M-EST. PATIENT-LVL II: ICD-10-PCS | Mod: PBBFAC,,, | Performed by: PODIATRIST

## 2022-10-26 PROCEDURE — 99999 PR PBB SHADOW E&M-EST. PATIENT-LVL II: CPT | Mod: PBBFAC,,, | Performed by: PODIATRIST

## 2022-10-26 PROCEDURE — 3066F PR DOCUMENTATION OF TREATMENT FOR NEPHROPATHY: ICD-10-PCS | Mod: CPTII,S$GLB,, | Performed by: PODIATRIST

## 2022-10-26 PROCEDURE — 3044F HG A1C LEVEL LT 7.0%: CPT | Mod: CPTII,S$GLB,, | Performed by: PODIATRIST

## 2022-10-26 PROCEDURE — 3066F NEPHROPATHY DOC TX: CPT | Mod: CPTII,S$GLB,, | Performed by: PODIATRIST

## 2022-10-26 PROCEDURE — 1159F PR MEDICATION LIST DOCUMENTED IN MEDICAL RECORD: ICD-10-PCS | Mod: CPTII,S$GLB,, | Performed by: PODIATRIST

## 2022-10-27 NOTE — PROGRESS NOTES
Subjective:      Patient ID: Yobany Richardson is a 66 y.o. male.    Chief Complaint: Foot Pain (F/u on sx)    Patient presents to clinic over three months S/P arthroplasty of the Rt. Hallux IP joint.  States the surgical site has done quite well since the last exam.  He is now able to ambulate to tolerance in casual shoe gear without issue.  Notes the digit remains swollen but denies this being a source of pain.  He continues taking gabapentin which has resolved prior neuropathy pain.  Denies any additional pedal complaints.        Hemoglobin A1C   Date Value Ref Range Status   10/14/2022 6.4 (A) 5.7 % Final   07/16/2021 7.3 (H) 4.0 - 5.6 % Final     Comment:     ADA Screening Guidelines:  5.7-6.4%  Consistent with prediabetes  >or=6.5%  Consistent with diabetes    High levels of fetal hemoglobin interfere with the HbA1C  assay. Heterozygous hemoglobin variants (HbS, HgC, etc)do  not significantly interfere with this assay.   However, presence of multiple variants may affect accuracy.     06/21/2021 6.9 (H) <5.7 % of total Hgb Final     Comment:     For someone without known diabetes, a hemoglobin A1c  value of 6.5% or greater indicates that they may have   diabetes and this should be confirmed with a follow-up   test.     For someone with known diabetes, a value <7% indicates   that their diabetes is well controlled and a value   greater than or equal to 7% indicates suboptimal   control. A1c targets should be individualized based on   duration of diabetes, age, comorbid conditions, and   other considerations.     Currently, no consensus exists regarding use of  hemoglobin A1c for diagnosis of diabetes for children.         04/20/2021 7.7 (A) 4.0 - 6.0 % Final           Past Medical History:   Diagnosis Date    Abnormal thallium stress test     Arrhythmia     Atrial flutter, paroxysmal 2/11/2016    CAD (coronary artery disease) 3/9/2016    5/2016 [facility] Left main:NL LAD: 35% proximal LAD. two small diagonals with minimal  disease.  Circumflex/Large branching first obtuse marginal: with minimal disease.   RCA: The vessel was large sized (dominant) with a 60% proximal lesion. 100 mcgs of intracoronary Nitroglycerin were given with no change in the lesion. Thus FFR was performed. FFR was 0.79  2/2016 cor CTA ~~ 50% LAD    Coronary artery disease     Diabetes mellitus     Diabetes mellitus, type 2     Disorder of kidney and ureter     Hypertension     Neuropathy     Paroxysmal atrial flutter     PE (pulmonary embolism)     Pulmonary embolism 2/11/2016 1/2016     Rheumatoid arthritis     Shortness of breath     Stented coronary artery 6/15/2016    5/2016 RCA- synergy 3.5x12    Wears contact lenses     Wears prescription eyeglasses        Past Surgical History:   Procedure Laterality Date    ARTHROPLASTY OF TOE Right 7/14/2022    Procedure: ARTHROPLASTY, TOE;  Surgeon: Augusto Simeon DPM;  Location: [facility];  Service: Podiatry;  Laterality: Right;  Hallux    CARDIAC CATHETERIZATION      with stent placed x 1    KNEE ARTHROSCOPY      TONSILLECTOMY         Family History   Problem Relation Age of Onset    Cancer Mother     Angina Mother     Heart disease Mother     Hypertension Mother     Kidney disease Father     Thyroid disease Sister     Bell's palsy Sister     Thyroid disease Sister     Depression Sister     Depression Sister        Social History     Socioeconomic History    Marital status:    Tobacco Use    Smoking status: Never    Smokeless tobacco: Never   Substance and Sexual Activity    Alcohol use: No    Drug use: No       Current Outpatient Medications   Medication Sig Dispense Refill    amLODIPine (NORVASC) 10 MG tablet TAKE 1 TABLET BY MOUTH DAILY 30 tablet 5    APPLE CIDER VINEGAR ORAL Take by mouth.      ascorbic acid (VITAMIN C) 500 MG tablet Take 500 mg by mouth once daily.      aspirin 81 MG Chew Take 81 mg by mouth once daily.      b complex vitamins capsule Take 1 capsule by mouth once daily.      BD AMAN 2ND  "GEN PEN NEEDLE 32 gauge x 5/32" Ndle USE TO INJECT INTO SKIN EVERY  each 3    carvediloL (COREG) 6.25 MG tablet TAKE 1 TABLET(6.25 MG) BY MOUTH TWICE DAILY 180 tablet 4    clopidogreL (PLAVIX) 75 mg tablet TAKE 1 TABLET BY MOUTH EVERY DAY 90 tablet 4    cyanocobalamin, vitamin B-12, 1,000 mcg/15 mL Liqd Take by mouth.      FARXIGA 5 mg Tab tablet Take 5 mg by mouth every evening.      FREESTYLE PASCUAL 14 DAY SENSOR Kit APPLY NEW SENSOR EVERY 14 DAYS AS DIRECTED      FREESTYLE LITE STRIPS Strp TEST 2 TO 3 TIMES D  2    gabapentin (NEURONTIN) 600 MG tablet Take 1 tablet (600 mg total) by mouth 2 (two) times daily. 60 tablet 11    HYDROcodone-acetaminophen (NORCO) 5-325 mg per tablet Take 1 tablet by mouth every 6 (six) hours as needed for Pain. 28 tablet 0    LANTUS SOLOSTAR U-100 INSULIN glargine 100 units/mL (3mL) SubQ pen ADMINISTER 45 UNITS UNDER THE SKIN EVERY DAY 15 mL 5    metFORMIN (GLUCOPHAGE) 1000 MG tablet Take 1 tablet (1,000 mg total) by mouth 2 (two) times daily with meals. 180 tablet 0    multivit-min/iron/folic acid/K (ADULTS MULTIVITAMIN ORAL) Take by mouth.      mupirocin (BACTROBAN) 2 % ointment Apply topically 3 (three) times daily. 30 g 2    olmesartan-hydrochlorothiazide (BENICAR HCT) 40-25 mg per tablet TAKE 1 TABLET BY MOUTH DAILY 90 tablet 1    ondansetron (ZOFRAN) 4 MG tablet Take 1 tablet (4 mg total) by mouth every 6 (six) hours. 12 tablet 0    ONETOUCH DELICA PLUS LANCET 33 gauge Misc TEST ONCE D  0    potassium chloride SA (K-DUR,KLOR-CON) 10 MEQ tablet Take by mouth.      pravastatin (PRAVACHOL) 40 MG tablet Take 1 tablet (40 mg total) by mouth every evening. 90 tablet 3    RYBELSUS 7 mg tablet Take 7 mg by mouth every morning.      UNABLE TO FIND medication name: Tart Cherry Extract      vitamin E 400 UNIT capsule Take 400 Units by mouth once daily.       Current Facility-Administered Medications   Medication Dose Route Frequency Provider Last Rate Last Admin    acetaminophen " tablet 650 mg  650 mg Oral Once PRN Robert Landrum MD        ondansetron disintegrating tablet 4 mg  4 mg Oral Once PRN Robert Landrum MD         Facility-Administered Medications Ordered in Other Visits   Medication Dose Route Frequency Provider Last Rate Last Admin    lactated ringers infusion   Intravenous Continuous Reyes Liz MD 10 mL/hr at 07/14/22 1239 Restarted at 07/14/22 1424    LIDOcaine (PF) 10 mg/ml (1%) injection 10 mg  1 mL Intradermal Once Reyes Liz MD           Review of patient's allergies indicates:  No Known Allergies      Review of Systems   Constitutional: Negative for chills and fever.   Cardiovascular: Negative for claudication and leg swelling.   Skin: Positive for color change and nail changes. Negative for poor wound healing.   Musculoskeletal: Positive for arthritis. Negative for joint pain and joint swelling.   Gastrointestinal: Negative for nausea and vomiting.   Neurological: Positive for numbness. Negative for paresthesias.   Psychiatric/Behavioral: Negative for altered mental status.           Objective:      Physical Exam  Constitutional:       General: He is not in acute distress.     Appearance: He is well-developed. He is not diaphoretic.   Cardiovascular:      Pulses:           Dorsalis pedis pulses are 2+ on the right side and 2+ on the left side.        Posterior tibial pulses are 2+ on the right side and 2+ on the left side.      Comments: CFT < 3 seconds bilateral.  Pedal hair growth present bilateral.   No varicosities noted bilateral.  Moderate edema to the IP joint of the Rt. Hallux.  Mild nonpitting edema of the Rt. Ankle.  Toes are warm to touch bilateral.    Musculoskeletal:         General: Deformity present. No tenderness.      Right lower leg: Edema present.      Left lower leg: No edema.      Comments: Muscle strength 5/5 in all muscle groups bilateral.  No tenderness nor crepitation with ROM of foot/ankle joints bilateral.  Arthritic  changes changes noted to the dorsal and medial aspect of the Rt. 1st mtp joint.   More rectus alignment of the Rt. Hallux with increased motion of the IP joint S/P arthroplasty.     Skin:     General: Skin is warm and dry.      Capillary Refill: Capillary refill takes less than 2 seconds.      Coloration: Skin is not pale.      Findings: No abrasion, bruising, burn, ecchymosis, erythema, lesion, petechiae, rash or wound.      Nails: There is no clubbing.      Comments: Incision to the dorsum of the Rt. Hallux IP joint remains well healed.     Neurological:      Mental Status: He is alert and oriented to person, place, and time.      Sensory: Sensory deficit present.      Motor: No weakness or atrophy.      Comments: Protective sensation per Benton-Yash monofilament absent bilateral.    Light touch intact bilateral.               Assessment:       Encounter Diagnoses   Name Primary?    Postoperative state Yes    Diabetic polyneuropathy associated with type 2 diabetes mellitus          Plan:       Yobany was seen today for foot pain.    Diagnoses and all orders for this visit:    Postoperative state    Diabetic polyneuropathy associated with type 2 diabetes mellitus    I counseled the patient on his conditions, their implications and medical management.    Overall, satisfactory postoperative results noted.  Incision remains well healed.     To continue with activity to tolerance in casual shoe gear.    Explained that it may take another 8 months for postoperative edema in the Rt. Hallux to resolve.    To continue with current dosage of gabapentin, as he relates no further neuropathy related pain.    RTC in 6 months for a routine diabetic foot exam.    Augusto Simeon DPM

## 2022-11-03 ENCOUNTER — PATIENT MESSAGE (OUTPATIENT)
Dept: ADMINISTRATIVE | Facility: HOSPITAL | Age: 67
End: 2022-11-03
Payer: COMMERCIAL

## 2022-11-21 ENCOUNTER — LAB VISIT (OUTPATIENT)
Dept: LAB | Facility: HOSPITAL | Age: 67
End: 2022-11-21
Attending: INTERNAL MEDICINE
Payer: COMMERCIAL

## 2022-11-21 DIAGNOSIS — Z79.4 CONTROLLED TYPE 2 DIABETES MELLITUS WITH STAGE 1 CHRONIC KIDNEY DISEASE, WITH LONG-TERM CURRENT USE OF INSULIN: ICD-10-CM

## 2022-11-21 DIAGNOSIS — I25.10 CORONARY ARTERY DISEASE INVOLVING NATIVE CORONARY ARTERY OF NATIVE HEART WITHOUT ANGINA PECTORIS: ICD-10-CM

## 2022-11-21 DIAGNOSIS — N18.2 CKD (CHRONIC KIDNEY DISEASE) STAGE 2, GFR 60-89 ML/MIN: ICD-10-CM

## 2022-11-21 DIAGNOSIS — E11.22 CONTROLLED TYPE 2 DIABETES MELLITUS WITH STAGE 1 CHRONIC KIDNEY DISEASE, WITH LONG-TERM CURRENT USE OF INSULIN: ICD-10-CM

## 2022-11-21 DIAGNOSIS — Z86.711 HISTORY OF PULMONARY EMBOLISM: ICD-10-CM

## 2022-11-21 DIAGNOSIS — I11.9 HYPERTENSIVE HEART DISEASE WITHOUT HEART FAILURE: ICD-10-CM

## 2022-11-21 DIAGNOSIS — N18.1 CONTROLLED TYPE 2 DIABETES MELLITUS WITH STAGE 1 CHRONIC KIDNEY DISEASE, WITH LONG-TERM CURRENT USE OF INSULIN: ICD-10-CM

## 2022-11-21 DIAGNOSIS — Z95.5 STENTED CORONARY ARTERY: ICD-10-CM

## 2022-11-21 DIAGNOSIS — I48.92 ATRIAL FLUTTER, PAROXYSMAL: ICD-10-CM

## 2022-11-21 DIAGNOSIS — E11.22 CONTROLLED TYPE 2 DIABETES MELLITUS WITH STAGE 1 CHRONIC KIDNEY DISEASE, UNSPECIFIED WHETHER LONG TERM INSULIN USE: ICD-10-CM

## 2022-11-21 DIAGNOSIS — N18.1 CONTROLLED TYPE 2 DIABETES MELLITUS WITH STAGE 1 CHRONIC KIDNEY DISEASE, UNSPECIFIED WHETHER LONG TERM INSULIN USE: ICD-10-CM

## 2022-11-21 LAB
ALBUMIN SERPL BCP-MCNC: 4 G/DL (ref 3.5–5.2)
ALBUMIN SERPL BCP-MCNC: 4.2 G/DL (ref 3.5–5.2)
ALP SERPL-CCNC: 93 U/L (ref 55–135)
ALT SERPL W/O P-5'-P-CCNC: 36 U/L (ref 10–44)
ANION GAP SERPL CALC-SCNC: 7 MMOL/L (ref 8–16)
ANION GAP SERPL CALC-SCNC: 8 MMOL/L (ref 8–16)
AST SERPL-CCNC: 26 U/L (ref 10–40)
BASOPHILS # BLD AUTO: 0.04 K/UL (ref 0–0.2)
BASOPHILS NFR BLD: 0.6 % (ref 0–1.9)
BILIRUB SERPL-MCNC: 0.4 MG/DL (ref 0.1–1)
BUN SERPL-MCNC: 27 MG/DL (ref 8–23)
BUN SERPL-MCNC: 27 MG/DL (ref 8–23)
CALCIUM SERPL-MCNC: 9.6 MG/DL (ref 8.7–10.5)
CALCIUM SERPL-MCNC: 9.7 MG/DL (ref 8.7–10.5)
CHLORIDE SERPL-SCNC: 104 MMOL/L (ref 95–110)
CHLORIDE SERPL-SCNC: 105 MMOL/L (ref 95–110)
CHOLEST SERPL-MCNC: 115 MG/DL (ref 120–199)
CHOLEST/HDLC SERPL: 2.7 {RATIO} (ref 2–5)
CK SERPL-CCNC: 96 U/L (ref 20–200)
CO2 SERPL-SCNC: 24 MMOL/L (ref 23–29)
CO2 SERPL-SCNC: 25 MMOL/L (ref 23–29)
CREAT SERPL-MCNC: 1.3 MG/DL (ref 0.5–1.4)
CREAT SERPL-MCNC: 1.4 MG/DL (ref 0.5–1.4)
DIFFERENTIAL METHOD: ABNORMAL
EOSINOPHIL # BLD AUTO: 0.4 K/UL (ref 0–0.5)
EOSINOPHIL NFR BLD: 5.5 % (ref 0–8)
ERYTHROCYTE [DISTWIDTH] IN BLOOD BY AUTOMATED COUNT: 12.5 % (ref 11.5–14.5)
EST. GFR  (NO RACE VARIABLE): 56.1 ML/MIN/1.73 M^2
EST. GFR  (NO RACE VARIABLE): >60 ML/MIN/1.73 M^2
ESTIMATED AVG GLUCOSE: 143 MG/DL (ref 68–131)
GLUCOSE SERPL-MCNC: 154 MG/DL (ref 70–110)
GLUCOSE SERPL-MCNC: 154 MG/DL (ref 70–110)
HBA1C MFR BLD: 6.6 % (ref 4–5.6)
HCT VFR BLD AUTO: 35.3 % (ref 40–54)
HDLC SERPL-MCNC: 43 MG/DL (ref 40–75)
HDLC SERPL: 37.4 % (ref 20–50)
HGB BLD-MCNC: 11.7 G/DL (ref 14–18)
IMM GRANULOCYTES # BLD AUTO: 0.02 K/UL (ref 0–0.04)
IMM GRANULOCYTES NFR BLD AUTO: 0.3 % (ref 0–0.5)
LDLC SERPL CALC-MCNC: 47.4 MG/DL (ref 63–159)
LYMPHOCYTES # BLD AUTO: 1.7 K/UL (ref 1–4.8)
LYMPHOCYTES NFR BLD: 24.7 % (ref 18–48)
MCH RBC QN AUTO: 32.7 PG (ref 27–31)
MCHC RBC AUTO-ENTMCNC: 33.1 G/DL (ref 32–36)
MCV RBC AUTO: 99 FL (ref 82–98)
MONOCYTES # BLD AUTO: 0.5 K/UL (ref 0.3–1)
MONOCYTES NFR BLD: 7.4 % (ref 4–15)
NEUTROPHILS # BLD AUTO: 4.2 K/UL (ref 1.8–7.7)
NEUTROPHILS NFR BLD: 61.5 % (ref 38–73)
NONHDLC SERPL-MCNC: 72 MG/DL
NRBC BLD-RTO: 0 /100 WBC
PHOSPHATE SERPL-MCNC: 3.7 MG/DL (ref 2.7–4.5)
PLATELET # BLD AUTO: 265 K/UL (ref 150–450)
PMV BLD AUTO: 11 FL (ref 9.2–12.9)
POTASSIUM SERPL-SCNC: 4.3 MMOL/L (ref 3.5–5.1)
POTASSIUM SERPL-SCNC: 4.3 MMOL/L (ref 3.5–5.1)
PROT SERPL-MCNC: 6.9 G/DL (ref 6–8.4)
PTH-INTACT SERPL-MCNC: 65 PG/ML (ref 9–77)
RBC # BLD AUTO: 3.58 M/UL (ref 4.6–6.2)
SODIUM SERPL-SCNC: 136 MMOL/L (ref 136–145)
SODIUM SERPL-SCNC: 137 MMOL/L (ref 136–145)
TRIGL SERPL-MCNC: 123 MG/DL (ref 30–150)
URATE SERPL-MCNC: 5.8 MG/DL (ref 3.4–7)
WBC # BLD AUTO: 6.77 K/UL (ref 3.9–12.7)

## 2022-11-21 PROCEDURE — 80053 COMPREHEN METABOLIC PANEL: CPT | Performed by: INTERNAL MEDICINE

## 2022-11-21 PROCEDURE — 80069 RENAL FUNCTION PANEL: CPT | Performed by: INTERNAL MEDICINE

## 2022-11-21 PROCEDURE — 36415 COLL VENOUS BLD VENIPUNCTURE: CPT | Mod: PO | Performed by: INTERNAL MEDICINE

## 2022-11-21 PROCEDURE — 85025 COMPLETE CBC W/AUTO DIFF WBC: CPT | Performed by: INTERNAL MEDICINE

## 2022-11-21 PROCEDURE — 82550 ASSAY OF CK (CPK): CPT | Performed by: INTERNAL MEDICINE

## 2022-11-21 PROCEDURE — 83036 HEMOGLOBIN GLYCOSYLATED A1C: CPT | Performed by: INTERNAL MEDICINE

## 2022-11-21 PROCEDURE — 84550 ASSAY OF BLOOD/URIC ACID: CPT | Performed by: INTERNAL MEDICINE

## 2022-11-21 PROCEDURE — 80061 LIPID PANEL: CPT | Performed by: INTERNAL MEDICINE

## 2022-11-21 PROCEDURE — 83970 ASSAY OF PARATHORMONE: CPT | Performed by: INTERNAL MEDICINE

## 2022-11-29 ENCOUNTER — OFFICE VISIT (OUTPATIENT)
Dept: NEPHROLOGY | Facility: CLINIC | Age: 67
End: 2022-11-29
Payer: COMMERCIAL

## 2022-11-29 VITALS
DIASTOLIC BLOOD PRESSURE: 80 MMHG | HEIGHT: 74 IN | SYSTOLIC BLOOD PRESSURE: 140 MMHG | WEIGHT: 212 LBS | BODY MASS INDEX: 27.21 KG/M2

## 2022-11-29 DIAGNOSIS — E11.22 CONTROLLED TYPE 2 DIABETES MELLITUS WITH STAGE 2 CHRONIC KIDNEY DISEASE, WITH LONG-TERM CURRENT USE OF INSULIN: ICD-10-CM

## 2022-11-29 DIAGNOSIS — I10 PRIMARY HYPERTENSION: ICD-10-CM

## 2022-11-29 DIAGNOSIS — N18.2 CONTROLLED TYPE 2 DIABETES MELLITUS WITH STAGE 2 CHRONIC KIDNEY DISEASE, WITH LONG-TERM CURRENT USE OF INSULIN: ICD-10-CM

## 2022-11-29 DIAGNOSIS — N18.2 CKD (CHRONIC KIDNEY DISEASE) STAGE 2, GFR 60-89 ML/MIN: Primary | ICD-10-CM

## 2022-11-29 DIAGNOSIS — Z79.4 CONTROLLED TYPE 2 DIABETES MELLITUS WITH STAGE 2 CHRONIC KIDNEY DISEASE, WITH LONG-TERM CURRENT USE OF INSULIN: ICD-10-CM

## 2022-11-29 PROCEDURE — 1159F MED LIST DOCD IN RCRD: CPT | Mod: CPTII,S$GLB,, | Performed by: INTERNAL MEDICINE

## 2022-11-29 PROCEDURE — 3060F POS MICROALBUMINURIA REV: CPT | Mod: CPTII,S$GLB,, | Performed by: INTERNAL MEDICINE

## 2022-11-29 PROCEDURE — 3066F NEPHROPATHY DOC TX: CPT | Mod: CPTII,S$GLB,, | Performed by: INTERNAL MEDICINE

## 2022-11-29 PROCEDURE — 3066F PR DOCUMENTATION OF TREATMENT FOR NEPHROPATHY: ICD-10-PCS | Mod: CPTII,S$GLB,, | Performed by: INTERNAL MEDICINE

## 2022-11-29 PROCEDURE — 99214 OFFICE O/P EST MOD 30 MIN: CPT | Mod: S$GLB,,, | Performed by: INTERNAL MEDICINE

## 2022-11-29 PROCEDURE — 99999 PR PBB SHADOW E&M-EST. PATIENT-LVL II: ICD-10-PCS | Mod: PBBFAC,,, | Performed by: INTERNAL MEDICINE

## 2022-11-29 PROCEDURE — 3060F PR POS MICROALBUMINURIA RESULT DOCUMENTED/REVIEW: ICD-10-PCS | Mod: CPTII,S$GLB,, | Performed by: INTERNAL MEDICINE

## 2022-11-29 PROCEDURE — 3008F BODY MASS INDEX DOCD: CPT | Mod: CPTII,S$GLB,, | Performed by: INTERNAL MEDICINE

## 2022-11-29 PROCEDURE — 3044F PR MOST RECENT HEMOGLOBIN A1C LEVEL <7.0%: ICD-10-PCS | Mod: CPTII,S$GLB,, | Performed by: INTERNAL MEDICINE

## 2022-11-29 PROCEDURE — 3079F DIAST BP 80-89 MM HG: CPT | Mod: CPTII,S$GLB,, | Performed by: INTERNAL MEDICINE

## 2022-11-29 PROCEDURE — 1159F PR MEDICATION LIST DOCUMENTED IN MEDICAL RECORD: ICD-10-PCS | Mod: CPTII,S$GLB,, | Performed by: INTERNAL MEDICINE

## 2022-11-29 PROCEDURE — 3079F PR MOST RECENT DIASTOLIC BLOOD PRESSURE 80-89 MM HG: ICD-10-PCS | Mod: CPTII,S$GLB,, | Performed by: INTERNAL MEDICINE

## 2022-11-29 PROCEDURE — 3008F PR BODY MASS INDEX (BMI) DOCUMENTED: ICD-10-PCS | Mod: CPTII,S$GLB,, | Performed by: INTERNAL MEDICINE

## 2022-11-29 PROCEDURE — 3077F SYST BP >= 140 MM HG: CPT | Mod: CPTII,S$GLB,, | Performed by: INTERNAL MEDICINE

## 2022-11-29 PROCEDURE — 99214 PR OFFICE/OUTPT VISIT, EST, LEVL IV, 30-39 MIN: ICD-10-PCS | Mod: S$GLB,,, | Performed by: INTERNAL MEDICINE

## 2022-11-29 PROCEDURE — 3077F PR MOST RECENT SYSTOLIC BLOOD PRESSURE >= 140 MM HG: ICD-10-PCS | Mod: CPTII,S$GLB,, | Performed by: INTERNAL MEDICINE

## 2022-11-29 PROCEDURE — 99999 PR PBB SHADOW E&M-EST. PATIENT-LVL II: CPT | Mod: PBBFAC,,, | Performed by: INTERNAL MEDICINE

## 2022-11-29 PROCEDURE — 3044F HG A1C LEVEL LT 7.0%: CPT | Mod: CPTII,S$GLB,, | Performed by: INTERNAL MEDICINE

## 2022-11-29 NOTE — PROGRESS NOTES
"Subjective:       Patient ID: Yobany Richardson is a 66 y.o. White male who presents for return patient evaluation for chronic renal failure.      He has no uremic or urinary symptoms and is in his usual state of health.  There have been no recent illnesses, hospitalizations.  He infrequently takes NSAIDs.  He did have COVID in August 2021.   He had a procedure on his R great toe since last visit.      Review of Systems   Constitutional:  Positive for fatigue. Negative for appetite change, chills and fever.   HENT:  Negative for congestion.    Eyes:  Negative for visual disturbance.   Respiratory:  Negative for cough and shortness of breath.    Cardiovascular:  Positive for leg swelling (mild occasionally). Negative for chest pain.   Gastrointestinal:  Negative for abdominal pain, diarrhea, nausea and vomiting.   Genitourinary:  Negative for difficulty urinating, dysuria and hematuria.   Musculoskeletal:  Negative for myalgias.   Skin:  Negative for rash.   Neurological:  Negative for headaches.   Psychiatric/Behavioral:  Positive for decreased concentration. Negative for sleep disturbance.      The past medical, family and social histories were reviewed for this encounter.     BP (!) 140/80 (BP Location: Right arm, Patient Position: Sitting, BP Method: Medium (Manual))   Ht 6' 2" (1.88 m)   Wt 96.2 kg (212 lb)   BMI 27.22 kg/m²     Objective:      Physical Exam  Vitals reviewed.   Constitutional:       General: He is not in acute distress.     Appearance: He is well-developed.   HENT:      Head: Normocephalic and atraumatic.   Eyes:      General: No scleral icterus.     Conjunctiva/sclera: Conjunctivae normal.   Neck:      Vascular: No JVD.   Cardiovascular:      Rate and Rhythm: Normal rate and regular rhythm.      Heart sounds: Normal heart sounds. No murmur heard.    No friction rub. No gallop.   Pulmonary:      Effort: Pulmonary effort is normal. No respiratory distress.      Breath sounds: Normal breath sounds. " No wheezing or rales.   Abdominal:      General: Bowel sounds are normal. There is no distension.      Palpations: Abdomen is soft.      Tenderness: There is no abdominal tenderness.   Musculoskeletal:      Cervical back: Normal range of motion.      Right lower leg: Edema (trace-1+) present.      Left lower leg: Edema (trace) present.   Skin:     General: Skin is warm and dry.      Findings: No rash.   Neurological:      Mental Status: He is alert and oriented to person, place, and time.   Psychiatric:         Mood and Affect: Mood normal.         Behavior: Behavior normal.       Assessment:       1. CKD (chronic kidney disease) stage 2, GFR 60-89 ml/min    2. Primary hypertension    3. Controlled type 2 diabetes mellitus with stage 2 chronic kidney disease, with long-term current use of insulin          Plan:   Return to clinic in 6 months.  Labs for next visit include rp pth upc uric per so.  Baseline creatinine is 1.0-1.3 since 2016.  Renal US shows R 10.8 cm L 10.3 cm.  PTH is 65 with a calcium of 9.6.  UPC is negative on an ARB.  Continue current medications as prescribed and reviewed.   Blood pressure is controlled on the current regimen.  Please have patient follow-up with your PCP or Endocrinology regarding the control and management of diabetes.

## 2022-12-20 ENCOUNTER — OFFICE VISIT (OUTPATIENT)
Dept: INTERNAL MEDICINE | Facility: CLINIC | Age: 67
End: 2022-12-20
Payer: COMMERCIAL

## 2022-12-20 VITALS
BODY MASS INDEX: 28.45 KG/M2 | RESPIRATION RATE: 18 BRPM | TEMPERATURE: 98 F | OXYGEN SATURATION: 97 % | WEIGHT: 221.69 LBS | HEIGHT: 74 IN | HEART RATE: 65 BPM | DIASTOLIC BLOOD PRESSURE: 80 MMHG | SYSTOLIC BLOOD PRESSURE: 140 MMHG

## 2022-12-20 DIAGNOSIS — N18.2 CONTROLLED TYPE 2 DIABETES MELLITUS WITH STAGE 2 CHRONIC KIDNEY DISEASE, WITH LONG-TERM CURRENT USE OF INSULIN: ICD-10-CM

## 2022-12-20 DIAGNOSIS — I48.92 ATRIAL FLUTTER, PAROXYSMAL: ICD-10-CM

## 2022-12-20 DIAGNOSIS — E11.42 TYPE 2 DIABETES MELLITUS WITH DIABETIC POLYNEUROPATHY, WITH LONG-TERM CURRENT USE OF INSULIN: ICD-10-CM

## 2022-12-20 DIAGNOSIS — E11.42 DIABETIC POLYNEUROPATHY ASSOCIATED WITH TYPE 2 DIABETES MELLITUS: Primary | ICD-10-CM

## 2022-12-20 DIAGNOSIS — Z79.4 TYPE 2 DIABETES MELLITUS WITH DIABETIC POLYNEUROPATHY, WITH LONG-TERM CURRENT USE OF INSULIN: ICD-10-CM

## 2022-12-20 DIAGNOSIS — Z79.4 CONTROLLED TYPE 2 DIABETES MELLITUS WITH STAGE 2 CHRONIC KIDNEY DISEASE, WITH LONG-TERM CURRENT USE OF INSULIN: ICD-10-CM

## 2022-12-20 DIAGNOSIS — I11.9 HYPERTENSIVE HEART DISEASE WITHOUT HEART FAILURE: ICD-10-CM

## 2022-12-20 DIAGNOSIS — I25.10 CORONARY ARTERY DISEASE INVOLVING NATIVE CORONARY ARTERY OF NATIVE HEART WITHOUT ANGINA PECTORIS: ICD-10-CM

## 2022-12-20 DIAGNOSIS — N18.2 CKD (CHRONIC KIDNEY DISEASE) STAGE 2, GFR 60-89 ML/MIN: ICD-10-CM

## 2022-12-20 DIAGNOSIS — E11.22 CONTROLLED TYPE 2 DIABETES MELLITUS WITH STAGE 2 CHRONIC KIDNEY DISEASE, WITH LONG-TERM CURRENT USE OF INSULIN: ICD-10-CM

## 2022-12-20 DIAGNOSIS — Z86.711 HISTORY OF PULMONARY EMBOLISM: ICD-10-CM

## 2022-12-20 PROCEDURE — 3288F PR FALLS RISK ASSESSMENT DOCUMENTED: ICD-10-PCS | Mod: CPTII,S$GLB,, | Performed by: INTERNAL MEDICINE

## 2022-12-20 PROCEDURE — 3060F POS MICROALBUMINURIA REV: CPT | Mod: CPTII,S$GLB,, | Performed by: INTERNAL MEDICINE

## 2022-12-20 PROCEDURE — 3077F PR MOST RECENT SYSTOLIC BLOOD PRESSURE >= 140 MM HG: ICD-10-PCS | Mod: CPTII,S$GLB,, | Performed by: INTERNAL MEDICINE

## 2022-12-20 PROCEDURE — 3066F NEPHROPATHY DOC TX: CPT | Mod: CPTII,S$GLB,, | Performed by: INTERNAL MEDICINE

## 2022-12-20 PROCEDURE — 99999 PR PBB SHADOW E&M-EST. PATIENT-LVL V: ICD-10-PCS | Mod: PBBFAC,,, | Performed by: INTERNAL MEDICINE

## 2022-12-20 PROCEDURE — 3044F HG A1C LEVEL LT 7.0%: CPT | Mod: CPTII,S$GLB,, | Performed by: INTERNAL MEDICINE

## 2022-12-20 PROCEDURE — 99214 PR OFFICE/OUTPT VISIT, EST, LEVL IV, 30-39 MIN: ICD-10-PCS | Mod: S$GLB,,, | Performed by: INTERNAL MEDICINE

## 2022-12-20 PROCEDURE — 3008F PR BODY MASS INDEX (BMI) DOCUMENTED: ICD-10-PCS | Mod: CPTII,S$GLB,, | Performed by: INTERNAL MEDICINE

## 2022-12-20 PROCEDURE — 1101F PR PT FALLS ASSESS DOC 0-1 FALLS W/OUT INJ PAST YR: ICD-10-PCS | Mod: CPTII,S$GLB,, | Performed by: INTERNAL MEDICINE

## 2022-12-20 PROCEDURE — 1101F PT FALLS ASSESS-DOCD LE1/YR: CPT | Mod: CPTII,S$GLB,, | Performed by: INTERNAL MEDICINE

## 2022-12-20 PROCEDURE — 3079F PR MOST RECENT DIASTOLIC BLOOD PRESSURE 80-89 MM HG: ICD-10-PCS | Mod: CPTII,S$GLB,, | Performed by: INTERNAL MEDICINE

## 2022-12-20 PROCEDURE — 99999 PR PBB SHADOW E&M-EST. PATIENT-LVL V: CPT | Mod: PBBFAC,,, | Performed by: INTERNAL MEDICINE

## 2022-12-20 PROCEDURE — 3077F SYST BP >= 140 MM HG: CPT | Mod: CPTII,S$GLB,, | Performed by: INTERNAL MEDICINE

## 2022-12-20 PROCEDURE — 3066F PR DOCUMENTATION OF TREATMENT FOR NEPHROPATHY: ICD-10-PCS | Mod: CPTII,S$GLB,, | Performed by: INTERNAL MEDICINE

## 2022-12-20 PROCEDURE — 3008F BODY MASS INDEX DOCD: CPT | Mod: CPTII,S$GLB,, | Performed by: INTERNAL MEDICINE

## 2022-12-20 PROCEDURE — 1160F RVW MEDS BY RX/DR IN RCRD: CPT | Mod: CPTII,S$GLB,, | Performed by: INTERNAL MEDICINE

## 2022-12-20 PROCEDURE — 3044F PR MOST RECENT HEMOGLOBIN A1C LEVEL <7.0%: ICD-10-PCS | Mod: CPTII,S$GLB,, | Performed by: INTERNAL MEDICINE

## 2022-12-20 PROCEDURE — 1160F PR REVIEW ALL MEDS BY PRESCRIBER/CLIN PHARMACIST DOCUMENTED: ICD-10-PCS | Mod: CPTII,S$GLB,, | Performed by: INTERNAL MEDICINE

## 2022-12-20 PROCEDURE — 3288F FALL RISK ASSESSMENT DOCD: CPT | Mod: CPTII,S$GLB,, | Performed by: INTERNAL MEDICINE

## 2022-12-20 PROCEDURE — 99214 OFFICE O/P EST MOD 30 MIN: CPT | Mod: S$GLB,,, | Performed by: INTERNAL MEDICINE

## 2022-12-20 PROCEDURE — 3060F PR POS MICROALBUMINURIA RESULT DOCUMENTED/REVIEW: ICD-10-PCS | Mod: CPTII,S$GLB,, | Performed by: INTERNAL MEDICINE

## 2022-12-20 PROCEDURE — 1159F MED LIST DOCD IN RCRD: CPT | Mod: CPTII,S$GLB,, | Performed by: INTERNAL MEDICINE

## 2022-12-20 PROCEDURE — 1159F PR MEDICATION LIST DOCUMENTED IN MEDICAL RECORD: ICD-10-PCS | Mod: CPTII,S$GLB,, | Performed by: INTERNAL MEDICINE

## 2022-12-20 PROCEDURE — 3079F DIAST BP 80-89 MM HG: CPT | Mod: CPTII,S$GLB,, | Performed by: INTERNAL MEDICINE

## 2022-12-20 RX ORDER — METFORMIN HYDROCHLORIDE 1000 MG/1
1000 TABLET ORAL 2 TIMES DAILY WITH MEALS
Qty: 180 TABLET | Refills: 3 | Status: SHIPPED | OUTPATIENT
Start: 2022-12-20 | End: 2023-07-05

## 2022-12-20 NOTE — PROGRESS NOTES
[facility] Note    Chief Complaint      Chief Complaint   Patient presents with    Follow-up     6M       History of Present Illness      Yobany Richardson is a 67 y.o. male who presents today for   Chief Complaint   Patient presents with    Follow-up     6M   .  Patient comes to appointment here for 6 m checkup for chronic issues as below . He had visit with dr pradhan and stress test yesterday results still pending . Is uptodate with dr quiñonez a1c 6.6 will be seeing his optho next week. Had visit with dr megha peters from his view point as well .     HPI    No problem-specific Assessment & Plan notes found for this encounter.       Problem List Items Addressed This Visit          Neuro    Diabetic polyneuropathy associated with type 2 diabetes mellitus - Primary    Overview     Stable on gabapentin            Cardiac/Vascular    Hypertensive heart disease without heart failure    Overview     Cont current          Atrial flutter, paroxysmal    Overview     Taking plavix only . Had only one episode dr pradhan managing          CAD (coronary artery disease)    Overview     Is seeing dr pradhan , had stress test yesterday             Renal/    CKD (chronic kidney disease) stage 2, GFR 60-89 ml/min    Overview     Stable             Hematology    History of pulmonary embolism    Overview     1/2016 stable on plavix currently             Endocrine    Type 2 diabetes mellitus with diabetic polyneuropathy, with long-term current use of insulin    Overview     6.6  a1c with dr quiñonez .    optho done 12/30/21  Cont current          Controlled type 2 diabetes mellitus with stage 2 chronic kidney disease, with long-term current use of insulin    Overview     Stable . Now seeing dr cleveland nephrology has f/u in April              Past Medical History:  Past Medical History:   Diagnosis Date    Abnormal thallium stress test     Arrhythmia     Atrial flutter, paroxysmal 2/11/2016    CAD (coronary artery  disease) 3/9/2016    5/2016 [facility] Left main:NL LAD: 35% proximal LAD. two small diagonals with minimal disease.  Circumflex/Large branching first obtuse marginal: with minimal disease.   RCA: The vessel was large sized (dominant) with a 60% proximal lesion. 100 mcgs of intracoronary Nitroglycerin were given with no change in the lesion. Thus FFR was performed. FFR was 0.79  2/2016 cor CTA ~~ 50% LAD    Coronary artery disease     Diabetes mellitus     Diabetes mellitus, type 2     Disorder of kidney and ureter     Hypertension     Neuropathy     Paroxysmal atrial flutter     PE (pulmonary embolism)     Pulmonary embolism 2/11/2016 1/2016     Rheumatoid arthritis     Shortness of breath     Stented coronary artery 6/15/2016    5/2016 RCA- synergy 3.5x12    Wears contact lenses     Wears prescription eyeglasses        Past Surgical History:  Past Surgical History:   Procedure Laterality Date    ARTHROPLASTY OF TOE Right 7/14/2022    Procedure: ARTHROPLASTY, TOE;  Surgeon: Augusto Simeon DPM;  Location: [facility];  Service: Podiatry;  Laterality: Right;  Hallux    CARDIAC CATHETERIZATION      with stent placed x 1    KNEE ARTHROSCOPY      TONSILLECTOMY         Family History:  family history includes Angina in his mother; Bell's palsy in his sister; Cancer in his mother; Depression in his sister and sister; Heart disease in his mother; Hypertension in his mother; Kidney disease in his father; Thyroid disease in his sister and sister.     Social History:  Social History     Socioeconomic History    Marital status:    Tobacco Use    Smoking status: Never    Smokeless tobacco: Never   Substance and Sexual Activity    Alcohol use: No    Drug use: No       Review of Systems:   Review of Systems   Constitutional:  Negative for fever and weight loss.   HENT:  Negative for congestion, hearing loss and sore throat.    Eyes:  Negative for blurred vision.   Respiratory:  Negative for cough and shortness of breath.   "  Cardiovascular:  Negative for chest pain, palpitations, claudication and leg swelling.   Gastrointestinal:  Negative for abdominal pain, constipation, diarrhea and heartburn.   Genitourinary:  Negative for dysuria.   Musculoskeletal:  Negative for back pain and myalgias.   Skin:  Negative for rash.   Neurological:  Negative for focal weakness and headaches.   Psychiatric/Behavioral:  Negative for depression and suicidal ideas. The patient is not nervous/anxious.       Medications:  Outpatient Encounter Medications as of 12/20/2022   Medication Sig Dispense Refill    amLODIPine (NORVASC) 10 MG tablet Take 1 tablet (10 mg total) by mouth once daily. 30 tablet 5    APPLE CIDER VINEGAR ORAL Take by mouth.      ascorbic acid (VITAMIN C) 500 MG tablet Take 500 mg by mouth once daily.      aspirin 81 MG Chew Take 81 mg by mouth once daily.      b complex vitamins capsule Take 1 capsule by mouth once daily.      BD AMAN 2ND GEN PEN NEEDLE 32 gauge x 5/32" Ndle USE TO INJECT INTO SKIN EVERY  each 3    carvediloL (COREG) 6.25 MG tablet TAKE 1 TABLET(6.25 MG) BY MOUTH TWICE DAILY 180 tablet 4    clopidogreL (PLAVIX) 75 mg tablet TAKE 1 TABLET BY MOUTH EVERY DAY 90 tablet 4    cyanocobalamin, vitamin B-12, 1,000 mcg/15 mL Liqd Take by mouth. Takes a tablet      FARXIGA 5 mg Tab tablet Take 5 mg by mouth every evening.      FREESTYLE PASCUAL 14 DAY SENSOR Kit APPLY NEW SENSOR EVERY 14 DAYS AS DIRECTED      FREESTYLE LITE STRIPS Strp TEST 2 TO 3 TIMES D  2    gabapentin (NEURONTIN) 600 MG tablet Take 1 tablet (600 mg total) by mouth 2 (two) times daily. 60 tablet 11    HYDROcodone-acetaminophen (NORCO) 5-325 mg per tablet Take 1 tablet by mouth every 6 (six) hours as needed for Pain. 28 tablet 0    LANTUS SOLOSTAR U-100 INSULIN glargine 100 units/mL (3mL) SubQ pen ADMINISTER 45 UNITS UNDER THE SKIN EVERY DAY 15 mL 5    metFORMIN (GLUCOPHAGE) 1000 MG tablet Take 1 tablet (1,000 mg total) by mouth 2 (two) times daily with " "meals. 180 tablet 0    multivit-min/iron/folic acid/K (ADULTS MULTIVITAMIN ORAL) Take by mouth.      mupirocin (BACTROBAN) 2 % ointment Apply topically 3 (three) times daily. 30 g 2    olmesartan-hydrochlorothiazide (BENICAR HCT) 40-25 mg per tablet TAKE 1 TABLET BY MOUTH DAILY 90 tablet 1    ondansetron (ZOFRAN) 4 MG tablet Take 1 tablet (4 mg total) by mouth every 6 (six) hours. 12 tablet 0    ONETOUCH DELICA PLUS LANCET 33 gauge Misc TEST ONCE D  0    potassium chloride SA (K-DUR,KLOR-CON) 10 MEQ tablet Take by mouth.      pravastatin (PRAVACHOL) 40 MG tablet TAKE 1 TABLET(40 MG) BY MOUTH EVERY EVENING 90 tablet 0    RYBELSUS 7 mg tablet Take 7 mg by mouth every morning.      UNABLE TO FIND medication name: Tart Cherry Extract      vitamin E 400 UNIT capsule Take 400 Units by mouth once daily.       Facility-Administered Encounter Medications as of 12/20/2022   Medication Dose Route Frequency Provider Last Rate Last Admin    acetaminophen tablet 650 mg  650 mg Oral Once PRN Robert Landrum MD        lactated ringers infusion   Intravenous Continuous Reyes Liz MD 10 mL/hr at 07/14/22 1239 Restarted at 07/14/22 1424    LIDOcaine (PF) 10 mg/ml (1%) injection 10 mg  1 mL Intradermal Once Reyes Liz MD        ondansetron disintegrating tablet 4 mg  4 mg Oral Once PRN Robert Landrum MD           Allergies:  Review of patient's allergies indicates:  No Known Allergies      Physical Exam      Vitals:    12/20/22 1013   BP: (!) 140/80   Pulse: 65   Resp: 18   Temp: 98 °F (36.7 °C)        Vital Signs  Temp: 98 °F (36.7 °C)  Temp src: Oral  Pulse: 65  Resp: 18  SpO2: 97 %  BP: (!) 140/80  BP Location: Right arm  Patient Position: Sitting  Pain Score: 0-No pain  Height and Weight  Height: 6' 2" (188 cm)  Weight: 100.5 kg (221 lb 10.8 oz)  BSA (Calculated - sq m): 2.29 sq meters  BMI (Calculated): 28.4  Weight in (lb) to have BMI = 25: 194.3]     Body mass index is 28.46 kg/m².    Physical " Exam  Constitutional:       Appearance: He is well-developed.   HENT:      Head: Normocephalic.   Eyes:      Pupils: Pupils are equal, round, and reactive to light.   Neck:      Thyroid: No thyromegaly.   Cardiovascular:      Rate and Rhythm: Normal rate and regular rhythm.      Heart sounds: No murmur heard.    No friction rub. No gallop.   Pulmonary:      Effort: Pulmonary effort is normal.      Breath sounds: Normal breath sounds.   Abdominal:      General: Bowel sounds are normal.      Palpations: Abdomen is soft.   Musculoskeletal:         General: Normal range of motion.      Cervical back: Normal range of motion.   Feet:      Right foot:      Protective Sensation: 4 sites tested.  2 sites sensed.      Left foot:      Protective Sensation: 4 sites tested.  2 sites sensed.   Skin:     General: Skin is warm and dry.   Neurological:      Mental Status: He is alert and oriented to person, place, and time.      Sensory: No sensory deficit.   Psychiatric:         Behavior: Behavior normal.        Laboratory:  CBC:  Recent Labs   Lab Result Units 11/21/22  0713   WBC K/uL 6.77   RBC M/uL 3.58*   Hemoglobin g/dL 11.7*   Hematocrit % 35.3*   Platelets K/uL 265   MCV fL 99*   MCH pg 32.7*   MCHC g/dL 33.1     CMP:  Recent Labs   Lab Result Units 11/21/22  0713   Glucose mg/dL 154*  154*   Calcium mg/dL 9.6  9.7   Albumin g/dL 4.2  4.0   Total Protein g/dL 6.9   Sodium mmol/L 136  137   Potassium mmol/L 4.3  4.3   CO2 mmol/L 24  25   Chloride mmol/L 104  105   BUN mg/dL 27*  27*   Alkaline Phosphatase U/L 93   ALT U/L 36   AST U/L 26   Total Bilirubin mg/dL 0.4     URINALYSIS:  No results for input(s): COLORU, CLARITYU, SPECGRAV, PHUR, PROTEINUA, GLUCOSEU, BILIRUBINCON, BLOODU, WBCU, RBCU, BACTERIA, MUCUS, NITRITE, LEUKOCYTESUR, UROBILINOGEN, HYALINECASTS in the last 2160 hours.   LIPIDS:  Recent Labs   Lab Result Units 10/14/22  0000 11/21/22  0713   HDL mg/dL 46 43   Cholesterol mg/dL  --  115*   Triglycerides  mg/dL 117 123   LDL Cholesterol mg/dL 61 47.4*   HDL/Cholesterol Ratio %  --  37.4   Non-HDL Cholesterol mg/dL  --  72   Total Cholesterol/HDL Ratio   --  2.7     TSH:  No results for input(s): TSH in the last 2160 hours.  A1C:  Recent Labs   Lab Result Units 10/14/22  0000 11/21/22  0713   Hemoglobin A1C % 6.4* 6.6*       Radiology:        Assessment:     Yobany Richardson is a 67 y.o.male with:    Diabetic polyneuropathy associated with type 2 diabetes mellitus    Type 2 diabetes mellitus with diabetic polyneuropathy, with long-term current use of insulin    Hypertensive heart disease without heart failure    History of pulmonary embolism    Controlled type 2 diabetes mellitus with stage 2 chronic kidney disease, with long-term current use of insulin    CKD (chronic kidney disease) stage 2, GFR 60-89 ml/min    Coronary artery disease involving native coronary artery of native heart without angina pectoris    Atrial flutter, paroxysmal                Plan:     Problem List Items Addressed This Visit          Neuro    Diabetic polyneuropathy associated with type 2 diabetes mellitus - Primary    Overview     Stable on gabapentin            Cardiac/Vascular    Hypertensive heart disease without heart failure    Overview     Cont current          Atrial flutter, paroxysmal    Overview     Taking plavix only . Had only one episode dr pradhan managing          CAD (coronary artery disease)    Overview     Is seeing dr pradhan , had stress test yesterday             Renal/    CKD (chronic kidney disease) stage 2, GFR 60-89 ml/min    Overview     Stable             Hematology    History of pulmonary embolism    Overview     1/2016 stable on plavix currently             Endocrine    Type 2 diabetes mellitus with diabetic polyneuropathy, with long-term current use of insulin    Overview     6.6  a1c with dr quiñonez .    mickie done 12/30/21  Cont current          Controlled type 2 diabetes mellitus with stage 2 chronic kidney disease,  with long-term current use of insulin    Overview     Stable . Now seeing dr cleveland nephrology has f/u in April             As above, continue current medications and maintain follow up with specialists.  Return to clinic in 6 months.      Frederick W Dantagnan [facility] [place]

## 2023-02-03 ENCOUNTER — PATIENT OUTREACH (OUTPATIENT)
Dept: ADMINISTRATIVE | Facility: HOSPITAL | Age: 68
End: 2023-02-03
Payer: COMMERCIAL

## 2023-02-03 ENCOUNTER — PATIENT MESSAGE (OUTPATIENT)
Dept: ADMINISTRATIVE | Facility: HOSPITAL | Age: 68
End: 2023-02-03
Payer: COMMERCIAL

## 2023-02-03 NOTE — PROGRESS NOTES
Health Maintenance Due   Topic Date Due    COVID-19 Vaccine (1) Never done    Pneumococcal Vaccines (Age 65+) (1 - PCV) Never done    HIV Screening  Never done    Colorectal Cancer Screening  Never done    Shingles Vaccine (1 of 2) Never done    TETANUS VACCINE  06/01/2017    Influenza Vaccine (1) Never done    Eye Exam  12/31/2022     Chart review done. HM updated. Immunizations reviewed & updated. Care Everywhere updated.  Portal Message sent re:  DM Eye Exam

## 2023-02-06 ENCOUNTER — PATIENT OUTREACH (OUTPATIENT)
Dept: ADMINISTRATIVE | Facility: HOSPITAL | Age: 68
End: 2023-02-06
Payer: COMMERCIAL

## 2023-02-06 NOTE — PROGRESS NOTES
Health Maintenance Due   Topic Date Due    COVID-19 Vaccine (1) Never done    Pneumococcal Vaccines (Age 65+) (1 - PCV) Never done    HIV Screening  Never done    Colorectal Cancer Screening  Never done    Shingles Vaccine (1 of 2) Never done    TETANUS VACCINE  06/01/2017    Influenza Vaccine (1) Never done    Eye Exam  12/31/2022     Chart review done. HM updated. Immunizations reviewed & updated. Care Everywhere updated.  Portal Message sent re:  DM Eye Exam. Pt states he had exam at [facility]. Replied to message asking for location.

## 2023-02-07 ENCOUNTER — PATIENT OUTREACH (OUTPATIENT)
Dept: ADMINISTRATIVE | Facility: HOSPITAL | Age: 68
End: 2023-02-07
Payer: COMMERCIAL

## 2023-02-07 NOTE — LETTER
AUTHORIZATION FOR RELEASE OF   CONFIDENTIAL INFORMATION    Dear [facility],    We are seeing Yobany Richardson, date of birth 1955, in the clinic at [facility]. Alirio Walters MD is the patient's PCP. Yobany Richardson has an outstanding lab/procedure at the time we reviewed his chart. In order to help keep his health information updated, he has authorized us to request the following medical record(s):        (  )  MAMMOGRAM                                      (  )  COLONOSCOPY      (  )  PAP SMEAR                                          (  )  OUTSIDE LAB RESULTS     (  )  DEXA SCAN                                          (XX)  DIABETIC EYE EXAM            (  )  FOOT EXAM                                          (  )  ENTIRE RECORD     (  )  OUTSIDE IMMUNIZATIONS                 (  )  _______________         Please fax records to Alirio Walters MD, [phone]     If you have any questions, please contact Helena Anne LPN at [phone].           Patient Name: Yobany Richardson  : 1955  Patient Phone #: [phone]

## 2023-02-07 NOTE — PROGRESS NOTES
Health Maintenance Due   Topic Date Due    COVID-19 Vaccine (1) Never done    Pneumococcal Vaccines (Age 65+) (1 - PCV) Never done    HIV Screening  Never done    Colorectal Cancer Screening  Never done    Shingles Vaccine (1 of 2) Never done    TETANUS VACCINE  06/01/2017    Influenza Vaccine (1) Never done    Eye Exam  12/31/2022     Chart review done. HM updated. Immunizations reviewed & updated. Care Everywhere updated.  AASHISH sent to [facility] for DM Eye Exam.

## 2023-02-13 ENCOUNTER — PATIENT OUTREACH (OUTPATIENT)
Dept: ADMINISTRATIVE | Facility: HOSPITAL | Age: 68
End: 2023-02-13
Payer: COMMERCIAL

## 2023-02-13 DIAGNOSIS — I48.92 ATRIAL FLUTTER, PAROXYSMAL: ICD-10-CM

## 2023-02-13 RX ORDER — PRAVASTATIN SODIUM 40 MG/1
40 TABLET ORAL NIGHTLY
Qty: 90 TABLET | Refills: 3 | Status: SHIPPED | OUTPATIENT
Start: 2023-02-13 | End: 2024-02-22 | Stop reason: SDUPTHER

## 2023-02-13 NOTE — TELEPHONE ENCOUNTER
No new care gaps identified.  Health [facility] Embedded Care Gaps. Reference number: [id number]. 2/13/2023   5:14:44 AM CST

## 2023-02-13 NOTE — PROGRESS NOTES
Health Maintenance Due   Topic Date Due    COVID-19 Vaccine (1) Never done    Pneumococcal Vaccines (Age 65+) (1 - PCV) Never done    HIV Screening  Never done    Colorectal Cancer Screening  Never done    Shingles Vaccine (1 of 2) Never done    TETANUS VACCINE  06/01/2017    Influenza Vaccine (1) Never done     Triggered LINKS. Updated Care Everywhere. Imported outside diabetic eye exam results received from [facility] into . Chart review completed.

## 2023-02-15 PROBLEM — I48.0 PAROXYSMAL ATRIAL FIBRILLATION: Status: ACTIVE | Noted: 2023-02-15

## 2023-02-16 ENCOUNTER — PATIENT OUTREACH (OUTPATIENT)
Dept: ADMINISTRATIVE | Facility: HOSPITAL | Age: 68
End: 2023-02-16
Payer: COMMERCIAL

## 2023-02-16 NOTE — PROGRESS NOTES
Health Maintenance Due   Topic Date Due    COVID-19 Vaccine (1) Never done    Pneumococcal Vaccines (Age 65+) (1 - PCV) Never done    HIV Screening  Never done    Colorectal Cancer Screening  Never done    Shingles Vaccine (1 of 2) Never done    TETANUS VACCINE  06/01/2017    Influenza Vaccine (1) Never done     Chart review done. HM updated. Immunizations reviewed & updated. Care Everywhere updated.  Confirmed DM Eye Exam scanned into chart. Informed pt report was received.

## 2023-03-20 ENCOUNTER — TELEPHONE (OUTPATIENT)
Dept: NEPHROLOGY | Facility: CLINIC | Age: 68
End: 2023-03-20
Payer: MEDICARE

## 2023-03-20 NOTE — TELEPHONE ENCOUNTER
----- Message from Milly Ramey sent at 3/20/2023  8:19 AM CDT -----  Contact: Patient  Type:  Needs Medical Advice    Who Called: Patient    Pharmacy name and phone #:    [id number] - [place] [address] [address] [facility] [address] [facility]  [address] [place] [id number]  Phone: [phone] Fax: [phone]      Would the patient rather a call back or a response via [facility]? Call    Best Call Back Number: [phone] (home)     Additional Information: Patient called to reschedule appt. Epic would not allow rescheduling. Please call to advise

## 2023-03-24 PROBLEM — Z86.79 S/P ABLATION OF ATRIAL FIBRILLATION: Status: ACTIVE | Noted: 2023-03-24

## 2023-03-24 PROBLEM — Z98.890 S/P ABLATION OF ATRIAL FIBRILLATION: Status: ACTIVE | Noted: 2023-03-24

## 2023-03-29 ENCOUNTER — TELEPHONE (OUTPATIENT)
Dept: PODIATRY | Facility: CLINIC | Age: 68
End: 2023-03-29
Payer: MEDICARE

## 2023-03-29 NOTE — TELEPHONE ENCOUNTER
----- Message from Garfield Muñoz sent at 3/29/2023 10:18 AM CDT -----  Contact: pt  Type:  Same Day Appointment Request    Caller is requesting a same day appointment.  Caller declined first available appointment listed below.      Name of Caller:  pt   When is the first available appointment?  04/20  Symptoms:  sore on foot  Best Call Back Number:  [phone]    Additional Information:   pt would like to be seen today if possible. Please advise

## 2023-04-03 ENCOUNTER — OFFICE VISIT (OUTPATIENT)
Dept: PODIATRY | Facility: CLINIC | Age: 68
End: 2023-04-03
Payer: COMMERCIAL

## 2023-04-03 VITALS — BODY MASS INDEX: 26.56 KG/M2 | WEIGHT: 207 LBS | HEIGHT: 74 IN

## 2023-04-03 DIAGNOSIS — L97.511 DIABETIC ULCER OF TOE OF RIGHT FOOT ASSOCIATED WITH TYPE 2 DIABETES MELLITUS, LIMITED TO BREAKDOWN OF SKIN: Primary | ICD-10-CM

## 2023-04-03 DIAGNOSIS — E11.621 DIABETIC ULCER OF TOE OF RIGHT FOOT ASSOCIATED WITH TYPE 2 DIABETES MELLITUS, LIMITED TO BREAKDOWN OF SKIN: Primary | ICD-10-CM

## 2023-04-03 DIAGNOSIS — M20.41 HAMMER TOE OF RIGHT FOOT: ICD-10-CM

## 2023-04-03 DIAGNOSIS — E11.42 DIABETIC POLYNEUROPATHY ASSOCIATED WITH TYPE 2 DIABETES MELLITUS: ICD-10-CM

## 2023-04-03 PROCEDURE — 3288F PR FALLS RISK ASSESSMENT DOCUMENTED: ICD-10-PCS | Mod: CPTII,S$GLB,, | Performed by: PODIATRIST

## 2023-04-03 PROCEDURE — 99999 PR PBB SHADOW E&M-EST. PATIENT-LVL II: ICD-10-PCS | Mod: PBBFAC,,, | Performed by: PODIATRIST

## 2023-04-03 PROCEDURE — 99214 PR OFFICE/OUTPT VISIT, EST, LEVL IV, 30-39 MIN: ICD-10-PCS | Mod: 25,S$GLB,, | Performed by: PODIATRIST

## 2023-04-03 PROCEDURE — 99999 PR PBB SHADOW E&M-EST. PATIENT-LVL II: CPT | Mod: PBBFAC,,, | Performed by: PODIATRIST

## 2023-04-03 PROCEDURE — 87075 CULTR BACTERIA EXCEPT BLOOD: CPT | Performed by: PODIATRIST

## 2023-04-03 PROCEDURE — 1159F PR MEDICATION LIST DOCUMENTED IN MEDICAL RECORD: ICD-10-PCS | Mod: CPTII,S$GLB,, | Performed by: PODIATRIST

## 2023-04-03 PROCEDURE — 87077 CULTURE AEROBIC IDENTIFY: CPT | Performed by: PODIATRIST

## 2023-04-03 PROCEDURE — 1126F AMNT PAIN NOTED NONE PRSNT: CPT | Mod: CPTII,S$GLB,, | Performed by: PODIATRIST

## 2023-04-03 PROCEDURE — 87070 CULTURE OTHR SPECIMN AEROBIC: CPT | Performed by: PODIATRIST

## 2023-04-03 PROCEDURE — 3051F HG A1C>EQUAL 7.0%<8.0%: CPT | Mod: CPTII,S$GLB,, | Performed by: PODIATRIST

## 2023-04-03 PROCEDURE — 97597 DBRDMT OPN WND 1ST 20 CM/<: CPT | Mod: S$GLB,,, | Performed by: PODIATRIST

## 2023-04-03 PROCEDURE — 99214 OFFICE O/P EST MOD 30 MIN: CPT | Mod: 25,S$GLB,, | Performed by: PODIATRIST

## 2023-04-03 PROCEDURE — 1126F PR PAIN SEVERITY QUANTIFIED, NO PAIN PRESENT: ICD-10-PCS | Mod: CPTII,S$GLB,, | Performed by: PODIATRIST

## 2023-04-03 PROCEDURE — 1101F PR PT FALLS ASSESS DOC 0-1 FALLS W/OUT INJ PAST YR: ICD-10-PCS | Mod: CPTII,S$GLB,, | Performed by: PODIATRIST

## 2023-04-03 PROCEDURE — 1101F PT FALLS ASSESS-DOCD LE1/YR: CPT | Mod: CPTII,S$GLB,, | Performed by: PODIATRIST

## 2023-04-03 PROCEDURE — 87186 SC STD MICRODIL/AGAR DIL: CPT | Performed by: PODIATRIST

## 2023-04-03 PROCEDURE — 3288F FALL RISK ASSESSMENT DOCD: CPT | Mod: CPTII,S$GLB,, | Performed by: PODIATRIST

## 2023-04-03 PROCEDURE — 97597 WOUND DEBRIDEMENT: ICD-10-PCS | Mod: S$GLB,,, | Performed by: PODIATRIST

## 2023-04-03 PROCEDURE — 3008F BODY MASS INDEX DOCD: CPT | Mod: CPTII,S$GLB,, | Performed by: PODIATRIST

## 2023-04-03 PROCEDURE — 1159F MED LIST DOCD IN RCRD: CPT | Mod: CPTII,S$GLB,, | Performed by: PODIATRIST

## 2023-04-03 PROCEDURE — 3051F PR MOST RECENT HEMOGLOBIN A1C LEVEL 7.0 - < 8.0%: ICD-10-PCS | Mod: CPTII,S$GLB,, | Performed by: PODIATRIST

## 2023-04-03 PROCEDURE — 3008F PR BODY MASS INDEX (BMI) DOCUMENTED: ICD-10-PCS | Mod: CPTII,S$GLB,, | Performed by: PODIATRIST

## 2023-04-03 RX ORDER — MUPIROCIN 20 MG/G
OINTMENT TOPICAL DAILY
Qty: 30 G | Refills: 1 | Status: SHIPPED | OUTPATIENT
Start: 2023-04-03

## 2023-04-04 NOTE — PROCEDURES
"Wound Debridement    Date/Time: 4/3/2023 3:20 PM  Performed by: Augusto Simeon DPM  Authorized by: Augusto Simeon DPM     Time out: Immediately prior to procedure a "time out" was called to verify the correct patient, procedure, equipment, support staff and site/side marked as required.    Consent Done?:  Yes (Verbal)  Local anesthesia used?: No      Wound Details:    Location:  Right foot    Location:  Right 2nd Toe    Type of Debridement:  Excisional       Length (cm):  1       Area (sq cm):  1       Width (cm):  1       Percent Debrided (%):  100       Depth (cm):  0.1       Total Area Debrided (sq cm):  1    Depth of debridement:  Epidermis/Dermis    Tissue debrided:  Dermis    Devitalized tissue debrided:  Fibrin    Instruments:  Blade  Bleeding:  Minimal  Hemostasis Achieved: Yes  Method Used:  Pressure  Patient tolerance:  Patient tolerated the procedure well with no immediate complications  "

## 2023-04-04 NOTE — PROGRESS NOTES
Subjective:      Patient ID: Yobany Richardson is a 67 y.o. male.    Chief Complaint: Wound Care    Yobany is a 67 y.o. male with a past medical history of Abnormal thallium stress test, Arrhythmia, Atrial flutter, paroxysmal (02/11/2016), CAD (coronary artery disease) (03/09/2016), Coronary artery disease, Diabetes mellitus, Diabetes mellitus, type 2, Disorder of kidney and ureter (2016), Hypertension, Neuropathy, Paroxysmal atrial flutter, PE (pulmonary embolism), Pulmonary embolism (02/11/2016), Rheumatoid arthritis, Shortness of breath, Stented coronary artery (06/15/2016), Wears contact lenses, and Wears prescription eyeglasses. Patient presents to clinic with the chief complaint of a wound to the Rt. 2nd toe.  States this occurred over two weeks ago, after dropping an item on the limb.  Denies this being a source of pain, however, he attributes this to his neuropathy.  Notes applying antibiotic ointment and a bandage to the site daily.  Denies noticing signs of infection, however, states the area has been draining.  Denies experiencing N/V/F/C/D.  Denies any additional pedal complaints.      Hemoglobin A1C   Date Value Ref Range Status   03/23/2023 7.0 (H) 0.0 - 5.6 % Final     Comment:     Reference Interval:  5.0 - 5.6 Normal   5.7 - 6.4 High Risk   > 6.5 Diabetic      Hgb A1c results are standardized based on the (NGSP) National   Glycohemoglobin Standardization Program.      Hemoglobin A1C levels are related to mean serum/plasma glucose   during the preceding 2-3 months.        11/21/2022 6.6 (H) 4.0 - 5.6 % Final     Comment:     ADA Screening Guidelines:  5.7-6.4%  Consistent with prediabetes  >or=6.5%  Consistent with diabetes    High levels of fetal hemoglobin interfere with the HbA1C  assay. Heterozygous hemoglobin variants (HbS, HgC, etc)do  not significantly interfere with this assay.   However, presence of multiple variants may affect accuracy.     10/14/2022 6.4 (A) 5.7 % Final   07/16/2021 7.3 (H) 4.0 -  5.6 % Final     Comment:     ADA Screening Guidelines:  5.7-6.4%  Consistent with prediabetes  >or=6.5%  Consistent with diabetes    High levels of fetal hemoglobin interfere with the HbA1C  assay. Heterozygous hemoglobin variants (HbS, HgC, etc)do  not significantly interfere with this assay.   However, presence of multiple variants may affect accuracy.           Past Medical History:   Diagnosis Date    Abnormal thallium stress test     Arrhythmia     Atrial flutter, paroxysmal 02/11/2016    CAD (coronary artery disease) 03/09/2016 5/2016 [facility] Left main:NL LAD: 35% proximal LAD. two small diagonals with minimal disease.  Circumflex/Large branching first obtuse marginal: with minimal disease.   RCA: The vessel was large sized (dominant) with a 60% proximal lesion. 100 mcgs of intracoronary Nitroglycerin were given with no change in the lesion. Thus FFR was performed. FFR was 0.79  2/2016 cor CTA ~~ 50% LAD    Coronary artery disease     Diabetes mellitus     Diabetes mellitus, type 2     Disorder of kidney and ureter 2016    KIDNEY STONE    Hypertension     Neuropathy     Paroxysmal atrial flutter     PE (pulmonary embolism)     Pulmonary embolism 02/11/2016 1/2016     Rheumatoid arthritis     Shortness of breath     Stented coronary artery 06/15/2016    5/2016 RCA- synergy 3.5x12    Wears contact lenses     Wears prescription eyeglasses        Past Surgical History:   Procedure Laterality Date    ABLATION Left 3/23/2023    Procedure: Ablation;  Surgeon: Yobany Cronin III, MD;  Location: [facility] CATH;  Service: Cardiology;  Laterality: Left;    ARTHROPLASTY OF TOE Right 07/14/2022    Procedure: ARTHROPLASTY, TOE;  Surgeon: Augusto Simeon DPM;  Location: [facility] OR;  Service: Podiatry;  Laterality: Right;  Hallux    CARDIAC CATHETERIZATION      with stent placed x 1    KNEE ARTHROSCOPY Left 1985    TONSILLECTOMY         Family History   Problem Relation Age of Onset    Cancer Mother     Angina Mother     Heart  "disease Mother     Hypertension Mother     Kidney disease Father     Thyroid disease Sister     Bell's palsy Sister     Thyroid disease Sister     Depression Sister     Depression Sister     Cancer Sister        Social History     Socioeconomic History    Marital status:    Tobacco Use    Smoking status: Never    Smokeless tobacco: Never   Substance and Sexual Activity    Alcohol use: No    Drug use: No       Current Outpatient Medications   Medication Sig Dispense Refill    amLODIPine (NORVASC) 10 MG tablet Take 1 tablet (10 mg total) by mouth once daily. 30 tablet 5    apixaban (ELIQUIS) 5 mg Tab Take 5 mg by mouth 2 (two) times daily.      APPLE CIDER VINEGAR ORAL Take by mouth.      ascorbic acid (VITAMIN C) 500 MG tablet Take 500 mg by mouth once daily.      aspirin 81 MG Chew Take 81 mg by mouth once daily.      b complex vitamins capsule Take 1 capsule by mouth once daily.      BD AMAN 2ND GEN PEN NEEDLE 32 gauge x 5/32" Ndle USE TO INJECT INTO SKIN EVERY  each 3    blood sugar diagnostic Strp To check BG 2 times daily, to use with insurance preferred meter  E 11.65 50 each 1    blood-glucose meter kit To check BG 2 times daily, to use with insurance preferred meter  Dx E 11.65 1 each 1    carvediloL (COREG) 6.25 MG tablet TAKE 1 TABLET(6.25 MG) BY MOUTH TWICE DAILY 180 tablet 4    clopidogreL (PLAVIX) 75 mg tablet TAKE 1 TABLET BY MOUTH EVERY DAY 90 tablet 4    cyanocobalamin, vitamin B-12, 1,000 mcg/15 mL Liqd Take by mouth. Takes a tablet      FARXIGA 5 mg Tab tablet Take 5 mg by mouth every evening.      FREESTYLE PASCUAL 14 DAY SENSOR Kit APPLY NEW SENSOR EVERY 14 DAYS AS DIRECTED      FREESTYLE LITE STRIPS Strp TEST 2 TO 3 TIMES D  2    gabapentin (NEURONTIN) 600 MG tablet Take 1 tablet (600 mg total) by mouth 2 (two) times daily. (Patient taking differently: Take 600 mg by mouth 2 (two) times daily as needed.) 60 tablet 11    insulin glargine,hum.rec.anlog (SEMGLEE U-100 INSULIN SUBQ) " Inject 20 Units into the skin nightly as needed.      lancets Misc To check BG 2 times daily, to use with insurance preferred meter  DX E 11.65 50 each 1    metFORMIN (GLUCOPHAGE) 1000 MG tablet Take 1 tablet (1,000 mg total) by mouth 2 (two) times daily with meals. 180 tablet 3    multivit-min/iron/folic acid/K (ADULTS MULTIVITAMIN ORAL) Take by mouth.      olmesartan-hydrochlorothiazide (BENICAR HCT) 40-25 mg per tablet TAKE 1 TABLET BY MOUTH DAILY 90 tablet 1    omeprazole (PRILOSEC) 40 MG capsule Take 1 capsule (40 mg total) by mouth every morning. 30 capsule 0    ondansetron (ZOFRAN) 4 MG tablet Take 1 tablet (4 mg total) by mouth every 6 (six) hours. 12 tablet 0    ONETOUCH DELICA PLUS LANCET 33 gauge Misc TEST ONCE D  0    potassium chloride SA (K-DUR,KLOR-CON) 10 MEQ tablet Take by mouth.      pravastatin (PRAVACHOL) 40 MG tablet Take 1 tablet (40 mg total) by mouth every evening. 90 tablet 3    RYBELSUS 7 mg tablet Take 7 mg by mouth every morning.      sucralfate (CARAFATE) 1 gram tablet Take 1 tablet (1 g total) by mouth 4 (four) times daily before meals and nightly. Dissolve each dose in a small amount of water 120 tablet 0    vitamin E 400 UNIT capsule Take 400 Units by mouth once daily.      mupirocin (BACTROBAN) 2 % ointment Apply topically once daily. 30 g 1     No current facility-administered medications for this visit.     Facility-Administered Medications Ordered in Other Visits   Medication Dose Route Frequency Provider Last Rate Last Admin    lactated ringers infusion   Intravenous Continuous Reyes Liz MD 10 mL/hr at 07/14/22 1239 New Bag at 03/23/23 0810    LIDOcaine (PF) 10 mg/ml (1%) injection 10 mg  1 mL Intradermal Once Reyes Liz MD        ondansetron injection 4 mg  4 mg Intravenous Daily PRN Adria River MD           Review of patient's allergies indicates:  No Known Allergies      Review of Systems   Constitutional: Negative for chills and fever.    Cardiovascular:  Negative for claudication and leg swelling.   Skin:  Positive for color change and nail changes. Negative for poor wound healing.   Musculoskeletal:  Positive for arthritis. Negative for joint pain and joint swelling.   Gastrointestinal:  Negative for nausea and vomiting.   Neurological:  Positive for numbness. Negative for paresthesias.   Psychiatric/Behavioral:  Negative for altered mental status.          Objective:      Physical Exam  Constitutional:       General: He is not in acute distress.     Appearance: He is well-developed. He is not diaphoretic.   Cardiovascular:      Pulses:           Dorsalis pedis pulses are 2+ on the right side and 2+ on the left side.        Posterior tibial pulses are 2+ on the right side and 2+ on the left side.      Comments: CFT < 3 seconds bilateral.  Pedal hair growth present bilateral.   No varicosities noted bilateral.  No lower extremity swelling noted. Toes are warm to touch bilateral.    Musculoskeletal:         General: Deformity present. No tenderness.      Left lower leg: No edema.      Comments: Muscle strength 5/5 in all muscle groups bilateral.  No tenderness nor crepitation with ROM of foot/ankle joints bilateral.  Arthritic changes changes noted to the dorsal and medial aspect of the Rt. 1st mtp joint.   More rectus alignment of the Rt. Hallux with increased motion of the IP joint S/P arthroplasty.     Skin:     General: Skin is warm and dry.      Capillary Refill: Capillary refill takes less than 2 seconds.      Coloration: Skin is not pale.      Findings: Wound present. No abrasion, bruising, burn, ecchymosis, erythema, lesion, petechiae or rash.      Nails: There is no clubbing.      Comments: Location: Open wound noted to the distal tip of the Rt. 2nd toe.  Base: Down to dermis and comprised of fibrin.    Drainage: None  Tiffany wound: Devoid of erythema, edema, fluctuance, purulence, and malodor.   Pre-debridement measurement: 1 x 1 x 0.1cm    Post-debridement measurement: 1.2 x 1.2 x 0.1cm    Neurological:      Mental Status: He is alert and oriented to person, place, and time.      Sensory: Sensory deficit present.      Motor: No weakness or atrophy.      Comments: Protective sensation per [place]-Yash monofilament absent bilateral.    Light touch intact bilateral.           Assessment:       Encounter Diagnoses   Name Primary?    Diabetic ulcer of toe of right foot associated with type 2 diabetes mellitus, limited to breakdown of skin Yes    Hammer toe of right foot     Diabetic polyneuropathy associated with type 2 diabetes mellitus          Plan:       Yobany was seen today for wound care.    Diagnoses and all orders for this visit:    Diabetic ulcer of toe of right foot associated with type 2 diabetes mellitus, limited to breakdown of skin  -     Aerobic culture  -     Culture, Anaerobic  -     mupirocin (BACTROBAN) 2 % ointment; Apply topically once daily.    Hammer toe of right foot    Diabetic polyneuropathy associated with type 2 diabetes mellitus      I counseled the patient on his conditions, their implications and medical management.    Discussed with patient the associated risks and benefits associated with a selective excisional debridement of the Rt. Foot.  Consent was read, signed, and witnessed.  See attached procedure note.      The wound base was covered with iodosorb ointment, and a toe football was applied.    Wound cultures were obtained.    Advised to keep the dressing CDI until tomorrow.  Patient to then resume application of bactroban and a bandage to the site.    Instructed to minimize weight bearing to facilitate wound healing.    May wear a supportive shoe to minimize pressure to the affected toe.    Patient was fitted with a crest pad to eliminate pressure to the tip of the digit.    Discussed having a flexor tenotomy of the Rt. 2nd digit to prevent future ulceration going forward.  Patient is amenable to said plan.  Will  schedule this for the next appointment.    Follow up in about 11 days (around 4/14/2023).    Augusto Simeon DPM

## 2023-04-07 ENCOUNTER — PATIENT MESSAGE (OUTPATIENT)
Dept: PODIATRY | Facility: CLINIC | Age: 68
End: 2023-04-07
Payer: MEDICARE

## 2023-04-07 DIAGNOSIS — L08.9 DIABETIC FOOT INFECTION: Primary | ICD-10-CM

## 2023-04-07 DIAGNOSIS — E11.628 DIABETIC FOOT INFECTION: Primary | ICD-10-CM

## 2023-04-07 LAB — BACTERIA SPEC AEROBE CULT: ABNORMAL

## 2023-04-07 RX ORDER — SULFAMETHOXAZOLE AND TRIMETHOPRIM 800; 160 MG/1; MG/1
1 TABLET ORAL 2 TIMES DAILY
Qty: 14 TABLET | Refills: 0 | Status: SHIPPED | OUTPATIENT
Start: 2023-04-07 | End: 2023-04-28 | Stop reason: SDUPTHER

## 2023-04-09 LAB — BACTERIA SPEC ANAEROBE CULT: NORMAL

## 2023-04-14 ENCOUNTER — TELEPHONE (OUTPATIENT)
Dept: PODIATRY | Facility: CLINIC | Age: 68
End: 2023-04-14
Payer: MEDICARE

## 2023-04-14 NOTE — TELEPHONE ENCOUNTER
pt stated no concerns or issues and can wait til his scheduled appointment on the 4th. Pt stated he would call back if any changes occur

## 2023-04-19 LAB
CHOLEST SERPL-MSCNC: 115 MG/DL (ref 0–200)
EGFR: 41
HBA1C MFR BLD: 6 % (ref 4–6)
HDLC SERPL-MCNC: 47 MG/DL (ref 35–70)
LDLC SERPL CALC-MCNC: 48 MG/DL (ref 0–160)
TRIGL SERPL-MCNC: 115 MG/DL (ref 40–160)

## 2023-04-21 ENCOUNTER — PATIENT MESSAGE (OUTPATIENT)
Dept: ADMINISTRATIVE | Facility: HOSPITAL | Age: 68
End: 2023-04-21
Payer: MEDICARE

## 2023-04-27 PROBLEM — R06.81 BREATHLESSNESS ON EXERTION: Status: ACTIVE | Noted: 2023-04-27

## 2023-04-28 DIAGNOSIS — E11.628 DIABETIC FOOT INFECTION: ICD-10-CM

## 2023-04-28 DIAGNOSIS — L08.9 DIABETIC FOOT INFECTION: ICD-10-CM

## 2023-04-29 RX ORDER — SULFAMETHOXAZOLE AND TRIMETHOPRIM 800; 160 MG/1; MG/1
1 TABLET ORAL 2 TIMES DAILY
Qty: 14 TABLET | Refills: 0 | Status: ON HOLD | OUTPATIENT
Start: 2023-04-29 | End: 2023-05-10 | Stop reason: ALTCHOICE

## 2023-05-04 ENCOUNTER — OFFICE VISIT (OUTPATIENT)
Dept: PODIATRY | Facility: CLINIC | Age: 68
End: 2023-05-04
Payer: COMMERCIAL

## 2023-05-04 DIAGNOSIS — M20.41 HAMMER TOE OF RIGHT FOOT: ICD-10-CM

## 2023-05-04 DIAGNOSIS — E11.42 DIABETIC POLYNEUROPATHY ASSOCIATED WITH TYPE 2 DIABETES MELLITUS: ICD-10-CM

## 2023-05-04 DIAGNOSIS — L97.511 DIABETIC ULCER OF TOE OF RIGHT FOOT ASSOCIATED WITH TYPE 2 DIABETES MELLITUS, LIMITED TO BREAKDOWN OF SKIN: Primary | ICD-10-CM

## 2023-05-04 DIAGNOSIS — E11.621 DIABETIC ULCER OF TOE OF RIGHT FOOT ASSOCIATED WITH TYPE 2 DIABETES MELLITUS, LIMITED TO BREAKDOWN OF SKIN: Primary | ICD-10-CM

## 2023-05-04 PROCEDURE — 1101F PT FALLS ASSESS-DOCD LE1/YR: CPT | Mod: CPTII,S$GLB,, | Performed by: PODIATRIST

## 2023-05-04 PROCEDURE — 1159F PR MEDICATION LIST DOCUMENTED IN MEDICAL RECORD: ICD-10-PCS | Mod: CPTII,S$GLB,, | Performed by: PODIATRIST

## 2023-05-04 PROCEDURE — 99999 PR PBB SHADOW E&M-EST. PATIENT-LVL IV: CPT | Mod: PBBFAC,,, | Performed by: PODIATRIST

## 2023-05-04 PROCEDURE — 99999 PR PBB SHADOW E&M-EST. PATIENT-LVL IV: ICD-10-PCS | Mod: PBBFAC,,, | Performed by: PODIATRIST

## 2023-05-04 PROCEDURE — 97597 DBRDMT OPN WND 1ST 20 CM/<: CPT | Mod: S$GLB,,, | Performed by: PODIATRIST

## 2023-05-04 PROCEDURE — 97597 WOUND DEBRIDEMENT: ICD-10-PCS | Mod: S$GLB,,, | Performed by: PODIATRIST

## 2023-05-04 PROCEDURE — 3051F PR MOST RECENT HEMOGLOBIN A1C LEVEL 7.0 - < 8.0%: ICD-10-PCS | Mod: CPTII,S$GLB,, | Performed by: PODIATRIST

## 2023-05-04 PROCEDURE — 1126F AMNT PAIN NOTED NONE PRSNT: CPT | Mod: CPTII,S$GLB,, | Performed by: PODIATRIST

## 2023-05-04 PROCEDURE — 1126F PR PAIN SEVERITY QUANTIFIED, NO PAIN PRESENT: ICD-10-PCS | Mod: CPTII,S$GLB,, | Performed by: PODIATRIST

## 2023-05-04 PROCEDURE — 3288F FALL RISK ASSESSMENT DOCD: CPT | Mod: CPTII,S$GLB,, | Performed by: PODIATRIST

## 2023-05-04 PROCEDURE — 87075 CULTR BACTERIA EXCEPT BLOOD: CPT | Performed by: PODIATRIST

## 2023-05-04 PROCEDURE — 3288F PR FALLS RISK ASSESSMENT DOCUMENTED: ICD-10-PCS | Mod: CPTII,S$GLB,, | Performed by: PODIATRIST

## 2023-05-04 PROCEDURE — 99499 NO LOS: ICD-10-PCS | Mod: S$GLB,,, | Performed by: PODIATRIST

## 2023-05-04 PROCEDURE — 3051F HG A1C>EQUAL 7.0%<8.0%: CPT | Mod: CPTII,S$GLB,, | Performed by: PODIATRIST

## 2023-05-04 PROCEDURE — 99499 UNLISTED E&M SERVICE: CPT | Mod: S$GLB,,, | Performed by: PODIATRIST

## 2023-05-04 PROCEDURE — 87070 CULTURE OTHR SPECIMN AEROBIC: CPT | Performed by: PODIATRIST

## 2023-05-04 PROCEDURE — 1159F MED LIST DOCD IN RCRD: CPT | Mod: CPTII,S$GLB,, | Performed by: PODIATRIST

## 2023-05-04 PROCEDURE — 1101F PR PT FALLS ASSESS DOC 0-1 FALLS W/OUT INJ PAST YR: ICD-10-PCS | Mod: CPTII,S$GLB,, | Performed by: PODIATRIST

## 2023-05-07 NOTE — PROGRESS NOTES
Subjective:      Patient ID: Yobany Richardson is a 67 y.o. male.    Chief Complaint: Follow-up (Rt. Foot 2nd toe)    Yobany is a 67 y.o. male with a past medical history of Abnormal thallium stress test, Arrhythmia, Atrial flutter, paroxysmal (02/11/2016), CAD (coronary artery disease) (03/09/2016), Coronary artery disease, Diabetes mellitus, Diabetes mellitus, type 2, Disorder of kidney and ureter (2016), Hypertension, Neuropathy, Paroxysmal atrial flutter, PE (pulmonary embolism), Pulmonary embolism (02/11/2016), Rheumatoid arthritis, Shortness of breath, Stented coronary artery (06/15/2016), Wears contact lenses, and Wears prescription eyeglasses. Patient presents to clinic for a follow up regarding the wound to the Rt. 2nd toe.  Notes being unable to make the previously scheduled two weeks ago on account of a scheduling conflict.  He has been applying antibiotic ointment and a band aid to the affected digit daily.  Denies experiencing pain from said toe.  Denies experiencing N/V/F/C/D.  Denies any additional pedal complaints.      Hemoglobin A1C   Date Value Ref Range Status   03/23/2023 7.0 (H) 0.0 - 5.6 % Final     Comment:     Reference Interval:  5.0 - 5.6 Normal   5.7 - 6.4 High Risk   > 6.5 Diabetic      Hgb A1c results are standardized based on the (NGSP) National   Glycohemoglobin Standardization Program.      Hemoglobin A1C levels are related to mean serum/plasma glucose   during the preceding 2-3 months.        11/21/2022 6.6 (H) 4.0 - 5.6 % Final     Comment:     ADA Screening Guidelines:  5.7-6.4%  Consistent with prediabetes  >or=6.5%  Consistent with diabetes    High levels of fetal hemoglobin interfere with the HbA1C  assay. Heterozygous hemoglobin variants (HbS, HgC, etc)do  not significantly interfere with this assay.   However, presence of multiple variants may affect accuracy.     10/14/2022 6.4 (A) 5.7 % Final   07/16/2021 7.3 (H) 4.0 - 5.6 % Final     Comment:     ADA Screening  Guidelines:  5.7-6.4%  Consistent with prediabetes  >or=6.5%  Consistent with diabetes    High levels of fetal hemoglobin interfere with the HbA1C  assay. Heterozygous hemoglobin variants (HbS, HgC, etc)do  not significantly interfere with this assay.   However, presence of multiple variants may affect accuracy.           Past Medical History:   Diagnosis Date    Abnormal thallium stress test     Anticoagulant long-term use     Arrhythmia     Atrial flutter, paroxysmal 02/11/2016    CAD (coronary artery disease) 03/09/2016 5/2016 [facility] Left main:NL LAD: 35% proximal LAD. two small diagonals with minimal disease.  Circumflex/Large branching first obtuse marginal: with minimal disease.   RCA: The vessel was large sized (dominant) with a 60% proximal lesion. 100 mcgs of intracoronary Nitroglycerin were given with no change in the lesion. Thus FFR was performed. FFR was 0.79  2/2016 cor CTA ~~ 50% LAD    Coronary artery disease     Diabetes mellitus     Diabetes mellitus, type 2     Disorder of kidney and ureter 2016    KIDNEY STONE    Hypertension     Neuropathy     Paroxysmal atrial flutter     PE (pulmonary embolism)     Pulmonary embolism 02/11/2016 1/2016     Rheumatoid arthritis     Shortness of breath     Stented coronary artery 06/15/2016    5/2016 RCA- synergy 3.5x12    Wears contact lenses     Wears prescription eyeglasses        Past Surgical History:   Procedure Laterality Date    ABLATION Left 3/23/2023    Procedure: Ablation;  Surgeon: Yobany Cronin III, MD;  Location: [facility] CATH;  Service: Cardiology;  Laterality: Left;    ARTHROPLASTY OF TOE Right 07/14/2022    Procedure: ARTHROPLASTY, TOE;  Surgeon: Augusto Simeon DPM;  Location: [facility] OR;  Service: Podiatry;  Laterality: Right;  Hallux    CARDIAC CATHETERIZATION      with stent placed x 1    INSERTION OF IMPLANTABLE LOOP RECORDER N/A 5/3/2023    Procedure: Implantable Loop Recorder;  Surgeon: Yobany Cronin III, MD;  Location: [facility] CATH;  Service:  "Cardiology;  Laterality: N/A;    KNEE ARTHROSCOPY Left 1985    TONSILLECTOMY         Family History   Problem Relation Age of Onset    Cancer Mother     Angina Mother     Heart disease Mother     Hypertension Mother     Kidney disease Father     Thyroid disease Sister     Bell's palsy Sister     Thyroid disease Sister     Depression Sister     Depression Sister     Cancer Sister        Social History     Socioeconomic History    Marital status:    Tobacco Use    Smoking status: Never    Smokeless tobacco: Never   Substance and Sexual Activity    Alcohol use: No    Drug use: No       Current Outpatient Medications   Medication Sig Dispense Refill    amLODIPine (NORVASC) 10 MG tablet TAKE 1 TABLET(10 MG) BY MOUTH EVERY DAY 30 tablet 5    apixaban (ELIQUIS) 5 mg Tab Take 5 mg by mouth 2 (two) times daily.      APPLE CIDER VINEGAR ORAL Take by mouth.      ascorbic acid (VITAMIN C) 500 MG tablet Take 500 mg by mouth once daily.      b complex vitamins capsule Take 1 capsule by mouth once daily.      BD AMAN 2ND GEN PEN NEEDLE 32 gauge x 5/32" Ndle USE TO INJECT INTO SKIN EVERY  each 3    blood sugar diagnostic Strp To check BG 2 times daily, to use with insurance preferred meter  E 11.65 50 each 1    blood-glucose meter kit To check BG 2 times daily, to use with insurance preferred meter  Dx E 11.65 1 each 1    carvediloL (COREG) 6.25 MG tablet TAKE 1 TABLET(6.25 MG) BY MOUTH TWICE DAILY 180 tablet 4    clopidogreL (PLAVIX) 75 mg tablet TAKE 1 TABLET BY MOUTH EVERY DAY 90 tablet 4    cyanocobalamin, vitamin B-12, 1,000 mcg/15 mL Liqd Take by mouth. Takes a tablet      FARXIGA 5 mg Tab tablet Take 5 mg by mouth every evening.      FREESTYLE PASCUAL 14 DAY SENSOR Kit APPLY NEW SENSOR EVERY 14 DAYS AS DIRECTED      FREESTYLE LITE STRIPS Strp TEST 2 TO 3 TIMES D  2    furosemide (LASIX) 40 MG tablet Take 1 tablet (40 mg total) by mouth daily as needed (weigth gain > 3 lbs from baseline or increased shortness of " breath). 30 tablet 0    gabapentin (NEURONTIN) 600 MG tablet Take 1 tablet (600 mg total) by mouth 2 (two) times daily. 60 tablet 11    insulin glargine,hum.rec.anlog (SEMGLEE U-100 INSULIN SUBQ) Inject 20 Units into the skin nightly as needed.      lancets Misc To check BG 2 times daily, to use with insurance preferred meter  DX E 11.65 50 each 1    metFORMIN (GLUCOPHAGE) 1000 MG tablet Take 1 tablet (1,000 mg total) by mouth 2 (two) times daily with meals. 180 tablet 3    multivit-min/iron/folic acid/K (ADULTS MULTIVITAMIN ORAL) Take by mouth.      mupirocin (BACTROBAN) 2 % ointment Apply topically once daily. 30 g 1    olmesartan-hydrochlorothiazide (BENICAR HCT) 40-25 mg per tablet TAKE 1 TABLET BY MOUTH DAILY 90 tablet 1    ondansetron (ZOFRAN) 4 MG tablet Take 1 tablet (4 mg total) by mouth every 6 (six) hours. 12 tablet 0    ONETOUCH DELICA PLUS LANCET 33 gauge Misc TEST ONCE D  0    potassium chloride SA (K-DUR,KLOR-CON) 10 MEQ tablet Take by mouth.      pravastatin (PRAVACHOL) 40 MG tablet Take 1 tablet (40 mg total) by mouth every evening. 90 tablet 3    RYBELSUS 7 mg tablet Take 7 mg by mouth every morning.      sulfamethoxazole-trimethoprim 800-160mg (BACTRIM DS) 800-160 mg Tab Take 1 tablet by mouth 2 (two) times daily. 14 tablet 0    vitamin E 400 UNIT capsule Take 400 Units by mouth once daily.      omeprazole (PRILOSEC) 40 MG capsule Take 1 capsule (40 mg total) by mouth every morning. 30 capsule 0     No current facility-administered medications for this visit.       Review of patient's allergies indicates:  No Known Allergies      Review of Systems   Constitutional: Negative for chills and fever.   Cardiovascular:  Negative for claudication and leg swelling.   Skin:  Positive for color change and nail changes. Negative for poor wound healing.   Musculoskeletal:  Positive for arthritis. Negative for joint pain and joint swelling.   Gastrointestinal:  Negative for nausea and vomiting.   Neurological:   Positive for numbness. Negative for paresthesias.   Psychiatric/Behavioral:  Negative for altered mental status.          Objective:      Physical Exam  Constitutional:       General: He is not in acute distress.     Appearance: He is well-developed. He is not diaphoretic.   Cardiovascular:      Pulses:           Dorsalis pedis pulses are 2+ on the right side and 2+ on the left side.        Posterior tibial pulses are 2+ on the right side and 2+ on the left side.      Comments: CFT < 3 seconds bilateral.  Pedal hair growth present bilateral.   No varicosities noted bilateral.  No lower extremity swelling noted. Toes are warm to touch bilateral.    Musculoskeletal:         General: Deformity present. No tenderness.      Left lower leg: No edema.      Comments: Muscle strength 5/5 in all muscle groups bilateral.  No tenderness nor crepitation with ROM of foot/ankle joints bilateral.  Arthritic changes changes noted to the dorsal and medial aspect of the Rt. 1st mtp joint.   More rectus alignment of the Rt. Hallux with increased motion of the IP joint S/P arthroplasty.     Skin:     General: Skin is warm and dry.      Capillary Refill: Capillary refill takes less than 2 seconds.      Coloration: Skin is not pale.      Findings: Wound present. No abrasion, bruising, burn, ecchymosis, erythema, lesion, petechiae or rash.      Nails: There is no clubbing.      Comments: Location: Open wound noted to the distal tip of the Rt. 2nd toe.  Base: Down to dermis and comprised of fibrin.    Drainage: None  Lukasz wound: Devoid of erythema, fluctuance, purulence, and malodor.  Mild lukasz wound edema noted.  Pre-debridement measurement: 0.5 x 0.5 x 0.1cm   Post-debridement measurement: 0.7 x 0.7 x 0.1cm    Neurological:      Mental Status: He is alert and oriented to person, place, and time.      Sensory: Sensory deficit present.      Motor: No weakness or atrophy.      Comments: Protective sensation per [place]-Yash monofilament  absent bilateral.    Light touch intact bilateral.           Assessment:       Encounter Diagnoses   Name Primary?    Diabetic ulcer of toe of right foot associated with type 2 diabetes mellitus, limited to breakdown of skin Yes    Hammer toe of right foot     Diabetic polyneuropathy associated with type 2 diabetes mellitus          Plan:       Yobany was seen today for follow-up.    Diagnoses and all orders for this visit:    Diabetic ulcer of toe of right foot associated with type 2 diabetes mellitus, limited to breakdown of skin  -     Aerobic culture  -     Culture, Anaerobic    Hammer toe of right foot    Diabetic polyneuropathy associated with type 2 diabetes mellitus      I counseled the patient on his conditions, their implications and medical management.    Performed a selective excisional debridement of the Rt. Foot.  See attached procedure note.      Wound cultures were obtained.    The wound base was covered with iodosorb ointment, and a band aid was applied.    He was also fitted with a crest pad to offload the site, daily, with all ambulation.    Patient to resume application of bactroban and a bandage to the site QD.    Instructed to minimize weight bearing to facilitate wound healing.    May wear a supportive shoe to minimize pressure to the affected toe.    Patient remains amenable to having a flexor tenotomy of the Rt. 2nd digit to prevent future ulceration going forward.  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    Follow up in about 2 weeks (around 5/18/2023).    Augusto Simeon DPM

## 2023-05-07 NOTE — PROCEDURES
"Wound Debridement    Date/Time: 5/4/2023 5:00 PM  Performed by: Augusto Simeon DPM  Authorized by: Augusto Simeon DPM     Time out: Immediately prior to procedure a "time out" was called to verify the correct patient, procedure, equipment, support staff and site/side marked as required.    Consent Done?:  Yes (Verbal)  Local anesthesia used?: No      Wound Details:    Location:  Right foot    Location:  Right 2nd Toe    Type of Debridement:  Excisional       Length (cm):  0.5       Area (sq cm):  0.25       Width (cm):  0.5       Percent Debrided (%):  100       Depth (cm):  0.1       Total Area Debrided (sq cm):  0.25    Depth of debridement:  Epidermis/Dermis    Tissue debrided:  Dermis    Devitalized tissue debrided:  Fibrin    Instruments:  Blade  Bleeding:  Minimal  Hemostasis Achieved: Yes  Method Used:  Pressure  Patient tolerance:  Patient tolerated the procedure well with no immediate complications  "
None Done

## 2023-05-08 DIAGNOSIS — L08.9 DIABETIC FOOT INFECTION: ICD-10-CM

## 2023-05-08 DIAGNOSIS — E11.628 DIABETIC FOOT INFECTION: ICD-10-CM

## 2023-05-08 LAB — BACTERIA SPEC AEROBE CULT: NORMAL

## 2023-05-08 RX ORDER — SULFAMETHOXAZOLE AND TRIMETHOPRIM 800; 160 MG/1; MG/1
1 TABLET ORAL 2 TIMES DAILY
Qty: 14 TABLET | Refills: 0 | OUTPATIENT
Start: 2023-05-08

## 2023-05-08 NOTE — TELEPHONE ENCOUNTER
Called and spoke with pt and informed him that the first set of curltures were negative. Pt acknowledged understanding.    ----- Message from Augutso Simeon DPM sent at 5/8/2023  8:38 AM CDT -----  Please let the patient know initial cultures are negative for infection.  We will keep him posted on the 2nd set.    ----- Message -----  From: [facility]  Sent: 5/6/2023   9:36 AM CDT  To: Augusto Simeon DPM

## 2023-05-09 ENCOUNTER — TELEPHONE (OUTPATIENT)
Dept: PODIATRY | Facility: CLINIC | Age: 68
End: 2023-05-09
Payer: MEDICARE

## 2023-05-09 LAB — BACTERIA SPEC ANAEROBE CULT: NORMAL

## 2023-05-09 NOTE — TELEPHONE ENCOUNTER
----- Message from Augusto Simeon DPM sent at 5/9/2023  9:58 AM CDT -----  Please let the patient know final cultures were negative for infection.    ----- Message -----  From: [facility]  Sent: 5/6/2023   9:36 AM CDT  To: Augusto Simeon DPM

## 2023-05-10 RX ORDER — OLMESARTAN MEDOXOMIL AND HYDROCHLOROTHIAZIDE 40/25 40; 25 MG/1; MG/1
TABLET ORAL
Qty: 90 TABLET | Refills: 1 | OUTPATIENT
Start: 2023-05-10 | End: 2023-05-10

## 2023-05-15 ENCOUNTER — OFFICE VISIT (OUTPATIENT)
Dept: PODIATRY | Facility: CLINIC | Age: 68
End: 2023-05-15
Payer: COMMERCIAL

## 2023-05-15 VITALS — BODY MASS INDEX: 26.17 KG/M2 | WEIGHT: 203.94 LBS | HEIGHT: 74 IN

## 2023-05-15 DIAGNOSIS — L97.511 DIABETIC ULCER OF TOE OF RIGHT FOOT ASSOCIATED WITH TYPE 2 DIABETES MELLITUS, LIMITED TO BREAKDOWN OF SKIN: Primary | ICD-10-CM

## 2023-05-15 DIAGNOSIS — E11.42 DIABETIC POLYNEUROPATHY ASSOCIATED WITH TYPE 2 DIABETES MELLITUS: ICD-10-CM

## 2023-05-15 DIAGNOSIS — M20.41 HAMMER TOE OF RIGHT FOOT: ICD-10-CM

## 2023-05-15 DIAGNOSIS — E11.621 DIABETIC ULCER OF TOE OF RIGHT FOOT ASSOCIATED WITH TYPE 2 DIABETES MELLITUS, LIMITED TO BREAKDOWN OF SKIN: Primary | ICD-10-CM

## 2023-05-15 PROCEDURE — 97597 DBRDMT OPN WND 1ST 20 CM/<: CPT | Mod: S$GLB,,, | Performed by: PODIATRIST

## 2023-05-15 PROCEDURE — 99999 PR PBB SHADOW E&M-EST. PATIENT-LVL IV: ICD-10-PCS | Mod: PBBFAC,,, | Performed by: PODIATRIST

## 2023-05-15 PROCEDURE — 99499 NO LOS: ICD-10-PCS | Mod: S$GLB,,, | Performed by: PODIATRIST

## 2023-05-15 PROCEDURE — 99999 PR PBB SHADOW E&M-EST. PATIENT-LVL IV: CPT | Mod: PBBFAC,,, | Performed by: PODIATRIST

## 2023-05-15 PROCEDURE — 99499 UNLISTED E&M SERVICE: CPT | Mod: S$GLB,,, | Performed by: PODIATRIST

## 2023-05-15 PROCEDURE — 97597 WOUND DEBRIDEMENT: ICD-10-PCS | Mod: S$GLB,,, | Performed by: PODIATRIST

## 2023-05-16 NOTE — PROGRESS NOTES
Subjective:      Patient ID: Yobany Richardson is a 67 y.o. male.    Chief Complaint: Wound Check    Yobany is a 67 y.o. male with a past medical history of Abnormal thallium stress test, Arrhythmia, Atrial flutter, paroxysmal (02/11/2016), CAD (coronary artery disease) (03/09/2016), Coronary artery disease, Diabetes mellitus, Diabetes mellitus, type 2, Disorder of kidney and ureter (2016), Hypertension, Neuropathy, Paroxysmal atrial flutter, PE (pulmonary embolism), Pulmonary embolism (02/11/2016), Rheumatoid arthritis, Shortness of breath, Stented coronary artery (06/15/2016), Wears contact lenses, and Wears prescription eyeglasses. Patient presents to clinic for a follow up regarding the wound to the Rt. 2nd toe. He continues applying antibiotic ointment and a band aid to the affected digit daily.  Notes regular use of the crest pad with all weight bearing.  Denies experiencing pain from said toe.  Denies experiencing N/V/F/C/D.  Denies any additional pedal complaints.      Hemoglobin A1C   Date Value Ref Range Status   03/23/2023 7.0 (H) 0.0 - 5.6 % Final     Comment:     Reference Interval:  5.0 - 5.6 Normal   5.7 - 6.4 High Risk   > 6.5 Diabetic      Hgb A1c results are standardized based on the (NGSP) National   Glycohemoglobin Standardization Program.      Hemoglobin A1C levels are related to mean serum/plasma glucose   during the preceding 2-3 months.        11/21/2022 6.6 (H) 4.0 - 5.6 % Final     Comment:     ADA Screening Guidelines:  5.7-6.4%  Consistent with prediabetes  >or=6.5%  Consistent with diabetes    High levels of fetal hemoglobin interfere with the HbA1C  assay. Heterozygous hemoglobin variants (HbS, HgC, etc)do  not significantly interfere with this assay.   However, presence of multiple variants may affect accuracy.     10/14/2022 6.4 (A) 5.7 % Final   07/16/2021 7.3 (H) 4.0 - 5.6 % Final     Comment:     ADA Screening Guidelines:  5.7-6.4%  Consistent with prediabetes  >or=6.5%  Consistent with  diabetes    High levels of fetal hemoglobin interfere with the HbA1C  assay. Heterozygous hemoglobin variants (HbS, HgC, etc)do  not significantly interfere with this assay.   However, presence of multiple variants may affect accuracy.           Past Medical History:   Diagnosis Date    Abnormal thallium stress test     Anticoagulant long-term use     Arrhythmia     Atrial flutter, paroxysmal 02/11/2016    CAD (coronary artery disease) 03/09/2016 5/2016 [facility] Left main:NL LAD: 35% proximal LAD. two small diagonals with minimal disease.  Circumflex/Large branching first obtuse marginal: with minimal disease.   RCA: The vessel was large sized (dominant) with a 60% proximal lesion. 100 mcgs of intracoronary Nitroglycerin were given with no change in the lesion. Thus FFR was performed. FFR was 0.79  2/2016 cor CTA ~~ 50% LAD    Coronary artery disease     Diabetes mellitus     Diabetes mellitus, type 2     Disorder of kidney and ureter 2016    KIDNEY STONE    Hypertension     Neuropathy     Paroxysmal atrial flutter     PE (pulmonary embolism)     Pulmonary embolism 02/11/2016 1/2016     Rheumatoid arthritis     Shortness of breath     Stented coronary artery 06/15/2016    5/2016 RCA- synergy 3.5x12    Wears contact lenses     Wears prescription eyeglasses        Past Surgical History:   Procedure Laterality Date    ABLATION Left 3/23/2023    Procedure: Ablation;  Surgeon: Yobany Cronin III, MD;  [facility];  Service: Cardiology;  Laterality: Left;    ARTHROPLASTY OF TOE Right 07/14/2022    Procedure: ARTHROPLASTY, TOE;  Surgeon: Augusto Simeon DPM;  Location: [facility] OR;  Service: Podiatry;  Laterality: Right;  Hallux    CARDIAC CATHETERIZATION      with stent placed x 1    INSERTION OF IMPLANTABLE LOOP RECORDER N/A 5/3/2023    Procedure: Implantable Loop Recorder;  Surgeon: Yobany Cronin III, MD;  Location: [facility] CATH;  Service: Cardiology;  Laterality: N/A;    KNEE ARTHROSCOPY Left 1985    TONSILLECTOMY    "      Family History   Problem Relation Age of Onset    Cancer Mother     Angina Mother     Heart disease Mother     Hypertension Mother     Kidney disease Father     Thyroid disease Sister     Bell's palsy Sister     Thyroid disease Sister     Depression Sister     Depression Sister     Cancer Sister        Social History     Socioeconomic History    Marital status:    Tobacco Use    Smoking status: Never    Smokeless tobacco: Never   Substance and Sexual Activity    Alcohol use: No    Drug use: No       Current Outpatient Medications   Medication Sig Dispense Refill    amLODIPine (NORVASC) 10 MG tablet TAKE 1 TABLET(10 MG) BY MOUTH EVERY DAY (Patient taking differently: Take 10 mg by mouth every evening.) 30 tablet 5    apixaban (ELIQUIS) 5 mg Tab Take 5 mg by mouth 2 (two) times daily.      APPLE CIDER VINEGAR ORAL Take by mouth.      ascorbic acid (VITAMIN C) 500 MG tablet Take 500 mg by mouth once daily.      aspirin (ECOTRIN) 81 MG EC tablet Take 1 tablet (81 mg total) by mouth once daily. 30 tablet 11    b complex vitamins capsule Take 1 capsule by mouth once daily.      BD AMAN 2ND GEN PEN NEEDLE 32 gauge x 5/32" Ndle USE TO INJECT INTO SKIN EVERY  each 3    blood sugar diagnostic Strp To check BG 2 times daily, to use with insurance preferred meter  E 11.65 50 each 1    blood-glucose meter kit To check BG 2 times daily, to use with insurance preferred meter  Dx E 11.65 1 each 1    carvediloL (COREG) 6.25 MG tablet TAKE 1 TABLET(6.25 MG) BY MOUTH TWICE DAILY 180 tablet 4    cyanocobalamin, vitamin B-12, 1,000 mcg/15 mL Liqd Take by mouth. Takes a tablet      FARXIGA 5 mg Tab tablet Take 5 mg by mouth every evening.      FREESTYLE PASCUAL 14 DAY SENSOR Kit APPLY NEW SENSOR EVERY 14 DAYS AS DIRECTED      FREESTYLE LITE STRIPS Strp TEST 2 TO 3 TIMES D  2    furosemide (LASIX) 20 MG tablet Take 0.5 tablets (10 mg total) by mouth daily as needed (weigth gain > 3 lbs from baseline or increased " shortness of breath). 90 tablet 4    gabapentin (NEURONTIN) 600 MG tablet Take 1 tablet (600 mg total) by mouth 2 (two) times daily. 60 tablet 11    insulin glargine,hum.rec.anlog (SEMGLEE U-100 INSULIN SUBQ) Inject 20 Units into the skin nightly as needed.      lancets Misc To check BG 2 times daily, to use with insurance preferred meter  DX E 11.65 50 each 1    metFORMIN (GLUCOPHAGE) 1000 MG tablet Take 1 tablet (1,000 mg total) by mouth 2 (two) times daily with meals. 180 tablet 3    multivit-min/iron/folic acid/K (ADULTS MULTIVITAMIN ORAL) Take by mouth.      mupirocin (BACTROBAN) 2 % ointment Apply topically once daily. 30 g 1    ondansetron (ZOFRAN) 4 MG tablet Take 1 tablet (4 mg total) by mouth every 6 (six) hours. 12 tablet 0    ONETOUCH DELICA PLUS LANCET 33 gauge Misc TEST ONCE D  0    pravastatin (PRAVACHOL) 40 MG tablet Take 1 tablet (40 mg total) by mouth every evening. 90 tablet 3    RYBELSUS 7 mg tablet Take 7 mg by mouth every morning.      vitamin E 400 UNIT capsule Take 400 Units by mouth once daily.      omeprazole (PRILOSEC) 40 MG capsule Take 1 capsule (40 mg total) by mouth every morning. 30 capsule 0     No current facility-administered medications for this visit.       Review of patient's allergies indicates:  No Known Allergies      Review of Systems   Constitutional: Negative for chills and fever.   Cardiovascular:  Negative for claudication and leg swelling.   Skin:  Positive for color change and nail changes. Negative for poor wound healing.   Musculoskeletal:  Positive for arthritis. Negative for joint pain and joint swelling.   Gastrointestinal:  Negative for nausea and vomiting.   Neurological:  Positive for numbness. Negative for paresthesias.   Psychiatric/Behavioral:  Negative for altered mental status.          Objective:      Physical Exam  Constitutional:       General: He is not in acute distress.     Appearance: He is well-developed. He is not diaphoretic.   Cardiovascular:       Pulses:           Dorsalis pedis pulses are 2+ on the right side and 2+ on the left side.        Posterior tibial pulses are 2+ on the right side and 2+ on the left side.      Comments: CFT < 3 seconds bilateral.  Pedal hair growth present bilateral.   No varicosities noted bilateral.  No lower extremity swelling noted. Toes are warm to touch bilateral.    Musculoskeletal:         General: Deformity present. No tenderness.      Left lower leg: No edema.      Comments: Muscle strength 5/5 in all muscle groups bilateral.  No tenderness nor crepitation with ROM of foot/ankle joints bilateral.  Arthritic changes changes noted to the dorsal and medial aspect of the Rt. 1st mtp joint.   More rectus alignment of the Rt. Hallux with increased motion of the IP joint S/P arthroplasty.     Skin:     General: Skin is warm and dry.      Capillary Refill: Capillary refill takes less than 2 seconds.      Coloration: Skin is not pale.      Findings: Wound present. No abrasion, bruising, burn, ecchymosis, erythema, lesion, petechiae or rash.      Nails: There is no clubbing.      Comments: Location: Open wound noted to the distal tip of the Rt. 2nd toe.  Base: Down to dermis and comprised of fibrin.    Drainage: None  Lukasz wound: Devoid of erythema, fluctuance, purulence, and malodor.  Mild lukasz wound edema noted.  Pre-debridement measurement: 0.4 x 0.4 x 0.1cm   Post-debridement measurement: 0.6 x 0.6 x 0.1cm    Neurological:      Mental Status: He is alert and oriented to person, place, and time.      Sensory: Sensory deficit present.      Motor: No weakness or atrophy.      Comments: Protective sensation per Black-Yash monofilament absent bilateral.    Light touch intact bilateral.           Assessment:       Encounter Diagnoses   Name Primary?    Diabetic ulcer of toe of right foot associated with type 2 diabetes mellitus, limited to breakdown of skin Yes    Hammer toe of right foot     Diabetic polyneuropathy associated  with type 2 diabetes mellitus          Plan:       Yobany was seen today for wound check.    Diagnoses and all orders for this visit:    Diabetic ulcer of toe of right foot associated with type 2 diabetes mellitus, limited to breakdown of skin    Hammer toe of right foot    Diabetic polyneuropathy associated with type 2 diabetes mellitus      I counseled the patient on his conditions, their implications and medical management.    Performed a selective excisional debridement of the Rt. Foot.  See attached procedure note.      The wound base was covered with antibiotic ointment, and a band aid was applied.    Advised to continue donning the crest pad to offload the site, daily, with all ambulation.    Patient to resume application of bactroban and a bandage to the site QD.    Instructed to minimize weight bearing to facilitate wound healing.    May wear a supportive shoe to minimize pressure to the affected toe.    Patient remains amenable to having a flexor tenotomy of the Rt. 2nd digit to prevent future ulceration going forward.     Follow up in about 11 days (around 5/26/2023), for the aforementioned tenotomy.    Augusto Simeon DPM

## 2023-05-16 NOTE — PROCEDURES
"Wound Debridement    Date/Time: 5/15/2023 5:00 PM  Performed by: Augusto Simeon DPM  Authorized by: Augusto Simeon DPM     Time out: Immediately prior to procedure a "time out" was called to verify the correct patient, procedure, equipment, support staff and site/side marked as required.    Consent Done?:  Yes (Verbal)  Local anesthesia used?: No      Wound Details:    Location:  Right foot    Location:  Right 2nd Toe    Type of Debridement:  Excisional       Length (cm):  0.4       Area (sq cm):  0.16       Width (cm):  0.4       Percent Debrided (%):  100       Depth (cm):  0.1       Total Area Debrided (sq cm):  0.16    Depth of debridement:  Epidermis/Dermis    Tissue debrided:  Dermis    Devitalized tissue debrided:  Fibrin    Instruments:  Blade  Bleeding:  Minimal  Hemostasis Achieved: Yes  Method Used:  Pressure  Patient tolerance:  Patient tolerated the procedure well with no immediate complications  "

## 2023-05-26 ENCOUNTER — OFFICE VISIT (OUTPATIENT)
Dept: PODIATRY | Facility: CLINIC | Age: 68
End: 2023-05-26
Payer: COMMERCIAL

## 2023-05-26 VITALS — HEIGHT: 74 IN | WEIGHT: 203 LBS | BODY MASS INDEX: 26.05 KG/M2

## 2023-05-26 DIAGNOSIS — E11.42 DIABETIC POLYNEUROPATHY ASSOCIATED WITH TYPE 2 DIABETES MELLITUS: ICD-10-CM

## 2023-05-26 DIAGNOSIS — M20.41 HAMMER TOE OF RIGHT FOOT: ICD-10-CM

## 2023-05-26 DIAGNOSIS — L97.511 DIABETIC ULCER OF TOE OF RIGHT FOOT ASSOCIATED WITH TYPE 2 DIABETES MELLITUS, LIMITED TO BREAKDOWN OF SKIN: Primary | ICD-10-CM

## 2023-05-26 DIAGNOSIS — E11.621 DIABETIC ULCER OF TOE OF RIGHT FOOT ASSOCIATED WITH TYPE 2 DIABETES MELLITUS, LIMITED TO BREAKDOWN OF SKIN: Primary | ICD-10-CM

## 2023-05-26 PROCEDURE — 28232 INCISION OF TOE TENDON: CPT | Mod: S$GLB,,, | Performed by: PODIATRIST

## 2023-05-26 PROCEDURE — 99999 PR PBB SHADOW E&M-EST. PATIENT-LVL II: CPT | Mod: PBBFAC,,, | Performed by: PODIATRIST

## 2023-05-26 PROCEDURE — 99999 PR PBB SHADOW E&M-EST. PATIENT-LVL II: ICD-10-PCS | Mod: PBBFAC,,, | Performed by: PODIATRIST

## 2023-05-26 PROCEDURE — 99499 NO LOS: ICD-10-PCS | Mod: S$GLB,,, | Performed by: PODIATRIST

## 2023-05-26 PROCEDURE — 99499 UNLISTED E&M SERVICE: CPT | Mod: S$GLB,,, | Performed by: PODIATRIST

## 2023-05-26 PROCEDURE — 28232 PR INCISION FLEX TOE TENDON: ICD-10-PCS | Mod: S$GLB,,, | Performed by: PODIATRIST

## 2023-05-26 RX ORDER — INSULIN GLARGINE 300 U/ML
20 INJECTION, SOLUTION SUBCUTANEOUS NIGHTLY
COMMUNITY
Start: 2023-03-27

## 2023-05-26 RX ORDER — BLOOD-GLUCOSE METER
EACH MISCELLANEOUS 2 TIMES DAILY
COMMUNITY
Start: 2023-03-24

## 2023-05-27 NOTE — PROGRESS NOTES
Subjective:      Patient ID: Yobany Richardson is a 67 y.o. male.    Chief Complaint: Wound Check (R #2)  Chief Complaint: Follow-up  Patient presents to clinic for a flexor tenotomy of the Rt. 2nd toe. Denies experiencing pain from the digit with weight bearing.  He has continued offloading with crest pads with no improvement in wound appearance or size.  Patient would like to resolve the current wound with said procedure, as this was successful with the Rt. 3rd and 4th toes.  Denies any additional pedal complaints.     Hemoglobin A1C   Date Value Ref Range Status   03/23/2023 7.0 (H) 0.0 - 5.6 % Final     Comment:     Reference Interval:  5.0 - 5.6 Normal   5.7 - 6.4 High Risk   > 6.5 Diabetic      Hgb A1c results are standardized based on the (NGSP) National   Glycohemoglobin Standardization Program.      Hemoglobin A1C levels are related to mean serum/plasma glucose   during the preceding 2-3 months.        11/21/2022 6.6 (H) 4.0 - 5.6 % Final     Comment:     ADA Screening Guidelines:  5.7-6.4%  Consistent with prediabetes  >or=6.5%  Consistent with diabetes    High levels of fetal hemoglobin interfere with the HbA1C  assay. Heterozygous hemoglobin variants (HbS, HgC, etc)do  not significantly interfere with this assay.   However, presence of multiple variants may affect accuracy.     10/14/2022 6.4 (A) 5.7 % Final   07/16/2021 7.3 (H) 4.0 - 5.6 % Final     Comment:     ADA Screening Guidelines:  5.7-6.4%  Consistent with prediabetes  >or=6.5%  Consistent with diabetes    High levels of fetal hemoglobin interfere with the HbA1C  assay. Heterozygous hemoglobin variants (HbS, HgC, etc)do  not significantly interfere with this assay.   However, presence of multiple variants may affect accuracy.           Past Medical History:   Diagnosis Date    Abnormal thallium stress test     Anticoagulant long-term use     Arrhythmia     Atrial flutter, paroxysmal 02/11/2016    CAD (coronary artery disease) 03/09/2016 5/2016 [facility]  Left main:NL LAD: 35% proximal LAD. two small diagonals with minimal disease.  Circumflex/Large branching first obtuse marginal: with minimal disease.   RCA: The vessel was large sized (dominant) with a 60% proximal lesion. 100 mcgs of intracoronary Nitroglycerin were given with no change in the lesion. Thus FFR was performed. FFR was 0.79  2/2016 cor CTA ~~ 50% LAD    Coronary artery disease     Diabetes mellitus     Diabetes mellitus, type 2     Disorder of kidney and ureter 2016    KIDNEY STONE    Hypertension     Neuropathy     Paroxysmal atrial flutter     PE (pulmonary embolism)     Pulmonary embolism 02/11/2016 1/2016     Rheumatoid arthritis     Shortness of breath     Stented coronary artery 06/15/2016    5/2016 RCA- synergy 3.5x12    Wears contact lenses     Wears prescription eyeglasses        Past Surgical History:   Procedure Laterality Date    ABLATION Left 3/23/2023    Procedure: Ablation;  Surgeon: Yobany Cronin III, MD;  Location: [facility] CATH;  Service: Cardiology;  Laterality: Left;    ARTHROPLASTY OF TOE Right 07/14/2022    Procedure: ARTHROPLASTY, TOE;  Surgeon: Augusto Simeon DPM;  Location: [facility] OR;  Service: Podiatry;  Laterality: Right;  Hallux    CARDIAC CATHETERIZATION      with stent placed x 1    INSERTION OF IMPLANTABLE LOOP RECORDER N/A 5/3/2023    Procedure: Implantable Loop Recorder;  Surgeon: Yobany Cronin III, MD;  Location: [facility] CATH;  Service: Cardiology;  Laterality: N/A;    KNEE ARTHROSCOPY Left 1985    TONSILLECTOMY         Family History   Problem Relation Age of Onset    Cancer Mother     Angina Mother     Heart disease Mother     Hypertension Mother     Kidney disease Father     Thyroid disease Sister     Bell's palsy Sister     Thyroid disease Sister     Depression Sister     Depression Sister     Cancer Sister        Social History     Socioeconomic History    Marital status:    Tobacco Use    Smoking status: Never    Smokeless tobacco: Never   Substance and  "Sexual Activity    Alcohol use: No    Drug use: No       Current Outpatient Medications   Medication Sig Dispense Refill    amLODIPine (NORVASC) 10 MG tablet TAKE 1 TABLET(10 MG) BY MOUTH EVERY DAY (Patient taking differently: Take 10 mg by mouth every evening.) 30 tablet 5    apixaban (ELIQUIS) 5 mg Tab Take 5 mg by mouth 2 (two) times daily.      APPLE CIDER VINEGAR ORAL Take by mouth.      ascorbic acid (VITAMIN C) 500 MG tablet Take 500 mg by mouth once daily.      aspirin (ECOTRIN) 81 MG EC tablet Take 1 tablet (81 mg total) by mouth once daily. 30 tablet 11    b complex vitamins capsule Take 1 capsule by mouth once daily.      BD AMAN 2ND GEN PEN NEEDLE 32 gauge x 5/32" Ndle USE TO INJECT INTO SKIN EVERY  each 3    blood sugar diagnostic Strp To check BG 2 times daily, to use with insurance preferred meter  E 11.65 50 each 1    blood-glucose meter kit To check BG 2 times daily, to use with insurance preferred meter  Dx E 11.65 1 each 1    carvediloL (COREG) 6.25 MG tablet TAKE 1 TABLET(6.25 MG) BY MOUTH TWICE DAILY 180 tablet 4    cyanocobalamin, vitamin B-12, 1,000 mcg/15 mL Liqd Take by mouth. Takes a tablet      FARXIGA 5 mg Tab tablet Take 5 mg by mouth every evening.      FREESTYLE PASCUAL 14 DAY SENSOR Kit APPLY NEW SENSOR EVERY 14 DAYS AS DIRECTED      FREESTYLE LITE STRIPS Strp TEST 2 TO 3 TIMES D  2    furosemide (LASIX) 20 MG tablet Take 0.5 tablets (10 mg total) by mouth daily as needed (weigth gain > 3 lbs from baseline or increased shortness of breath). 90 tablet 4    gabapentin (NEURONTIN) 600 MG tablet Take 1 tablet (600 mg total) by mouth 2 (two) times daily. 60 tablet 11    insulin glargine,hum.rec.anlog (SEMGLEE U-100 INSULIN SUBQ) Inject 20 Units into the skin nightly as needed.      lancets Misc To check BG 2 times daily, to use with insurance preferred meter  DX E 11.65 50 each 1    metFORMIN (GLUCOPHAGE) 1000 MG tablet Take 1 tablet (1,000 mg total) by mouth 2 (two) times daily with " meals. 180 tablet 3    multivit-min/iron/folic acid/K (ADULTS MULTIVITAMIN ORAL) Take by mouth.      mupirocin (BACTROBAN) 2 % ointment Apply topically once daily. 30 g 1    ondansetron (ZOFRAN) 4 MG tablet Take 1 tablet (4 mg total) by mouth every 6 (six) hours. 12 tablet 0    ONETOUCH DELICA PLUS LANCET 33 gauge Misc TEST ONCE D  0    pravastatin (PRAVACHOL) 40 MG tablet Take 1 tablet (40 mg total) by mouth every evening. 90 tablet 3    RYBELSUS 7 mg tablet Take 7 mg by mouth every morning.      TOUJEO SOLOSTAR U-300 INSULIN 300 unit/mL (1.5 mL) InPn pen SMARTSI Unit(s) SUB-Q Daily      TRUE METRIX GLUCOSE METER Misc 2 (two) times daily. Use to check blood glucose      vitamin E 400 UNIT capsule Take 400 Units by mouth once daily.      omeprazole (PRILOSEC) 40 MG capsule Take 1 capsule (40 mg total) by mouth every morning. 30 capsule 0     No current facility-administered medications for this visit.       Review of patient's allergies indicates:  No Known Allergies      Review of Systems   Constitutional: Negative for chills and fever.   Cardiovascular:  Negative for claudication and leg swelling.   Skin:  Positive for color change and nail changes. Negative for poor wound healing.   Musculoskeletal:  Positive for arthritis. Negative for joint pain and joint swelling.   Gastrointestinal:  Negative for nausea and vomiting.   Neurological:  Positive for numbness. Negative for paresthesias.   Psychiatric/Behavioral:  Negative for altered mental status.          Objective:      Physical Exam  Constitutional:       General: He is not in acute distress.     Appearance: He is well-developed. He is not diaphoretic.   Cardiovascular:      Pulses:           Dorsalis pedis pulses are 2+ on the right side and 2+ on the left side.        Posterior tibial pulses are 2+ on the right side and 2+ on the left side.      Comments: CFT < 3 seconds bilateral.  Pedal hair growth present bilateral.   No varicosities noted bilateral.   No lower extremity swelling noted. Toes are warm to touch bilateral.    Musculoskeletal:         General: Deformity present. No tenderness.      Left lower leg: No edema.      Comments: Muscle strength 5/5 in all muscle groups bilateral.  No tenderness nor crepitation with ROM of foot/ankle joints bilateral.  Arthritic changes changes noted to the dorsal and medial aspect of the Rt. 1st mtp joint.   More rectus alignment of the Rt. Hallux with increased motion of the IP joint S/P arthroplasty.  Semi-rigid contracture of the Rt. 2nd toe.   Skin:     General: Skin is warm and dry.      Capillary Refill: Capillary refill takes less than 2 seconds.      Coloration: Skin is not pale.      Findings: Wound present. No abrasion, bruising, burn, ecchymosis, erythema, lesion, petechiae or rash.      Nails: There is no clubbing.      Comments: Location: Open wound noted to the distal tip of the Rt. 2nd toe.  Base: Down to dermis and comprised of granulation tissue.   Drainage: None  Lukasz wound: Devoid of erythema, fluctuance, purulence, and malodor.  Mild lukasz wound edema noted.  Measurement: 0.3 x 0.3 x 0.1cm      Neurological:      Mental Status: He is alert and oriented to person, place, and time.      Sensory: Sensory deficit present.      Motor: No weakness or atrophy.      Comments: Protective sensation per [place]-Yash monofilament absent bilateral.    Light touch intact bilateral.           Assessment:       Encounter Diagnoses   Name Primary?    Diabetic ulcer of toe of right foot associated with type 2 diabetes mellitus, limited to breakdown of skin Yes    Hammer toe of right foot     Diabetic polyneuropathy associated with type 2 diabetes mellitus            Plan:       Yobany was seen today for wound check.    Diagnoses and all orders for this visit:    Diabetic ulcer of toe of right foot associated with type 2 diabetes mellitus, limited to breakdown of skin    Hammer toe of right foot    Diabetic polyneuropathy  associated with type 2 diabetes mellitus        I counseled the patient on his conditions, their implications and medical management.    Discussed with patient the procedure, risks, benefits, and follow up care associated with a flexor tenotomy of the Rt. 2nd toe.  All questions were thoroughly answered.  Consent was read, signed, and witnessed by nursing staff.  See attached procedure note.     The stab incision and wound was covered with xeroform, and a football dressing was applied.     Advised to keep today's dressing CDI x 1 week.     Advised to keep the surgical extremity dry while bathing to minimize the risk of infection.     Patient to ambulate only in the postoperative shoe.       Following today's procedure, patient to rest, ice, and elevate the surgical extremity.        Op Note:     Date of surgery: 5/26/23     Surgeon: Dr. Cailin DPM     Preoperative diagnosis: 1. Hammertoe, Rt. 2nd  Postoperative diagnosis: As above.  Procedure: 1. Flexor tenotomy of toe, Rt. 2nd toe  Anesthesia: Local  Hemostasis: Pressure  Estimated blood loss: < 0.2 mL  Specimen: None  Culture: None  Complications: None  Condition upon discharge: Stable     Procedure in detail:  Upon prepping the skin with an alcohol swab, a digital block of the Rt. 3rd toe was performed utilizing 3 mL of 1% lidocaine plain.  The foot was then scrubbed, draped, and prepped in the usual aseptic manner.  No tourniquet was applied, with hemostasis being applied with pressure.       Attention was directed to the plantar surface of the Rt. 3rd toe.  A #11 scalpel was used to perform a percutaneous tenotomy, through a single stab incision, at the plantar aspect of the middle phalanx. This was done taking care to release the flexor digitorum longus tendon.  The distal portion of each toe was manually dorsiflexed to ensure adequate release during the tenotomy.  The stab incision was then reapproximated with 3-0 nylon.  The same exact procedure was  performed from skin incision to closure for the Rt. 4th toe.     Upon completion, a prompt hyperemic response was noted to all toes of the surgical extremity.  The incision and current wound was dressed with xeroform, and a football dressing and postoperative shoe were applied.  The patient tolerated the procedure and local anesthesia quite well.  The patient was discharged with the following verbal and written instructions:     1. Keep dressings, clean, dry, and intact to surgical extremity for 1 week.   2. Rest, ice, and elevate the surgical extremity.  3. Weight bearing to the surgical extremity in postoperative shoe,  4. Instructed to call with any postoperative concerns or complications.  5. Follow up in one week in clinic.    Augusto Simeon DPM

## 2023-06-02 ENCOUNTER — OFFICE VISIT (OUTPATIENT)
Dept: PODIATRY | Facility: CLINIC | Age: 68
End: 2023-06-02
Payer: COMMERCIAL

## 2023-06-02 VITALS — BODY MASS INDEX: 26.06 KG/M2 | HEIGHT: 74 IN | WEIGHT: 203.06 LBS

## 2023-06-02 DIAGNOSIS — Z98.890 POSTOPERATIVE STATE: Primary | ICD-10-CM

## 2023-06-02 DIAGNOSIS — E11.621 DIABETIC ULCER OF TOE OF RIGHT FOOT ASSOCIATED WITH TYPE 2 DIABETES MELLITUS, LIMITED TO BREAKDOWN OF SKIN: ICD-10-CM

## 2023-06-02 DIAGNOSIS — L97.511 DIABETIC ULCER OF TOE OF RIGHT FOOT ASSOCIATED WITH TYPE 2 DIABETES MELLITUS, LIMITED TO BREAKDOWN OF SKIN: ICD-10-CM

## 2023-06-02 DIAGNOSIS — E11.42 DIABETIC POLYNEUROPATHY ASSOCIATED WITH TYPE 2 DIABETES MELLITUS: ICD-10-CM

## 2023-06-02 PROCEDURE — 99999 PR PBB SHADOW E&M-EST. PATIENT-LVL II: CPT | Mod: PBBFAC,,, | Performed by: PODIATRIST

## 2023-06-02 PROCEDURE — 1101F PT FALLS ASSESS-DOCD LE1/YR: CPT | Mod: CPTII,S$GLB,, | Performed by: PODIATRIST

## 2023-06-02 PROCEDURE — 1101F PR PT FALLS ASSESS DOC 0-1 FALLS W/OUT INJ PAST YR: ICD-10-PCS | Mod: CPTII,S$GLB,, | Performed by: PODIATRIST

## 2023-06-02 PROCEDURE — 1159F MED LIST DOCD IN RCRD: CPT | Mod: CPTII,S$GLB,, | Performed by: PODIATRIST

## 2023-06-02 PROCEDURE — 3008F PR BODY MASS INDEX (BMI) DOCUMENTED: ICD-10-PCS | Mod: CPTII,S$GLB,, | Performed by: PODIATRIST

## 2023-06-02 PROCEDURE — 99999 PR PBB SHADOW E&M-EST. PATIENT-LVL II: ICD-10-PCS | Mod: PBBFAC,,, | Performed by: PODIATRIST

## 2023-06-02 PROCEDURE — 3051F PR MOST RECENT HEMOGLOBIN A1C LEVEL 7.0 - < 8.0%: ICD-10-PCS | Mod: CPTII,S$GLB,, | Performed by: PODIATRIST

## 2023-06-02 PROCEDURE — 99024 POSTOP FOLLOW-UP VISIT: CPT | Mod: S$GLB,,, | Performed by: PODIATRIST

## 2023-06-02 PROCEDURE — 3288F PR FALLS RISK ASSESSMENT DOCUMENTED: ICD-10-PCS | Mod: CPTII,S$GLB,, | Performed by: PODIATRIST

## 2023-06-02 PROCEDURE — 99024 PR POST-OP FOLLOW-UP VISIT: ICD-10-PCS | Mod: S$GLB,,, | Performed by: PODIATRIST

## 2023-06-02 PROCEDURE — 3051F HG A1C>EQUAL 7.0%<8.0%: CPT | Mod: CPTII,S$GLB,, | Performed by: PODIATRIST

## 2023-06-02 PROCEDURE — 3288F FALL RISK ASSESSMENT DOCD: CPT | Mod: CPTII,S$GLB,, | Performed by: PODIATRIST

## 2023-06-02 PROCEDURE — 1159F PR MEDICATION LIST DOCUMENTED IN MEDICAL RECORD: ICD-10-PCS | Mod: CPTII,S$GLB,, | Performed by: PODIATRIST

## 2023-06-02 PROCEDURE — 3008F BODY MASS INDEX DOCD: CPT | Mod: CPTII,S$GLB,, | Performed by: PODIATRIST

## 2023-06-03 NOTE — PROGRESS NOTES
Subjective:      Patient ID: Yobany Richardson is a 67 y.o. male.    Chief Complaint: Wound Care and Follow-up  Patient presents to 1 week S/P flexor tenotomy of the Rt. 2nd toe.  Denies pain to the affected extremity with today's exam.  Has kept the football dressing clean, dry, and intact x 1 week.  Notes weight bearing to tolerance in the postoperative shoe.  Denies having N/V/F/C/D.  Denies any additional pedal complaints.      Hemoglobin A1C   Date Value Ref Range Status   03/23/2023 7.0 (H) 0.0 - 5.6 % Final     Comment:     Reference Interval:  5.0 - 5.6 Normal   5.7 - 6.4 High Risk   > 6.5 Diabetic      Hgb A1c results are standardized based on the (NGSP) National   Glycohemoglobin Standardization Program.      Hemoglobin A1C levels are related to mean serum/plasma glucose   during the preceding 2-3 months.        11/21/2022 6.6 (H) 4.0 - 5.6 % Final     Comment:     ADA Screening Guidelines:  5.7-6.4%  Consistent with prediabetes  >or=6.5%  Consistent with diabetes    High levels of fetal hemoglobin interfere with the HbA1C  assay. Heterozygous hemoglobin variants (HbS, HgC, etc)do  not significantly interfere with this assay.   However, presence of multiple variants may affect accuracy.     10/14/2022 6.4 (A) 5.7 % Final   07/16/2021 7.3 (H) 4.0 - 5.6 % Final     Comment:     ADA Screening Guidelines:  5.7-6.4%  Consistent with prediabetes  >or=6.5%  Consistent with diabetes    High levels of fetal hemoglobin interfere with the HbA1C  assay. Heterozygous hemoglobin variants (HbS, HgC, etc)do  not significantly interfere with this assay.   However, presence of multiple variants may affect accuracy.             Past Medical History:   Diagnosis Date    Abnormal thallium stress test     Anticoagulant long-term use     Arrhythmia     Atrial flutter, paroxysmal 02/11/2016    CAD (coronary artery disease) 03/09/2016 5/2016 [facility] Left main:NL LAD: 35% proximal LAD. two small diagonals with minimal disease.   Circumflex/Large branching first obtuse marginal: with minimal disease.   RCA: The vessel was large sized (dominant) with a 60% proximal lesion. 100 mcgs of intracoronary Nitroglycerin were given with no change in the lesion. Thus FFR was performed. FFR was 0.79  2/2016 cor CTA ~~ 50% LAD    Coronary artery disease     Diabetes mellitus     Diabetes mellitus, type 2     Disorder of kidney and ureter 2016    KIDNEY STONE    Hypertension     Neuropathy     Paroxysmal atrial flutter     PE (pulmonary embolism)     Pulmonary embolism 02/11/2016 1/2016     Rheumatoid arthritis     Shortness of breath     Stented coronary artery 06/15/2016    5/2016 RCA- synergy 3.5x12    Wears contact lenses     Wears prescription eyeglasses        Past Surgical History:   Procedure Laterality Date    ABLATION Left 3/23/2023    Procedure: Ablation;  Surgeon: Yobany Cronin III, MD;  Location: [facility] CATH;  Service: Cardiology;  Laterality: Left;    ARTHROPLASTY OF TOE Right 07/14/2022    Procedure: ARTHROPLASTY, TOE;  Surgeon: Augusto Simeon DPM;  Location: [facility] OR;  Service: Podiatry;  Laterality: Right;  Hallux    CARDIAC CATHETERIZATION      with stent placed x 1    INSERTION OF IMPLANTABLE LOOP RECORDER N/A 5/3/2023    Procedure: Implantable Loop Recorder;  Surgeon: Yobany Cronin III, MD;  [facility] CATH;  Service: Cardiology;  Laterality: N/A;    KNEE ARTHROSCOPY Left 1985    TONSILLECTOMY         Family History   Problem Relation Age of Onset    Cancer Mother     Angina Mother     Heart disease Mother     Hypertension Mother     Kidney disease Father     Thyroid disease Sister     Bell's palsy Sister     Thyroid disease Sister     Depression Sister     Depression Sister     Cancer Sister        Social History     Socioeconomic History    Marital status:    Tobacco Use    Smoking status: Never    Smokeless tobacco: Never   Substance and Sexual Activity    Alcohol use: No    Drug use: No       Current Outpatient  "Medications   Medication Sig Dispense Refill    amLODIPine (NORVASC) 10 MG tablet TAKE 1 TABLET(10 MG) BY MOUTH EVERY DAY (Patient taking differently: Take 10 mg by mouth every evening.) 30 tablet 5    apixaban (ELIQUIS) 5 mg Tab Take 5 mg by mouth 2 (two) times daily.      APPLE CIDER VINEGAR ORAL Take by mouth.      ascorbic acid (VITAMIN C) 500 MG tablet Take 500 mg by mouth once daily.      aspirin (ECOTRIN) 81 MG EC tablet Take 1 tablet (81 mg total) by mouth once daily. 30 tablet 11    b complex vitamins capsule Take 1 capsule by mouth once daily.      BD AMAN 2ND GEN PEN NEEDLE 32 gauge x 5/32" Ndle USE TO INJECT INTO SKIN EVERY  each 3    blood sugar diagnostic Strp To check BG 2 times daily, to use with insurance preferred meter  E 11.65 50 each 1    blood-glucose meter kit To check BG 2 times daily, to use with insurance preferred meter  Dx E 11.65 1 each 1    carvediloL (COREG) 6.25 MG tablet TAKE 1 TABLET(6.25 MG) BY MOUTH TWICE DAILY 180 tablet 4    cyanocobalamin, vitamin B-12, 1,000 mcg/15 mL Liqd Take by mouth. Takes a tablet      FARXIGA 5 mg Tab tablet Take 5 mg by mouth every evening.      FREESTYLE PASCUAL 14 DAY SENSOR Kit APPLY NEW SENSOR EVERY 14 DAYS AS DIRECTED      FREESTYLE LITE STRIPS Strp TEST 2 TO 3 TIMES D  2    furosemide (LASIX) 20 MG tablet Take 0.5 tablets (10 mg total) by mouth daily as needed (weigth gain > 3 lbs from baseline or increased shortness of breath). 90 tablet 4    gabapentin (NEURONTIN) 600 MG tablet Take 1 tablet (600 mg total) by mouth 2 (two) times daily. 60 tablet 11    insulin glargine,hum.rec.anlog (SEMGLEE U-100 INSULIN SUBQ) Inject 20 Units into the skin nightly as needed.      lancets Misc To check BG 2 times daily, to use with insurance preferred meter  DX E 11.65 50 each 1    metFORMIN (GLUCOPHAGE) 1000 MG tablet Take 1 tablet (1,000 mg total) by mouth 2 (two) times daily with meals. 180 tablet 3    multivit-min/iron/folic acid/K (ADULTS MULTIVITAMIN " ORAL) Take by mouth.      mupirocin (BACTROBAN) 2 % ointment Apply topically once daily. 30 g 1    ondansetron (ZOFRAN) 4 MG tablet Take 1 tablet (4 mg total) by mouth every 6 (six) hours. 12 tablet 0    ONETOUCH DELICA PLUS LANCET 33 gauge Misc TEST ONCE D  0    pravastatin (PRAVACHOL) 40 MG tablet Take 1 tablet (40 mg total) by mouth every evening. 90 tablet 3    RYBELSUS 7 mg tablet Take 7 mg by mouth every morning.      TOUJEO SOLOSTAR U-300 INSULIN 300 unit/mL (1.5 mL) InPn pen SMARTSI Unit(s) SUB-Q Daily      TRUE METRIX GLUCOSE METER Misc 2 (two) times daily. Use to check blood glucose      vitamin E 400 UNIT capsule Take 400 Units by mouth once daily.      omeprazole (PRILOSEC) 40 MG capsule Take 1 capsule (40 mg total) by mouth every morning. 30 capsule 0     No current facility-administered medications for this visit.       Review of patient's allergies indicates:  No Known Allergies      Review of Systems   Constitutional: Negative for chills and fever.   Cardiovascular:  Negative for claudication and leg swelling.   Skin:  Positive for color change and nail changes. Negative for poor wound healing.   Musculoskeletal:  Positive for arthritis. Negative for joint pain and joint swelling.   Gastrointestinal:  Negative for nausea and vomiting.   Neurological:  Positive for numbness. Negative for paresthesias.   Psychiatric/Behavioral:  Negative for altered mental status.          Objective:      Physical Exam  Constitutional:       General: He is not in acute distress.     Appearance: He is well-developed. He is not diaphoretic.   Cardiovascular:      Pulses:           Dorsalis pedis pulses are 2+ on the right side and 2+ on the left side.        Posterior tibial pulses are 2+ on the right side and 2+ on the left side.      Comments: CFT < 3 seconds bilateral.  Pedal hair growth present bilateral.   No varicosities noted bilateral. No lower extremity edema noted bilateral.  Toes are warm to touch  bilateral.    Musculoskeletal:         General: Deformity present. No tenderness.      Right lower leg: No edema.      Left lower leg: No edema.      Comments: Muscle strength 5/5 in all muscle groups bilateral.  No tenderness nor crepitation with ROM of foot/ankle joints bilateral.  No tenderness with palpation of bilateral foot and ankle.  Arthritic changes changes noted to the dorsal and medial aspect of the Rt. 1st mtp joint.  Bilateral pes planus foot type.  Bilateral hallux abducto valgus.  Bilateral semi-rigid contracture of toes 2-5 with better reduction of the Rt. 2nd DIP joint.     Skin:     General: Skin is warm and dry.      Capillary Refill: Capillary refill takes less than 2 seconds.      Coloration: Skin is not pale.      Findings: Wound present. No abrasion, bruising, burn, ecchymosis, erythema, lesion, petechiae or rash.      Nails: There is no clubbing.      Comments: Pedal skin has normal turgor, temperature, and texture bilateral.  Toenails x 10 appear mycotic but well maintained.      Incision to the plantar aspect of the Rt. 2nd toe is well approximated with suture.  No evidence of dehiscence, necrosis, ischemia, or infection the length of the incision.      Small, remaining partial thickness wound noted to the distal tip of the Rt. 2nd toe.  Wound bed is down to dermis and comprised of granulation tissue.  Tiffany wound is devoid of erythema, edema fluctuance, purulence, and malodor.  Measures 0.2 x 0.1 x 0.1cm         Neurological:      Mental Status: He is alert and oriented to person, place, and time.      Sensory: Sensory deficit present.      Motor: No weakness or atrophy.      Comments: Protective sensation per [place]-Yash monofilament absent bilateral.    Light touch intact bilateral.             Assessment:       Encounter Diagnoses   Name Primary?    Postoperative state Yes    Diabetic ulcer of toe of right foot associated with type 2 diabetes mellitus, limited to breakdown of skin      Diabetic polyneuropathy associated with type 2 diabetes mellitus            Plan:       Yobany was seen today for wound care and follow-up.    Diagnoses and all orders for this visit:    Postoperative state    Diabetic ulcer of toe of right foot associated with type 2 diabetes mellitus, limited to breakdown of skin    Diabetic polyneuropathy associated with type 2 diabetes mellitus        I counseled the patient on his conditions, their implications and medical management.    Overall, satisfactory postoperative results noted.  The incision is well approximated with suture and devoid of signs of infection.     The Rt. 2nd toe distal wound was covered with xerform as was the suture itself.  This was then covered with two band aids.     Given verbal wound care/dressing change instructions.   Anticipate suture removal and complete healing of the distal toe wound at the next visit.     May transition back into casual shoe gear.     May resume his normal bathing routine.     RTC in one week for suture removal.     Augusto Simeon DPM

## 2023-06-07 ENCOUNTER — LAB VISIT (OUTPATIENT)
Dept: LAB | Facility: HOSPITAL | Age: 68
End: 2023-06-07
Attending: INTERNAL MEDICINE
Payer: COMMERCIAL

## 2023-06-07 DIAGNOSIS — N18.2 CKD (CHRONIC KIDNEY DISEASE) STAGE 2, GFR 60-89 ML/MIN: ICD-10-CM

## 2023-06-07 LAB
ALBUMIN SERPL BCP-MCNC: 4 G/DL (ref 3.5–5.2)
ANION GAP SERPL CALC-SCNC: 9 MMOL/L (ref 8–16)
BUN SERPL-MCNC: 25 MG/DL (ref 8–23)
CALCIUM SERPL-MCNC: 9.7 MG/DL (ref 8.7–10.5)
CHLORIDE SERPL-SCNC: 104 MMOL/L (ref 95–110)
CO2 SERPL-SCNC: 24 MMOL/L (ref 23–29)
CREAT SERPL-MCNC: 1.4 MG/DL (ref 0.5–1.4)
CREAT UR-MCNC: 49 MG/DL (ref 23–375)
EST. GFR  (NO RACE VARIABLE): 55.8 ML/MIN/1.73 M^2
GLUCOSE SERPL-MCNC: 100 MG/DL (ref 70–110)
PHOSPHATE SERPL-MCNC: 3.2 MG/DL (ref 2.7–4.5)
POTASSIUM SERPL-SCNC: 4.4 MMOL/L (ref 3.5–5.1)
PROT UR-MCNC: 21 MG/DL (ref 0–15)
PROT/CREAT UR: 0.43 MG/G{CREAT} (ref 0–0.2)
PTH-INTACT SERPL-MCNC: 78.6 PG/ML (ref 9–77)
SODIUM SERPL-SCNC: 137 MMOL/L (ref 136–145)
URATE SERPL-MCNC: 4.7 MG/DL (ref 3.4–7)

## 2023-06-07 PROCEDURE — 36415 COLL VENOUS BLD VENIPUNCTURE: CPT | Mod: PO | Performed by: INTERNAL MEDICINE

## 2023-06-07 PROCEDURE — 83970 ASSAY OF PARATHORMONE: CPT | Performed by: INTERNAL MEDICINE

## 2023-06-07 PROCEDURE — 84156 ASSAY OF PROTEIN URINE: CPT | Performed by: INTERNAL MEDICINE

## 2023-06-07 PROCEDURE — 84550 ASSAY OF BLOOD/URIC ACID: CPT | Performed by: INTERNAL MEDICINE

## 2023-06-07 PROCEDURE — 80069 RENAL FUNCTION PANEL: CPT | Performed by: INTERNAL MEDICINE

## 2023-06-09 ENCOUNTER — OFFICE VISIT (OUTPATIENT)
Dept: PODIATRY | Facility: CLINIC | Age: 68
End: 2023-06-09
Payer: COMMERCIAL

## 2023-06-09 DIAGNOSIS — E11.621 DIABETIC ULCER OF TOE OF RIGHT FOOT ASSOCIATED WITH TYPE 2 DIABETES MELLITUS, LIMITED TO BREAKDOWN OF SKIN: ICD-10-CM

## 2023-06-09 DIAGNOSIS — E11.42 DIABETIC POLYNEUROPATHY ASSOCIATED WITH TYPE 2 DIABETES MELLITUS: ICD-10-CM

## 2023-06-09 DIAGNOSIS — Z98.890 POSTOPERATIVE STATE: Primary | ICD-10-CM

## 2023-06-09 DIAGNOSIS — L97.511 DIABETIC ULCER OF TOE OF RIGHT FOOT ASSOCIATED WITH TYPE 2 DIABETES MELLITUS, LIMITED TO BREAKDOWN OF SKIN: ICD-10-CM

## 2023-06-09 PROCEDURE — 97597 WOUND DEBRIDEMENT: ICD-10-PCS | Mod: XS,S$GLB,, | Performed by: PODIATRIST

## 2023-06-09 PROCEDURE — 99024 POSTOP FOLLOW-UP VISIT: CPT | Mod: S$GLB,,, | Performed by: PODIATRIST

## 2023-06-09 PROCEDURE — 99024 PR POST-OP FOLLOW-UP VISIT: ICD-10-PCS | Mod: S$GLB,,, | Performed by: PODIATRIST

## 2023-06-09 PROCEDURE — 99999 PR PBB SHADOW E&M-EST. PATIENT-LVL IV: ICD-10-PCS | Mod: PBBFAC,,, | Performed by: PODIATRIST

## 2023-06-09 PROCEDURE — 97597 DBRDMT OPN WND 1ST 20 CM/<: CPT | Mod: XS,S$GLB,, | Performed by: PODIATRIST

## 2023-06-09 PROCEDURE — 99999 PR PBB SHADOW E&M-EST. PATIENT-LVL IV: CPT | Mod: PBBFAC,,, | Performed by: PODIATRIST

## 2023-06-09 RX ORDER — CLOPIDOGREL BISULFATE 75 MG/1
TABLET ORAL
Status: ON HOLD | COMMUNITY
End: 2023-07-07 | Stop reason: SDUPTHER

## 2023-06-09 RX ORDER — OLMESARTAN MEDOXOMIL AND HYDROCHLOROTHIAZIDE 40/25 40; 25 MG/1; MG/1
TABLET ORAL
COMMUNITY
End: 2023-06-20 | Stop reason: ALTCHOICE

## 2023-06-10 NOTE — PROCEDURES
"Wound Debridement    Date/Time: 6/9/2023 3:00 PM  Performed by: Augusto Simeon DPM  Authorized by: Augusto Simeon DPM     Time out: Immediately prior to procedure a "time out" was called to verify the correct patient, procedure, equipment, support staff and site/side marked as required.    Consent Done?:  Yes (Verbal)  Local anesthesia used?: No      Wound Details:    Location:  Right foot    Location:  Right 2nd Toe    Type of Debridement:  Excisional       Length (cm):  0.1       Area (sq cm):  0.01       Width (cm):  0.1       Percent Debrided (%):  100       Depth (cm):  0.1       Total Area Debrided (sq cm):  0.01    Depth of debridement:  Epidermis/Dermis    Tissue debrided:  Dermis    Devitalized tissue debrided:  Fibrin    Instruments:  Blade  Bleeding:  Minimal  Hemostasis Achieved: Yes  Method Used:  Pressure  Patient tolerance:  Patient tolerated the procedure well with no immediate complications  "
Statement Selected

## 2023-06-10 NOTE — PROGRESS NOTES
Subjective:      Patient ID: Yobany Richardson is a 67 y.o. male.    Chief Complaint: Follow-up (1 wk f/u )  Patient presents to 2 weeks S/P flexor tenotomy of the Rt. 2nd toe.  Denies pain to the affected extremity with today's exam.  Has been applying xeroform and a band aid to both the incision site and toe ulcer once daily for the past week.  Notes wearing supportive shoe gear.  Denies having N/V/F/C/D.  Denies any additional pedal complaints.      Hemoglobin A1C   Date Value Ref Range Status   03/23/2023 7.0 (H) 0.0 - 5.6 % Final     Comment:     Reference Interval:  5.0 - 5.6 Normal   5.7 - 6.4 High Risk   > 6.5 Diabetic      Hgb A1c results are standardized based on the (NGSP) National   Glycohemoglobin Standardization Program.      Hemoglobin A1C levels are related to mean serum/plasma glucose   during the preceding 2-3 months.        11/21/2022 6.6 (H) 4.0 - 5.6 % Final     Comment:     ADA Screening Guidelines:  5.7-6.4%  Consistent with prediabetes  >or=6.5%  Consistent with diabetes    High levels of fetal hemoglobin interfere with the HbA1C  assay. Heterozygous hemoglobin variants (HbS, HgC, etc)do  not significantly interfere with this assay.   However, presence of multiple variants may affect accuracy.     10/14/2022 6.4 (A) 5.7 % Final   07/16/2021 7.3 (H) 4.0 - 5.6 % Final     Comment:     ADA Screening Guidelines:  5.7-6.4%  Consistent with prediabetes  >or=6.5%  Consistent with diabetes    High levels of fetal hemoglobin interfere with the HbA1C  assay. Heterozygous hemoglobin variants (HbS, HgC, etc)do  not significantly interfere with this assay.   However, presence of multiple variants may affect accuracy.             Past Medical History:   Diagnosis Date    Abnormal thallium stress test     Anticoagulant long-term use     Arrhythmia     Atrial flutter, paroxysmal 02/11/2016    CAD (coronary artery disease) 03/09/2016 5/2016 [facility] Left main:NL LAD: 35% proximal LAD. two small diagonals with minimal  disease.  Circumflex/Large branching first obtuse marginal: with minimal disease.   RCA: The vessel was large sized (dominant) with a 60% proximal lesion. 100 mcgs of intracoronary Nitroglycerin were given with no change in the lesion. Thus FFR was performed. FFR was 0.79  2/2016 cor CTA ~~ 50% LAD    Coronary artery disease     Diabetes mellitus     Diabetes mellitus, type 2     Disorder of kidney and ureter 2016    KIDNEY STONE    Hypertension     Neuropathy     Paroxysmal atrial flutter     PE (pulmonary embolism)     Pulmonary embolism 02/11/2016 1/2016     Rheumatoid arthritis     Shortness of breath     Stented coronary artery 06/15/2016    5/2016 RCA- synergy 3.5x12    Wears contact lenses     Wears prescription eyeglasses        Past Surgical History:   Procedure Laterality Date    ABLATION Left 3/23/2023    Procedure: Ablation;  Surgeon: Yobany Cronin III, MD;  Location: [facility] CATH;  Service: Cardiology;  Laterality: Left;    ARTHROPLASTY OF TOE Right 07/14/2022    Procedure: ARTHROPLASTY, TOE;  Surgeon: Augusto Simeon DPM;  Location: [facility] OR;  Service: Podiatry;  Laterality: Right;  Hallux    CARDIAC CATHETERIZATION      with stent placed x 1    INSERTION OF IMPLANTABLE LOOP RECORDER N/A 5/3/2023    Procedure: Implantable Loop Recorder;  Surgeon: Yobany Cronin III, MD;  [facility];  Service: Cardiology;  Laterality: N/A;    KNEE ARTHROSCOPY Left 1985    TONSILLECTOMY         Family History   Problem Relation Age of Onset    Cancer Mother     Angina Mother     Heart disease Mother     Hypertension Mother     Kidney disease Father     Thyroid disease Sister     Bell's palsy Sister     Thyroid disease Sister     Depression Sister     Depression Sister     Cancer Sister        Social History     Socioeconomic History    Marital status:    Tobacco Use    Smoking status: Never    Smokeless tobacco: Never   Substance and Sexual Activity    Alcohol use: No    Drug use: No       Current  "Outpatient Medications   Medication Sig Dispense Refill    amLODIPine (NORVASC) 10 MG tablet TAKE 1 TABLET(10 MG) BY MOUTH EVERY DAY (Patient taking differently: Take 10 mg by mouth every evening.) 30 tablet 5    apixaban (ELIQUIS) 5 mg Tab Take 5 mg by mouth 2 (two) times daily.      APPLE CIDER VINEGAR ORAL Take by mouth.      ascorbic acid (VITAMIN C) 500 MG tablet Take 500 mg by mouth once daily.      aspirin (ECOTRIN) 81 MG EC tablet Take 1 tablet (81 mg total) by mouth once daily. 30 tablet 11    b complex vitamins capsule Take 1 capsule by mouth once daily.      BD AMAN 2ND GEN PEN NEEDLE 32 gauge x 5/32" Ndle USE TO INJECT INTO SKIN EVERY  each 3    blood sugar diagnostic Strp To check BG 2 times daily, to use with insurance preferred meter  E 11.65 50 each 1    blood-glucose meter kit To check BG 2 times daily, to use with insurance preferred meter  Dx E 11.65 1 each 1    carvediloL (COREG) 6.25 MG tablet TAKE 1 TABLET(6.25 MG) BY MOUTH TWICE DAILY 180 tablet 4    clopidogreL (PLAVIX) 75 mg tablet 1 tablet Orally Once a day for 30 day(s)      cyanocobalamin, vitamin B-12, 1,000 mcg/15 mL Liqd Take by mouth. Takes a tablet      FARXIGA 5 mg Tab tablet Take 5 mg by mouth every evening.      FREESTYLE PASCUAL 14 DAY SENSOR Kit APPLY NEW SENSOR EVERY 14 DAYS AS DIRECTED      FREESTYLE LITE STRIPS Strp TEST 2 TO 3 TIMES D  2    furosemide (LASIX) 20 MG tablet Take 0.5 tablets (10 mg total) by mouth daily as needed (weigth gain > 3 lbs from baseline or increased shortness of breath). 90 tablet 4    gabapentin (NEURONTIN) 600 MG tablet Take 1 tablet (600 mg total) by mouth 2 (two) times daily. 60 tablet 11    insulin glargine,hum.rec.anlog (SEMGLEE U-100 INSULIN SUBQ) Inject 20 Units into the skin nightly as needed.      lancets Misc To check BG 2 times daily, to use with insurance preferred meter  DX E 11.65 50 each 1    metFORMIN (GLUCOPHAGE) 1000 MG tablet Take 1 tablet (1,000 mg total) by mouth 2 (two) " times daily with meals. 180 tablet 3    multivit-min/iron/folic acid/K (ADULTS MULTIVITAMIN ORAL) Take by mouth.      mupirocin (BACTROBAN) 2 % ointment Apply topically once daily. 30 g 1    olmesartan-hydrochlorothiazide (BENICAR HCT) 40-25 mg per tablet 1 tablet Orally Once a day for 30 day(s)      ondansetron (ZOFRAN) 4 MG tablet Take 1 tablet (4 mg total) by mouth every 6 (six) hours. 12 tablet 0    ONETOUCH DELICA PLUS LANCET 33 gauge Misc TEST ONCE D  0    pravastatin (PRAVACHOL) 40 MG tablet Take 1 tablet (40 mg total) by mouth every evening. 90 tablet 3    RYBELSUS 7 mg tablet Take 7 mg by mouth every morning.      TOUJEO SOLOSTAR U-300 INSULIN 300 unit/mL (1.5 mL) InPn pen SMARTSI Unit(s) SUB-Q Daily      TRUE METRIX GLUCOSE METER Misc 2 (two) times daily. Use to check blood glucose      vitamin E 400 UNIT capsule Take 400 Units by mouth once daily.      omeprazole (PRILOSEC) 40 MG capsule Take 1 capsule (40 mg total) by mouth every morning. 30 capsule 0     No current facility-administered medications for this visit.       Review of patient's allergies indicates:  No Known Allergies      Review of Systems   Constitutional: Negative for chills and fever.   Cardiovascular:  Negative for claudication and leg swelling.   Skin:  Positive for color change and nail changes. Negative for poor wound healing.   Musculoskeletal:  Positive for arthritis. Negative for joint pain and joint swelling.   Gastrointestinal:  Negative for nausea and vomiting.   Neurological:  Positive for numbness. Negative for paresthesias.   Psychiatric/Behavioral:  Negative for altered mental status.          Objective:      Physical Exam  Constitutional:       General: He is not in acute distress.     Appearance: He is well-developed. He is not diaphoretic.   Cardiovascular:      Pulses:           Dorsalis pedis pulses are 2+ on the right side and 2+ on the left side.        Posterior tibial pulses are 2+ on the right side and 2+ on  the left side.      Comments: CFT < 3 seconds bilateral.  Pedal hair growth present bilateral.   No varicosities noted bilateral. No lower extremity edema noted bilateral.  Toes are warm to touch bilateral.    Musculoskeletal:         General: Deformity present. No tenderness.      Right lower leg: No edema.      Left lower leg: No edema.      Comments: Muscle strength 5/5 in all muscle groups bilateral.  No tenderness nor crepitation with ROM of foot/ankle joints bilateral.  No tenderness with palpation of bilateral foot and ankle.  Arthritic changes changes noted to the dorsal and medial aspect of the Rt. 1st mtp joint.  Bilateral pes planus foot type.  Bilateral hallux abducto valgus.  Bilateral semi-rigid contracture of toes 2-5 with better reduction of the Rt. 2nd DIP joint.     Skin:     General: Skin is warm and dry.      Capillary Refill: Capillary refill takes less than 2 seconds.      Coloration: Skin is not pale.      Findings: Wound present. No abrasion, bruising, burn, ecchymosis, erythema, lesion, petechiae or rash.      Nails: There is no clubbing.      Comments: Pedal skin has normal turgor, temperature, and texture bilateral.  Toenails x 10 appear mycotic but well maintained.      Incision to the plantar aspect of the Rt. 2nd toe is well approximated with suture.  No evidence of dehiscence, necrosis, ischemia, or infection the length of the incision.      Partial thickness wound noted to the distal tip of the Rt. 2nd toe.  Wound bed is down to dermis and comprised of fibrin.  Tiffany wound is devoid of erythema, edema fluctuance, purulence, and malodor.    Pre-debridement measurement: 0.1 x 0.1 x 0.1cm  Post-debridement measurement: 0.3 x 0.3 x 0.1cm         Neurological:      Mental Status: He is alert and oriented to person, place, and time.      Sensory: Sensory deficit present.      Motor: No weakness or atrophy.      Comments: Protective sensation per [place] monofilament absent  bilateral.    Light touch intact bilateral.             Assessment:       Encounter Diagnoses   Name Primary?    Postoperative state Yes    Diabetic ulcer of toe of right foot associated with type 2 diabetes mellitus, limited to breakdown of skin     Diabetic polyneuropathy associated with type 2 diabetes mellitus            Plan:       Yobany was seen today for follow-up.    Diagnoses and all orders for this visit:    Postoperative state    Diabetic ulcer of toe of right foot associated with type 2 diabetes mellitus, limited to breakdown of skin    Diabetic polyneuropathy associated with type 2 diabetes mellitus        I counseled the patient on his conditions, their implications and medical management.    Overall, satisfactory postoperative results noted.  The incision is well approximated with suture and devoid of signs of infection.     With the patient's verbal consent, a sterile suture removal kit was used to the suture without incident.  Patient tolerated this quite well.      Patient to begin applying mupirocin ointment and a band aid to the residual wound of the Rt. 2nd toe once daily x 1 final week.      Patient to continue use of casual shoe gear.     May resume his normal bathing routine.     Patient to call if the wound fails to fully heal in the next two weeks.    RTC prn.     Augusto Simeon DPM

## 2023-06-14 ENCOUNTER — OFFICE VISIT (OUTPATIENT)
Dept: NEPHROLOGY | Facility: CLINIC | Age: 68
End: 2023-06-14
Payer: COMMERCIAL

## 2023-06-14 VITALS — HEIGHT: 74 IN | SYSTOLIC BLOOD PRESSURE: 138 MMHG | DIASTOLIC BLOOD PRESSURE: 76 MMHG | BODY MASS INDEX: 26.07 KG/M2

## 2023-06-14 DIAGNOSIS — I10 PRIMARY HYPERTENSION: ICD-10-CM

## 2023-06-14 DIAGNOSIS — N18.2 CKD (CHRONIC KIDNEY DISEASE) STAGE 2, GFR 60-89 ML/MIN: Primary | ICD-10-CM

## 2023-06-14 DIAGNOSIS — E11.22 CONTROLLED TYPE 2 DIABETES MELLITUS WITH STAGE 2 CHRONIC KIDNEY DISEASE, WITH LONG-TERM CURRENT USE OF INSULIN: ICD-10-CM

## 2023-06-14 DIAGNOSIS — N18.2 CONTROLLED TYPE 2 DIABETES MELLITUS WITH STAGE 2 CHRONIC KIDNEY DISEASE, WITH LONG-TERM CURRENT USE OF INSULIN: ICD-10-CM

## 2023-06-14 DIAGNOSIS — Z79.4 CONTROLLED TYPE 2 DIABETES MELLITUS WITH STAGE 2 CHRONIC KIDNEY DISEASE, WITH LONG-TERM CURRENT USE OF INSULIN: ICD-10-CM

## 2023-06-14 PROCEDURE — 3075F SYST BP GE 130 - 139MM HG: CPT | Mod: CPTII,S$GLB,, | Performed by: INTERNAL MEDICINE

## 2023-06-14 PROCEDURE — 3288F FALL RISK ASSESSMENT DOCD: CPT | Mod: CPTII,S$GLB,, | Performed by: INTERNAL MEDICINE

## 2023-06-14 PROCEDURE — 3078F DIAST BP <80 MM HG: CPT | Mod: CPTII,S$GLB,, | Performed by: INTERNAL MEDICINE

## 2023-06-14 PROCEDURE — 99999 PR PBB SHADOW E&M-EST. PATIENT-LVL IV: ICD-10-PCS | Mod: PBBFAC,,, | Performed by: INTERNAL MEDICINE

## 2023-06-14 PROCEDURE — 1160F PR REVIEW ALL MEDS BY PRESCRIBER/CLIN PHARMACIST DOCUMENTED: ICD-10-PCS | Mod: CPTII,S$GLB,, | Performed by: INTERNAL MEDICINE

## 2023-06-14 PROCEDURE — 3075F PR MOST RECENT SYSTOLIC BLOOD PRESS GE 130-139MM HG: ICD-10-PCS | Mod: CPTII,S$GLB,, | Performed by: INTERNAL MEDICINE

## 2023-06-14 PROCEDURE — 3051F PR MOST RECENT HEMOGLOBIN A1C LEVEL 7.0 - < 8.0%: ICD-10-PCS | Mod: CPTII,S$GLB,, | Performed by: INTERNAL MEDICINE

## 2023-06-14 PROCEDURE — 1101F PR PT FALLS ASSESS DOC 0-1 FALLS W/OUT INJ PAST YR: ICD-10-PCS | Mod: CPTII,S$GLB,, | Performed by: INTERNAL MEDICINE

## 2023-06-14 PROCEDURE — 3066F NEPHROPATHY DOC TX: CPT | Mod: CPTII,S$GLB,, | Performed by: INTERNAL MEDICINE

## 2023-06-14 PROCEDURE — 1159F PR MEDICATION LIST DOCUMENTED IN MEDICAL RECORD: ICD-10-PCS | Mod: CPTII,S$GLB,, | Performed by: INTERNAL MEDICINE

## 2023-06-14 PROCEDURE — 1101F PT FALLS ASSESS-DOCD LE1/YR: CPT | Mod: CPTII,S$GLB,, | Performed by: INTERNAL MEDICINE

## 2023-06-14 PROCEDURE — 3051F HG A1C>EQUAL 7.0%<8.0%: CPT | Mod: CPTII,S$GLB,, | Performed by: INTERNAL MEDICINE

## 2023-06-14 PROCEDURE — 1159F MED LIST DOCD IN RCRD: CPT | Mod: CPTII,S$GLB,, | Performed by: INTERNAL MEDICINE

## 2023-06-14 PROCEDURE — 99214 OFFICE O/P EST MOD 30 MIN: CPT | Mod: S$GLB,,, | Performed by: INTERNAL MEDICINE

## 2023-06-14 PROCEDURE — 99214 PR OFFICE/OUTPT VISIT, EST, LEVL IV, 30-39 MIN: ICD-10-PCS | Mod: S$GLB,,, | Performed by: INTERNAL MEDICINE

## 2023-06-14 PROCEDURE — 3008F BODY MASS INDEX DOCD: CPT | Mod: CPTII,S$GLB,, | Performed by: INTERNAL MEDICINE

## 2023-06-14 PROCEDURE — 3288F PR FALLS RISK ASSESSMENT DOCUMENTED: ICD-10-PCS | Mod: CPTII,S$GLB,, | Performed by: INTERNAL MEDICINE

## 2023-06-14 PROCEDURE — 3066F PR DOCUMENTATION OF TREATMENT FOR NEPHROPATHY: ICD-10-PCS | Mod: CPTII,S$GLB,, | Performed by: INTERNAL MEDICINE

## 2023-06-14 PROCEDURE — 99999 PR PBB SHADOW E&M-EST. PATIENT-LVL IV: CPT | Mod: PBBFAC,,, | Performed by: INTERNAL MEDICINE

## 2023-06-14 PROCEDURE — 3078F PR MOST RECENT DIASTOLIC BLOOD PRESSURE < 80 MM HG: ICD-10-PCS | Mod: CPTII,S$GLB,, | Performed by: INTERNAL MEDICINE

## 2023-06-14 PROCEDURE — 3008F PR BODY MASS INDEX (BMI) DOCUMENTED: ICD-10-PCS | Mod: CPTII,S$GLB,, | Performed by: INTERNAL MEDICINE

## 2023-06-14 PROCEDURE — 1160F RVW MEDS BY RX/DR IN RCRD: CPT | Mod: CPTII,S$GLB,, | Performed by: INTERNAL MEDICINE

## 2023-06-20 PROBLEM — Z95.818 STATUS POST PLACEMENT OF IMPLANTABLE LOOP RECORDER: Status: ACTIVE | Noted: 2023-06-20

## 2023-06-26 ENCOUNTER — OFFICE VISIT (OUTPATIENT)
Dept: PRIMARY CARE CLINIC | Facility: CLINIC | Age: 68
End: 2023-06-26
Payer: COMMERCIAL

## 2023-06-26 VITALS
HEIGHT: 74 IN | BODY MASS INDEX: 27.27 KG/M2 | WEIGHT: 212.5 LBS | SYSTOLIC BLOOD PRESSURE: 120 MMHG | OXYGEN SATURATION: 97 % | DIASTOLIC BLOOD PRESSURE: 72 MMHG | HEART RATE: 64 BPM

## 2023-06-26 DIAGNOSIS — E11.42 TYPE 2 DIABETES MELLITUS WITH DIABETIC POLYNEUROPATHY, WITH LONG-TERM CURRENT USE OF INSULIN: ICD-10-CM

## 2023-06-26 DIAGNOSIS — Z79.4 TYPE 2 DIABETES MELLITUS WITH DIABETIC POLYNEUROPATHY, WITH LONG-TERM CURRENT USE OF INSULIN: ICD-10-CM

## 2023-06-26 DIAGNOSIS — Z86.79 S/P ABLATION OF ATRIAL FIBRILLATION: ICD-10-CM

## 2023-06-26 DIAGNOSIS — I25.118 CORONARY ARTERY DISEASE INVOLVING NATIVE CORONARY ARTERY OF NATIVE HEART WITH OTHER FORM OF ANGINA PECTORIS: ICD-10-CM

## 2023-06-26 DIAGNOSIS — I11.9 HYPERTENSIVE HEART DISEASE WITHOUT HEART FAILURE: ICD-10-CM

## 2023-06-26 DIAGNOSIS — Z95.818 STATUS POST PLACEMENT OF IMPLANTABLE LOOP RECORDER: ICD-10-CM

## 2023-06-26 DIAGNOSIS — Z98.890 S/P ABLATION OF ATRIAL FIBRILLATION: ICD-10-CM

## 2023-06-26 PROCEDURE — 3078F DIAST BP <80 MM HG: CPT | Mod: CPTII,S$GLB,, | Performed by: INTERNAL MEDICINE

## 2023-06-26 PROCEDURE — 3008F BODY MASS INDEX DOCD: CPT | Mod: CPTII,S$GLB,, | Performed by: INTERNAL MEDICINE

## 2023-06-26 PROCEDURE — 3051F HG A1C>EQUAL 7.0%<8.0%: CPT | Mod: CPTII,S$GLB,, | Performed by: INTERNAL MEDICINE

## 2023-06-26 PROCEDURE — 3074F PR MOST RECENT SYSTOLIC BLOOD PRESSURE < 130 MM HG: ICD-10-PCS | Mod: CPTII,S$GLB,, | Performed by: INTERNAL MEDICINE

## 2023-06-26 PROCEDURE — 1126F PR PAIN SEVERITY QUANTIFIED, NO PAIN PRESENT: ICD-10-PCS | Mod: CPTII,S$GLB,, | Performed by: INTERNAL MEDICINE

## 2023-06-26 PROCEDURE — 99214 OFFICE O/P EST MOD 30 MIN: CPT | Mod: S$GLB,,, | Performed by: INTERNAL MEDICINE

## 2023-06-26 PROCEDURE — 3074F SYST BP LT 130 MM HG: CPT | Mod: CPTII,S$GLB,, | Performed by: INTERNAL MEDICINE

## 2023-06-26 PROCEDURE — 1159F MED LIST DOCD IN RCRD: CPT | Mod: CPTII,S$GLB,, | Performed by: INTERNAL MEDICINE

## 2023-06-26 PROCEDURE — 3008F PR BODY MASS INDEX (BMI) DOCUMENTED: ICD-10-PCS | Mod: CPTII,S$GLB,, | Performed by: INTERNAL MEDICINE

## 2023-06-26 PROCEDURE — 99214 PR OFFICE/OUTPT VISIT, EST, LEVL IV, 30-39 MIN: ICD-10-PCS | Mod: S$GLB,,, | Performed by: INTERNAL MEDICINE

## 2023-06-26 PROCEDURE — 1160F PR REVIEW ALL MEDS BY PRESCRIBER/CLIN PHARMACIST DOCUMENTED: ICD-10-PCS | Mod: CPTII,S$GLB,, | Performed by: INTERNAL MEDICINE

## 2023-06-26 PROCEDURE — 3066F PR DOCUMENTATION OF TREATMENT FOR NEPHROPATHY: ICD-10-PCS | Mod: CPTII,S$GLB,, | Performed by: INTERNAL MEDICINE

## 2023-06-26 PROCEDURE — 3078F PR MOST RECENT DIASTOLIC BLOOD PRESSURE < 80 MM HG: ICD-10-PCS | Mod: CPTII,S$GLB,, | Performed by: INTERNAL MEDICINE

## 2023-06-26 PROCEDURE — 1159F PR MEDICATION LIST DOCUMENTED IN MEDICAL RECORD: ICD-10-PCS | Mod: CPTII,S$GLB,, | Performed by: INTERNAL MEDICINE

## 2023-06-26 PROCEDURE — 1126F AMNT PAIN NOTED NONE PRSNT: CPT | Mod: CPTII,S$GLB,, | Performed by: INTERNAL MEDICINE

## 2023-06-26 PROCEDURE — 99999 PR PBB SHADOW E&M-EST. PATIENT-LVL V: ICD-10-PCS | Mod: PBBFAC,,, | Performed by: INTERNAL MEDICINE

## 2023-06-26 PROCEDURE — 3066F NEPHROPATHY DOC TX: CPT | Mod: CPTII,S$GLB,, | Performed by: INTERNAL MEDICINE

## 2023-06-26 PROCEDURE — 99999 PR PBB SHADOW E&M-EST. PATIENT-LVL V: CPT | Mod: PBBFAC,,, | Performed by: INTERNAL MEDICINE

## 2023-06-26 PROCEDURE — 1160F RVW MEDS BY RX/DR IN RCRD: CPT | Mod: CPTII,S$GLB,, | Performed by: INTERNAL MEDICINE

## 2023-06-26 PROCEDURE — 3051F PR MOST RECENT HEMOGLOBIN A1C LEVEL 7.0 - < 8.0%: ICD-10-PCS | Mod: CPTII,S$GLB,, | Performed by: INTERNAL MEDICINE

## 2023-06-26 NOTE — PROGRESS NOTES
[facility] Note    Chief Complaint      Chief Complaint   Patient presents with    Follow-up     6 month        History of Present Illness      Yobany Richardson is a 67 y.o. male who presents today for   Chief Complaint   Patient presents with    Follow-up     6 month    .  Patient comes to appointment here for 6m checkup for chronic issues sa below . He is currently awaiting angiogram  with dr pradhan was postponed originally or acute on chr renal insuff . Is seeing macy [facility] nephrology and last cr 1.4 (baseline) he is scheduled 7/6/23 for angiogram . He is currently not having any chest pain . His bs is well controlled at 7.0 is seeing dr quiñonez .     HPI    No problem-specific Assessment & Plan notes found for this encounter.       Problem List Items Addressed This Visit          Cardiac/Vascular    Hypertensive heart disease without heart failure    Overview     Cont current bp well controlled          CAD (coronary artery disease)    Overview     Is seeing dr pradhan , for angiogram 7/6         S/P ablation of atrial fibrillation - PVI by cryoballoon    Overview     Stable          Status post placement of implantable loop recorder    Overview       Successful implantation of a LINQ Loop Recorder.              Endocrine    Type 2 diabetes mellitus with diabetic polyneuropathy, with long-term current use of insulin    Overview     7.0 a1c with dr quiñonez .    optho done 12/30/21  Cont current              Past Medical History:  Past Medical History:   Diagnosis Date    Abnormal thallium stress test     Anticoagulant long-term use     Arrhythmia     Atrial flutter, paroxysmal 02/11/2016    CAD (coronary artery disease) 03/09/2016 5/2016 [facility] Left main:NL LAD: 35% proximal LAD. two small diagonals with minimal disease.  Circumflex/Large branching first obtuse marginal: with minimal disease.   RCA: The vessel was large sized (dominant) with a 60% proximal lesion. 100 mcgs of intracoronary  Nitroglycerin were given with no change in the lesion. Thus FFR was performed. FFR was 0.79  2/2016 cor CTA ~~ 50% LAD    Coronary artery disease     Diabetes mellitus     Diabetes mellitus, type 2     Disorder of kidney and ureter 2016    KIDNEY STONE    Hypertension     Neuropathy     Paroxysmal atrial flutter     PE (pulmonary embolism)     Pulmonary embolism 02/11/2016 1/2016     Rheumatoid arthritis     Shortness of breath     Stented coronary artery 06/15/2016    5/2016 RCA- synergy 3.5x12    Wears contact lenses     Wears prescription eyeglasses        Past Surgical History:  Past Surgical History:   Procedure Laterality Date    ABLATION Left 3/23/2023    Procedure: Ablation;  Surgeon: Yobany Cronin III, MD;  Location: [facility] CATH;  Service: Cardiology;  Laterality: Left;    ARTHROPLASTY OF TOE Right 07/14/2022    Procedure: ARTHROPLASTY, TOE;  Surgeon: Augusto Simeon DPM;  Location: [facility] OR;  Service: Podiatry;  Laterality: Right;  Hallux    CARDIAC CATHETERIZATION      with stent placed x 1    INSERTION OF IMPLANTABLE LOOP RECORDER N/A 5/3/2023    Procedure: Implantable Loop Recorder;  Surgeon: Yobany Cronin III, MD;  Location: [facility] CATH;  Service: Cardiology;  Laterality: N/A;    KNEE ARTHROSCOPY Left 1985    TONSILLECTOMY         Family History:  family history includes Angina in his mother; Bell's palsy in his sister; Cancer in his mother and sister; Depression in his sister and sister; Heart disease in his mother; Hypertension in his mother; Kidney disease in his father; Thyroid disease in his sister and sister.     Social History:  Social History     Socioeconomic History    Marital status:    Tobacco Use    Smoking status: Never    Smokeless tobacco: Never   Substance and Sexual Activity    Alcohol use: No    Drug use: No       Review of Systems:   Review of Systems   Constitutional:  Negative for fever and weight loss.   HENT:  Negative for congestion, hearing loss and sore throat.    Eyes:  " Negative for blurred vision.   Respiratory:  Negative for cough and shortness of breath.    Cardiovascular:  Negative for chest pain, palpitations, claudication and leg swelling.   Gastrointestinal:  Negative for abdominal pain, constipation, diarrhea and heartburn.   Genitourinary:  Negative for dysuria.   Musculoskeletal:  Negative for back pain and myalgias.   Skin:  Negative for rash.   Neurological:  Negative for focal weakness and headaches.   Psychiatric/Behavioral:  Negative for depression and suicidal ideas. The patient is not nervous/anxious.       Medications:  Outpatient Encounter Medications as of 6/26/2023   Medication Sig Note Dispense Refill    amLODIPine (NORVASC) 10 MG tablet TAKE 1 TABLET(10 MG) BY MOUTH EVERY DAY  30 tablet 5    apixaban (ELIQUIS) 5 mg Tab Take 5 mg by mouth 2 (two) times daily.       APPLE CIDER VINEGAR ORAL Take by mouth.       ascorbic acid (VITAMIN C) 500 MG tablet Take 500 mg by mouth once daily.       aspirin (ECOTRIN) 81 MG EC tablet Take 1 tablet (81 mg total) by mouth once daily.  30 tablet 11    b complex vitamins capsule Take 1 capsule by mouth once daily.       BD AMAN 2ND GEN PEN NEEDLE 32 gauge x 5/32" Ndle USE TO INJECT INTO SKIN EVERY DAY  100 each 3    blood sugar diagnostic Strp To check BG 2 times daily, to use with insurance preferred meter  E 11.65  50 each 1    blood-glucose meter kit To check BG 2 times daily, to use with insurance preferred meter  Dx E 11.65  1 each 1    carvediloL (COREG) 6.25 MG tablet TAKE 1 TABLET(6.25 MG) BY MOUTH TWICE DAILY  180 tablet 4    clopidogreL (PLAVIX) 75 mg tablet 1 tablet Orally Once a day for 30 day(s)       cyanocobalamin, vitamin B-12, 1,000 mcg/15 mL Liqd Take by mouth. Takes a tablet       FARXIGA 5 mg Tab tablet Take 5 mg by mouth every evening.       FREESTYLE PASCUAL 14 DAY SENSOR Kit APPLY NEW SENSOR EVERY 14 DAYS AS DIRECTED       FREESTYLE LITE STRIPS Strp TEST 2 TO 3 TIMES D   2    furosemide (LASIX) 20 MG " tablet Take 0.5 tablets (10 mg total) by mouth daily as needed (weigth gain > 3 lbs from baseline or increased shortness of breath).  90 tablet 4    gabapentin (NEURONTIN) 600 MG tablet Take 1 tablet (600 mg total) by mouth 2 (two) times daily.  60 tablet 11    insulin glargine,hum.rec.anlog (SEMGLEE U-100 INSULIN SUBQ) Inject 20 Units into the skin nightly as needed.       lancets Misc To check BG 2 times daily, to use with insurance preferred meter  DX E 11.65  50 each 1    metFORMIN (GLUCOPHAGE) 1000 MG tablet Take 1 tablet (1,000 mg total) by mouth 2 (two) times daily with meals. (Patient not taking: Reported on 2023)  180 tablet 3    multivit-min/iron/folic acid/K (ADULTS MULTIVITAMIN ORAL) Take by mouth.       mupirocin (BACTROBAN) 2 % ointment Apply topically once daily.  30 g 1    nitroGLYCERIN (NITROSTAT) 0.4 MG SL tablet Place 1 tablet (0.4 mg total) under the tongue every 5 (five) minutes as needed for Chest pain.  30 tablet 12    omeprazole (PRILOSEC) 40 MG capsule Take 1 capsule (40 mg total) by mouth every morning.  30 capsule 0    ONETOUCH DELICA PLUS LANCET 33 gauge Misc TEST ONCE D   0    pravastatin (PRAVACHOL) 40 MG tablet Take 1 tablet (40 mg total) by mouth every evening.  90 tablet 3    RYBELSUS 7 mg tablet Take 7 mg by mouth every morning.       TOUJEO SOLOSTAR U-300 INSULIN 300 unit/mL (1.5 mL) InPn pen SMARTSI Unit(s) SUB-Q Daily       TRUE METRIX GLUCOSE METER Misc 2 (two) times daily. Use to check blood glucose       vitamin E 400 UNIT capsule Take 400 Units by mouth once daily.       [DISCONTINUED] olmesartan-hydrochlorothiazide (BENICAR HCT) 40-25 mg per tablet 1 tablet Orally Once a day for 30 day(s)       [DISCONTINUED] ondansetron (ZOFRAN) 4 MG tablet Take 1 tablet (4 mg total) by mouth every 6 (six) hours. (Patient not taking: Reported on 2023) 2023: Pt not taking 12 tablet 0     No facility-administered encounter medications on file as of 2023.  "      Allergies:  Review of patient's allergies indicates:  No Known Allergies      Physical Exam      Vitals:    06/26/23 1356   BP: 120/72   Pulse: 64        Vital Signs  Pulse: 64  SpO2: 97 %  BP: 120/72  BP Location: Right arm  Patient Position: Sitting  Pain Score: 0-No pain  Height and Weight  Height: 6' 2" (188 cm)  Weight: 96.4 kg (212 lb 8.4 oz)  BSA (Calculated - sq m): 2.24 sq meters  BMI (Calculated): 27.3  Weight in (lb) to have BMI = 25: 194.3]     Body mass index is 27.29 kg/m².    Physical Exam  Constitutional:       Appearance: He is well-developed.   HENT:      Head: Normocephalic.   Eyes:      Pupils: Pupils are equal, round, and reactive to light.   Neck:      Thyroid: No thyromegaly.   Cardiovascular:      Rate and Rhythm: Normal rate and regular rhythm.      Heart sounds: No murmur heard.    No friction rub. No gallop.   Pulmonary:      Effort: Pulmonary effort is normal.      Breath sounds: Normal breath sounds.   Abdominal:      General: Bowel sounds are normal.      Palpations: Abdomen is soft.   Musculoskeletal:         General: Normal range of motion.      Cervical back: Normal range of motion.   Skin:     General: Skin is warm and dry.   Neurological:      Mental Status: He is alert and oriented to person, place, and time.      Sensory: No sensory deficit.   Psychiatric:         Behavior: Behavior normal.        Laboratory:  CBC:  Recent Labs   Lab Result Units 05/10/23  1031   WBC K/uL 5.96   RBC M/uL 3.03*   Hemoglobin g/dL 9.9*   Hematocrit % 30.3*   Platelets K/uL 226   MCV fL 100*   MCH pg 32.7*   MCHC g/dL 32.7     CMP:  Recent Labs   Lab Result Units 06/07/23  0716   Glucose mg/dL 100   Calcium mg/dL 9.7   Albumin g/dL 4.0   Sodium mmol/L 137   Potassium mmol/L 4.4   CO2 mmol/L 24   Chloride mmol/L 104   BUN mg/dL 25*     URINALYSIS:  No results for input(s): COLORU, CLARITYU, SPECGRAV, PHUR, PROTEINUA, GLUCOSEU, BILIRUBINCON, BLOODU, WBCU, RBCU, BACTERIA, MUCUS, NITRITE, " LEUKOCYTESUR, UROBILINOGEN, HYALINECASTS in the last 2160 hours.   LIPIDS:  No results for input(s): TSH, HDL, CHOL, TRIG, LDLCALC, CHOLHDL, NONHDLCHOL, TOTALCHOLEST in the last 2160 hours.  TSH:  No results for input(s): TSH in the last 2160 hours.  A1C:  No results for input(s): HGBA1C in the last 2160 hours.    Radiology:        Assessment:     Yobany Richardson is a 67 y.o.male with:    Type 2 diabetes mellitus with diabetic polyneuropathy, with long-term current use of insulin    Status post placement of implantable loop recorder    S/P ablation of atrial fibrillation - PVI by cryoballoon    Hypertensive heart disease without heart failure    Coronary artery disease involving native coronary artery of native heart with other form of angina pectoris                Plan:     Problem List Items Addressed This Visit          Cardiac/Vascular    Hypertensive heart disease without heart failure    Overview     Cont current bp well controlled          CAD (coronary artery disease)    Overview     Is seeing dr pradhan , for angiogram 7/6         S/P ablation of atrial fibrillation - PVI by cryoballoon    Overview     Stable          Status post placement of implantable loop recorder    Overview       Successful implantation of a LINQ Loop Recorder.              Endocrine    Type 2 diabetes mellitus with diabetic polyneuropathy, with long-term current use of insulin    Overview     7.0 a1c with dr quiñonez .    mickie done 12/30/21  Cont current             As above, continue current medications and maintain follow up with specialists.  Return to clinic in 6 months.      Alirio Walters  [facility]

## 2023-07-06 ENCOUNTER — DOCUMENTATION ONLY (OUTPATIENT)
Dept: NEPHROLOGY | Facility: CLINIC | Age: 68
End: 2023-07-06
Payer: MEDICARE

## 2023-07-06 PROBLEM — I44.1 AV BLOCK, 2ND DEGREE: Status: ACTIVE | Noted: 2023-07-06

## 2023-07-06 PROBLEM — Z71.89 ACP (ADVANCE CARE PLANNING): Status: ACTIVE | Noted: 2023-07-06

## 2023-07-06 NOTE — PROGRESS NOTES
Chart reviewed.    If nephrology is needed, please contact Dr. Lopez as he is in the hospital this week.  Thanks!   Pt f/u for SI/psychosis. Use of  #[id number] Yaya. Patient presents to interview in paper scrubs and fair ADLs. No interval events. Patient states "Im good, I'm fine." No sleep or appetite concerns. Discussed with patient his previous suicide attempt. Patient states "The devil made me do it, he was out to get me." Patient reports hearing voices from the devil started 20 days ago. Patient denies substance/alcohol use during time of AH. Patient currently denies any AVH, stating "I don't hear it anymore." Patient is compliant with medications and states "the medications are working." Patient denies SI/SIB/HI. Patient engages in safety planning. Pt refused Magno, states "the pills are fine." Discharge scheduled for next Wednesday. Continue to provide therapeutic support. Continue treatment for risk modification, no SE reported.  Pt f/u for SI/psychosis. Use of  #[id number] Yaya.     Patient presents to interview in paper scrubs and fair ADLs. No interval events. Patient states "Im good, I'm fine." No sleep or appetite concerns. Discussed with patient his previous suicide attempt. Patient states "The devil made me do it, he was out to get me." Patient reports hearing voices from the devil started 20 days ago. Patient denies substance/alcohol use during time of AH. Patient currently denies any AVH, stating "I don't hear it anymore." Patient is compliant with medications and states "the medications are working." Patient denies SI/SIB/HI. Patient engages in safety planning. Pt refused EDMONDS, states "the pills are fine." Discharge scheduled for next Wednesday. Continue to provide therapeutic support. Continue treatment for risk modification, no SE reported.

## 2023-07-07 PROBLEM — Z71.89 ACP (ADVANCE CARE PLANNING): Status: RESOLVED | Noted: 2023-07-06 | Resolved: 2023-07-07

## 2023-07-14 ENCOUNTER — OFFICE VISIT (OUTPATIENT)
Dept: PRIMARY CARE CLINIC | Facility: CLINIC | Age: 68
End: 2023-07-14
Payer: COMMERCIAL

## 2023-07-14 VITALS
SYSTOLIC BLOOD PRESSURE: 144 MMHG | WEIGHT: 205.94 LBS | BODY MASS INDEX: 26.43 KG/M2 | DIASTOLIC BLOOD PRESSURE: 66 MMHG | HEART RATE: 76 BPM | OXYGEN SATURATION: 99 % | HEIGHT: 74 IN

## 2023-07-14 DIAGNOSIS — E11.42 TYPE 2 DIABETES MELLITUS WITH DIABETIC POLYNEUROPATHY, WITH LONG-TERM CURRENT USE OF INSULIN: ICD-10-CM

## 2023-07-14 DIAGNOSIS — I48.0 PAF (PAROXYSMAL ATRIAL FIBRILLATION): Primary | ICD-10-CM

## 2023-07-14 DIAGNOSIS — I44.1 AV BLOCK, 2ND DEGREE: ICD-10-CM

## 2023-07-14 DIAGNOSIS — Z79.4 TYPE 2 DIABETES MELLITUS WITH DIABETIC POLYNEUROPATHY, WITH LONG-TERM CURRENT USE OF INSULIN: ICD-10-CM

## 2023-07-14 PROCEDURE — 1101F PR PT FALLS ASSESS DOC 0-1 FALLS W/OUT INJ PAST YR: ICD-10-PCS | Mod: CPTII,S$GLB,, | Performed by: STUDENT IN AN ORGANIZED HEALTH CARE EDUCATION/TRAINING PROGRAM

## 2023-07-14 PROCEDURE — 3077F PR MOST RECENT SYSTOLIC BLOOD PRESSURE >= 140 MM HG: ICD-10-PCS | Mod: CPTII,S$GLB,, | Performed by: STUDENT IN AN ORGANIZED HEALTH CARE EDUCATION/TRAINING PROGRAM

## 2023-07-14 PROCEDURE — 3044F HG A1C LEVEL LT 7.0%: CPT | Mod: CPTII,S$GLB,, | Performed by: STUDENT IN AN ORGANIZED HEALTH CARE EDUCATION/TRAINING PROGRAM

## 2023-07-14 PROCEDURE — 3078F PR MOST RECENT DIASTOLIC BLOOD PRESSURE < 80 MM HG: ICD-10-PCS | Mod: CPTII,S$GLB,, | Performed by: STUDENT IN AN ORGANIZED HEALTH CARE EDUCATION/TRAINING PROGRAM

## 2023-07-14 PROCEDURE — 1111F PR DISCHARGE MEDS RECONCILED W/ CURRENT OUTPATIENT MED LIST: ICD-10-PCS | Mod: CPTII,S$GLB,, | Performed by: STUDENT IN AN ORGANIZED HEALTH CARE EDUCATION/TRAINING PROGRAM

## 2023-07-14 PROCEDURE — 3078F DIAST BP <80 MM HG: CPT | Mod: CPTII,S$GLB,, | Performed by: STUDENT IN AN ORGANIZED HEALTH CARE EDUCATION/TRAINING PROGRAM

## 2023-07-14 PROCEDURE — 3288F FALL RISK ASSESSMENT DOCD: CPT | Mod: CPTII,S$GLB,, | Performed by: STUDENT IN AN ORGANIZED HEALTH CARE EDUCATION/TRAINING PROGRAM

## 2023-07-14 PROCEDURE — 1160F PR REVIEW ALL MEDS BY PRESCRIBER/CLIN PHARMACIST DOCUMENTED: ICD-10-PCS | Mod: CPTII,S$GLB,, | Performed by: STUDENT IN AN ORGANIZED HEALTH CARE EDUCATION/TRAINING PROGRAM

## 2023-07-14 PROCEDURE — 3066F NEPHROPATHY DOC TX: CPT | Mod: CPTII,S$GLB,, | Performed by: STUDENT IN AN ORGANIZED HEALTH CARE EDUCATION/TRAINING PROGRAM

## 2023-07-14 PROCEDURE — 3066F PR DOCUMENTATION OF TREATMENT FOR NEPHROPATHY: ICD-10-PCS | Mod: CPTII,S$GLB,, | Performed by: STUDENT IN AN ORGANIZED HEALTH CARE EDUCATION/TRAINING PROGRAM

## 2023-07-14 PROCEDURE — 1111F DSCHRG MED/CURRENT MED MERGE: CPT | Mod: CPTII,S$GLB,, | Performed by: STUDENT IN AN ORGANIZED HEALTH CARE EDUCATION/TRAINING PROGRAM

## 2023-07-14 PROCEDURE — 3008F BODY MASS INDEX DOCD: CPT | Mod: CPTII,S$GLB,, | Performed by: STUDENT IN AN ORGANIZED HEALTH CARE EDUCATION/TRAINING PROGRAM

## 2023-07-14 PROCEDURE — 99999 PR PBB SHADOW E&M-EST. PATIENT-LVL V: CPT | Mod: PBBFAC,,, | Performed by: STUDENT IN AN ORGANIZED HEALTH CARE EDUCATION/TRAINING PROGRAM

## 2023-07-14 PROCEDURE — 99999 PR PBB SHADOW E&M-EST. PATIENT-LVL V: ICD-10-PCS | Mod: PBBFAC,,, | Performed by: STUDENT IN AN ORGANIZED HEALTH CARE EDUCATION/TRAINING PROGRAM

## 2023-07-14 PROCEDURE — 1159F PR MEDICATION LIST DOCUMENTED IN MEDICAL RECORD: ICD-10-PCS | Mod: CPTII,S$GLB,, | Performed by: STUDENT IN AN ORGANIZED HEALTH CARE EDUCATION/TRAINING PROGRAM

## 2023-07-14 PROCEDURE — 1160F RVW MEDS BY RX/DR IN RCRD: CPT | Mod: CPTII,S$GLB,, | Performed by: STUDENT IN AN ORGANIZED HEALTH CARE EDUCATION/TRAINING PROGRAM

## 2023-07-14 PROCEDURE — 3044F PR MOST RECENT HEMOGLOBIN A1C LEVEL <7.0%: ICD-10-PCS | Mod: CPTII,S$GLB,, | Performed by: STUDENT IN AN ORGANIZED HEALTH CARE EDUCATION/TRAINING PROGRAM

## 2023-07-14 PROCEDURE — 1126F AMNT PAIN NOTED NONE PRSNT: CPT | Mod: CPTII,S$GLB,, | Performed by: STUDENT IN AN ORGANIZED HEALTH CARE EDUCATION/TRAINING PROGRAM

## 2023-07-14 PROCEDURE — 1126F PR PAIN SEVERITY QUANTIFIED, NO PAIN PRESENT: ICD-10-PCS | Mod: CPTII,S$GLB,, | Performed by: STUDENT IN AN ORGANIZED HEALTH CARE EDUCATION/TRAINING PROGRAM

## 2023-07-14 PROCEDURE — 99214 PR OFFICE/OUTPT VISIT, EST, LEVL IV, 30-39 MIN: ICD-10-PCS | Mod: S$GLB,,, | Performed by: STUDENT IN AN ORGANIZED HEALTH CARE EDUCATION/TRAINING PROGRAM

## 2023-07-14 PROCEDURE — 99214 OFFICE O/P EST MOD 30 MIN: CPT | Mod: S$GLB,,, | Performed by: STUDENT IN AN ORGANIZED HEALTH CARE EDUCATION/TRAINING PROGRAM

## 2023-07-14 PROCEDURE — 3077F SYST BP >= 140 MM HG: CPT | Mod: CPTII,S$GLB,, | Performed by: STUDENT IN AN ORGANIZED HEALTH CARE EDUCATION/TRAINING PROGRAM

## 2023-07-14 PROCEDURE — 1159F MED LIST DOCD IN RCRD: CPT | Mod: CPTII,S$GLB,, | Performed by: STUDENT IN AN ORGANIZED HEALTH CARE EDUCATION/TRAINING PROGRAM

## 2023-07-14 PROCEDURE — 3008F PR BODY MASS INDEX (BMI) DOCUMENTED: ICD-10-PCS | Mod: CPTII,S$GLB,, | Performed by: STUDENT IN AN ORGANIZED HEALTH CARE EDUCATION/TRAINING PROGRAM

## 2023-07-14 PROCEDURE — 3288F PR FALLS RISK ASSESSMENT DOCUMENTED: ICD-10-PCS | Mod: CPTII,S$GLB,, | Performed by: STUDENT IN AN ORGANIZED HEALTH CARE EDUCATION/TRAINING PROGRAM

## 2023-07-14 PROCEDURE — 1101F PT FALLS ASSESS-DOCD LE1/YR: CPT | Mod: CPTII,S$GLB,, | Performed by: STUDENT IN AN ORGANIZED HEALTH CARE EDUCATION/TRAINING PROGRAM

## 2023-07-14 NOTE — PROGRESS NOTES
Subjective:       Patient ID: Yobany Richardson is a 67 y.o. male with history of CAD status post PCI, paroxysmal atrial fibrillation status post ablation March 2023, status post loop recorder May 2023  Chief Complaint: Hospital Follow Up        Admission Date: 7/6/2023  Discharge Date and Time:  07/07/2023 2:04 PM    Primary Care Provider: Alirio Walters MD       Patient was referred for coronary angiogram to evaluate CAD with plans to place permanent pacemaker for 2-1 AV block and second-degree AV block.  Angiogram was previously scheduled however canceled due to acute on chronic renal failure which later resolved.  Angiogram completed. He was monitored overnight on telemetry.  He went for pacemaker placement 7/7 and discharged following procedure.  Instructed to resume antiplatelets 7/8 per Cardiology instructions. Cardiology follow up planned for 8/7. No cp, sob, palpitations, or difficulty breathing since discharge.    Paroxsmal Afib: Now off Eliquis. On plavix/aspirin.Carvedilol 6.25mg. Rate controlled in clinic today    DM2: Toujeo 20 u nightly, Farxiga 5 mg nightly    Review of Systems   Constitutional:  Negative for fever.   Respiratory:  Negative for shortness of breath.    Cardiovascular:  Negative for chest pain.   Genitourinary:  Negative for dysuria, flank pain and urgency.   Musculoskeletal:  Negative for back pain.   Integumentary:  Negative for rash.   Neurological:  Negative for weakness.              Objective:        Physical Exam  HENT:      Head: Normocephalic and atraumatic.      Nose: Nose normal.      Mouth/Throat:      Mouth: Mucous membranes are moist.      Pharynx: Oropharynx is clear.   Eyes:      Extraocular Movements: Extraocular movements intact.      Conjunctiva/sclera: Conjunctivae normal.      Pupils: Pupils are equal, round, and reactive to light.   Cardiovascular:      Rate and Rhythm: Normal rate and regular rhythm.   Pulmonary:      Effort: Pulmonary effort is normal.       Breath sounds: Normal breath sounds.   Musculoskeletal:         General: No swelling. Normal range of motion.      Cervical back: Normal range of motion.      Right lower leg: No edema.      Left lower leg: No edema.   Skin:     General: Skin is warm.      Findings: No lesion or rash.   Neurological:      General: No focal deficit present.      Mental Status: He is alert and oriented to person, place, and time.      Motor: No weakness.           Assessment:         Problem List Items Addressed This Visit          Cardiac/Vascular    PAF (paroxysmal atrial fibrillation) - Primary    AV block, 2nd degree       Endocrine    Type 2 diabetes mellitus with diabetic polyneuropathy, with long-term current use of insulin         Plan:         1. PAF (paroxysmal atrial fibrillation)  Stable on medications, continue regimen    2. AV block, 2nd degree  Continue with planned cardiology follow up    3. Type 2 diabetes mellitus with diabetic polyneuropathy, with long-term current use of insulin  Overview:  7.0 a1c with dr quiñonez .    mickie done 12/30/21  Cont current       Phoebe Braun MD

## 2023-08-07 PROBLEM — Z95.818 STATUS POST PLACEMENT OF IMPLANTABLE LOOP RECORDER: Status: RESOLVED | Noted: 2023-06-20 | Resolved: 2023-08-07

## 2023-08-15 LAB
CHOLEST SERPL-MSCNC: 127 MG/DL (ref 0–200)
CREATININE, URINE: 99 MG/DL
EGFR: 56
HBA1C MFR BLD: 7.3 % (ref 4–6)
HDLC SERPL-MCNC: 55 MG/DL (ref 35–70)
LDLC SERPL CALC-MCNC: 56 MG/DL (ref 0–160)
MICROALB/CREAT RATIO: 206
MICROALBUMIN URINE: 20.4
TRIGL SERPL-MCNC: 79 MG/DL (ref 40–160)

## 2023-08-27 DIAGNOSIS — E11.42 DIABETIC POLYNEUROPATHY ASSOCIATED WITH TYPE 2 DIABETES MELLITUS: ICD-10-CM

## 2023-08-28 RX ORDER — GABAPENTIN 600 MG/1
600 TABLET ORAL 2 TIMES DAILY
Qty: 60 TABLET | Refills: 11 | Status: SHIPPED | OUTPATIENT
Start: 2023-08-28

## 2023-09-11 ENCOUNTER — OFFICE VISIT (OUTPATIENT)
Dept: PODIATRY | Facility: CLINIC | Age: 68
End: 2023-09-11
Payer: MEDICARE

## 2023-09-11 ENCOUNTER — LAB VISIT (OUTPATIENT)
Dept: LAB | Facility: HOSPITAL | Age: 68
End: 2023-09-11
Attending: INTERNAL MEDICINE
Payer: MEDICARE

## 2023-09-11 VITALS — WEIGHT: 213 LBS | HEIGHT: 74 IN | BODY MASS INDEX: 27.34 KG/M2

## 2023-09-11 DIAGNOSIS — N18.2 CKD (CHRONIC KIDNEY DISEASE) STAGE 2, GFR 60-89 ML/MIN: ICD-10-CM

## 2023-09-11 DIAGNOSIS — L84 CORN OR CALLUS: ICD-10-CM

## 2023-09-11 DIAGNOSIS — E11.42 DIABETIC POLYNEUROPATHY ASSOCIATED WITH TYPE 2 DIABETES MELLITUS: ICD-10-CM

## 2023-09-11 DIAGNOSIS — M79.674 TOE PAIN, RIGHT: Primary | ICD-10-CM

## 2023-09-11 LAB
ALBUMIN SERPL BCP-MCNC: 4.1 G/DL (ref 3.5–5.2)
ANION GAP SERPL CALC-SCNC: 12 MMOL/L (ref 8–16)
BUN SERPL-MCNC: 18 MG/DL (ref 8–23)
CALCIUM SERPL-MCNC: 9.9 MG/DL (ref 8.7–10.5)
CHLORIDE SERPL-SCNC: 105 MMOL/L (ref 95–110)
CO2 SERPL-SCNC: 22 MMOL/L (ref 23–29)
CREAT SERPL-MCNC: 1.3 MG/DL (ref 0.5–1.4)
CREAT UR-MCNC: 107 MG/DL (ref 23–375)
EST. GFR  (NO RACE VARIABLE): >60 ML/MIN/1.73 M^2
GLUCOSE SERPL-MCNC: 102 MG/DL (ref 70–110)
PHOSPHATE SERPL-MCNC: 3.5 MG/DL (ref 2.7–4.5)
POTASSIUM SERPL-SCNC: 4.2 MMOL/L (ref 3.5–5.1)
PROT UR-MCNC: 61 MG/DL (ref 0–15)
PROT/CREAT UR: 0.57 MG/G{CREAT} (ref 0–0.2)
PTH-INTACT SERPL-MCNC: 73.2 PG/ML (ref 9–77)
SODIUM SERPL-SCNC: 139 MMOL/L (ref 136–145)

## 2023-09-11 PROCEDURE — 84156 ASSAY OF PROTEIN URINE: CPT | Performed by: INTERNAL MEDICINE

## 2023-09-11 PROCEDURE — 36415 COLL VENOUS BLD VENIPUNCTURE: CPT | Mod: PO | Performed by: INTERNAL MEDICINE

## 2023-09-11 PROCEDURE — 83970 ASSAY OF PARATHORMONE: CPT | Performed by: INTERNAL MEDICINE

## 2023-09-11 PROCEDURE — 99214 OFFICE O/P EST MOD 30 MIN: CPT | Mod: PBBFAC,PO | Performed by: PODIATRIST

## 2023-09-11 PROCEDURE — 99999 PR PBB SHADOW E&M-EST. PATIENT-LVL IV: CPT | Mod: PBBFAC,,, | Performed by: PODIATRIST

## 2023-09-11 PROCEDURE — 99999 PR PBB SHADOW E&M-EST. PATIENT-LVL IV: ICD-10-PCS | Mod: PBBFAC,,, | Performed by: PODIATRIST

## 2023-09-11 PROCEDURE — 80069 RENAL FUNCTION PANEL: CPT | Performed by: INTERNAL MEDICINE

## 2023-09-11 PROCEDURE — 99213 PR OFFICE/OUTPT VISIT, EST, LEVL III, 20-29 MIN: ICD-10-PCS | Mod: S$PBB,,, | Performed by: PODIATRIST

## 2023-09-11 PROCEDURE — 99213 OFFICE O/P EST LOW 20 MIN: CPT | Mod: S$PBB,,, | Performed by: PODIATRIST

## 2023-09-12 NOTE — PROGRESS NOTES
Subjective:      Patient ID: Yobany Richardson is a 67 y.o. male.    Chief Complaint: Nail Care  Patient presents to 3 months S/P flexor tenotomy of the Rt. 2nd toe.  States the digit continues to develop callus to the tip with regular weight bearing.  Denies this being an overt source of pain with activity, however, he does note aching to the digit at rest.  Denies noticing a wound nor drainage from said digit.  Has not attempted to self treat.  Inquires as to whether any intervention is necessary to prevent a future wound.  Denies any additional pedal complaints.      Hemoglobin A1C   Date Value Ref Range Status   07/06/2023 6.8 (H) 0.0 - 5.6 % Final     Comment:     Reference Interval:  5.0 - 5.6 Normal   5.7 - 6.4 High Risk   > 6.5 Diabetic      Hgb A1c results are standardized based on the (NGSP) National   Glycohemoglobin Standardization Program.      Hemoglobin A1C levels are related to mean serum/plasma glucose   during the preceding 2-3 months.        03/23/2023 7.0 (H) 0.0 - 5.6 % Final     Comment:     Reference Interval:  5.0 - 5.6 Normal   5.7 - 6.4 High Risk   > 6.5 Diabetic      Hgb A1c results are standardized based on the (NGSP) National   Glycohemoglobin Standardization Program.      Hemoglobin A1C levels are related to mean serum/plasma glucose   during the preceding 2-3 months.        11/21/2022 6.6 (H) 4.0 - 5.6 % Final     Comment:     ADA Screening Guidelines:  5.7-6.4%  Consistent with prediabetes  >or=6.5%  Consistent with diabetes    High levels of fetal hemoglobin interfere with the HbA1C  assay. Heterozygous hemoglobin variants (HbS, HgC, etc)do  not significantly interfere with this assay.   However, presence of multiple variants may affect accuracy.     10/14/2022 6.4 (A) 5.7 % Final           Past Medical History:   Diagnosis Date    Abnormal thallium stress test     Anticoagulant long-term use     Arrhythmia     Atrial flutter, paroxysmal 02/11/2016    CAD (coronary artery disease)  03/09/2016 5/2016 [facility] Left main:NL LAD: 35% proximal LAD. two small diagonals with minimal disease.  Circumflex/Large branching first obtuse marginal: with minimal disease.   RCA: The vessel was large sized (dominant) with a 60% proximal lesion. 100 mcgs of intracoronary Nitroglycerin were given with no change in the lesion. Thus FFR was performed. FFR was 0.79  2/2016 cor CTA ~~ 50% LAD    Coronary artery disease     Diabetes mellitus     Diabetes mellitus, type 2     Disorder of kidney and ureter 2016    KIDNEY STONE    Hypertension     Neuropathy     Paroxysmal atrial flutter     PE (pulmonary embolism)     Pulmonary embolism 02/11/2016 1/2016     Rheumatoid arthritis     Shortness of breath     Stented coronary artery 06/15/2016    5/2016 RCA- synergy 3.5x12    Wears contact lenses     Wears prescription eyeglasses        Past Surgical History:   Procedure Laterality Date    A-V CARDIAC PACEMAKER INSERTION  7/7/2023    Procedure: Dual Chamber PPM (Biotronik);  Surgeon: Yobany Cronin III, MD;  [facility];  Service: Cardiology;;    ABLATION Left 03/23/2023    Procedure: Ablation;  Surgeon: Yobany Cronin III, MD;  Location: [facility] CATH;  Service: Cardiology;  Laterality: Left;    ARTHROPLASTY OF TOE Right 07/14/2022    Procedure: ARTHROPLASTY, TOE;  Surgeon: Augusto Simeon DPM;  Location: [facility] OR;  Service: Podiatry;  Laterality: Right;  Hallux    CARDIAC CATHETERIZATION  2017    with stent placed x 1    CORONARY ANGIOGRAPHY N/A 7/6/2023    Procedure: Left Heart cath;  Surgeon: Yobany Cronin III, MD;  Location: [facility] CATH;  Service: Cardiology;  Laterality: N/A;    INSERTION OF IMPLANTABLE LOOP RECORDER N/A 05/03/2023    Procedure: Implantable Loop Recorder;  Surgeon: Yobany Cronin III, MD;  Location: [facility] CATH;  Service: Cardiology;  Laterality: N/A;    KNEE ARTHROSCOPY Left 1985    REMOVAL OF IMPLANTABLE LOOP RECORDER  7/7/2023    Procedure: Removal Loop Recorder (Medtronic);  Surgeon: Yobany CHEEK  Roseanne GOMEZ MD;  Location: [facility];  Service: Cardiology;;    TONSILLECTOMY         Family History   Problem Relation Age of Onset    Cancer Mother     Angina Mother     Heart disease Mother     Hypertension Mother     Kidney disease Father     Thyroid disease Sister     Bell's palsy Sister     Thyroid disease Sister     Depression Sister     Depression Sister     Cancer Sister        Social History     Socioeconomic History    Marital status:    Tobacco Use    Smoking status: Never    Smokeless tobacco: Never   Substance and Sexual Activity    Alcohol use: No    Drug use: No     Social Determinants of Health     Financial Resource Strain: Low Risk  (1/20/2021)    Overall Financial Resource Strain (CARDIA)     Difficulty of Paying Living Expenses: Not hard at all   Food Insecurity: No Food Insecurity (1/20/2021)    Hunger Vital Sign     Worried About Running Out of Food in the Last Year: Never true     Ran Out of Food in the Last Year: Never true   Transportation Needs: No Transportation Needs (1/20/2021)    PRAPARE - Transportation     Lack of Transportation (Medical): No     Lack of Transportation (Non-Medical): No   Physical Activity: Unknown (1/20/2021)    Exercise Vital Sign     Days of Exercise per Week: 0 days   Stress: No Stress Concern Present (1/20/2021)    [employer] [place] - Occupational Stress Questionnaire     Feeling of Stress : Not at all   Social Connections: Unknown (1/20/2021)    Social Connection and Isolation Panel [NHANES]     Frequency of Communication with Friends and Family: Never     Frequency of Social Gatherings with Friends and Family: Never     Active Member of Clubs or Organizations: No     Attends Club or Organization Meetings: Never     Marital Status:        Current Outpatient Medications   Medication Sig Dispense Refill    amLODIPine (NORVASC) 10 MG tablet TAKE 1 TABLET(10 MG) BY MOUTH EVERY DAY 30 tablet 5    APPLE CIDER VINEGAR ORAL Take by  "mouth.      ascorbic acid (VITAMIN C) 500 MG tablet Take 500 mg by mouth once daily.      aspirin (ECOTRIN) 81 MG EC tablet Take 1 tablet (81 mg total) by mouth once daily. 30 tablet 11    b complex vitamins capsule Take 1 capsule by mouth once daily.      BD AMAN 2ND GEN PEN NEEDLE 32 gauge x 5/32" Ndle USE TO INJECT INTO SKIN EVERY  each 3    blood sugar diagnostic Strp To check BG 2 times daily, to use with insurance preferred meter  E 11.65 50 each 1    blood-glucose meter kit To check BG 2 times daily, to use with insurance preferred meter  Dx E 11.65 1 each 1    carvediloL (COREG) 6.25 MG tablet Take 1 tablet (6.25 mg total) by mouth once daily. 180 tablet 4    clopidogreL (PLAVIX) 75 mg tablet 1 tablet Orally Once a day for 30 day(s) 90 tablet 4    cyanocobalamin, vitamin B-12, 1,000 mcg/15 mL Liqd Take by mouth. Takes a tablet      FARXIGA 5 mg Tab tablet Take 5 mg by mouth every evening.      FREESTYLE PASCUAL 14 DAY SENSOR Kit APPLY NEW SENSOR EVERY 14 DAYS AS DIRECTED      FREESTYLE LITE STRIPS Strp TEST 2 TO 3 TIMES D  2    furosemide (LASIX) 20 MG tablet Take 0.5 tablets (10 mg total) by mouth daily as needed (weigth gain > 3 lbs from baseline or increased shortness of breath). 90 tablet 4    gabapentin (NEURONTIN) 600 MG tablet TAKE 1 TABLET(600 MG) BY MOUTH TWICE DAILY 60 tablet 11    lancets Misc To check BG 2 times daily, to use with insurance preferred meter  DX E 11.65 50 each 1    multivit-min/iron/folic acid/K (ADULTS MULTIVITAMIN ORAL) Take by mouth.      mupirocin (BACTROBAN) 2 % ointment Apply topically once daily. 30 g 1    nitroGLYCERIN (NITROSTAT) 0.4 MG SL tablet Place 1 tablet (0.4 mg total) under the tongue every 5 (five) minutes as needed for Chest pain. 30 tablet 12    ONETOUCH DELICA PLUS LANCET 33 gauge Misc TEST ONCE D  0    pravastatin (PRAVACHOL) 40 MG tablet Take 1 tablet (40 mg total) by mouth every evening. 90 tablet 3    RYBELSUS 7 mg tablet Take 7 mg by mouth every " morning.      TOUJEO SOLOSTAR U-300 INSULIN 300 unit/mL (1.5 mL) InPn pen Inject 20 Units into the skin every evening.      TRUE METRIX GLUCOSE METER Misc 2 (two) times daily. Use to check blood glucose      vitamin E 400 UNIT capsule Take 400 Units by mouth once daily.       No current facility-administered medications for this visit.       Review of patient's allergies indicates:  No Known Allergies      Review of Systems   Constitutional: Negative for chills and fever.   Cardiovascular:  Negative for claudication and leg swelling.   Skin:  Positive for color change, nail changes and suspicious lesions. Negative for poor wound healing.   Musculoskeletal:  Positive for arthritis. Negative for joint pain and joint swelling.   Gastrointestinal:  Negative for nausea and vomiting.   Neurological:  Positive for numbness. Negative for paresthesias.   Psychiatric/Behavioral:  Negative for altered mental status.            Objective:      Physical Exam  Constitutional:       General: He is not in acute distress.     Appearance: He is well-developed. He is not diaphoretic.   Cardiovascular:      Pulses:           Dorsalis pedis pulses are 2+ on the right side and 2+ on the left side.        Posterior tibial pulses are 2+ on the right side and 2+ on the left side.      Comments: CFT < 3 seconds bilateral.  Pedal hair growth present bilateral.   No varicosities noted bilateral. No lower extremity edema noted bilateral.  Toes are warm to touch bilateral.    Musculoskeletal:         General: Deformity present. No tenderness.      Right lower leg: No edema.      Left lower leg: No edema.      Comments: Muscle strength 5/5 in all muscle groups bilateral.  No tenderness nor crepitation with ROM of foot/ankle joints bilateral.  No tenderness with palpation of bilateral foot and ankle.  Arthritic changes changes noted to the dorsal and medial aspect of the Rt. 1st mtp joint.  Bilateral pes planus foot type.  Bilateral hallux abducto  valgus.  Bilateral semi-rigid contracture of toes 2-5 with better reduction of the Rt. 2nd DIP joint.     Skin:     General: Skin is warm and dry.      Capillary Refill: Capillary refill takes less than 2 seconds.      Coloration: Skin is not pale.      Findings: Wound present. No abrasion, bruising, burn, ecchymosis, erythema, lesion, petechiae or rash.      Nails: There is no clubbing.      Comments: Pedal skin has normal turgor, temperature, and texture bilateral.  Toenails x 10 appear mycotic but well maintained.      Hyperkeratotic lesion noted to the distal tip of the Rt. 2nd toe.     Neurological:      Mental Status: He is alert and oriented to person, place, and time.      Sensory: Sensory deficit present.      Motor: No weakness or atrophy.      Comments: Protective sensation per Arden-Yash monofilament absent bilateral.    Light touch intact bilateral.               Assessment:       Encounter Diagnoses   Name Primary?    Toe pain, right Yes    Corn or callus     Diabetic polyneuropathy associated with type 2 diabetes mellitus            Plan:       Yobany was seen today for nail care.    Diagnoses and all orders for this visit:    Toe pain, right    Corn or callus    Diabetic polyneuropathy associated with type 2 diabetes mellitus        I counseled the patient on his conditions, their implications and medical management.    Based on today's exam, there is light hyperkeratotic growth to the tip of said toe.      Fortunately, there is no open wound to the area.      Suspect this is secondary to the fit of his shoes being a 1/2 size too short.  Recommend going up to a size 11.5.     If this fails to resolve the issue, we will potentially explore the possibility of an arthroplasty of the Rt. 2nd digit to further reduce pressure.    RTC in 2 weeks for follow up.     Augusto Simeon DPM

## 2023-09-23 NOTE — TELEPHONE ENCOUNTER
Care Due:                  Date            Visit Type   Department     Provider  --------------------------------------------------------------------------------                                EP -                              PRIMARY      [facility]  Last Visit: 06-      CARE ([facility])   DUNCAN Walters                              EP -                              PRIMARY      [facility] PRIMARY  Next Visit: 12-      CARE ([facility])   DUNCAN Walters                                                            Last  Test          Frequency    Reason                     Performed    Due Date  --------------------------------------------------------------------------------    Lipid Panel.  12 months..  pravastatin..............  11- 11-    [facility] Embedded Care Due Messages. Reference number: [id number].   9/23/2023 5:43:22 AM CDT

## 2023-09-25 RX ORDER — PEN NEEDLE, DIABETIC 32GX 5/32"
NEEDLE, DISPOSABLE MISCELLANEOUS
Qty: 100 EACH | Refills: 3 | Status: SHIPPED | OUTPATIENT
Start: 2023-09-25

## 2023-09-28 ENCOUNTER — OFFICE VISIT (OUTPATIENT)
Dept: PODIATRY | Facility: CLINIC | Age: 68
End: 2023-09-28
Payer: MEDICARE

## 2023-09-28 VITALS — HEIGHT: 74 IN | BODY MASS INDEX: 27.34 KG/M2 | WEIGHT: 213 LBS

## 2023-09-28 DIAGNOSIS — E11.42 DIABETIC POLYNEUROPATHY ASSOCIATED WITH TYPE 2 DIABETES MELLITUS: ICD-10-CM

## 2023-09-28 DIAGNOSIS — L60.1 ONYCHOLYSIS OF TOENAIL: Primary | ICD-10-CM

## 2023-09-28 PROCEDURE — 99999 PR PBB SHADOW E&M-EST. PATIENT-LVL III: CPT | Mod: PBBFAC,,, | Performed by: PODIATRIST

## 2023-09-28 PROCEDURE — 99213 OFFICE O/P EST LOW 20 MIN: CPT | Mod: PBBFAC,PO | Performed by: PODIATRIST

## 2023-09-28 PROCEDURE — 11730 AVULSION NAIL PLATE SIMPLE 1: CPT | Mod: PBBFAC,T5,PO | Performed by: PODIATRIST

## 2023-09-28 PROCEDURE — 11730 NAIL REMOVAL: ICD-10-PCS | Mod: S$PBB,T5,, | Performed by: PODIATRIST

## 2023-09-28 PROCEDURE — 99213 PR OFFICE/OUTPT VISIT, EST, LEVL III, 20-29 MIN: ICD-10-PCS | Mod: S$PBB,25,, | Performed by: PODIATRIST

## 2023-09-28 PROCEDURE — 99213 OFFICE O/P EST LOW 20 MIN: CPT | Mod: S$PBB,25,, | Performed by: PODIATRIST

## 2023-09-28 PROCEDURE — 99999 PR PBB SHADOW E&M-EST. PATIENT-LVL III: ICD-10-PCS | Mod: PBBFAC,,, | Performed by: PODIATRIST

## 2023-09-28 NOTE — PROGRESS NOTES
Subjective:      Patient ID: Yobany Richardson is a 67 y.o. male.    Chief Complaint: Follow-up  Patient presents to clinic with the chief complaint of a partially detached Rt. Hallux toenail.  States this occurred several days ago upon stubbing the digit while barefoot. Notes the site bled profusely.  He has been applying antibiotic ointment to the area since this occurred.  Denies noticing signs of infection to the site.  Inquires as to how this issue should be addressed.  Also, notes continued callus build up to the distal tip of the Rt. 2nd toe, however denies any remaining sensitivity with increasing his shoe size to 11.5.  Denies any additional pedal complaints.      Hemoglobin A1C   Date Value Ref Range Status   07/06/2023 6.8 (H) 0.0 - 5.6 % Final     Comment:     Reference Interval:  5.0 - 5.6 Normal   5.7 - 6.4 High Risk   > 6.5 Diabetic      Hgb A1c results are standardized based on the (NGSP) National   Glycohemoglobin Standardization Program.      Hemoglobin A1C levels are related to mean serum/plasma glucose   during the preceding 2-3 months.        03/23/2023 7.0 (H) 0.0 - 5.6 % Final     Comment:     Reference Interval:  5.0 - 5.6 Normal   5.7 - 6.4 High Risk   > 6.5 Diabetic      Hgb A1c results are standardized based on the (NGSP) National   Glycohemoglobin Standardization Program.      Hemoglobin A1C levels are related to mean serum/plasma glucose   during the preceding 2-3 months.        11/21/2022 6.6 (H) 4.0 - 5.6 % Final     Comment:     ADA Screening Guidelines:  5.7-6.4%  Consistent with prediabetes  >or=6.5%  Consistent with diabetes    High levels of fetal hemoglobin interfere with the HbA1C  assay. Heterozygous hemoglobin variants (HbS, HgC, etc)do  not significantly interfere with this assay.   However, presence of multiple variants may affect accuracy.     10/14/2022 6.4 (A) 5.7 % Final           Past Medical History:   Diagnosis Date    Abnormal thallium stress test     Anticoagulant  long-term use     Arrhythmia     Atrial flutter, paroxysmal 02/11/2016    CAD (coronary artery disease) 03/09/2016 5/2016 [facility] Left main:NL LAD: 35% proximal LAD. two small diagonals with minimal disease.  Circumflex/Large branching first obtuse marginal: with minimal disease.   RCA: The vessel was large sized (dominant) with a 60% proximal lesion. 100 mcgs of intracoronary Nitroglycerin were given with no change in the lesion. Thus FFR was performed. FFR was 0.79  2/2016 cor CTA ~~ 50% LAD    Coronary artery disease     Diabetes mellitus     Diabetes mellitus, type 2     Disorder of kidney and ureter 2016    KIDNEY STONE    Hypertension     Neuropathy     Paroxysmal atrial flutter     PE (pulmonary embolism)     Pulmonary embolism 02/11/2016 1/2016     Rheumatoid arthritis     Shortness of breath     Stented coronary artery 06/15/2016    5/2016 RCA- synergy 3.5x12    Wears contact lenses     Wears prescription eyeglasses        Past Surgical History:   Procedure Laterality Date    A-V CARDIAC PACEMAKER INSERTION  7/7/2023    Procedure: Dual Chamber PPM (Biotronik);  Surgeon: Yobany Cronin III, MD;  [facility];  Service: Cardiology;;    ABLATION Left 03/23/2023    Procedure: Ablation;  Surgeon: Yobany Cronin III, MD;  Location: [facility] CATH;  Service: Cardiology;  Laterality: Left;    ARTHROPLASTY OF TOE Right 07/14/2022    Procedure: ARTHROPLASTY, TOE;  Surgeon: Augusto Simeon DPM;  Location: [facility] OR;  Service: Podiatry;  Laterality: Right;  Hallux    CARDIAC CATHETERIZATION  2017    with stent placed x 1    CORONARY ANGIOGRAPHY N/A 7/6/2023    Procedure: Left Heart cath;  Surgeon: Yobany Cronin III, MD;  Location: [facility] CATH;  Service: Cardiology;  Laterality: N/A;    INSERTION OF IMPLANTABLE LOOP RECORDER N/A 05/03/2023    Procedure: Implantable Loop Recorder;  Surgeon: Yobany Cronin III, MD;  Location: [facility] CATH;  Service: Cardiology;  Laterality: N/A;    KNEE ARTHROSCOPY Left 1985    REMOVAL OF  IMPLANTABLE LOOP RECORDER  7/7/2023    Procedure: Removal Loop Recorder ([employer]);  Surgeon: Yobany Cronin III, MD;  Location: [facility];  Service: Cardiology;;    TONSILLECTOMY         Family History   Problem Relation Age of Onset    Cancer Mother     Angina Mother     Heart disease Mother     Hypertension Mother     Kidney disease Father     Thyroid disease Sister     Bell's palsy Sister     Thyroid disease Sister     Depression Sister     Depression Sister     Cancer Sister        Social History     Socioeconomic History    Marital status:    Tobacco Use    Smoking status: Never    Smokeless tobacco: Never   Substance and Sexual Activity    Alcohol use: No    Drug use: No     Social Determinants of Health     Financial Resource Strain: Low Risk  (1/20/2021)    Overall Financial Resource Strain (CARDIA)     Difficulty of Paying Living Expenses: Not hard at all   Food Insecurity: No Food Insecurity (1/20/2021)    Hunger Vital Sign     Worried About Running Out of Food in the Last Year: Never true     Ran Out of Food in the Last Year: Never true   Transportation Needs: No Transportation Needs (1/20/2021)    PRAPARE - Transportation     Lack of Transportation (Medical): No     Lack of Transportation (Non-Medical): No   Physical Activity: Unknown (1/20/2021)    Exercise Vital Sign     Days of Exercise per Week: 0 days   Stress: No Stress Concern Present (1/20/2021)    [employer] [place] [employer] - Occupational Stress Questionnaire     Feeling of Stress : Not at all   Social Connections: Unknown (1/20/2021)    Social Connection and Isolation Panel [NHANES]     Frequency of Communication with Friends and Family: Never     Frequency of Social Gatherings with Friends and Family: Never     Active Member of Clubs or Organizations: No     Attends Club or Organization Meetings: Never     Marital Status:        Current Outpatient Medications   Medication Sig Dispense Refill    amLODIPine  "(NORVASC) 10 MG tablet TAKE 1 TABLET(10 MG) BY MOUTH EVERY DAY 30 tablet 5    APPLE CIDER VINEGAR ORAL Take by mouth.      ascorbic acid (VITAMIN C) 500 MG tablet Take 500 mg by mouth once daily.      aspirin (ECOTRIN) 81 MG EC tablet Take 1 tablet (81 mg total) by mouth once daily. 30 tablet 11    b complex vitamins capsule Take 1 capsule by mouth once daily.      BD AMAN 2ND GEN PEN NEEDLE 32 gauge x 5/32" Ndle USE TO INJECT INSULIN INTO SKIN EVERY  each 3    blood sugar diagnostic Strp To check BG 2 times daily, to use with insurance preferred meter  E 11.65 50 each 1    blood-glucose meter kit To check BG 2 times daily, to use with insurance preferred meter  Dx E 11.65 1 each 1    carvediloL (COREG) 6.25 MG tablet Take 1 tablet (6.25 mg total) by mouth once daily. 180 tablet 4    clopidogreL (PLAVIX) 75 mg tablet 1 tablet Orally Once a day for 30 day(s) 90 tablet 4    cyanocobalamin, vitamin B-12, 1,000 mcg/15 mL Liqd Take by mouth. Takes a tablet      FARXIGA 5 mg Tab tablet Take 5 mg by mouth every evening.      FREESTYLE PASCUAL 14 DAY SENSOR Kit APPLY NEW SENSOR EVERY 14 DAYS AS DIRECTED      FREESTYLE LITE STRIPS Strp TEST 2 TO 3 TIMES D  2    furosemide (LASIX) 20 MG tablet Take 0.5 tablets (10 mg total) by mouth daily as needed (weigth gain > 3 lbs from baseline or increased shortness of breath). 90 tablet 4    gabapentin (NEURONTIN) 600 MG tablet TAKE 1 TABLET(600 MG) BY MOUTH TWICE DAILY 60 tablet 11    lancets Misc To check BG 2 times daily, to use with insurance preferred meter  DX E 11.65 50 each 1    multivit-min/iron/folic acid/K (ADULTS MULTIVITAMIN ORAL) Take by mouth.      mupirocin (BACTROBAN) 2 % ointment Apply topically once daily. 30 g 1    nitroGLYCERIN (NITROSTAT) 0.4 MG SL tablet Place 1 tablet (0.4 mg total) under the tongue every 5 (five) minutes as needed for Chest pain. 30 tablet 12    ONETOUCH DELICA PLUS LANCET 33 gauge Misc TEST ONCE D  0    pravastatin (PRAVACHOL) 40 MG " tablet Take 1 tablet (40 mg total) by mouth every evening. 90 tablet 3    RYBELSUS 7 mg tablet Take 7 mg by mouth every morning.      TOUJEO SOLOSTAR U-300 INSULIN 300 unit/mL (1.5 mL) InPn pen Inject 20 Units into the skin every evening.      TRUE METRIX GLUCOSE METER Misc 2 (two) times daily. Use to check blood glucose      vitamin E 400 UNIT capsule Take 400 Units by mouth once daily.       No current facility-administered medications for this visit.       Review of patient's allergies indicates:  No Known Allergies      Review of Systems   Constitutional: Negative for chills and fever.   Cardiovascular:  Negative for claudication and leg swelling.   Skin:  Positive for color change and nail changes. Negative for poor wound healing and suspicious lesions.   Musculoskeletal:  Positive for arthritis. Negative for joint pain and joint swelling.   Gastrointestinal:  Negative for nausea and vomiting.   Neurological:  Positive for numbness. Negative for paresthesias.   Psychiatric/Behavioral:  Negative for altered mental status.            Objective:      Physical Exam  Constitutional:       General: He is not in acute distress.     Appearance: He is well-developed. He is not diaphoretic.   Cardiovascular:      Pulses:           Dorsalis pedis pulses are 2+ on the right side and 2+ on the left side.        Posterior tibial pulses are 2+ on the right side and 2+ on the left side.      Comments: CFT < 3 seconds bilateral.  Pedal hair growth present bilateral.   No varicosities noted bilateral. No lower extremity edema noted bilateral.  Toes are warm to touch bilateral.    Musculoskeletal:         General: Deformity present. No tenderness.      Right lower leg: No edema.      Left lower leg: No edema.      Comments: Muscle strength 5/5 in all muscle groups bilateral.  No tenderness nor crepitation with ROM of foot/ankle joints bilateral.  No tenderness with palpation of bilateral foot and ankle.  Arthritic changes changes  noted to the dorsal and medial aspect of the Rt. 1st mtp joint.  Bilateral pes planus foot type.  Bilateral hallux abducto valgus.  Bilateral semi-rigid contracture of toes 2-5 with better reduction of the Rt. 2nd DIP joint.     Skin:     General: Skin is warm and dry.      Capillary Refill: Capillary refill takes less than 2 seconds.      Coloration: Skin is not pale.      Findings: Wound present. No abrasion, bruising, burn, ecchymosis, erythema, lesion, petechiae or rash.      Nails: There is no clubbing.      Comments: Toenails x 10 appear mycotic but well maintained.  Rt. Hallux toenail appears partially avulsed along the distal 2/3 of the nail plate.  Underlying nail bed appears epithelialized.        Minimal hyperkeratotic build up noted to the distal tip of the Rt. 2nd toe.     Neurological:      Mental Status: He is alert and oriented to person, place, and time.      Sensory: Sensory deficit present.      Motor: No weakness or atrophy.      Comments: Protective sensation per [place] monofilament absent bilateral.    Light touch intact bilateral.               Assessment:       Encounter Diagnoses   Name Primary?    Onycholysis of toenail Yes    Diabetic polyneuropathy associated with type 2 diabetes mellitus              Plan:       Yobayn was seen today for follow-up.    Diagnoses and all orders for this visit:    Onycholysis of toenail    Diabetic polyneuropathy associated with type 2 diabetes mellitus          I counseled the patient on his conditions, their implications and medical management.    Discussed, in depth the risks, benefits, procedure, and follow up care associated with a total avulsion of the Rt. Hallux toenail.  All questions were thoroughly answered. Verbal consent was obtained.  See attached procedure note.       Given verbal and written wound care/dressing change instructions.      Advised to rest, ice, and elevate the surgical extremity.     RTC in 4 weeks for follow up.      Augusto Simeon DPM

## 2023-09-30 NOTE — PROCEDURES
Nail Removal    Date/Time: 9/28/2023 5:00 PM    Performed by: Augusto Simeon DPM  Authorized by: Augusto Simeon DPM    Consent Done?:  Yes (Verbal)  Time out: Immediately prior to the procedure a time out was called    Location:     Location:  Right foot    Location detail:  Right big toe  Procedure Details:     Preparation:  Skin prepped with alcohol    Amount removed:  Complete    Wedge excision of skin of nail fold: No      Nail bed sutured?: No      Nail matrix removed:  None    Removed nail replaced and anchored: No      Dressing applied:  4x4, antibiotic ointment and dressing applied    Patient tolerance:  Patient tolerated the procedure well with no immediate complications

## 2023-10-23 ENCOUNTER — TELEPHONE (OUTPATIENT)
Dept: PODIATRY | Facility: CLINIC | Age: 68
End: 2023-10-23
Payer: MEDICARE

## 2023-10-31 PROBLEM — I49.5 SSS (SICK SINUS SYNDROME): Status: ACTIVE | Noted: 2023-10-31

## 2023-10-31 PROBLEM — Z95.0 CARDIAC PACEMAKER IN SITU: Status: ACTIVE | Noted: 2023-10-31

## 2023-11-02 RX ORDER — AMLODIPINE BESYLATE 10 MG/1
10 TABLET ORAL DAILY
Qty: 30 TABLET | Refills: 5 | Status: SHIPPED | OUTPATIENT
Start: 2023-11-02 | End: 2024-04-22

## 2023-11-04 ENCOUNTER — PATIENT MESSAGE (OUTPATIENT)
Dept: ADMINISTRATIVE | Facility: HOSPITAL | Age: 68
End: 2023-11-04
Payer: MEDICARE

## 2023-11-06 ENCOUNTER — OFFICE VISIT (OUTPATIENT)
Dept: PODIATRY | Facility: CLINIC | Age: 68
End: 2023-11-06
Payer: COMMERCIAL

## 2023-11-06 DIAGNOSIS — M79.674 TOE PAIN, RIGHT: ICD-10-CM

## 2023-11-06 DIAGNOSIS — L60.1 ONYCHOLYSIS OF TOENAIL: Primary | ICD-10-CM

## 2023-11-06 DIAGNOSIS — E11.42 DIABETIC POLYNEUROPATHY ASSOCIATED WITH TYPE 2 DIABETES MELLITUS: ICD-10-CM

## 2023-11-06 PROCEDURE — 99212 PR OFFICE/OUTPT VISIT, EST, LEVL II, 10-19 MIN: ICD-10-PCS | Mod: S$GLB,,, | Performed by: PODIATRIST

## 2023-11-06 PROCEDURE — 99212 OFFICE O/P EST SF 10 MIN: CPT | Mod: S$GLB,,, | Performed by: PODIATRIST

## 2023-11-07 ENCOUNTER — TELEPHONE (OUTPATIENT)
Dept: PRIMARY CARE CLINIC | Facility: CLINIC | Age: 68
End: 2023-11-07
Payer: MEDICARE

## 2023-11-07 NOTE — PROGRESS NOTES
Subjective:      Patient ID: Yobany Richardson is a 67 y.o. male.    Chief Complaint: No chief complaint on file.  Patient presents to clinic for a 4 week follow up after an avulsion of the Rt. Hallux toenail.  Patient is doing well since the procedure.  Notes the nail bed remains healed.  Denies experiencing pain at the site.  Notes occasional paresthesias in the Rt. Great toe, specifically while at rest.  Denies this being unmanageable for the time being.  Has not attempted to self treat.  Denies any additional pedal complaints.      Hemoglobin A1C   Date Value Ref Range Status   07/06/2023 6.8 (H) 0.0 - 5.6 % Final     Comment:     Reference Interval:  5.0 - 5.6 Normal   5.7 - 6.4 High Risk   > 6.5 Diabetic      Hgb A1c results are standardized based on the (NGSP) National   Glycohemoglobin Standardization Program.      Hemoglobin A1C levels are related to mean serum/plasma glucose   during the preceding 2-3 months.        03/23/2023 7.0 (H) 0.0 - 5.6 % Final     Comment:     Reference Interval:  5.0 - 5.6 Normal   5.7 - 6.4 High Risk   > 6.5 Diabetic      Hgb A1c results are standardized based on the (NGSP) National   Glycohemoglobin Standardization Program.      Hemoglobin A1C levels are related to mean serum/plasma glucose   during the preceding 2-3 months.        11/21/2022 6.6 (H) 4.0 - 5.6 % Final     Comment:     ADA Screening Guidelines:  5.7-6.4%  Consistent with prediabetes  >or=6.5%  Consistent with diabetes    High levels of fetal hemoglobin interfere with the HbA1C  assay. Heterozygous hemoglobin variants (HbS, HgC, etc)do  not significantly interfere with this assay.   However, presence of multiple variants may affect accuracy.     10/14/2022 6.4 (A) 5.7 % Final           Past Medical History:   Diagnosis Date    Abnormal thallium stress test     Anticoagulant long-term use     Arrhythmia     Atrial flutter, paroxysmal 02/11/2016    CAD (coronary artery disease) 03/09/2016 5/2016 [facility] Left main:NL LAD:  35% proximal LAD. two small diagonals with minimal disease.  Circumflex/Large branching first obtuse marginal: with minimal disease.   RCA: The vessel was large sized (dominant) with a 60% proximal lesion. 100 mcgs of intracoronary Nitroglycerin were given with no change in the lesion. Thus FFR was performed. FFR was 0.79  2/2016 cor CTA ~~ 50% LAD    Coronary artery disease     Diabetes mellitus     Diabetes mellitus, type 2     Disorder of kidney and ureter 2016    KIDNEY STONE    Hypertension     Neuropathy     Paroxysmal atrial flutter     PE (pulmonary embolism)     Pulmonary embolism 02/11/2016 1/2016     Rheumatoid arthritis     Shortness of breath     Stented coronary artery 06/15/2016    5/2016 RCA- synergy 3.5x12    Wears contact lenses     Wears prescription eyeglasses        Past Surgical History:   Procedure Laterality Date    A-V CARDIAC PACEMAKER INSERTION  7/7/2023    Procedure: Dual Chamber PPM (Biotronik);  Surgeon: Yobany Cronin III, MD;  [facility];  Service: Cardiology;;    ABLATION Left 03/23/2023    Procedure: Ablation;  Surgeon: Yobany Cronin III, MD;  Location: [facility] CATH;  Service: Cardiology;  Laterality: Left;    ARTHROPLASTY OF TOE Right 07/14/2022    Procedure: ARTHROPLASTY, TOE;  Surgeon: Augusto Simeon DPM;  Location: [facility] OR;  Service: Podiatry;  Laterality: Right;  Hallux    CARDIAC CATHETERIZATION  2017    with stent placed x 1    CORONARY ANGIOGRAPHY N/A 7/6/2023    Procedure: Left Heart cath;  Surgeon: Yobany Cronin III, MD;  [facility];  Service: Cardiology;  Laterality: N/A;    INSERTION OF IMPLANTABLE LOOP RECORDER N/A 05/03/2023    Procedure: Implantable Loop Recorder;  Surgeon: Yobany Cronin III, MD;  Location: [facility] CATH;  Service: Cardiology;  Laterality: N/A;    KNEE ARTHROSCOPY Left 1985    REMOVAL OF IMPLANTABLE LOOP RECORDER  7/7/2023    Procedure: Removal Loop Recorder ([employer]tr[employer]);  Surgeon: Yobany Cronin III, MD;  Location: [facility] CATH;   Service: Cardiology;;    TONSILLECTOMY         Family History   Problem Relation Age of Onset    Cancer Mother     Angina Mother     Heart disease Mother     Hypertension Mother     Kidney disease Father     Thyroid disease Sister     Bell's palsy Sister     Thyroid disease Sister     Depression Sister     Depression Sister     Cancer Sister        Social History     Socioeconomic History    Marital status:    Tobacco Use    Smoking status: Never    Smokeless tobacco: Never   Substance and Sexual Activity    Alcohol use: No    Drug use: No     Social Determinants of Health     Financial Resource Strain: Low Risk  (1/20/2021)    Overall Financial Resource Strain (CARDIA)     Difficulty of Paying Living Expenses: Not hard at all   Food Insecurity: No Food Insecurity (1/20/2021)    Hunger Vital Sign     Worried About Running Out of Food in the Last Year: Never true     Ran Out of Food in the Last Year: Never true   Transportation Needs: No Transportation Needs (1/20/2021)    PRAPARE - Transportation     Lack of Transportation (Medical): No     Lack of Transportation (Non-Medical): No   Physical Activity: Unknown (1/20/2021)    Exercise Vital Sign     Days of Exercise per Week: 0 days   Stress: No Stress Concern Present (1/20/2021)    [employer] - Occupational Stress Questionnaire     Feeling of Stress : Not at all   Social Connections: Unknown (1/20/2021)    Social Connection and Isolation Panel [NHANES]     Frequency of Communication with Friends and Family: Never     Frequency of Social Gatherings with Friends and Family: Never     Active Member of Clubs or Organizations: No     Attends Club or Organization Meetings: Never     Marital Status:        Current Outpatient Medications   Medication Sig Dispense Refill    amLODIPine (NORVASC) 10 MG tablet Take 1 tablet (10 mg total) by mouth once daily. 30 tablet 5    APPLE CIDER VINEGAR ORAL Take by mouth.      ascorbic acid  "(VITAMIN C) 500 MG tablet Take 500 mg by mouth once daily.      aspirin (ECOTRIN) 81 MG EC tablet Take 1 tablet (81 mg total) by mouth once daily. 30 tablet 11    b complex vitamins capsule Take 1 capsule by mouth once daily.      BD AMAN 2ND GEN PEN NEEDLE 32 gauge x 5/32" Ndle USE TO INJECT INSULIN INTO SKIN EVERY  each 3    blood sugar diagnostic Strp To check BG 2 times daily, to use with insurance preferred meter  E 11.65 50 each 1    blood-glucose meter kit To check BG 2 times daily, to use with insurance preferred meter  Dx E 11.65 1 each 1    carvediloL (COREG) 6.25 MG tablet Take 1 tablet (6.25 mg total) by mouth once daily. 180 tablet 4    clopidogreL (PLAVIX) 75 mg tablet 1 tablet Orally Once a day for 30 day(s) 90 tablet 4    cyanocobalamin, vitamin B-12, 1,000 mcg/15 mL Liqd Take by mouth. Takes a tablet      FARXIGA 10 mg tablet Take 10 mg by mouth.      FREESTYLE PASCUAL 14 DAY SENSOR Kit APPLY NEW SENSOR EVERY 14 DAYS AS DIRECTED      FREESTYLE LITE STRIPS Strp TEST 2 TO 3 TIMES D  2    furosemide (LASIX) 20 MG tablet Take 0.5 tablets (10 mg total) by mouth daily as needed (weigth gain > 3 lbs from baseline or increased shortness of breath). 90 tablet 4    gabapentin (NEURONTIN) 600 MG tablet TAKE 1 TABLET(600 MG) BY MOUTH TWICE DAILY 60 tablet 11    lancets Misc To check BG 2 times daily, to use with insurance preferred meter  DX E 11.65 50 each 1    multivit-min/iron/folic acid/K (ADULTS MULTIVITAMIN ORAL) Take by mouth.      mupirocin (BACTROBAN) 2 % ointment Apply topically once daily. 30 g 1    nitroGLYCERIN (NITROSTAT) 0.4 MG SL tablet Place 1 tablet (0.4 mg total) under the tongue every 5 (five) minutes as needed for Chest pain. 30 tablet 12    ONETOUCH DELICA PLUS LANCET 33 gauge Misc TEST ONCE D  0    pravastatin (PRAVACHOL) 40 MG tablet Take 1 tablet (40 mg total) by mouth every evening. 90 tablet 3    RYBELSUS 14 mg tablet Take 14 mg by mouth every morning.      TOUJEO SOLOSTAR U-300 " INSULIN 300 unit/mL (1.5 mL) InPn pen Inject 20 Units into the skin every evening.      TRUE METRIX GLUCOSE METER Misc 2 (two) times daily. Use to check blood glucose      vitamin E 400 UNIT capsule Take 400 Units by mouth once daily.       No current facility-administered medications for this visit.       Review of patient's allergies indicates:  No Known Allergies      Review of Systems   Constitutional: Negative for chills and fever.   Cardiovascular:  Negative for claudication and leg swelling.   Skin:  Positive for color change and nail changes. Negative for poor wound healing and suspicious lesions.   Musculoskeletal:  Positive for arthritis. Negative for joint pain and joint swelling.   Gastrointestinal:  Negative for nausea and vomiting.   Neurological:  Positive for numbness and paresthesias.   Psychiatric/Behavioral:  Negative for altered mental status.            Objective:      Physical Exam  Constitutional:       General: He is not in acute distress.     Appearance: He is well-developed. He is not diaphoretic.   Cardiovascular:      Pulses:           Dorsalis pedis pulses are 2+ on the right side and 2+ on the left side.        Posterior tibial pulses are 2+ on the right side and 2+ on the left side.      Comments: CFT < 3 seconds bilateral.  Pedal hair growth present bilateral.   No varicosities noted bilateral. No lower extremity edema noted bilateral.  Toes are warm to touch bilateral.    Musculoskeletal:         General: Deformity present. No tenderness.      Right lower leg: No edema.      Left lower leg: No edema.      Comments: Muscle strength 5/5 in all muscle groups bilateral.  No tenderness nor crepitation with ROM of foot/ankle joints bilateral.  No tenderness with palpation of bilateral foot and ankle.  Arthritic changes changes noted to the dorsal and medial aspect of the Rt. 1st mtp joint.  Bilateral pes planus foot type.  Bilateral hallux abducto valgus.  Bilateral semi-rigid contracture  of toes 2-5 with better reduction of the Rt. 2nd DIP joint.     Skin:     General: Skin is warm and dry.      Capillary Refill: Capillary refill takes less than 2 seconds.      Coloration: Skin is not pale.      Findings: No abrasion, bruising, burn, ecchymosis, erythema, lesion, petechiae, rash or wound.      Nails: There is no clubbing.      Comments: Toenails x 9 appear mycotic but well maintained.  Rt. Surgical absence of the Rt. Hallux toenail with exposed epithelialized nail bed.      Minimal hyperkeratotic build up noted to the distal tip of the Rt. 2nd toe.     Neurological:      Mental Status: He is alert and oriented to person, place, and time.      Sensory: Sensory deficit present.      Motor: No weakness or atrophy.      Comments: Protective sensation per [place] monofilament absent bilateral.    Light touch intact bilateral.               Assessment:       Encounter Diagnoses   Name Primary?    Onycholysis of toenail Yes    Toe pain, right     Diabetic polyneuropathy associated with type 2 diabetes mellitus              Plan:       Diagnoses and all orders for this visit:    Onycholysis of toenail    Toe pain, right    Diabetic polyneuropathy associated with type 2 diabetes mellitus          I counseled the patient on his conditions, their implications and medical management.    Overall, patient is doing well at today's exam.      Prior site of the Rt. Hallux toenail avulsion is well healed.  No further treatment is necessary.    Regarding infrequent paresthesias of the Rt. Great toe,  I recommend using a topical analgesic each night prior to going to bed.  This should help to settle the nerves at the site.     Instructed to inspect his feet daily for any abnormal signs of shearing/skin breakdown.      RTC in 6 months for follow up.     Augusto Simeon DPM

## 2023-11-07 NOTE — TELEPHONE ENCOUNTER
----- Message from Leisa Acevedo sent at 11/7/2023  9:59 AM CST -----  Contact: JHONNY [phone]  Patient is returning a phone call.    Who left a message for the patient: --Carmela--    Does patient know what this is regarding:  --Reschedule appointment on 12/28--    Would you like a call back, or a response through your [facility] portal?: --Call back--    Comments:  Pt calling to see if he can be seen sooner then May since the doctor has to reschedule? Please call to advise.

## 2023-11-13 LAB
CHOLEST SERPL-MSCNC: 146 MG/DL (ref 0–200)
HBA1C MFR BLD: 7.7 % (ref 4–6)
HDLC SERPL-MCNC: 51 MG/DL (ref 35–70)
LDLC SERPL CALC-MCNC: 76 MG/DL (ref 0–160)
TRIGL SERPL-MCNC: 109 MG/DL (ref 40–160)

## 2023-12-07 ENCOUNTER — LAB VISIT (OUTPATIENT)
Dept: LAB | Facility: HOSPITAL | Age: 68
End: 2023-12-07
Attending: INTERNAL MEDICINE
Payer: MEDICARE

## 2023-12-07 DIAGNOSIS — N18.2 CKD (CHRONIC KIDNEY DISEASE) STAGE 2, GFR 60-89 ML/MIN: ICD-10-CM

## 2023-12-07 LAB
ALBUMIN SERPL BCP-MCNC: 4.2 G/DL (ref 3.5–5.2)
ANION GAP SERPL CALC-SCNC: 9 MMOL/L (ref 8–16)
BUN SERPL-MCNC: 20 MG/DL (ref 8–23)
CALCIUM SERPL-MCNC: 9.6 MG/DL (ref 8.7–10.5)
CHLORIDE SERPL-SCNC: 107 MMOL/L (ref 95–110)
CO2 SERPL-SCNC: 24 MMOL/L (ref 23–29)
CREAT SERPL-MCNC: 1.6 MG/DL (ref 0.5–1.4)
CREAT UR-MCNC: 112 MG/DL (ref 23–375)
EST. GFR  (NO RACE VARIABLE): 47.2 ML/MIN/1.73 M^2
GLUCOSE SERPL-MCNC: 137 MG/DL (ref 70–110)
PHOSPHATE SERPL-MCNC: 3.5 MG/DL (ref 2.7–4.5)
POTASSIUM SERPL-SCNC: 4.7 MMOL/L (ref 3.5–5.1)
PROT UR-MCNC: 39 MG/DL (ref 0–15)
PROT/CREAT UR: 0.35 MG/G{CREAT} (ref 0–0.2)
PTH-INTACT SERPL-MCNC: 92.3 PG/ML (ref 9–77)
SODIUM SERPL-SCNC: 140 MMOL/L (ref 136–145)

## 2023-12-07 PROCEDURE — 36415 COLL VENOUS BLD VENIPUNCTURE: CPT | Mod: PO | Performed by: INTERNAL MEDICINE

## 2023-12-07 PROCEDURE — 84156 ASSAY OF PROTEIN URINE: CPT | Performed by: INTERNAL MEDICINE

## 2023-12-07 PROCEDURE — 83970 ASSAY OF PARATHORMONE: CPT | Performed by: INTERNAL MEDICINE

## 2023-12-07 PROCEDURE — 80069 RENAL FUNCTION PANEL: CPT | Performed by: INTERNAL MEDICINE

## 2023-12-14 ENCOUNTER — OFFICE VISIT (OUTPATIENT)
Dept: NEPHROLOGY | Facility: CLINIC | Age: 68
End: 2023-12-14
Payer: COMMERCIAL

## 2023-12-14 VITALS
SYSTOLIC BLOOD PRESSURE: 116 MMHG | HEART RATE: 98 BPM | WEIGHT: 203 LBS | HEIGHT: 74 IN | DIASTOLIC BLOOD PRESSURE: 70 MMHG | OXYGEN SATURATION: 98 % | BODY MASS INDEX: 26.05 KG/M2

## 2023-12-14 DIAGNOSIS — N18.2 CKD (CHRONIC KIDNEY DISEASE) STAGE 2, GFR 60-89 ML/MIN: Primary | ICD-10-CM

## 2023-12-14 DIAGNOSIS — Z79.4 CONTROLLED TYPE 2 DIABETES MELLITUS WITH STAGE 2 CHRONIC KIDNEY DISEASE, WITH LONG-TERM CURRENT USE OF INSULIN: ICD-10-CM

## 2023-12-14 DIAGNOSIS — E11.22 CONTROLLED TYPE 2 DIABETES MELLITUS WITH STAGE 2 CHRONIC KIDNEY DISEASE, WITH LONG-TERM CURRENT USE OF INSULIN: ICD-10-CM

## 2023-12-14 DIAGNOSIS — I10 PRIMARY HYPERTENSION: ICD-10-CM

## 2023-12-14 DIAGNOSIS — N18.2 CONTROLLED TYPE 2 DIABETES MELLITUS WITH STAGE 2 CHRONIC KIDNEY DISEASE, WITH LONG-TERM CURRENT USE OF INSULIN: ICD-10-CM

## 2023-12-14 PROCEDURE — 99214 OFFICE O/P EST MOD 30 MIN: CPT | Mod: PBBFAC,PN | Performed by: INTERNAL MEDICINE

## 2023-12-14 PROCEDURE — 99999 PR PBB SHADOW E&M-EST. PATIENT-LVL IV: CPT | Mod: PBBFAC,,, | Performed by: INTERNAL MEDICINE

## 2023-12-14 PROCEDURE — 99999 PR PBB SHADOW E&M-EST. PATIENT-LVL IV: ICD-10-PCS | Mod: PBBFAC,,, | Performed by: INTERNAL MEDICINE

## 2023-12-14 PROCEDURE — 99214 OFFICE O/P EST MOD 30 MIN: CPT | Mod: S$GLB,,, | Performed by: INTERNAL MEDICINE

## 2023-12-14 PROCEDURE — 99214 PR OFFICE/OUTPT VISIT, EST, LEVL IV, 30-39 MIN: ICD-10-PCS | Mod: S$GLB,,, | Performed by: INTERNAL MEDICINE

## 2023-12-14 NOTE — PROGRESS NOTES
"    Subjective:       Patient ID: Yobany Richardson is a 68 y.o. White male who presents for return patient evaluation for chronic renal failure.      He has no uremic or urinary symptoms and is in his usual state of health.  There have been no recent illnesses, hospitalizations.  He did have COVID in August 2021.  He is caring for his 3 year old grandson.      Review of Systems   Constitutional:  Positive for fatigue. Negative for appetite change, chills and fever.   HENT:  Negative for congestion.    Eyes:  Negative for visual disturbance.   Respiratory:  Negative for cough and shortness of breath.    Cardiovascular:  Negative for chest pain and leg swelling.   Gastrointestinal:  Negative for abdominal pain, diarrhea, nausea and vomiting.   Genitourinary:  Negative for difficulty urinating, dysuria and hematuria.   Musculoskeletal:  Positive for arthralgias. Negative for myalgias.   Skin:  Negative for rash.   Neurological:  Negative for headaches.   Psychiatric/Behavioral:  Negative for sleep disturbance.        The past medical, family and social histories were reviewed for this encounter.     /70 (BP Location: Right arm, Patient Position: Sitting)   Pulse 98   Ht 6' 2" (1.88 m)   Wt 92.1 kg (203 lb)   SpO2 98%   BMI 26.06 kg/m²     Objective:      Physical Exam  Vitals reviewed.   Constitutional:       General: He is not in acute distress.     Appearance: He is well-developed.   HENT:      Head: Normocephalic and atraumatic.   Eyes:      General: No scleral icterus.     Conjunctiva/sclera: Conjunctivae normal.   Neck:      Vascular: No JVD.   Cardiovascular:      Rate and Rhythm: Normal rate and regular rhythm.      Heart sounds: Normal heart sounds. No murmur heard.     No friction rub. No gallop.   Pulmonary:      Effort: Pulmonary effort is normal. No respiratory distress.      Breath sounds: Normal breath sounds. No wheezing or rales.   Abdominal:      General: Bowel sounds are normal. There is no " distension.      Palpations: Abdomen is soft.      Tenderness: There is no abdominal tenderness.   Musculoskeletal:      Cervical back: Normal range of motion.      Right lower leg: No edema.      Left lower leg: No edema.   Skin:     General: Skin is warm and dry.      Findings: No rash.   Neurological:      Mental Status: He is alert and oriented to person, place, and time.   Psychiatric:         Mood and Affect: Mood normal.         Behavior: Behavior normal.         Assessment:       1. CKD (chronic kidney disease) stage 2, GFR 60-89 ml/min    2. Primary hypertension    3. Controlled type 2 diabetes mellitus with stage 2 chronic kidney disease, with long-term current use of insulin       Lab Results   Component Value Date    CREATININE 1.6 (H) 12/07/2023    BUN 20 12/07/2023     12/07/2023    K 4.7 12/07/2023     12/07/2023    CO2 24 12/07/2023     Lab Results   Component Value Date    PTH 92.3 (H) 12/07/2023    CALCIUM 9.6 12/07/2023    PHOS 3.5 12/07/2023     Lab Results   Component Value Date    HCT 32.2 (L) 07/07/2023     Prot/Creat Ratio, Urine   Date Value Ref Range Status   12/07/2023 0.35 (H) 0.00 - 0.20 Final   09/11/2023 0.57 (H) 0.00 - 0.20 Final   06/07/2023 0.43 (H) 0.00 - 0.20 Final      Plan:   Return to clinic in 6 months.  Labs for next visit include rp pth upc uric per standing orders q 3 months.  RP in 1 month.  Baseline creatinine is 1.0-1.3 since 2016.  Renal US shows R 10.8 cm L 10.3 cm.  PTH is 92 with a calcium of 9.6.  UPC is 0.35 but he was taken off of his ARB due to hyperkalemia.  Continue current medications as prescribed and reviewed.   Blood pressure is controlled on the current regimen.  Please have patient follow-up with your PCP or Endocrinology regarding the control and management of diabetes.

## 2024-01-05 ENCOUNTER — OFFICE VISIT (OUTPATIENT)
Dept: PRIMARY CARE CLINIC | Facility: CLINIC | Age: 69
End: 2024-01-05
Payer: MEDICARE

## 2024-01-05 VITALS
DIASTOLIC BLOOD PRESSURE: 80 MMHG | HEART RATE: 61 BPM | RESPIRATION RATE: 18 BRPM | BODY MASS INDEX: 26.03 KG/M2 | OXYGEN SATURATION: 98 % | HEIGHT: 74 IN | SYSTOLIC BLOOD PRESSURE: 120 MMHG | TEMPERATURE: 97 F | WEIGHT: 202.81 LBS

## 2024-01-05 DIAGNOSIS — I48.92 ATRIAL FLUTTER, PAROXYSMAL: Primary | ICD-10-CM

## 2024-01-05 DIAGNOSIS — N18.2 CKD (CHRONIC KIDNEY DISEASE) STAGE 2, GFR 60-89 ML/MIN: ICD-10-CM

## 2024-01-05 DIAGNOSIS — I11.9 HYPERTENSIVE HEART DISEASE WITHOUT HEART FAILURE: ICD-10-CM

## 2024-01-05 DIAGNOSIS — E11.22 CONTROLLED TYPE 2 DIABETES MELLITUS WITH STAGE 2 CHRONIC KIDNEY DISEASE, WITH LONG-TERM CURRENT USE OF INSULIN: ICD-10-CM

## 2024-01-05 DIAGNOSIS — Z79.4 CONTROLLED TYPE 2 DIABETES MELLITUS WITH STAGE 2 CHRONIC KIDNEY DISEASE, WITH LONG-TERM CURRENT USE OF INSULIN: ICD-10-CM

## 2024-01-05 DIAGNOSIS — E11.42 DIABETIC POLYNEUROPATHY ASSOCIATED WITH TYPE 2 DIABETES MELLITUS: ICD-10-CM

## 2024-01-05 DIAGNOSIS — N18.2 CONTROLLED TYPE 2 DIABETES MELLITUS WITH STAGE 2 CHRONIC KIDNEY DISEASE, WITH LONG-TERM CURRENT USE OF INSULIN: ICD-10-CM

## 2024-01-05 DIAGNOSIS — Z95.0 CARDIAC PACEMAKER IN SITU: ICD-10-CM

## 2024-01-05 DIAGNOSIS — I25.10 CORONARY ARTERY DISEASE INVOLVING NATIVE CORONARY ARTERY OF NATIVE HEART WITHOUT ANGINA PECTORIS: ICD-10-CM

## 2024-01-05 DIAGNOSIS — Z86.711 HISTORY OF PULMONARY EMBOLISM: ICD-10-CM

## 2024-01-05 PROCEDURE — 99215 OFFICE O/P EST HI 40 MIN: CPT | Mod: PBBFAC | Performed by: INTERNAL MEDICINE

## 2024-01-05 PROCEDURE — 99214 OFFICE O/P EST MOD 30 MIN: CPT | Mod: S$PBB,,, | Performed by: INTERNAL MEDICINE

## 2024-01-05 PROCEDURE — 99999 PR PBB SHADOW E&M-EST. PATIENT-LVL V: CPT | Mod: PBBFAC,,, | Performed by: INTERNAL MEDICINE

## 2024-01-05 NOTE — PROGRESS NOTES
[facility] Note    Chief Complaint      Chief Complaint   Patient presents with    Follow-up     6m       History of Present Illness      Yobany Richardson is a 68 y.o. male who presents today for   Chief Complaint   Patient presents with    Follow-up     6m   .  Patient comes to appointment here for 6 m checkup for chronc issues as below . He is having some issues with vision . Ahd visit with dr Johnson workup in progress .had visit with dr quiñonez last month last A1c 7.7 . He is compliant with meds and diet . No med changes with dr quiñonez . He is uptodate with cardiology dr pradhan f/u as well . He will be increasing his physical activity soon .     HPI    No problem-specific Assessment & Plan notes found for this encounter.       Problem List Items Addressed This Visit          Neuro    Diabetic polyneuropathy associated with type 2 diabetes mellitus    Overview     Stable on gabapentin cont current             Cardiac/Vascular    Hypertensive heart disease without heart failure    Overview     Cont current bp well controlled stable          Atrial flutter, paroxysmal - Primary    Overview     Taking plavix only . Had only one episode dr pradhan managing stable          CAD (coronary artery disease)    Overview     Is seeing dr pradhan , on stable regimen for secondary prevention         Cardiac pacemaker in situ - dual chamber, Biotronik    Overview     stable            Renal/    CKD (chronic kidney disease) stage 2, GFR 60-89 ml/min    Overview     Stable             Hematology    History of pulmonary embolism    Overview     1/2016 stable on plavix currently cont current             Endocrine    Controlled type 2 diabetes mellitus with stage 2 chronic kidney disease, with long-term current use of insulin    Overview     Stable .seeing dr cleveland nephrology cont current             Past Medical History:  Past Medical History:   Diagnosis Date    Abnormal thallium stress test     Anticoagulant  long-term use     Arrhythmia     Atrial flutter, paroxysmal 02/11/2016    CAD (coronary artery disease) 03/09/2016 5/2016 [facility] Left main:NL LAD: 35% proximal LAD. two small diagonals with minimal disease.  Circumflex/Large branching first obtuse marginal: with minimal disease.   RCA: The vessel was large sized (dominant) with a 60% proximal lesion. 100 mcgs of intracoronary Nitroglycerin were given with no change in the lesion. Thus FFR was performed. FFR was 0.79  2/2016 cor CTA ~~ 50% LAD    Coronary artery disease     Diabetes mellitus     Diabetes mellitus, type 2     Disorder of kidney and ureter 2016    KIDNEY STONE    Hypertension     Neuropathy     Paroxysmal atrial flutter     PE (pulmonary embolism)     Pulmonary embolism 02/11/2016 1/2016     Rheumatoid arthritis     Shortness of breath     Stented coronary artery 06/15/2016    5/2016 RCA- synergy 3.5x12    Wears contact lenses     Wears prescription eyeglasses        Past Surgical History:  Past Surgical History:   Procedure Laterality Date    A-V CARDIAC PACEMAKER INSERTION  7/7/2023    Procedure: Dual Chamber PPM (Biotronik);  Surgeon: Yobany Cronin III, MD;  Location: [facility] CATH;  Service: Cardiology;;    ABLATION Left 03/23/2023    Procedure: Ablation;  Surgeon: Yobany Cronin III, MD;  Location: [facility] CATH;  Service: Cardiology;  Laterality: Left;    ARTHROPLASTY OF TOE Right 07/14/2022    Procedure: ARTHROPLASTY, TOE;  Surgeon: Augusto Simeon DPM;  Location: [facility] OR;  Service: Podiatry;  Laterality: Right;  Hallux    CARDIAC CATHETERIZATION  2017    with stent placed x 1    CORONARY ANGIOGRAPHY N/A 7/6/2023    Procedure: Left Heart cath;  Surgeon: Yobany Cronin III, MD;  Location: [facility] CATH;  Service: Cardiology;  Laterality: N/A;    INSERTION OF IMPLANTABLE LOOP RECORDER N/A 05/03/2023    Procedure: Implantable Loop Recorder;  Surgeon: Yobany Cronin III, MD;  Location: [facility] CATH;  Service: Cardiology;  Laterality: N/A;    KNEE ARTHROSCOPY  Left 1985    REMOVAL OF IMPLANTABLE LOOP RECORDER  7/7/2023    Procedure: Removal Loop Recorder (Medtronic);  Surgeon: Yobany Cronin III, MD;  Location: [facility];  Service: Cardiology;;    TONSILLECTOMY         Family History:  family history includes Angina in his mother; Bell's palsy in his sister; Cancer in his mother and sister; Depression in his sister and sister; Heart disease in his mother; Hypertension in his mother; Kidney disease in his father; Thyroid disease in his sister and sister.     Social History:  Social History     Socioeconomic History    Marital status:    Tobacco Use    Smoking status: Never    Smokeless tobacco: Never   Substance and Sexual Activity    Alcohol use: No    Drug use: No     Social Determinants of Health     Financial Resource Strain: Low Risk  (1/20/2021)    Overall Financial Resource Strain (CARDIA)     Difficulty of Paying Living Expenses: Not hard at all   Food Insecurity: No Food Insecurity (1/20/2021)    Hunger Vital Sign     Worried About Running Out of Food in the Last Year: Never true     Ran Out of Food in the Last Year: Never true   Transportation Needs: No Transportation Needs (1/20/2021)    PRAPARE - Transportation     Lack of Transportation (Medical): No     Lack of Transportation (Non-Medical): No   Physical Activity: Unknown (1/20/2021)    Exercise Vital Sign     Days of Exercise per Week: 0 days   Stress: No Stress Concern Present (1/20/2021)    [employer] [place] [employer] - Occupational Stress Questionnaire     Feeling of Stress : Not at all   Social Connections: Unknown (1/20/2021)    Social Connection and Isolation Panel [NHANES]     Frequency of Communication with Friends and Family: Never     Frequency of Social Gatherings with Friends and Family: Never     Active Member of Clubs or Organizations: No     Attends Club or Organization Meetings: Never     Marital Status:        Review of Systems:   Review of Systems  "  Constitutional:  Negative for fever and weight loss.   HENT:  Negative for congestion, hearing loss and sore throat.    Eyes:  Positive for blurred vision.   Respiratory:  Negative for cough and shortness of breath.    Cardiovascular:  Negative for chest pain, palpitations, claudication and leg swelling.   Gastrointestinal:  Negative for abdominal pain, constipation, diarrhea and heartburn.   Genitourinary:  Negative for dysuria.   Musculoskeletal:  Negative for back pain and myalgias.   Skin:  Negative for rash.   Neurological:  Positive for tingling. Negative for focal weakness and headaches.   Psychiatric/Behavioral:  Negative for depression, memory loss and suicidal ideas. The patient is not nervous/anxious.         Medications:  Outpatient Encounter Medications as of 1/5/2024   Medication Sig Dispense Refill    amLODIPine (NORVASC) 10 MG tablet Take 1 tablet (10 mg total) by mouth once daily. 30 tablet 5    APPLE CIDER VINEGAR ORAL Take by mouth.      ascorbic acid (VITAMIN C) 500 MG tablet Take 500 mg by mouth once daily.      aspirin (ECOTRIN) 81 MG EC tablet Take 1 tablet (81 mg total) by mouth once daily. 30 tablet 11    b complex vitamins capsule Take 1 capsule by mouth once daily.      BD AMAN 2ND GEN PEN NEEDLE 32 gauge x 5/32" Ndle USE TO INJECT INSULIN INTO SKIN EVERY  each 3    blood sugar diagnostic Strp To check BG 2 times daily, to use with insurance preferred meter  E 11.65 50 each 1    blood-glucose meter kit To check BG 2 times daily, to use with insurance preferred meter  Dx E 11.65 1 each 1    carvediloL (COREG) 6.25 MG tablet Take 1 tablet (6.25 mg total) by mouth once daily. 180 tablet 4    clopidogreL (PLAVIX) 75 mg tablet 1 tablet Orally Once a day for 30 day(s) 90 tablet 4    cyanocobalamin, vitamin B-12, 1,000 mcg/15 mL Liqd Take by mouth. Takes a tablet      FARXIGA 10 mg tablet Take 10 mg by mouth.      FREESTYLE PASCUAL 14 DAY SENSOR Kit APPLY NEW SENSOR EVERY 14 DAYS AS " "DIRECTED      FREESTYLE LITE STRIPS Strp TEST 2 TO 3 TIMES D  2    furosemide (LASIX) 20 MG tablet Take 0.5 tablets (10 mg total) by mouth daily as needed (weigth gain > 3 lbs from baseline or increased shortness of breath). 90 tablet 4    gabapentin (NEURONTIN) 600 MG tablet TAKE 1 TABLET(600 MG) BY MOUTH TWICE DAILY 60 tablet 11    lancets Misc To check BG 2 times daily, to use with insurance preferred meter  DX E 11.65 50 each 1    multivit-min/iron/folic acid/K (ADULTS MULTIVITAMIN ORAL) Take by mouth.      mupirocin (BACTROBAN) 2 % ointment Apply topically once daily. 30 g 1    nitroGLYCERIN (NITROSTAT) 0.4 MG SL tablet Place 1 tablet (0.4 mg total) under the tongue every 5 (five) minutes as needed for Chest pain. 30 tablet 12    ONETOUCH DELICA PLUS LANCET 33 gauge Misc TEST ONCE D  0    pravastatin (PRAVACHOL) 40 MG tablet Take 1 tablet (40 mg total) by mouth every evening. 90 tablet 3    RYBELSUS 14 mg tablet Take 14 mg by mouth every morning.      TOUJEO SOLOSTAR U-300 INSULIN 300 unit/mL (1.5 mL) InPn pen Inject 20 Units into the skin every evening.      TRUE METRIX GLUCOSE METER Misc 2 (two) times daily. Use to check blood glucose      vitamin E 400 UNIT capsule Take 400 Units by mouth once daily.       No facility-administered encounter medications on file as of 1/5/2024.       Allergies:  Review of patient's allergies indicates:  No Known Allergies      Physical Exam      Vitals:    01/05/24 0936   BP: 120/80   Pulse: 61   Resp: 18   Temp: 97 °F (36.1 °C)        Vital Signs  Temp: 97 °F (36.1 °C)  Temp Source: Oral  Pulse: 61  Resp: 18  SpO2: 98 %  BP: 120/80  BP Location: Right arm  Patient Position: Sitting  Pain Score: 0-No pain  Height and Weight  Height: 6' 2" (188 cm)  Weight: 92 kg (202 lb 13.2 oz)  BSA (Calculated - sq m): 2.19 sq meters  BMI (Calculated): 26  Weight in (lb) to have BMI = 25: 194.3]     Body mass index is 26.04 kg/m².    Physical Exam  Constitutional:       Appearance: He is " "well-developed.   HENT:      Head: Normocephalic.   Eyes:      Pupils: Pupils are equal, round, and reactive to light.   Neck:      Thyroid: No thyromegaly.   Cardiovascular:      Rate and Rhythm: Normal rate and regular rhythm.      Heart sounds: No murmur heard.     No friction rub. No gallop.   Pulmonary:      Effort: Pulmonary effort is normal.      Breath sounds: Normal breath sounds.   Abdominal:      General: Bowel sounds are normal.      Palpations: Abdomen is soft.   Musculoskeletal:         General: Normal range of motion.      Cervical back: Normal range of motion.   Skin:     General: Skin is warm and dry.   Neurological:      Mental Status: He is alert and oriented to person, place, and time.      Sensory: No sensory deficit.   Psychiatric:         Behavior: Behavior normal.          Laboratory:  CBC:  No results for input(s): "WBC", "RBC", "HGB", "HCT", "PLT", "MCV", "MCH", "MCHC" in the last 2160 hours.  CMP:  Recent Labs   Lab Result Units 12/07/23  1021   Glucose mg/dL 137*   Calcium mg/dL 9.6   Albumin g/dL 4.2   Sodium mmol/L 140   Potassium mmol/L 4.7   CO2 mmol/L 24   Chloride mmol/L 107   BUN mg/dL 20     URINALYSIS:  No results for input(s): "COLORU", "CLARITYU", "SPECGRAV", "PHUR", "PROTEINUA", "GLUCOSEU", "BILIRUBINCON", "BLOODU", "WBCU", "RBCU", "BACTERIA", "MUCUS", "NITRITE", "LEUKOCYTESUR", "UROBILINOGEN", "HYALINECASTS" in the last 2160 hours.   LIPIDS:  No results for input(s): "TSH", "HDL", "CHOL", "TRIG", "LDLCALC", "CHOLHDL", "NONHDLCHOL", "TOTALCHOLEST" in the last 2160 hours.  TSH:  No results for input(s): "TSH" in the last 2160 hours.  A1C:  No results for input(s): "HGBA1C" in the last 2160 hours.    Radiology:        Assessment:     Yobany Richardson is a 68 y.o.male with:    Atrial flutter, paroxysmal    Coronary artery disease involving native coronary artery of native heart without angina pectoris    Cardiac pacemaker in situ - dual chamber, Biotronik    CKD (chronic kidney " disease) stage 2, GFR 60-89 ml/min    Controlled type 2 diabetes mellitus with stage 2 chronic kidney disease, with long-term current use of insulin    Diabetic polyneuropathy associated with type 2 diabetes mellitus    History of pulmonary embolism    Hypertensive heart disease without heart failure                Plan:     Problem List Items Addressed This Visit          Neuro    Diabetic polyneuropathy associated with type 2 diabetes mellitus    Overview     Stable on gabapentin cont current             Cardiac/Vascular    Hypertensive heart disease without heart failure    Overview     Cont current bp well controlled stable          Atrial flutter, paroxysmal - Primary    Overview     Taking plavix only . Had only one episode dr pradhan managing stable          CAD (coronary artery disease)    Overview     Is seeing dr pradhan , on stable regimen for secondary prevention         Cardiac pacemaker in situ - dual chamber, Biotronik    Overview     stable            Renal/    CKD (chronic kidney disease) stage 2, GFR 60-89 ml/min    Overview     Stable             Hematology    History of pulmonary embolism    Overview     1/2016 stable on plavix currently cont current             Endocrine    Controlled type 2 diabetes mellitus with stage 2 chronic kidney disease, with long-term current use of insulin    Overview     Stable .seeing dr cleveland nephrology cont current            As above, continue current medications and maintain follow up with specialists.  Return to clinic in 6 months.      Frederick W Dantagnan [facility]

## 2024-01-11 ENCOUNTER — PATIENT OUTREACH (OUTPATIENT)
Dept: ADMINISTRATIVE | Facility: HOSPITAL | Age: 69
End: 2024-01-11
Payer: MEDICARE

## 2024-01-11 NOTE — PROGRESS NOTES
Health Maintenance Due   Topic Date Due    COVID-19 Vaccine (1) Never done    Pneumococcal Vaccines (Age 65+) (1 - PCV) Never done    Colorectal Cancer Screening  Never done    Shingles Vaccine (1 of 2) Never done    TETANUS VACCINE  2017    RSV Vaccine (Age 60+ and Pregnant patients) (1 - 1-dose 60+ series) Never done    Eye Exam  2023     Population Health Chart Review & Patient Outreach Details    Updates Requested / Reviewed:     [x]  Care Everywhere    [x]     [x]  External Sources ([facility], [facility], [facility], etc.)    [x] [facility]   [x] [facility]   [] Other:    [x]  Care Team Updated   []  Removed  or Duplicate Orders   [x]  Immunization Reconciliation Completed / Queried    [x] [place]   [] [place]   [] [place]   [] [place]      Health Maintenance Topics Addressed and Outreach Outcomes / Actions Taken:             Breast Cancer Screening []  Mammogram Order Placed    []  Mammogram Screening Scheduled    []  External Records Requested & Care Team Updated if Applicable    []  External Records Uploaded & Care Team Updated if Applicable    []  Pt Declined Scheduling Mammogram    []  Pt Will Schedule with External Provider / Order Routed & Care Team Updated if Applicable              Cervical Cancer Screening []  Pap Smear Scheduled in Primary Care or OBGYN    []  External Records Requested & Care Team Updated if Applicable       []  External Records Uploaded, Care Team Updated, & History Updated if Applicable    []  Patient Declined Scheduling Pap Smear    []  Patient Will Schedule with External Provider & Care Team Updated if Applicable                  Colorectal Cancer Screening []  Colonoscopy Case Request / Referral / Home Test Order Placed    []  External Records Requested & Care Team Updated if Applicable    []  External Records Uploaded, Care Team Updated, & History Updated if Applicable    []  Patient Declined Completing Colon Cancer Screening    []  Patient Will Schedule with  External Provider & Care Team Updated if Applicable    []  Fit Kit Mailed (add the SmartPhrase under additional notes)    []  Reminded Patient to Complete Home Test                Diabetic Eye Exam []  Eye Exam Screening Order Placed    []  Eye Camera Scheduled or Optometry/Ophthalmology Referral Placed    []  External Records Requested & Care Team Updated if Applicable    []  External Records Uploaded, Care Team Updated, & History Updated if Applicable    []  Patient Declined Scheduling Eye Exam    []  Patient Will Schedule with External Provider & Care Team Updated if Applicable             Blood Pressure Control []  Primary Care Follow Up Visit Scheduled     []  Remote Blood Pressure Reading Captured    []  Patient Declined Remote Reading or Scheduling Appt - Escalated to PCP    []  Patient Will Call Back or Send Portal Message with Reading                 HbA1c & Other Labs []  Overdue Lab(s) Ordered    []  Overdue Lab(s) Scheduled    [x]  External Records Uploaded & Care Team Updated if Applicable    []  Primary Care Follow Up Visit Scheduled     []  Reminded Patient to Complete A1c Home Test    []  Patient Declined Scheduling Labs or Will Call Back to Schedule    []  Patient Will Schedule with External Provider / Order Routed, & Care Team Updated if Applicable           Primary Care Appointment []  Primary Care Appt Scheduled    []  Patient Declined Scheduling or Will Call Back to Schedule    []  Pt Established with External Provider, Updated Care Team, & Informed Pt to Notify Payor if Applicable           Medication Adherence /    Statin Use []  Primary Care Appointment Scheduled    []  Patient Reminded to  Prescription    []  Patient Declined, Provider Notified if Needed    []  Sent Provider Message to Review to Evaluate Pt for Statin, Add Exclusion Dx Codes, Document   Exclusion in Problem List, Change Statin Intensity Level to Moderate or High Intensity if Applicable                Osteoporosis  Screening []  Dexa Order Placed    []  Dexa Appointment Scheduled    []  External Records Requested & Care Team Updated    []  External Records Uploaded, Care Team Updated, & History Updated if Applicable    []  Patient Declined Scheduling Dexa or Will Call Back to Schedule    []  Patient Will Schedule with External Provider / Order Routed & Care Team Updated if Applicable       Additional Notes:    Chart review completed.

## 2024-01-17 ENCOUNTER — LAB VISIT (OUTPATIENT)
Dept: LAB | Facility: HOSPITAL | Age: 69
End: 2024-01-17
Attending: INTERNAL MEDICINE
Payer: MEDICARE

## 2024-01-17 DIAGNOSIS — N18.2 CKD (CHRONIC KIDNEY DISEASE) STAGE 2, GFR 60-89 ML/MIN: ICD-10-CM

## 2024-01-17 LAB
ALBUMIN SERPL BCP-MCNC: 4.2 G/DL (ref 3.5–5.2)
ANION GAP SERPL CALC-SCNC: 7 MMOL/L (ref 8–16)
BUN SERPL-MCNC: 27 MG/DL (ref 8–23)
CALCIUM SERPL-MCNC: 9.8 MG/DL (ref 8.7–10.5)
CHLORIDE SERPL-SCNC: 105 MMOL/L (ref 95–110)
CO2 SERPL-SCNC: 27 MMOL/L (ref 23–29)
CREAT SERPL-MCNC: 1.4 MG/DL (ref 0.5–1.4)
EST. GFR  (NO RACE VARIABLE): 55.4 ML/MIN/1.73 M^2
GLUCOSE SERPL-MCNC: 154 MG/DL (ref 70–110)
PHOSPHATE SERPL-MCNC: 4.2 MG/DL (ref 2.7–4.5)
POTASSIUM SERPL-SCNC: 4.9 MMOL/L (ref 3.5–5.1)
SODIUM SERPL-SCNC: 139 MMOL/L (ref 136–145)

## 2024-01-17 PROCEDURE — 36415 COLL VENOUS BLD VENIPUNCTURE: CPT | Mod: PO | Performed by: INTERNAL MEDICINE

## 2024-01-17 PROCEDURE — 80069 RENAL FUNCTION PANEL: CPT | Performed by: INTERNAL MEDICINE

## 2024-02-06 DIAGNOSIS — Z12.11 COLON CANCER SCREENING: ICD-10-CM

## 2024-02-22 DIAGNOSIS — I48.92 ATRIAL FLUTTER, PAROXYSMAL: ICD-10-CM

## 2024-02-22 RX ORDER — PRAVASTATIN SODIUM 40 MG/1
40 TABLET ORAL NIGHTLY
Qty: 90 TABLET | Refills: 3 | Status: SHIPPED | OUTPATIENT
Start: 2024-02-22

## 2024-02-22 NOTE — TELEPHONE ENCOUNTER
No care due was identified.  Health [facility] Embedded Care Due Messages. Reference number: [id number].   2/22/2024 2:48:56 PM CST

## 2024-02-28 ENCOUNTER — TELEPHONE (OUTPATIENT)
Dept: NEPHROLOGY | Facility: CLINIC | Age: 69
End: 2024-02-28
Payer: MEDICARE

## 2024-02-28 ENCOUNTER — PATIENT MESSAGE (OUTPATIENT)
Dept: NEPHROLOGY | Facility: CLINIC | Age: 69
End: 2024-02-28
Payer: MEDICARE

## 2024-02-28 NOTE — TELEPHONE ENCOUNTER
Pt. Wanted to make sure this is a safe medication and your thoughts before he starts please advise - thank you

## 2024-02-28 NOTE — TELEPHONE ENCOUNTER
----- Message from Magda Suero sent at 2/28/2024 11:50 AM CST -----  Type:  Needs Medical Advice    Who Called: pt   Best Call Back Number: [phone] (home)     Additional Information:  Requesting call back  wants to discussed  rx kerendia. Sts another dr wrote it for him and wants to know what the provider thinks about it     please advise thank you

## 2024-03-13 ENCOUNTER — PATIENT MESSAGE (OUTPATIENT)
Dept: PRIMARY CARE CLINIC | Facility: CLINIC | Age: 69
End: 2024-03-13
Payer: MEDICARE

## 2024-03-13 ENCOUNTER — LAB VISIT (OUTPATIENT)
Dept: LAB | Facility: HOSPITAL | Age: 69
End: 2024-03-13
Attending: INTERNAL MEDICINE
Payer: MEDICARE

## 2024-03-13 DIAGNOSIS — N18.2 CKD (CHRONIC KIDNEY DISEASE) STAGE 2, GFR 60-89 ML/MIN: ICD-10-CM

## 2024-03-13 LAB
CREAT UR-MCNC: 57 MG/DL (ref 23–375)
PROT UR-MCNC: 21 MG/DL (ref 0–15)
PROT/CREAT UR: 0.37 MG/G{CREAT} (ref 0–0.2)

## 2024-03-13 PROCEDURE — 82570 ASSAY OF URINE CREATININE: CPT | Performed by: INTERNAL MEDICINE

## 2024-04-11 ENCOUNTER — TELEPHONE (OUTPATIENT)
Dept: NEPHROLOGY | Facility: CLINIC | Age: 69
End: 2024-04-11
Payer: MEDICARE

## 2024-04-11 NOTE — TELEPHONE ENCOUNTER
----- Message from Mason Orellana MD sent at 3/15/2024  9:51 AM CDT -----  Your creatinine blood test is a bit elevated from your typical baseline. Have you had any recent illnesses, infections, new medications?

## 2024-04-11 NOTE — TELEPHONE ENCOUNTER
Patient has been taking Kerendia for about six weeks and he had a cold recently. He took an OTC medication for cold and flu.Charleen is putting recent lab results in the chart from [facility].I am sending this message to .

## 2024-04-22 RX ORDER — AMLODIPINE BESYLATE 10 MG/1
10 TABLET ORAL
Qty: 90 TABLET | Refills: 1 | Status: SHIPPED | OUTPATIENT
Start: 2024-04-22

## 2024-05-11 PROBLEM — E11.10 DKA (DIABETIC KETOACIDOSIS): Status: ACTIVE | Noted: 2024-05-11

## 2024-05-12 PROBLEM — Z75.8 DISCHARGE PLANNING ISSUES: Status: ACTIVE | Noted: 2024-05-12

## 2024-05-28 ENCOUNTER — OFFICE VISIT (OUTPATIENT)
Dept: PRIMARY CARE CLINIC | Facility: CLINIC | Age: 69
End: 2024-05-28
Payer: MEDICARE

## 2024-05-28 VITALS
DIASTOLIC BLOOD PRESSURE: 74 MMHG | TEMPERATURE: 98 F | OXYGEN SATURATION: 98 % | HEIGHT: 74 IN | BODY MASS INDEX: 24.78 KG/M2 | SYSTOLIC BLOOD PRESSURE: 124 MMHG | HEART RATE: 85 BPM | WEIGHT: 193.13 LBS | RESPIRATION RATE: 18 BRPM

## 2024-05-28 DIAGNOSIS — Z86.711 HISTORY OF PULMONARY EMBOLISM: ICD-10-CM

## 2024-05-28 DIAGNOSIS — I11.9 HYPERTENSIVE HEART DISEASE WITHOUT HEART FAILURE: ICD-10-CM

## 2024-05-28 DIAGNOSIS — E11.10 DIABETIC KETOACIDOSIS WITHOUT COMA ASSOCIATED WITH TYPE 2 DIABETES MELLITUS: ICD-10-CM

## 2024-05-28 DIAGNOSIS — I48.0 PAF (PAROXYSMAL ATRIAL FIBRILLATION): ICD-10-CM

## 2024-05-28 DIAGNOSIS — E11.42 DIABETIC POLYNEUROPATHY ASSOCIATED WITH TYPE 2 DIABETES MELLITUS: Primary | ICD-10-CM

## 2024-05-28 PROCEDURE — 99213 OFFICE O/P EST LOW 20 MIN: CPT | Mod: PBBFAC | Performed by: INTERNAL MEDICINE

## 2024-05-28 PROCEDURE — 99999 PR PBB SHADOW E&M-EST. PATIENT-LVL III: CPT | Mod: PBBFAC,,, | Performed by: INTERNAL MEDICINE

## 2024-05-28 PROCEDURE — 99495 TRANSJ CARE MGMT MOD F2F 14D: CPT | Mod: S$PBB,,, | Performed by: INTERNAL MEDICINE

## 2024-05-28 NOTE — PROGRESS NOTES
[facility] Note    Chief Complaint      Chief Complaint   Patient presents with    Hospital Follow Up       History of Present Illness      Yobany Richardson is a 68 y.o. male who presents today for   Chief Complaint   Patient presents with    Hospital Follow Up   .  Patient comes to appointment here for hosp f/u   Admit date 5/11/24 dka   D/c date 5/15/2024  Transitional Care Note    Family and/or Caretaker present at visit?  No.  Diagnostic tests reviewed/disposition: No diagnosic tests pending after this hospitalization.  Disease/illness education: yes   Home health/community services discussion/referrals: Patient does not have home health established from hospital visit.  They do not need home health.  If needed, we will set up home health for the patient.   Establishment or re-establishment of referral orders for community resources: No other necessary community resources.   Discussion with other health care providers: No discussion with other health care providers necessary.      Here for f/y had eye procedure done which result wed in vertigo nausea and vomiting , this led to dehydration and difficulty with taking medication which led to dka . He has visit with dr olamide rodriguez this afternoon . He complains of some issue swiht fatigue but is getting better . He is still dealing with some visual disturbance . He has visit with optho June 13     HPI    No problem-specific Assessment & Plan notes found for this encounter.       Problem List Items Addressed This Visit          Neuro    Diabetic polyneuropathy associated with type 2 diabetes mellitus - Primary    Overview     Stable on gabapentin cont current             Cardiac/Vascular    Hypertensive heart disease without heart failure    Overview     Cont current bp well controlled stable          PAF (paroxysmal atrial fibrillation)    Overview     Cardiology managing pacemaker placed            Hematology    History of pulmonary embolism     Overview     1/2016 stable on plavix currently cont current             Endocrine    DKA (diabetic ketoacidosis)    Overview     Resolved see hpi             Past Medical History:  Past Medical History:   Diagnosis Date    Abnormal thallium stress test     Anticoagulant long-term use     Arrhythmia     Atrial flutter, paroxysmal 02/11/2016    CAD (coronary artery disease) 03/09/2016 5/2016 [facility] Left main:NL LAD: 35% proximal LAD. two small diagonals with minimal disease.  Circumflex/Large branching first obtuse marginal: with minimal disease.   RCA: The vessel was large sized (dominant) with a 60% proximal lesion. 100 mcgs of intracoronary Nitroglycerin were given with no change in the lesion. Thus FFR was performed. FFR was 0.79  2/2016 cor CTA ~~ 50% LAD    Coronary artery disease     Diabetes mellitus     Diabetes mellitus, type 2     Disorder of kidney and ureter 2016    KIDNEY STONE    Hypertension     Neuropathy     Paroxysmal atrial flutter     PE (pulmonary embolism)     PONV (postoperative nausea and vomiting)     Pulmonary embolism 02/11/2016 1/2016     Rheumatoid arthritis     Shortness of breath     Stented coronary artery 06/15/2016    5/2016 RCA- synergy 3.5x12    Wears contact lenses     Wears prescription eyeglasses        Past Surgical History:  Past Surgical History:   Procedure Laterality Date    A-V CARDIAC PACEMAKER INSERTION  07/07/2023    Procedure: Dual Chamber PPM (Biotronik);  Surgeon: Yobany Cronin III, MD;  Location: [facility] CATH;  Service: Cardiology;;    ABLATION Left 03/23/2023    Procedure: Ablation;  Surgeon: Yobany Cronin III, MD;  Location: [facility] CATH;  Service: Cardiology;  Laterality: Left;    ARTHROPLASTY OF TOE Right 07/14/2022    Procedure: ARTHROPLASTY, TOE;  Surgeon: Augusto Simeon DPM;  Location: [facility] OR;  Service: Podiatry;  Laterality: Right;  Hallux    CARDIAC CATHETERIZATION  2017    with stent placed x 1    CORONARY ANGIOGRAPHY N/A 07/06/2023    Procedure: Left  Heart cath;  Surgeon: Yobany Cronin III, MD;  Location: [facility] CATH;  Service: Cardiology;  Laterality: N/A;    EYE SURGERY Left 05/08/2024    for bleeding of retina    INSERTION OF IMPLANTABLE LOOP RECORDER N/A 05/03/2023    Procedure: Implantable Loop Recorder;  Surgeon: Yobany Cronin III, MD;  [facility];  Service: Cardiology;  Laterality: N/A;    KNEE ARTHROSCOPY Left 1985    REMOVAL OF IMPLANTABLE LOOP RECORDER  07/07/2023    Procedure: Removal Loop Recorder ([employer]tronic);  Surgeon: Yobany Cronin III, MD;  Location: [facility] CATH;  Service: Cardiology;;    TONSILLECTOMY         Family History:  family history includes Angina in his mother; Bell's palsy in his sister; Cancer in his mother and sister; Depression in his sister and sister; Heart disease in his mother; Hypertension in his mother; Kidney disease in his father; Thyroid disease in his sister and sister.     Social History:  Social History     Socioeconomic History    Marital status:    Tobacco Use    Smoking status: Never    Smokeless tobacco: Never   Substance and Sexual Activity    Alcohol use: No    Drug use: No    Sexual activity: Yes     Partners: Female     Social Determinants of Health     Financial Resource Strain: Low Risk  (5/12/2024)    Overall Financial Resource Strain (CARDIA)     Difficulty of Paying Living Expenses: Not hard at all   Food Insecurity: No Food Insecurity (5/12/2024)    Hunger Vital Sign     Worried About Running Out of Food in the Last Year: Never true     Ran Out of Food in the Last Year: Never true   Transportation Needs: No Transportation Needs (5/12/2024)    TRANSPORTATION NEEDS     Transportation : No   Physical Activity: Unknown (1/20/2021)    Exercise Vital Sign     Days of Exercise per Week: 0 days   Stress: No Stress Concern Present (1/20/2021)    [employer] [place] [employer] - Occupational Stress Questionnaire     Feeling of Stress : Not at all   Housing Stability: Low Risk  (5/12/2024)     "Housing Stability Vital Sign     Unable to Pay for Housing in the Last Year: No     Homeless in the Last Year: No       Review of Systems:   Review of Systems   Constitutional:  Negative for fever and weight loss.   HENT:  Negative for congestion, hearing loss and sore throat.    Eyes:  Negative for blurred vision.   Respiratory:  Negative for cough and shortness of breath.    Cardiovascular:  Negative for chest pain, palpitations, claudication and leg swelling.   Gastrointestinal:  Negative for abdominal pain, constipation, diarrhea and heartburn.   Genitourinary:  Negative for dysuria.   Musculoskeletal:  Negative for back pain and myalgias.   Skin:  Negative for rash.   Neurological:  Negative for focal weakness and headaches.   Psychiatric/Behavioral:  Negative for depression and suicidal ideas. The patient is not nervous/anxious.         Medications:  Outpatient Encounter Medications as of 5/28/2024   Medication Sig Dispense Refill    acetaminophen (TYLENOL) 325 MG tablet Take 2 tablets (650 mg total) by mouth every 6 (six) hours as needed for Pain.      amLODIPine (NORVASC) 10 MG tablet TAKE 1 TABLET(10 MG) BY MOUTH EVERY DAY 90 tablet 1    APPLE CIDER VINEGAR ORAL Take by mouth.      ascorbic acid (VITAMIN C) 500 MG tablet Take 500 mg by mouth once daily.      aspirin (ECOTRIN) 81 MG EC tablet Take 1 tablet (81 mg total) by mouth once daily. 30 tablet 11    b complex vitamins capsule Take 1 capsule by mouth once daily.      BD AMAN 2ND GEN PEN NEEDLE 32 gauge x 5/32" Ndle USE TO INJECT INSULIN INTO SKIN EVERY  each 3    blood sugar diagnostic Strp To check BG 2 times daily, to use with insurance preferred meter  E 11.65 50 each 1    carvediloL (COREG) 6.25 MG tablet Take 1 tablet (6.25 mg total) by mouth once daily. 180 tablet 4    ciprofloxacin HCl (CILOXAN) 0.3 % ophthalmic solution Place 1 drop into the left eye 4 (four) times daily.      clopidogreL (PLAVIX) 75 mg tablet 1 tablet Orally Once a day " for 30 day(s) 90 tablet 4    cyanocobalamin, vitamin B-12, 1,000 mcg/15 mL Liqd Take by mouth. Takes a tablet      FARXIGA 10 mg tablet Take 1 tablet (10 mg total) by mouth once daily. Discuss with ordering provider prior to restarting.      FREESTYLE PASCUAL 14 DAY SENSOR Kit APPLY NEW SENSOR EVERY 14 DAYS AS DIRECTED      FREESTYLE LITE STRIPS Strp TEST 2 TO 3 TIMES D  2    gabapentin (NEURONTIN) 600 MG tablet TAKE 1 TABLET(600 MG) BY MOUTH TWICE DAILY 60 tablet 11    KERENDIA 10 mg Tab Take by mouth.      lancets Misc To check BG 2 times daily, to use with insurance preferred meter  DX E 11.65 50 each 1    multivit-min/iron/folic acid/K (ADULTS MULTIVITAMIN ORAL) Take by mouth.      mupirocin (BACTROBAN) 2 % ointment Apply topically once daily. 30 g 1    nitroGLYCERIN (NITROSTAT) 0.4 MG SL tablet Place 1 tablet (0.4 mg total) under the tongue every 5 (five) minutes as needed for Chest pain. 30 tablet 12    ONETOUCH DELICA PLUS LANCET 33 gauge Misc TEST ONCE D  0    pantoprazole (PROTONIX) 40 MG tablet Take 1 tablet (40 mg total) by mouth once daily. 20 tablet 0    pravastatin (PRAVACHOL) 40 MG tablet Take 1 tablet (40 mg total) by mouth every evening. 90 tablet 3    prednisoLONE acetate (PRED FORTE) 1 % DrpS Place 1 drop into the left eye 4 (four) times daily.      RYBELSUS 14 mg tablet Take 1 tablet (14 mg total) by mouth every morning. Discuss with ordering provider prior to restarting.      TOUJEO SOLOSTAR U-300 INSULIN 300 unit/mL (1.5 mL) InPn pen Inject 20 Units into the skin every evening.      TRUE METRIX GLUCOSE METER Misc 2 (two) times daily. Use to check blood glucose      vitamin E 400 UNIT capsule Take 400 Units by mouth once daily.       No facility-administered encounter medications on file as of 5/28/2024.       Allergies:  Review of patient's allergies indicates:  No Known Allergies      Physical Exam      Vitals:    05/28/24 0925   BP: 124/74   Pulse: 85   Resp: 18   Temp: 98 °F (36.7 °C)     "    Vital Signs  Temp: 98 °F (36.7 °C)  Temp Source: Oral  Pulse: 85  Resp: 18  SpO2: 98 %  BP: 124/74  BP Location: Right arm  Patient Position: Sitting  Pain Score: 0-No pain  Height and Weight  Height: 6' 2" (188 cm)  Weight: 87.6 kg (193 lb 2 oz)  BSA (Calculated - sq m): 2.14 sq meters  BMI (Calculated): 24.8  Weight in (lb) to have BMI = 25: 194.3]     Body mass index is 24.8 kg/m².    Physical Exam  Constitutional:       Appearance: He is well-developed.   HENT:      Head: Normocephalic.   Eyes:      Pupils: Pupils are equal, round, and reactive to light.   Neck:      Thyroid: No thyromegaly.   Cardiovascular:      Rate and Rhythm: Normal rate and regular rhythm.      Heart sounds: No murmur heard.     No friction rub. No gallop.   Pulmonary:      Effort: Pulmonary effort is normal.      Breath sounds: Normal breath sounds.   Abdominal:      General: Bowel sounds are normal.      Palpations: Abdomen is soft.   Musculoskeletal:         General: Normal range of motion.      Cervical back: Normal range of motion.   Skin:     General: Skin is warm and dry.   Neurological:      Mental Status: He is alert and oriented to person, place, and time.      Sensory: No sensory deficit.   Psychiatric:         Behavior: Behavior normal.          Laboratory:  CBC:  Recent Labs   Lab Result Units 05/11/24  0301 05/12/24  0400 05/13/24  0605   WBC K/uL 14.98* 19.66* 12.50   RBC M/uL 4.44* 3.79* 3.99*   Hemoglobin g/dL 14.7 12.8* 13.2*   Hematocrit % 42.6 36.2* 36.9*   Platelets K/uL 281 233 219   MCV fL 96 96 93   MCH pg 33.1* 33.8* 33.1*   MCHC g/dL 34.5 35.4 35.8     CMP:  Recent Labs   Lab Result Units 05/11/24  0548 05/11/24  0706 05/13/24  0605 05/14/24  0705 05/15/24  0417   Glucose mg/dL 253*   < > 149*   < > 173*   Calcium mg/dL 9.3   < > 9.1   < > 8.9   Albumin g/dL 4.7  --  3.8  --  3.5   Total Protein g/dL 7.5  --  6.4  --  5.8*   Sodium mmol/L 141   < > 130*   < > 133*   Potassium mmol/L 4.8   < > 3.6   < > 3.5 " "  CO2 mmol/L 11*   < > 24   < > 31   Chloride mmol/L 108   < > 98   < > 98   BUN mg/dL 31*   < > 24*   < > 28*   Alkaline Phosphatase U/L 98  --  85  --  86   ALT U/L 42  --  28  --  22   AST U/L 39  --  40  --  28   Total Bilirubin mg/dL 0.8  --  0.7  --  0.4    < > = values in this interval not displayed.     URINALYSIS:  Recent Labs   Lab Result Units 05/11/24  0548   Color, UA  Yellow   Specific Gravity, UA  >=1.030*   pH, UA  5.5   Protein, UA  2+*   Bacteria /hpf Negative  Negative   Nitrite, UA  Negative   Leukocytes, UA  Negative   Urobilinogen, UA EU/dL 0.2   Hyaline Casts, UA /lpf 0  0      LIPIDS:  No results for input(s): "TSH", "HDL", "CHOL", "TRIG", "LDLCALC", "CHOLHDL", "NONHDLCHOL", "TOTALCHOLEST" in the last 2160 hours.  TSH:  No results for input(s): "TSH" in the last 2160 hours.  A1C:  Recent Labs   Lab Result Units 05/11/24  0706   Hemoglobin A1C % 7.6*       Radiology:        Assessment:     Yobany Richardson is a 68 y.o.male with:    Diabetic polyneuropathy associated with type 2 diabetes mellitus    Diabetic ketoacidosis without coma associated with type 2 diabetes mellitus    History of pulmonary embolism    Hypertensive heart disease without heart failure    PAF (paroxysmal atrial fibrillation)                Plan:     Problem List Items Addressed This Visit          Neuro    Diabetic polyneuropathy associated with type 2 diabetes mellitus - Primary    Overview     Stable on gabapentin cont current             Cardiac/Vascular    Hypertensive heart disease without heart failure    Overview     Cont current bp well controlled stable          PAF (paroxysmal atrial fibrillation)    Overview     Cardiology managing pacemaker placed            Hematology    History of pulmonary embolism    Overview     1/2016 stable on plavix currently cont current             Endocrine    DKA (diabetic ketoacidosis)    Overview     Resolved see hpi            As above, continue current medications and maintain " follow up with specialists.  Return to clinic in 6 months.      Frederick W Dantagnan [facility]

## 2024-06-12 ENCOUNTER — LAB VISIT (OUTPATIENT)
Dept: LAB | Facility: HOSPITAL | Age: 69
End: 2024-06-12
Attending: INTERNAL MEDICINE
Payer: MEDICARE

## 2024-06-12 DIAGNOSIS — N18.2 CKD (CHRONIC KIDNEY DISEASE) STAGE 2, GFR 60-89 ML/MIN: ICD-10-CM

## 2024-06-12 LAB
CREAT UR-MCNC: 56 MG/DL (ref 23–375)
PROT UR-MCNC: 16 MG/DL (ref 0–15)
PROT/CREAT UR: 0.29 MG/G{CREAT} (ref 0–0.2)

## 2024-06-12 PROCEDURE — 82570 ASSAY OF URINE CREATININE: CPT | Performed by: INTERNAL MEDICINE

## 2024-06-17 ENCOUNTER — OFFICE VISIT (OUTPATIENT)
Dept: NEPHROLOGY | Facility: CLINIC | Age: 69
End: 2024-06-17
Payer: MEDICARE

## 2024-06-17 VITALS
OXYGEN SATURATION: 98 % | BODY MASS INDEX: 24.8 KG/M2 | SYSTOLIC BLOOD PRESSURE: 106 MMHG | HEART RATE: 74 BPM | HEIGHT: 74 IN | DIASTOLIC BLOOD PRESSURE: 62 MMHG

## 2024-06-17 DIAGNOSIS — N18.2 CONTROLLED TYPE 2 DIABETES MELLITUS WITH STAGE 2 CHRONIC KIDNEY DISEASE, WITH LONG-TERM CURRENT USE OF INSULIN: ICD-10-CM

## 2024-06-17 DIAGNOSIS — Z79.4 CONTROLLED TYPE 2 DIABETES MELLITUS WITH STAGE 2 CHRONIC KIDNEY DISEASE, WITH LONG-TERM CURRENT USE OF INSULIN: ICD-10-CM

## 2024-06-17 DIAGNOSIS — N18.31 STAGE 3A CHRONIC KIDNEY DISEASE: Primary | ICD-10-CM

## 2024-06-17 DIAGNOSIS — E11.22 CONTROLLED TYPE 2 DIABETES MELLITUS WITH STAGE 2 CHRONIC KIDNEY DISEASE, WITH LONG-TERM CURRENT USE OF INSULIN: ICD-10-CM

## 2024-06-17 DIAGNOSIS — N25.81 SECONDARY RENAL HYPERPARATHYROIDISM: ICD-10-CM

## 2024-06-17 DIAGNOSIS — I10 PRIMARY HYPERTENSION: ICD-10-CM

## 2024-06-17 PROCEDURE — 99214 OFFICE O/P EST MOD 30 MIN: CPT | Mod: S$PBB,,, | Performed by: INTERNAL MEDICINE

## 2024-06-17 PROCEDURE — 99999 PR PBB SHADOW E&M-EST. PATIENT-LVL IV: CPT | Mod: PBBFAC,,, | Performed by: INTERNAL MEDICINE

## 2024-06-17 PROCEDURE — 99214 OFFICE O/P EST MOD 30 MIN: CPT | Mod: PBBFAC,PN | Performed by: INTERNAL MEDICINE

## 2024-06-17 RX ORDER — INSULIN ASPART INJECTION 100 [IU]/ML
INJECTION, SOLUTION SUBCUTANEOUS
COMMUNITY

## 2024-06-17 RX ORDER — BLOOD-GLUCOSE SENSOR
EACH MISCELLANEOUS
COMMUNITY
Start: 2024-05-16

## 2024-06-17 NOTE — PROGRESS NOTES
"  Subjective:       Patient ID: Yobany Richardson is a 68 y.o. White male who presents for return patient evaluation for chronic renal failure.      He had a recent admit for DKA and also had a L eye hemorrhage.  He did have COVID in August 2021.  He is caring for his 3 year old grandson.  He also lost 10 pounds recently.      Review of Systems   Constitutional:  Positive for fatigue. Negative for appetite change, chills and fever.   HENT:  Negative for congestion.    Eyes:  Negative for visual disturbance.   Respiratory:  Negative for cough and shortness of breath.    Cardiovascular:  Negative for chest pain and leg swelling.   Gastrointestinal:  Negative for abdominal pain, diarrhea, nausea and vomiting.   Genitourinary:  Negative for difficulty urinating, dysuria and hematuria.   Musculoskeletal:  Positive for arthralgias. Negative for myalgias.   Skin:  Negative for rash.   Neurological:  Negative for headaches.   Psychiatric/Behavioral:  Negative for sleep disturbance.        The past medical, family and social histories were reviewed for this encounter.     /62 (BP Location: Right arm, Patient Position: Sitting, BP Method: Medium (Manual))   Pulse 74   Ht 6' 2" (1.88 m)   SpO2 98%   BMI 24.80 kg/m²     Objective:      Physical Exam  Vitals reviewed.   Constitutional:       General: He is not in acute distress.     Appearance: He is well-developed.   HENT:      Head: Normocephalic and atraumatic.   Eyes:      General: No scleral icterus.     Conjunctiva/sclera: Conjunctivae normal.   Neck:      Vascular: No JVD.   Cardiovascular:      Rate and Rhythm: Normal rate and regular rhythm.      Heart sounds: Normal heart sounds. No murmur heard.     No friction rub. No gallop.   Pulmonary:      Effort: Pulmonary effort is normal. No respiratory distress.      Breath sounds: Normal breath sounds. No wheezing or rales.   Abdominal:      General: Bowel sounds are normal. There is no distension.      Palpations: " Abdomen is soft.      Tenderness: There is no abdominal tenderness.   Musculoskeletal:      Cervical back: Normal range of motion.      Right lower leg: No edema.      Left lower leg: No edema.   Skin:     General: Skin is warm and dry.      Findings: No rash.   Neurological:      Mental Status: He is alert and oriented to person, place, and time.   Psychiatric:         Mood and Affect: Mood normal.         Behavior: Behavior normal.         Assessment:       1. Stage 3a chronic kidney disease    2. Secondary renal hyperparathyroidism    3. Primary hypertension    4. Controlled type 2 diabetes mellitus with stage 2 chronic kidney disease, with long-term current use of insulin       Lab Results   Component Value Date    CREATININE 1.5 (H) 06/12/2024    BUN 28 (H) 06/12/2024     06/12/2024    K 4.7 06/12/2024     06/12/2024    CO2 25 06/12/2024     Lab Results   Component Value Date    PTH 96.1 (H) 06/12/2024    CALCIUM 9.9 06/12/2024    PHOS 3.6 06/12/2024     Lab Results   Component Value Date    HCT 36.9 (L) 05/13/2024     Prot/Creat Ratio, Urine   Date Value Ref Range Status   06/12/2024 0.29 (H) 0.00 - 0.20 Final   03/13/2024 0.37 (H) 0.00 - 0.20 Final   12/07/2023 0.35 (H) 0.00 - 0.20 Final      Plan:   Return to clinic in 6 months.  Labs for next visit include rp pth upc uric per standing orders q 3 months.  UACR for next time.  Baseline creatinine is 1.0-1.3 since 2016.  Lately he has been 1.3-1.5.  Renal US shows R 10.8 cm L 10.3 cm.  PTH is 96 with a calcium of 9.9.  UPC is 0.29 but he was taken off of his ARB due to hyperkalemia.  Continue current medications as prescribed and reviewed.   Blood pressure is controlled on the current regimen.  Please have patient follow-up with your PCP or Endocrinology regarding the control and management of diabetes.    Computed KFRE 2-Year unavailable. One or more values for this score either were not found within the given timeframe or did not fit some other  criterion.    Computed KFRE 5-Year unavailable. One or more values for this score either were not found within the given timeframe or did not fit some other criterion.

## 2024-07-22 ENCOUNTER — OFFICE VISIT (OUTPATIENT)
Dept: PODIATRY | Facility: CLINIC | Age: 69
End: 2024-07-22
Payer: MEDICARE

## 2024-07-22 VITALS — BODY MASS INDEX: 24.78 KG/M2 | WEIGHT: 193.13 LBS | HEIGHT: 74 IN

## 2024-07-22 DIAGNOSIS — E11.42 DIABETIC POLYNEUROPATHY ASSOCIATED WITH TYPE 2 DIABETES MELLITUS: Primary | ICD-10-CM

## 2024-07-22 DIAGNOSIS — B35.1 ONYCHOMYCOSIS DUE TO DERMATOPHYTE: ICD-10-CM

## 2024-07-22 PROCEDURE — 11721 DEBRIDE NAIL 6 OR MORE: CPT | Mod: Q9,S$PBB,, | Performed by: PODIATRIST

## 2024-07-22 PROCEDURE — 99213 OFFICE O/P EST LOW 20 MIN: CPT | Mod: PBBFAC,PO | Performed by: PODIATRIST

## 2024-07-22 PROCEDURE — 99999 PR PBB SHADOW E&M-EST. PATIENT-LVL III: CPT | Mod: PBBFAC,,, | Performed by: PODIATRIST

## 2024-07-22 PROCEDURE — 99213 OFFICE O/P EST LOW 20 MIN: CPT | Mod: 25,S$PBB,, | Performed by: PODIATRIST

## 2024-07-22 PROCEDURE — 11721 DEBRIDE NAIL 6 OR MORE: CPT | Mod: Q9,PBBFAC,PO | Performed by: PODIATRIST

## 2024-07-22 NOTE — PROGRESS NOTES
Subjective:      Patient ID: Yobany Richardson is a 68 y.o. male.    Chief Complaint: Diabetes Mellitus and Nail Care (6 month DM nail care, check-up, diabetic/neuropathy nerve pain)    Yobany is a 68 y.o. male with a past medical history of Abnormal thallium stress test, Anticoagulant long-term use, Arrhythmia, Atrial flutter, paroxysmal (02/11/2016), CAD (coronary artery disease) (03/09/2016), Coronary artery disease, Diabetes mellitus, Diabetes mellitus, type 2, Disorder of kidney and ureter (2016), Hypertension, Neuropathy, Paroxysmal atrial flutter, PE (pulmonary embolism), PONV (postoperative nausea and vomiting), Pulmonary embolism (02/11/2016), Rheumatoid arthritis, Shortness of breath, Stented coronary artery (06/15/2016), Wears contact lenses, and Wears prescription eyeglasses. Patient relates having toenails that are in need of trimming.  Denies experiencing pain from said issue.  Has not attempted to self trim.  Also, notes continued neuropathy that is well managed by the gabapentin.  Reports occasional pain symptoms, however, states these are fleeting in nature.  Denies any additional pedal complaints.    PCP: Alirio Walters MD    Date Last Seen by PCP: 5/24    Hemoglobin A1C   Date Value Ref Range Status   05/11/2024 7.6 (H) 0.0 - 5.6 % Final     Comment:     Reference Interval:  5.0 - 5.6 Normal   5.7 - 6.4 High Risk   > 6.5 Diabetic      Hgb A1c results are standardized based on the (NGSP) National   Glycohemoglobin Standardization Program.      Hemoglobin A1C levels are related to mean serum/plasma glucose   during the preceding 2-3 months.        11/13/2023 7.7 (A) 4.0 - 6.0 % Final   08/15/2023 7.3 (A) 4.0 - 6.0 % Final   10/14/2022 6.4 (A) 5.7 % Final         Past Medical History:   Diagnosis Date    Abnormal thallium stress test     Anticoagulant long-term use     Arrhythmia     Atrial flutter, paroxysmal 02/11/2016    CAD (coronary artery disease) 03/09/2016 5/2016 [facility] Left main:NL LAD:  35% proximal LAD. two small diagonals with minimal disease.  Circumflex/Large branching first obtuse marginal: with minimal disease.   RCA: The vessel was large sized (dominant) with a 60% proximal lesion. 100 mcgs of intracoronary Nitroglycerin were given with no change in the lesion. Thus FFR was performed. FFR was 0.79  2/2016 cor CTA ~~ 50% LAD    Coronary artery disease     Diabetes mellitus     Diabetes mellitus, type 2     Disorder of kidney and ureter 2016    KIDNEY STONE    Hypertension     Neuropathy     Paroxysmal atrial flutter     PE (pulmonary embolism)     PONV (postoperative nausea and vomiting)     Pulmonary embolism 02/11/2016 1/2016     Rheumatoid arthritis     Shortness of breath     Stented coronary artery 06/15/2016    5/2016 RCA- synergy 3.5x12    Wears contact lenses     Wears prescription eyeglasses        Past Surgical History:   Procedure Laterality Date    A-V CARDIAC PACEMAKER INSERTION  07/07/2023    Procedure: Dual Chamber PPM (Biotronik);  Surgeon: Yobany Cronin III, MD;  [facility];  Service: Cardiology;;    ABLATION Left 03/23/2023    Procedure: Ablation;  Surgeon: Yobany Cronin III, MD;  Location: [facility] [place] CATH;  Service: Cardiology;  Laterality: Left;    ARTHROPLASTY OF TOE Right 07/14/2022    Procedure: ARTHROPLASTY, TOE;  Surgeon: Augusto Simeon DPM;  Location: [facility] OR;  Service: Podiatry;  Laterality: Right;  Hallux    CARDIAC CATHETERIZATION  2017    with stent placed x 1    CORONARY ANGIOGRAPHY N/A 07/06/2023    Procedure: Left Heart cath;  Surgeon: Yobany Cronin III, MD;  Location: [facility] [place] CATH;  Service: Cardiology;  Laterality: N/A;    EYE SURGERY Left 05/08/2024    for bleeding of retina    INSERTION OF IMPLANTABLE LOOP RECORDER N/A 05/03/2023    Procedure: Implantable Loop Recorder;  Surgeon: Yobany Cronin III, MD;  [facility];  Service: Cardiology;  Laterality: N/A;    KNEE ARTHROSCOPY Left 1985    REMOVAL OF IMPLANTABLE LOOP RECORDER  07/07/2023     "Procedure: Removal Loop Recorder ([employer]);  Surgeon: Yobany Cronin III, MD;  Location: [facility];  Service: Cardiology;;    TONSILLECTOMY         Family History   Problem Relation Name Age of Onset    Cancer Mother      Angina Mother      Heart disease Mother      Hypertension Mother      Kidney disease Father      Thyroid disease Sister      Bell's palsy Sister      Thyroid disease Sister      Depression Sister      Depression Sister      Cancer Sister         Social History     Socioeconomic History    Marital status:    Tobacco Use    Smoking status: Never    Smokeless tobacco: Never   Substance and Sexual Activity    Alcohol use: No    Drug use: No    Sexual activity: Yes     Partners: Female     Social Determinants of Health     Financial Resource Strain: Low Risk  (5/12/2024)    Overall Financial Resource Strain (CARDIA)     Difficulty of Paying Living Expenses: Not hard at all   Food Insecurity: No Food Insecurity (5/12/2024)    Hunger Vital Sign     Worried About Running Out of Food in the Last Year: Never true     Ran Out of Food in the Last Year: Never true   Transportation Needs: No Transportation Needs (5/12/2024)    TRANSPORTATION NEEDS     Transportation : No   Physical Activity: Unknown (1/20/2021)    Exercise Vital Sign     Days of Exercise per Week: 0 days   Stress: No Stress Concern Present (1/20/2021)    [employer] [place] [employer] - Occupational Stress Questionnaire     Feeling of Stress : Not at all   Housing Stability: Low Risk  (5/12/2024)    Housing Stability Vital Sign     Unable to Pay for Housing in the Last Year: No     Homeless in the Last Year: No       Current Outpatient Medications   Medication Sig Dispense Refill    amLODIPine (NORVASC) 10 MG tablet TAKE 1 TABLET(10 MG) BY MOUTH EVERY DAY 90 tablet 1    b complex vitamins capsule Take 1 capsule by mouth once daily.      BD AMAN 2ND GEN PEN NEEDLE 32 gauge x 5/32" Ndle USE TO INJECT INSULIN INTO SKIN EVERY DAY " 100 each 3    blood sugar diagnostic Strp To check BG 2 times daily, to use with insurance preferred meter  E 11.65 50 each 1    carvediloL (COREG) 6.25 MG tablet Take 1 tablet (6.25 mg total) by mouth once daily. 180 tablet 4    clopidogreL (PLAVIX) 75 mg tablet 1 tablet Orally Once a day for 30 day(s) 90 tablet 4    cyanocobalamin, vitamin B-12, 1,000 mcg/15 mL Liqd Take by mouth. Takes a tablet      FARXIGA 10 mg tablet Take 1 tablet (10 mg total) by mouth once daily. Discuss with ordering provider prior to restarting.      FIASP FLEXTOUCH U-100 INSULIN 100 unit/mL (3 mL) InPn SMARTSI Unit(s) SUB-Q 3 Times Daily      FREESTYLE PASCUAL 14 DAY SENSOR Kit APPLY NEW SENSOR EVERY 14 DAYS AS DIRECTED      FREESTYLE PASCUAL 3 SENSOR Vandana USE DAILY AS DIRECTED. CHANGE SENSOR EVERY 14 DAYS      FREESTYLE LITE STRIPS Strp TEST 2 TO 3 TIMES D  2    gabapentin (NEURONTIN) 600 MG tablet TAKE 1 TABLET(600 MG) BY MOUTH TWICE DAILY 60 tablet 11    KERENDIA 10 mg Tab Take by mouth.      lancets Misc To check BG 2 times daily, to use with insurance preferred meter  DX E 11.65 50 each 1    multivit-min/iron/folic acid/K (ADULTS MULTIVITAMIN ORAL) Take by mouth.      ONETOUCH DELICA PLUS LANCET 33 gauge Misc   0    pravastatin (PRAVACHOL) 40 MG tablet Take 1 tablet (40 mg total) by mouth every evening. 90 tablet 3    RYBELSUS 14 mg tablet Take 1 tablet (14 mg total) by mouth every morning. Discuss with ordering provider prior to restarting.      TOUJEO SOLOSTAR U-300 INSULIN 300 unit/mL (1.5 mL) InPn pen Inject 20 Units into the skin every evening.      TRUE METRIX GLUCOSE METER Misc 2 (two) times daily. Use to check blood glucose      acetaminophen (TYLENOL) 325 MG tablet Take 2 tablets (650 mg total) by mouth every 6 (six) hours as needed for Pain. (Patient not taking: Reported on 2024)      APPLE CIDER VINEGAR ORAL Take by mouth. (Patient not taking: Reported on 2024)      ascorbic acid (VITAMIN C) 500 MG tablet Take 500  mg by mouth once daily.      aspirin (ECOTRIN) 81 MG EC tablet Take 1 tablet (81 mg total) by mouth once daily. 30 tablet 11    ciprofloxacin HCl (CILOXAN) 0.3 % ophthalmic solution Place 1 drop into the left eye 4 (four) times daily. (Patient not taking: Reported on 6/17/2024)      mupirocin (BACTROBAN) 2 % ointment Apply topically once daily. (Patient not taking: Reported on 6/17/2024) 30 g 1    nitroGLYCERIN (NITROSTAT) 0.4 MG SL tablet Place 1 tablet (0.4 mg total) under the tongue every 5 (five) minutes as needed for Chest pain. (Patient not taking: Reported on 6/17/2024) 30 tablet 12    pantoprazole (PROTONIX) 40 MG tablet Take 1 tablet (40 mg total) by mouth once daily. (Patient not taking: Reported on 6/17/2024) 20 tablet 0    prednisoLONE acetate (PRED FORTE) 1 % DrpS Place 1 drop into the left eye 4 (four) times daily. (Patient not taking: Reported on 6/17/2024)      vitamin E 400 UNIT capsule Take 400 Units by mouth once daily. (Patient not taking: Reported on 6/17/2024)       No current facility-administered medications for this visit.       Review of patient's allergies indicates:  No Known Allergies      Review of Systems   Constitutional: Negative for chills and fever.   Cardiovascular:  Negative for claudication and leg swelling.   Skin:  Positive for color change and nail changes. Negative for poor wound healing and suspicious lesions.   Musculoskeletal:  Positive for arthritis. Negative for joint pain and joint swelling.   Gastrointestinal:  Negative for nausea and vomiting.   Neurological:  Positive for numbness and paresthesias.   Psychiatric/Behavioral:  Negative for altered mental status.            Objective:      Physical Exam  Constitutional:       General: He is not in acute distress.     Appearance: He is well-developed. He is not diaphoretic.   Cardiovascular:      Pulses:           Dorsalis pedis pulses are 2+ on the right side and 2+ on the left side.        Posterior tibial pulses are  2+ on the right side and 2+ on the left side.      Comments: CFT < 3 seconds bilateral.  Pedal hair growth present bilateral.   No varicosities noted bilateral. No lower extremity edema noted bilateral.  Toes are warm to touch bilateral.    Musculoskeletal:         General: Deformity present. No tenderness.      Right lower leg: No edema.      Left lower leg: No edema.      Comments: Muscle strength 5/5 in all muscle groups bilateral.  No tenderness nor crepitation with ROM of foot/ankle joints bilateral.  No tenderness with palpation of bilateral foot and ankle.  Arthritic changes changes noted to the dorsal and medial aspect of the Rt. 1st mtp joint.  Bilateral pes planus foot type.  Bilateral hallux abducto valgus.  Bilateral semi-rigid contracture of toes 2-5 with better reduction of the Rt. 2nd DIP joint.     Skin:     General: Skin is warm and dry.      Capillary Refill: Capillary refill takes less than 2 seconds.      Coloration: Skin is not pale.      Findings: No abrasion, bruising, burn, ecchymosis, erythema, lesion, petechiae, rash or wound.      Nails: There is no clubbing.      Comments: Pedal skin has normal turgor, temperature, and texture bilateral.  Toenails x 10 appear thickened by 6 mm, elongated by 10 mm, and discolored with subungual debris.    Neurological:      Mental Status: He is alert and oriented to person, place, and time.      Sensory: Sensory deficit present.      Motor: No weakness or atrophy.      Comments: Protective sensation per [place] monofilament absent bilateral.    Light touch intact bilateral.               Assessment:       Encounter Diagnoses   Name Primary?    Diabetic polyneuropathy associated with type 2 diabetes mellitus Yes    Onychomycosis due to dermatophyte          Plan:       Yobany was seen today for diabetes mellitus and nail care.    Diagnoses and all orders for this visit:    Diabetic polyneuropathy associated with type 2 diabetes  mellitus    Onychomycosis due to dermatophyte      I counseled the patient on his conditions, their implications and medical management.    Shoe inspection. Diabetic Foot Education. Patient reminded of the importance of good nutrition and blood sugar control to help prevent podiatric complications of diabetes. Patient instructed on proper foot hygeine. We discussed wearing proper shoe gear, daily foot inspections, never walking without protective shoe gear, never putting sharp instruments to feet    With patient's permission, nails were aggressively reduced and debrided x 10 to their soft tissue attachment mechanically and with electric , removing all offending nail and debris. Patient relates relief following the procedure. She will continue to monitor the areas daily, inspect her feet, wear protective shoe gear when ambulatory, moisturizer to maintain skin integrity and follow in this office in approximately 6 months, sooner p.r.n.    Follow up in about 6 months (around 1/22/2025).    Augusto Simeon DPM

## 2024-08-28 DIAGNOSIS — E11.42 DIABETIC POLYNEUROPATHY ASSOCIATED WITH TYPE 2 DIABETES MELLITUS: ICD-10-CM

## 2024-08-28 RX ORDER — GABAPENTIN 600 MG/1
600 TABLET ORAL 2 TIMES DAILY
Qty: 60 TABLET | Refills: 11 | Status: SHIPPED | OUTPATIENT
Start: 2024-08-28

## 2024-09-02 PROBLEM — E11.10 DKA (DIABETIC KETOACIDOSIS): Status: RESOLVED | Noted: 2024-05-11 | Resolved: 2024-09-02

## 2024-09-05 DIAGNOSIS — E11.42 DIABETIC POLYNEUROPATHY ASSOCIATED WITH TYPE 2 DIABETES MELLITUS: ICD-10-CM

## 2024-09-05 RX ORDER — GABAPENTIN 600 MG/1
600 TABLET ORAL 2 TIMES DAILY
Qty: 60 TABLET | Refills: 11 | Status: SHIPPED | OUTPATIENT
Start: 2024-09-05

## 2024-09-19 ENCOUNTER — LAB VISIT (OUTPATIENT)
Dept: LAB | Facility: HOSPITAL | Age: 69
End: 2024-09-19
Attending: INTERNAL MEDICINE
Payer: MEDICARE

## 2024-09-19 DIAGNOSIS — I25.10 CORONARY ARTERY DISEASE INVOLVING NATIVE CORONARY ARTERY OF NATIVE HEART WITHOUT ANGINA PECTORIS: ICD-10-CM

## 2024-09-19 DIAGNOSIS — Z95.0 CARDIAC PACEMAKER IN SITU: ICD-10-CM

## 2024-09-19 DIAGNOSIS — I10 PRIMARY HYPERTENSION: ICD-10-CM

## 2024-09-19 DIAGNOSIS — N18.2 CKD (CHRONIC KIDNEY DISEASE) STAGE 2, GFR 60-89 ML/MIN: ICD-10-CM

## 2024-09-19 DIAGNOSIS — N18.31 STAGE 3A CHRONIC KIDNEY DISEASE: ICD-10-CM

## 2024-09-19 DIAGNOSIS — Z86.79 S/P ABLATION OF ATRIAL FIBRILLATION: ICD-10-CM

## 2024-09-19 DIAGNOSIS — Z95.5 STENTED CORONARY ARTERY: ICD-10-CM

## 2024-09-19 DIAGNOSIS — Z98.890 S/P ABLATION OF ATRIAL FIBRILLATION: ICD-10-CM

## 2024-09-19 DIAGNOSIS — Z86.711 HISTORY OF PULMONARY EMBOLISM: ICD-10-CM

## 2024-09-19 DIAGNOSIS — I48.92 ATRIAL FLUTTER, PAROXYSMAL: ICD-10-CM

## 2024-09-19 DIAGNOSIS — R94.39 ABNORMAL THALLIUM STRESS TEST: ICD-10-CM

## 2024-09-19 DIAGNOSIS — I48.0 PAF (PAROXYSMAL ATRIAL FIBRILLATION): ICD-10-CM

## 2024-09-19 LAB
ALBUMIN SERPL BCP-MCNC: 3.5 G/DL (ref 3.5–5.2)
ALBUMIN SERPL BCP-MCNC: 3.5 G/DL (ref 3.5–5.2)
ALP SERPL-CCNC: 92 U/L (ref 55–135)
ALT SERPL W/O P-5'-P-CCNC: 18 U/L (ref 10–44)
ANION GAP SERPL CALC-SCNC: 8 MMOL/L (ref 8–16)
ANION GAP SERPL CALC-SCNC: 8 MMOL/L (ref 8–16)
AST SERPL-CCNC: 24 U/L (ref 10–40)
BASOPHILS # BLD AUTO: 0.04 K/UL (ref 0–0.2)
BASOPHILS NFR BLD: 0.5 % (ref 0–1.9)
BILIRUB SERPL-MCNC: 0.5 MG/DL (ref 0.1–1)
BUN SERPL-MCNC: 23 MG/DL (ref 8–23)
BUN SERPL-MCNC: 23 MG/DL (ref 8–23)
CALCIUM SERPL-MCNC: 9.5 MG/DL (ref 8.7–10.5)
CALCIUM SERPL-MCNC: 9.5 MG/DL (ref 8.7–10.5)
CHLORIDE SERPL-SCNC: 104 MMOL/L (ref 95–110)
CHLORIDE SERPL-SCNC: 104 MMOL/L (ref 95–110)
CHOLEST SERPL-MCNC: 95 MG/DL (ref 120–199)
CHOLEST/HDLC SERPL: 2.4 {RATIO} (ref 2–5)
CO2 SERPL-SCNC: 22 MMOL/L (ref 23–29)
CO2 SERPL-SCNC: 22 MMOL/L (ref 23–29)
CREAT SERPL-MCNC: 1.4 MG/DL (ref 0.5–1.4)
CREAT SERPL-MCNC: 1.4 MG/DL (ref 0.5–1.4)
DIFFERENTIAL METHOD BLD: ABNORMAL
EOSINOPHIL # BLD AUTO: 0.2 K/UL (ref 0–0.5)
EOSINOPHIL NFR BLD: 3.1 % (ref 0–8)
ERYTHROCYTE [DISTWIDTH] IN BLOOD BY AUTOMATED COUNT: 12.5 % (ref 11.5–14.5)
EST. GFR  (NO RACE VARIABLE): 55.4 ML/MIN/1.73 M^2
EST. GFR  (NO RACE VARIABLE): 55.4 ML/MIN/1.73 M^2
GLUCOSE SERPL-MCNC: 105 MG/DL (ref 70–110)
GLUCOSE SERPL-MCNC: 105 MG/DL (ref 70–110)
HCT VFR BLD AUTO: 35.9 % (ref 40–54)
HDLC SERPL-MCNC: 40 MG/DL (ref 40–75)
HDLC SERPL: 42.1 % (ref 20–50)
HGB BLD-MCNC: 11.7 G/DL (ref 14–18)
IMM GRANULOCYTES # BLD AUTO: 0.02 K/UL (ref 0–0.04)
IMM GRANULOCYTES NFR BLD AUTO: 0.3 % (ref 0–0.5)
LDLC SERPL CALC-MCNC: 36.8 MG/DL (ref 63–159)
LYMPHOCYTES # BLD AUTO: 1.4 K/UL (ref 1–4.8)
LYMPHOCYTES NFR BLD: 18.2 % (ref 18–48)
MCH RBC QN AUTO: 31.6 PG (ref 27–31)
MCHC RBC AUTO-ENTMCNC: 32.6 G/DL (ref 32–36)
MCV RBC AUTO: 97 FL (ref 82–98)
MONOCYTES # BLD AUTO: 0.5 K/UL (ref 0.3–1)
MONOCYTES NFR BLD: 7.1 % (ref 4–15)
NEUTROPHILS # BLD AUTO: 5.3 K/UL (ref 1.8–7.7)
NEUTROPHILS NFR BLD: 70.8 % (ref 38–73)
NONHDLC SERPL-MCNC: 55 MG/DL
NRBC BLD-RTO: 0 /100 WBC
PHOSPHATE SERPL-MCNC: 3 MG/DL (ref 2.7–4.5)
PLATELET # BLD AUTO: 290 K/UL (ref 150–450)
PMV BLD AUTO: 10 FL (ref 9.2–12.9)
POTASSIUM SERPL-SCNC: 4 MMOL/L (ref 3.5–5.1)
POTASSIUM SERPL-SCNC: 4 MMOL/L (ref 3.5–5.1)
PROT SERPL-MCNC: 6.9 G/DL (ref 6–8.4)
PTH-INTACT SERPL-MCNC: 73.7 PG/ML (ref 9–77)
RBC # BLD AUTO: 3.7 M/UL (ref 4.6–6.2)
SODIUM SERPL-SCNC: 134 MMOL/L (ref 136–145)
SODIUM SERPL-SCNC: 134 MMOL/L (ref 136–145)
TRIGL SERPL-MCNC: 91 MG/DL (ref 30–150)
WBC # BLD AUTO: 7.51 K/UL (ref 3.9–12.7)

## 2024-09-19 PROCEDURE — 85025 COMPLETE CBC W/AUTO DIFF WBC: CPT | Performed by: INTERNAL MEDICINE

## 2024-09-19 PROCEDURE — 80061 LIPID PANEL: CPT | Performed by: INTERNAL MEDICINE

## 2024-09-19 PROCEDURE — 84100 ASSAY OF PHOSPHORUS: CPT | Performed by: INTERNAL MEDICINE

## 2024-09-19 PROCEDURE — 36415 COLL VENOUS BLD VENIPUNCTURE: CPT | Mod: PO | Performed by: INTERNAL MEDICINE

## 2024-09-19 PROCEDURE — 80053 COMPREHEN METABOLIC PANEL: CPT | Performed by: INTERNAL MEDICINE

## 2024-09-19 PROCEDURE — 80069 RENAL FUNCTION PANEL: CPT | Performed by: INTERNAL MEDICINE

## 2024-09-19 PROCEDURE — 83970 ASSAY OF PARATHORMONE: CPT | Performed by: INTERNAL MEDICINE

## 2024-11-13 NOTE — TELEPHONE ENCOUNTER
No care due was identified.  [facility] Embedded Care Due Messages. Reference number: [id number].   11/13/2024 3:27:24 PM CST

## 2024-11-14 RX ORDER — PEN NEEDLE, DIABETIC 32GX 5/32"
1 NEEDLE, DISPOSABLE MISCELLANEOUS DAILY
Qty: 100 EACH | Refills: 3 | Status: SHIPPED | OUTPATIENT
Start: 2024-11-14

## 2024-11-18 RX ORDER — AMLODIPINE BESYLATE 10 MG/1
10 TABLET ORAL DAILY
Qty: 90 TABLET | Refills: 1 | Status: SHIPPED | OUTPATIENT
Start: 2024-11-18 | End: 2025-05-17

## 2024-11-20 DIAGNOSIS — E11.9 TYPE 2 DIABETES MELLITUS WITHOUT COMPLICATION: ICD-10-CM

## 2024-11-26 ENCOUNTER — OFFICE VISIT (OUTPATIENT)
Dept: PRIMARY CARE CLINIC | Facility: CLINIC | Age: 69
End: 2024-11-26
Payer: MEDICARE

## 2024-11-26 VITALS
OXYGEN SATURATION: 96 % | WEIGHT: 192.69 LBS | BODY MASS INDEX: 24.73 KG/M2 | HEIGHT: 74 IN | RESPIRATION RATE: 18 BRPM | TEMPERATURE: 97 F | HEART RATE: 75 BPM | SYSTOLIC BLOOD PRESSURE: 120 MMHG | DIASTOLIC BLOOD PRESSURE: 80 MMHG

## 2024-11-26 DIAGNOSIS — I25.10 CORONARY ARTERY DISEASE INVOLVING NATIVE CORONARY ARTERY OF NATIVE HEART WITHOUT ANGINA PECTORIS: ICD-10-CM

## 2024-11-26 DIAGNOSIS — N18.2 CONTROLLED TYPE 2 DIABETES MELLITUS WITH STAGE 2 CHRONIC KIDNEY DISEASE, WITH LONG-TERM CURRENT USE OF INSULIN: ICD-10-CM

## 2024-11-26 DIAGNOSIS — I48.92 ATRIAL FLUTTER, PAROXYSMAL: ICD-10-CM

## 2024-11-26 DIAGNOSIS — I48.0 PAF (PAROXYSMAL ATRIAL FIBRILLATION): ICD-10-CM

## 2024-11-26 DIAGNOSIS — Z79.4 CONTROLLED TYPE 2 DIABETES MELLITUS WITH STAGE 2 CHRONIC KIDNEY DISEASE, WITH LONG-TERM CURRENT USE OF INSULIN: ICD-10-CM

## 2024-11-26 DIAGNOSIS — N25.81 SECONDARY RENAL HYPERPARATHYROIDISM: ICD-10-CM

## 2024-11-26 DIAGNOSIS — E11.22 CONTROLLED TYPE 2 DIABETES MELLITUS WITH STAGE 2 CHRONIC KIDNEY DISEASE, WITH LONG-TERM CURRENT USE OF INSULIN: ICD-10-CM

## 2024-11-26 DIAGNOSIS — I11.9 HYPERTENSIVE HEART DISEASE WITHOUT HEART FAILURE: Primary | ICD-10-CM

## 2024-11-26 DIAGNOSIS — Z12.5 PROSTATE CANCER SCREENING: ICD-10-CM

## 2024-11-26 DIAGNOSIS — N18.2 CKD (CHRONIC KIDNEY DISEASE) STAGE 2, GFR 60-89 ML/MIN: ICD-10-CM

## 2024-11-26 DIAGNOSIS — I25.118 CORONARY ARTERY DISEASE INVOLVING NATIVE CORONARY ARTERY OF NATIVE HEART WITH OTHER FORM OF ANGINA PECTORIS: ICD-10-CM

## 2024-11-26 PROCEDURE — 99215 OFFICE O/P EST HI 40 MIN: CPT | Mod: PBBFAC | Performed by: INTERNAL MEDICINE

## 2024-11-26 PROCEDURE — 99214 OFFICE O/P EST MOD 30 MIN: CPT | Mod: S$PBB,,, | Performed by: INTERNAL MEDICINE

## 2024-11-26 PROCEDURE — 99999 PR PBB SHADOW E&M-EST. PATIENT-LVL V: CPT | Mod: PBBFAC,,, | Performed by: INTERNAL MEDICINE

## 2024-11-26 NOTE — PROGRESS NOTES
[facility] Note    Chief Complaint      Chief Complaint   Patient presents with    Follow-up     6m       History of Present Illness      Yobany Richardson is a 68 y.o. male who presents today for   Chief Complaint   Patient presents with    Follow-up     6m   .  Patient comes to appointment here for 6m checkup for chronic issues as below . He has multiple chronic conditions . He uis complaining of some shortness of breath currently . He is scheduled to see cardiology next week .     HPI    No problem-specific Assessment & Plan notes found for this encounter.       Problem List Items Addressed This Visit       Hypertensive heart disease without heart failure - Primary    Overview     Cont current bp well controlled stable          Atrial flutter, paroxysmal    Overview     Taking plavix only . Had only one episode dr pradhan managing stable          Coronary artery disease involving native coronary artery of native heart with other form of angina pectoris    Overview     7/23    Patent stent to the proximal RCA.    Otherwise, minimal and nonobstructive CAD.    LVEF = 55%.    Patient had second degree type II AV block and 2::1 AV block was observed multiple times during the case.    The estimated blood loss was none.  Stable          Controlled type 2 diabetes mellitus with stage 2 chronic kidney disease, with long-term current use of insulin    Overview     Stable .seeing dr cleveland nephrology cont current         CKD (chronic kidney disease) stage 2, GFR 60-89 ml/min    Overview     Stable          PAF (paroxysmal atrial fibrillation)    Overview     Cardiology managing pacemaker placed         Secondary renal hyperparathyroidism    Overview     stable          Other Visit Diagnoses       Prostate cancer screening                 Past Medical History:  Past Medical History:   Diagnosis Date    Abnormal thallium stress test     Anticoagulant long-term use     Arrhythmia     Atrial flutter,  paroxysmal 02/11/2016    CAD (coronary artery disease) 03/09/2016 5/2016 [facility] Left main:NL LAD: 35% proximal LAD. two small diagonals with minimal disease.  Circumflex/Large branching first obtuse marginal: with minimal disease.   RCA: The vessel was large sized (dominant) with a 60% proximal lesion. 100 mcgs of intracoronary Nitroglycerin were given with no change in the lesion. Thus FFR was performed. FFR was 0.79  2/2016 cor CTA ~~ 50% LAD    Coronary artery disease     Diabetes mellitus     Diabetes mellitus, type 2     Disorder of kidney and ureter 2016    KIDNEY STONE    Hypertension     Neuropathy     Paroxysmal atrial flutter     PE (pulmonary embolism)     PONV (postoperative nausea and vomiting)     Pulmonary embolism 02/11/2016 1/2016     Rheumatoid arthritis     Shortness of breath     Stented coronary artery 06/15/2016    5/2016 RCA- synergy 3.5x12    Wears contact lenses     Wears prescription eyeglasses        Past Surgical History:  Past Surgical History:   Procedure Laterality Date    A-V CARDIAC PACEMAKER INSERTION  07/07/2023    Procedure: Dual Chamber PPM (Biotronik);  Surgeon: Yobany Cronin III, MD;  Location: [facility] CATH;  Service: Cardiology;;    ABLATION Left 03/23/2023    Procedure: Ablation;  Surgeon: Yobany Crnoin III, MD;  Location: [facility] CATH;  Service: Cardiology;  Laterality: Left;    ARTHROPLASTY OF TOE Right 07/14/2022    Procedure: ARTHROPLASTY, TOE;  Surgeon: Augusto Simeon DPM;  Location: [facility] OR;  Service: Podiatry;  Laterality: Right;  Hallux    CARDIAC CATHETERIZATION  2017    with stent placed x 1    CORONARY ANGIOGRAPHY N/A 07/06/2023    Procedure: Left Heart cath;  Surgeon: Yobany Cronin III, MD;  Location: [facility] CATH;  Service: Cardiology;  Laterality: N/A;    EYE SURGERY Left 05/08/2024    for bleeding of retina    INSERTION OF IMPLANTABLE LOOP RECORDER N/A 05/03/2023    Procedure: Implantable Loop Recorder;  Surgeon: Yobany Cronin III, MD;  Location: [facility] CATH;   Service: Cardiology;  Laterality: N/A;    KNEE ARTHROSCOPY Left 1985    REMOVAL OF IMPLANTABLE LOOP RECORDER  07/07/2023    Procedure: Removal Loop Recorder (Medtronic);  Surgeon: Yobany Cronin III, MD;  Location: [facility];  Service: Cardiology;;    TONSILLECTOMY         Family History:  family history includes Angina in his mother; Bell's palsy in his sister; Cancer in his mother and sister; Depression in his sister and sister; Heart disease in his mother; Hypertension in his mother; Kidney disease in his father; Thyroid disease in his sister and sister.     Social History:  Social History     Socioeconomic History    Marital status:    Tobacco Use    Smoking status: Never    Smokeless tobacco: Never   Substance and Sexual Activity    Alcohol use: No    Drug use: No    Sexual activity: Yes     Partners: Female     Social Drivers of Health     Financial Resource Strain: Low Risk  (5/12/2024)    Overall Financial Resource Strain (CARDIA)     Difficulty of Paying Living Expenses: Not hard at all   Food Insecurity: No Food Insecurity (5/12/2024)    Hunger Vital Sign     Worried About Running Out of Food in the Last Year: Never true     Ran Out of Food in the Last Year: Never true   Transportation Needs: No Transportation Needs (5/12/2024)    TRANSPORTATION NEEDS     Transportation : No   Physical Activity: Unknown (1/20/2021)    Exercise Vital Sign     Days of Exercise per Week: 0 days   Stress: No Stress Concern Present (1/20/2021)    [employer] - Occupational Stress Questionnaire     Feeling of Stress : Not at all   Housing Stability: Low Risk  (5/12/2024)    Housing Stability Vital Sign     Unable to Pay for Housing in the Last Year: No     Homeless in the Last Year: No       Review of Systems:   Review of Systems   Constitutional:  Negative for fever and weight loss.   HENT:  Negative for congestion, hearing loss and sore throat.    Eyes:  Negative for blurred vision.  "  Respiratory:  Positive for shortness of breath. Negative for cough.    Cardiovascular:  Negative for chest pain, palpitations, claudication and leg swelling.   Gastrointestinal:  Negative for abdominal pain, constipation, diarrhea and heartburn.   Genitourinary:  Negative for dysuria.   Musculoskeletal:  Negative for back pain and myalgias.   Skin:  Negative for rash.   Neurological:  Negative for focal weakness and headaches.   Psychiatric/Behavioral:  Negative for depression and suicidal ideas. The patient is not nervous/anxious.         Medications:  Outpatient Encounter Medications as of 2024   Medication Sig Dispense Refill    acetaminophen (TYLENOL) 325 MG tablet Take 2 tablets (650 mg total) by mouth every 6 (six) hours as needed for Pain.      amLODIPine (NORVASC) 10 MG tablet Take 1 tablet (10 mg total) by mouth once daily. 90 tablet 1    APPLE CIDER VINEGAR ORAL Take by mouth.      ascorbic acid (VITAMIN C) 500 MG tablet Take 500 mg by mouth once daily.      aspirin (ECOTRIN) 81 MG EC tablet Take 1 tablet (81 mg total) by mouth once daily. 30 tablet 11    b complex vitamins capsule Take 1 capsule by mouth once daily.      BD AMAN 2ND GEN PEN NEEDLE 32 gauge x 5/32" Ndle 1 Needle by abdominal subcutaneous route once daily. 100 each 3    blood sugar diagnostic Strp To check BG 2 times daily, to use with insurance preferred meter  E 11.65 50 each 1    carvediloL (COREG) 6.25 MG tablet TAKE 1 TABLET BY MOUTH TWICE DAILY 180 tablet 4    clopidogreL (PLAVIX) 75 mg tablet TAKE 1 TABLET BY MOUTH EVERY DAY 90 tablet 4    cyanocobalamin, vitamin B-12, 1,000 mcg/15 mL Liqd Take by mouth. Takes a tablet      FARXIGA 10 mg tablet Take 1 tablet (10 mg total) by mouth once daily. Discuss with ordering provider prior to restarting.      FIASP FLEXTOUCH U-100 INSULIN 100 unit/mL (3 mL) InPn SMARTSI Unit(s) SUB-Q 3 Times Daily      FREESTYLE PASCUAL 14 DAY SENSOR Kit APPLY NEW SENSOR EVERY 14 DAYS AS DIRECTED      " "FREESTYLE PASCUAL 3 SENSOR Vandana USE DAILY AS DIRECTED. CHANGE SENSOR EVERY 14 DAYS      FREESTYLE LITE STRIPS Strp TEST 2 TO 3 TIMES D  2    gabapentin (NEURONTIN) 600 MG tablet Take 1 tablet (600 mg total) by mouth 2 (two) times daily. 60 tablet 11    KERENDIA 10 mg Tab Take by mouth. Take 20 mg daily      lancets Misc To check BG 2 times daily, to use with insurance preferred meter  DX E 11.65 50 each 1    multivit-min/iron/folic acid/K (ADULTS MULTIVITAMIN ORAL) Take by mouth.      mupirocin (BACTROBAN) 2 % ointment Apply topically once daily. 30 g 1    nitroGLYCERIN (NITROSTAT) 0.4 MG SL tablet Place 1 tablet (0.4 mg total) under the tongue every 5 (five) minutes as needed for Chest pain. 30 tablet 12    ONETOUCH DELICA PLUS LANCET 33 gauge Misc   0    pantoprazole (PROTONIX) 40 MG tablet Take 1 tablet (40 mg total) by mouth once daily. 20 tablet 0    pravastatin (PRAVACHOL) 40 MG tablet Take 1 tablet (40 mg total) by mouth every evening. 90 tablet 3    prednisoLONE acetate (PRED FORTE) 1 % DrpS Place 1 drop into the left eye 4 (four) times daily.      RYBELSUS 14 mg tablet Take 1 tablet (14 mg total) by mouth every morning. Discuss with ordering provider prior to restarting.      TOUJEO SOLOSTAR U-300 INSULIN 300 unit/mL (1.5 mL) InPn pen Inject 20 Units into the skin every evening.      TRUE METRIX GLUCOSE METER Misc 2 (two) times daily. Use to check blood glucose      vitamin E 400 UNIT capsule Take 400 Units by mouth once daily.      [DISCONTINUED] amLODIPine (NORVASC) 10 MG tablet TAKE 1 TABLET(10 MG) BY MOUTH EVERY DAY 90 tablet 1    [DISCONTINUED] BD AMAN 2ND GEN PEN NEEDLE 32 gauge x 5/32" Ndle USE TO INJECT INSULIN INTO SKIN EVERY  each 3    [DISCONTINUED] ciprofloxacin HCl (CILOXAN) 0.3 % ophthalmic solution Place 1 drop into the left eye 4 (four) times daily. (Patient not taking: Reported on 11/26/2024)       No facility-administered encounter medications on file as of 11/26/2024. " "      Allergies:  Review of patient's allergies indicates:  No Known Allergies      Physical Exam      Vitals:    11/26/24 1351   BP: 120/80   Pulse: 75   Resp: 18   Temp: 97 °F (36.1 °C)        Vital Signs  Temp: 97 °F (36.1 °C)  Temp Source: Oral  Pulse: 75  Resp: 18  SpO2: 96 %  BP: 120/80  BP Location: Right arm  Patient Position: Sitting  Pain Score: 0-No pain  Height and Weight  Height: 6' 2" (188 cm)  Weight: 87.4 kg (192 lb 10.9 oz)  BSA (Calculated - sq m): 2.14 sq meters  BMI (Calculated): 24.7  Weight in (lb) to have BMI = 25: 194.3]     Body mass index is 24.74 kg/m².    Physical Exam  Constitutional:       Appearance: He is well-developed.   HENT:      Head: Normocephalic.   Eyes:      Pupils: Pupils are equal, round, and reactive to light.   Neck:      Thyroid: No thyromegaly.   Cardiovascular:      Rate and Rhythm: Normal rate and regular rhythm.      Heart sounds: No murmur heard.     No friction rub. No gallop.   Pulmonary:      Effort: Pulmonary effort is normal.      Breath sounds: Normal breath sounds.   Abdominal:      General: Bowel sounds are normal.      Palpations: Abdomen is soft.   Musculoskeletal:         General: Normal range of motion.      Cervical back: Normal range of motion.   Skin:     General: Skin is warm and dry.   Neurological:      Mental Status: He is alert and oriented to person, place, and time.      Sensory: No sensory deficit.   Psychiatric:         Behavior: Behavior normal.          Laboratory:  CBC:  Recent Labs   Lab Result Units 09/19/24  0740   WBC K/uL 7.51   RBC M/uL 3.70*   Hemoglobin g/dL 11.7*   Hematocrit % 35.9*   Platelets K/uL 290   MCV fL 97   MCH pg 31.6*   MCHC g/dL 32.6     CMP:  Recent Labs   Lab Result Units 09/19/24  0740   Glucose mg/dL 105  105   Calcium mg/dL 9.5  9.5   Albumin g/dL 3.5  3.5   Total Protein g/dL 6.9   Sodium mmol/L 134*  134*   Potassium mmol/L 4.0  4.0   CO2 mmol/L 22*  22*   Chloride mmol/L 104  104   BUN mg/dL 23  23 " "  Alkaline Phosphatase U/L 92   ALT U/L 18   AST U/L 24   Total Bilirubin mg/dL 0.5     URINALYSIS:  No results for input(s): "COLORU", "CLARITYU", "SPECGRAV", "PHUR", "PROTEINUA", "GLUCOSEU", "BILIRUBINCON", "BLOODU", "WBCU", "RBCU", "BACTERIA", "MUCUS", "NITRITE", "LEUKOCYTESUR", "UROBILINOGEN", "HYALINECASTS" in the last 2160 hours.   LIPIDS:  Recent Labs   Lab Result Units 09/19/24  0740   HDL mg/dL 40   Cholesterol mg/dL 95*   Triglycerides mg/dL 91   LDL Cholesterol mg/dL 36.8*   HDL/Cholesterol Ratio % 42.1   Non-HDL Cholesterol mg/dL 55   Total Cholesterol/HDL Ratio  2.4     TSH:  No results for input(s): "TSH" in the last 2160 hours.  A1C:  No results for input(s): "HGBA1C" in the last 2160 hours.    Radiology:        Assessment:     Yobany Richardson is a 68 y.o.male with:    Hypertensive heart disease without heart failure    Controlled type 2 diabetes mellitus with stage 2 chronic kidney disease, with long-term current use of insulin    CKD (chronic kidney disease) stage 2, GFR 60-89 ml/min    Coronary artery disease involving native coronary artery of native heart without angina pectoris    Atrial flutter, paroxysmal    PAF (paroxysmal atrial fibrillation)    Secondary renal hyperparathyroidism    Coronary artery disease involving native coronary artery of native heart with other form of angina pectoris    Prostate cancer screening  -     PSA, Screening; Future; Expected date: 11/26/2024                Plan:     Problem List Items Addressed This Visit       Hypertensive heart disease without heart failure - Primary    Overview     Cont current bp well controlled stable          Atrial flutter, paroxysmal    Overview     Taking plavix only . Had only one episode dr pradhan managing stable          Coronary artery disease involving native coronary artery of native heart with other form of angina pectoris    Overview     7/23    Patent stent to the proximal RCA.    Otherwise, minimal and nonobstructive CAD.    LVEF " = 55%.    Patient had second degree type II AV block and 2::1 AV block was observed multiple times during the case.    The estimated blood loss was none.  Stable          Controlled type 2 diabetes mellitus with stage 2 chronic kidney disease, with long-term current use of insulin    Overview     Stable .seeing dr cle[place]and nephrology cont current         CKD (chronic kidney disease) stage 2, GFR 60-89 ml/min    Overview     Stable          PAF (paroxysmal atrial fibrillation)    Overview     Cardiology managing pacemaker placed         Secondary renal hyperparathyroidism    Overview     stable          Other Visit Diagnoses       Prostate cancer screening                As above, continue current medications and maintain follow up with specialists.  Return to clinic in 6 months.      [facility]

## 2024-12-02 PROBLEM — E78.2 MIXED HYPERLIPIDEMIA: Status: ACTIVE | Noted: 2024-12-02

## 2024-12-02 PROBLEM — I49.5 SSS (SICK SINUS SYNDROME): Status: RESOLVED | Noted: 2023-10-31 | Resolved: 2024-12-02

## 2024-12-03 ENCOUNTER — LAB VISIT (OUTPATIENT)
Dept: LAB | Facility: HOSPITAL | Age: 69
End: 2024-12-03
Attending: INTERNAL MEDICINE
Payer: MEDICARE

## 2024-12-03 DIAGNOSIS — N18.31 STAGE 3A CHRONIC KIDNEY DISEASE: ICD-10-CM

## 2024-12-03 DIAGNOSIS — Z12.5 PROSTATE CANCER SCREENING: ICD-10-CM

## 2024-12-03 DIAGNOSIS — E11.9 TYPE 2 DIABETES MELLITUS WITHOUT COMPLICATION: ICD-10-CM

## 2024-12-03 LAB
ALBUMIN SERPL BCP-MCNC: 4 G/DL (ref 3.5–5.2)
ANION GAP SERPL CALC-SCNC: 7 MMOL/L (ref 8–16)
BUN SERPL-MCNC: 28 MG/DL (ref 8–23)
CALCIUM SERPL-MCNC: 9.4 MG/DL (ref 8.7–10.5)
CHLORIDE SERPL-SCNC: 105 MMOL/L (ref 95–110)
CO2 SERPL-SCNC: 24 MMOL/L (ref 23–29)
COMPLEXED PSA SERPL-MCNC: 0.45 NG/ML (ref 0–4)
CREAT SERPL-MCNC: 1.6 MG/DL (ref 0.5–1.4)
EST. GFR  (NO RACE VARIABLE): 46.9 ML/MIN/1.73 M^2
ESTIMATED AVG GLUCOSE: 140 MG/DL (ref 68–131)
GLUCOSE SERPL-MCNC: 161 MG/DL (ref 70–110)
HBA1C MFR BLD: 6.5 % (ref 4–5.6)
PHOSPHATE SERPL-MCNC: 4.5 MG/DL (ref 2.7–4.5)
POTASSIUM SERPL-SCNC: 4.7 MMOL/L (ref 3.5–5.1)
PTH-INTACT SERPL-MCNC: 67.2 PG/ML (ref 9–77)
SODIUM SERPL-SCNC: 136 MMOL/L (ref 136–145)

## 2024-12-03 PROCEDURE — 80069 RENAL FUNCTION PANEL: CPT | Performed by: INTERNAL MEDICINE

## 2024-12-03 PROCEDURE — 83970 ASSAY OF PARATHORMONE: CPT | Performed by: INTERNAL MEDICINE

## 2024-12-03 PROCEDURE — 83036 HEMOGLOBIN GLYCOSYLATED A1C: CPT | Performed by: INTERNAL MEDICINE

## 2024-12-03 PROCEDURE — 84153 ASSAY OF PSA TOTAL: CPT | Performed by: INTERNAL MEDICINE

## 2024-12-05 ENCOUNTER — OFFICE VISIT (OUTPATIENT)
Dept: NEPHROLOGY | Facility: CLINIC | Age: 69
End: 2024-12-05
Payer: MEDICARE

## 2024-12-05 VITALS
WEIGHT: 192.88 LBS | BODY MASS INDEX: 24.75 KG/M2 | DIASTOLIC BLOOD PRESSURE: 66 MMHG | OXYGEN SATURATION: 99 % | SYSTOLIC BLOOD PRESSURE: 138 MMHG | HEIGHT: 74 IN | HEART RATE: 74 BPM

## 2024-12-05 DIAGNOSIS — I10 PRIMARY HYPERTENSION: ICD-10-CM

## 2024-12-05 DIAGNOSIS — N18.2 CONTROLLED TYPE 2 DIABETES MELLITUS WITH STAGE 2 CHRONIC KIDNEY DISEASE, WITH LONG-TERM CURRENT USE OF INSULIN: ICD-10-CM

## 2024-12-05 DIAGNOSIS — E11.22 CONTROLLED TYPE 2 DIABETES MELLITUS WITH STAGE 2 CHRONIC KIDNEY DISEASE, WITH LONG-TERM CURRENT USE OF INSULIN: ICD-10-CM

## 2024-12-05 DIAGNOSIS — N18.31 STAGE 3A CHRONIC KIDNEY DISEASE: Primary | ICD-10-CM

## 2024-12-05 DIAGNOSIS — N25.81 SECONDARY RENAL HYPERPARATHYROIDISM: ICD-10-CM

## 2024-12-05 DIAGNOSIS — Z79.4 CONTROLLED TYPE 2 DIABETES MELLITUS WITH STAGE 2 CHRONIC KIDNEY DISEASE, WITH LONG-TERM CURRENT USE OF INSULIN: ICD-10-CM

## 2024-12-05 PROCEDURE — 99212 OFFICE O/P EST SF 10 MIN: CPT | Mod: PBBFAC,PN | Performed by: INTERNAL MEDICINE

## 2024-12-05 PROCEDURE — 99999 PR PBB SHADOW E&M-EST. PATIENT-LVL II: CPT | Mod: PBBFAC,,, | Performed by: INTERNAL MEDICINE

## 2024-12-05 NOTE — PROGRESS NOTES
"  Subjective:       Patient ID: Yobany Richardson is a 69 y.o. White male who presents for return patient evaluation for chronic renal failure.      He had a June 2024 admit for DKA and also had a L eye hemorrhage.  He did have COVID in August 2021.  He is caring for his 3 year old grandson.        Review of Systems   Constitutional:  Positive for fatigue. Negative for appetite change, chills and fever.   HENT:  Negative for congestion.    Eyes:  Positive for visual disturbance.   Respiratory:  Positive for shortness of breath (with exertion). Negative for cough.    Cardiovascular:  Negative for chest pain and leg swelling.   Gastrointestinal:  Negative for abdominal pain, diarrhea, nausea and vomiting.   Genitourinary:  Negative for difficulty urinating, dysuria and hematuria.   Musculoskeletal:  Positive for arthralgias. Negative for myalgias.   Skin:  Negative for rash.   Neurological:  Negative for headaches.   Psychiatric/Behavioral:  Negative for sleep disturbance.        The past medical, family and social histories were reviewed for this encounter.     /66 (BP Location: Right arm, Patient Position: Sitting)   Pulse 74   Ht 6' 2" (1.88 m)   Wt 87.5 kg (192 lb 14.4 oz)   SpO2 99%   BMI 24.77 kg/m²     Objective:      Physical Exam  Vitals reviewed.   Constitutional:       General: He is not in acute distress.     Appearance: He is well-developed.   HENT:      Head: Normocephalic and atraumatic.   Eyes:      General: No scleral icterus.     Conjunctiva/sclera: Conjunctivae normal.   Neck:      Vascular: No JVD.   Cardiovascular:      Rate and Rhythm: Normal rate and regular rhythm.      Heart sounds: Normal heart sounds. No murmur heard.     No friction rub. No gallop.   Pulmonary:      Effort: Pulmonary effort is normal. No respiratory distress.      Breath sounds: Normal breath sounds. No wheezing or rales.   Abdominal:      General: Bowel sounds are normal. There is no distension.      Palpations: " Abdomen is soft.      Tenderness: There is no abdominal tenderness.   Musculoskeletal:      Cervical back: Normal range of motion.      Right lower leg: No edema.      Left lower leg: No edema.   Skin:     General: Skin is warm and dry.      Findings: No rash.   Neurological:      Mental Status: He is alert and oriented to person, place, and time.   Psychiatric:         Mood and Affect: Mood normal.         Behavior: Behavior normal.         Assessment:       1. Stage 3a chronic kidney disease    2. Secondary renal hyperparathyroidism    3. Primary hypertension    4. Controlled type 2 diabetes mellitus with stage 2 chronic kidney disease, with long-term current use of insulin       Lab Results   Component Value Date    CREATININE 1.6 (H) 12/03/2024    BUN 28 (H) 12/03/2024     12/03/2024    K 4.7 12/03/2024     12/03/2024    CO2 24 12/03/2024     Lab Results   Component Value Date    PTH 67.2 12/03/2024    CALCIUM 9.4 12/03/2024    PHOS 4.5 12/03/2024     Lab Results   Component Value Date    HCT 35.9 (L) 09/19/2024     Prot/Creat Ratio, Urine   Date Value Ref Range Status   12/03/2024 0.23 (H) 0.00 - 0.20 Final   09/19/2024 0.23 (H) 0.00 - 0.20 Final   06/12/2024 0.29 (H) 0.00 - 0.20 Final      Plan:   Return to clinic in 6 months.  Labs for next visit include rp pth upc uric per standing orders q 3 months.   Baseline creatinine is 1.0-1.3 since 2016.  Since 2020 he has been 1.3-1.5.  Renal US shows R 10.8 cm L 10.3 cm.  PTH is 67 with a calcium of 9.4.  UPC is 0.23 but he was taken off of his ARB due to hyperkalemia.  Continue current medications as prescribed and reviewed.   Blood pressure is controlled on the current regimen.  Please have patient follow-up with your PCP or Endocrinology regarding the control and management of diabetes.    KFRE 2-Year: 0.8% at 12/3/2024  7:54 AM  Calculated from:  Serum Creatinine: 1.6 mg/dL at 12/3/2024  7:54 AM  Urine Albumin Creatinine Ratio: 116.2 ug/mg at  9/19/2024  7:36 AM  Age: 69 years  Sex: Male at 12/3/2024  7:54 AM  Has CKD-3 to CKD-5: No    KFRE 5-Year: 2.3% at 12/3/2024  7:54 AM  Calculated from:  Serum Creatinine: 1.6 mg/dL at 12/3/2024  7:54 AM  Urine Albumin Creatinine Ratio: 116.2 ug/mg at 9/19/2024  7:36 AM  Age: 69 years  Sex: Male at 12/3/2024  7:54 AM  Has CKD-3 to CKD-5: No

## 2025-01-16 ENCOUNTER — OFFICE VISIT (OUTPATIENT)
Dept: PODIATRY | Facility: CLINIC | Age: 70
End: 2025-01-16
Payer: MEDICARE

## 2025-01-16 VITALS — HEIGHT: 74 IN | WEIGHT: 198.19 LBS | BODY MASS INDEX: 25.44 KG/M2

## 2025-01-16 DIAGNOSIS — E11.42 DIABETIC POLYNEUROPATHY ASSOCIATED WITH TYPE 2 DIABETES MELLITUS: Primary | ICD-10-CM

## 2025-01-16 DIAGNOSIS — B35.1 ONYCHOMYCOSIS DUE TO DERMATOPHYTE: ICD-10-CM

## 2025-01-16 PROCEDURE — 99212 OFFICE O/P EST SF 10 MIN: CPT | Mod: PBBFAC,PO | Performed by: PODIATRIST

## 2025-01-16 PROCEDURE — 11721 DEBRIDE NAIL 6 OR MORE: CPT | Mod: PBBFAC,PO | Performed by: PODIATRIST

## 2025-01-16 PROCEDURE — 11721 DEBRIDE NAIL 6 OR MORE: CPT | Mod: Q9,S$PBB,, | Performed by: PODIATRIST

## 2025-01-16 PROCEDURE — 99213 OFFICE O/P EST LOW 20 MIN: CPT | Mod: 25,S$PBB,, | Performed by: PODIATRIST

## 2025-01-16 PROCEDURE — 99999 PR PBB SHADOW E&M-EST. PATIENT-LVL II: CPT | Mod: PBBFAC,,, | Performed by: PODIATRIST

## 2025-01-16 NOTE — PROGRESS NOTES
Subjective:      Patient ID: Yobany Richardson is a 69 y.o. male.    Chief Complaint: Diabetic Foot Exam and Diabetes Mellitus    Yobany is a 69 y.o. male with a past medical history of Abnormal thallium stress test, Anticoagulant long-term use, Arrhythmia, Atrial flutter, paroxysmal (02/11/2016), CAD (coronary artery disease) (03/09/2016), Coronary artery disease, Diabetes mellitus, Diabetes mellitus, type 2, Disorder of kidney and ureter (2016), Hypertension, Neuropathy, Paroxysmal atrial flutter, PE (pulmonary embolism), PONV (postoperative nausea and vomiting), Pulmonary embolism (02/11/2016), Rheumatoid arthritis, Shortness of breath, Stented coronary artery (06/15/2016), Wears contact lenses, and Wears prescription eyeglasses. Patient relates having toenails that are in need of trimming.  Denies experiencing pain from said issue.  Has not attempted to self trim.  States neuropathy symptoms have been pretty well managed since our last exam.  Notes decent control over his blood glucose.  Denies any additional pedal complaints.    PCP: Alirio Walters MD    Date Last Seen by PCP: 11/24    Hemoglobin A1C   Date Value Ref Range Status   12/03/2024 6.5 (H) 4.0 - 5.6 % Final     Comment:     ADA Screening Guidelines:  5.7-6.4%  Consistent with prediabetes  >or=6.5%  Consistent with diabetes    High levels of fetal hemoglobin interfere with the HbA1C  assay. Heterozygous hemoglobin variants (HbS, HgC, etc)do  not significantly interfere with this assay.   However, presence of multiple variants may affect accuracy.     05/11/2024 7.6 (H) 0.0 - 5.6 % Final     Comment:     Reference Interval:  5.0 - 5.6 Normal   5.7 - 6.4 High Risk   > 6.5 Diabetic      Hgb A1c results are standardized based on the (NGSP) National   Glycohemoglobin Standardization Program.      Hemoglobin A1C levels are related to mean serum/plasma glucose   during the preceding 2-3 months.        11/13/2023 7.7 (A) 4.0 - 6.0 % Final   10/14/2022 6.4  (A) <=5.7 % Final         Past Medical History:   Diagnosis Date    Abnormal thallium stress test     Anticoagulant long-term use     Arrhythmia     Atrial flutter, paroxysmal 02/11/2016    CAD (coronary artery disease) 03/09/2016 5/2016 [facility] Left main:NL LAD: 35% proximal LAD. two small diagonals with minimal disease.  Circumflex/Large branching first obtuse marginal: with minimal disease.   RCA: The vessel was large sized (dominant) with a 60% proximal lesion. 100 mcgs of intracoronary Nitroglycerin were given with no change in the lesion. Thus FFR was performed. FFR was 0.79  2/2016 cor CTA ~~ 50% LAD    Coronary artery disease     Diabetes mellitus     Diabetes mellitus, type 2     Disorder of kidney and ureter 2016    KIDNEY STONE    Hypertension     Neuropathy     Paroxysmal atrial flutter     PE (pulmonary embolism)     PONV (postoperative nausea and vomiting)     Pulmonary embolism 02/11/2016 1/2016     Rheumatoid arthritis     Shortness of breath     Stented coronary artery 06/15/2016    5/2016 RCA- synergy 3.5x12    Wears contact lenses     Wears prescription eyeglasses        Past Surgical History:   Procedure Laterality Date    A-V CARDIAC PACEMAKER INSERTION  07/07/2023    Procedure: Dual Chamber PPM (Biotronik);  Surgeon: Yobany Cronin III, MD;  Location: [facility] CATH;  Service: Cardiology;;    ABLATION Left 03/23/2023    Procedure: Ablation;  Surgeon: Yobany Cronin III, MD;  Location: [facility] CATH;  Service: Cardiology;  Laterality: Left;    ARTHROPLASTY OF TOE Right 07/14/2022    Procedure: ARTHROPLASTY, TOE;  Surgeon: Augusto Simeon DPM;  Location: [facility] OR;  Service: Podiatry;  Laterality: Right;  Hallux    CARDIAC CATHETERIZATION  2017    with stent placed x 1    CORONARY ANGIOGRAPHY N/A 07/06/2023    Procedure: Left Heart cath;  Surgeon: Yobany Cronin III, MD;  [facility];  Service: Cardiology;  Laterality: N/A;    EYE SURGERY Left 05/08/2024    for bleeding of retina    INSERTION OF  IMPLANTABLE LOOP RECORDER N/A 05/03/2023    Procedure: Implantable Loop Recorder;  Surgeon: Yobany Cronin III, MD;  Location: [facility] CATH;  Service: Cardiology;  Laterality: N/A;    KNEE ARTHROSCOPY Left 1985    REMOVAL OF IMPLANTABLE LOOP RECORDER  07/07/2023    Procedure: Removal Loop Recorder ([employer]tr[employer]);  Surgeon: Yobany Cronin III, MD;  Location: [facility] CATH;  Service: Cardiology;;    TONSILLECTOMY         Family History   Problem Relation Name Age of Onset    Cancer Mother      Angina Mother      Heart disease Mother      Hypertension Mother      Kidney disease Father      Thyroid disease Sister      Bell's palsy Sister      Thyroid disease Sister      Depression Sister      Depression Sister      Cancer Sister         Social History     Socioeconomic History    Marital status:    Tobacco Use    Smoking status: Never    Smokeless tobacco: Never   Substance and Sexual Activity    Alcohol use: No    Drug use: No    Sexual activity: Yes     Partners: Female     Social Drivers of Health     Financial Resource Strain: Low Risk  (5/12/2024)    Overall Financial Resource Strain (CARDIA)     Difficulty of Paying Living Expenses: Not hard at all   Food Insecurity: No Food Insecurity (5/12/2024)    Hunger Vital Sign     Worried About Running Out of Food in the Last Year: Never true     Ran Out of Food in the Last Year: Never true   Transportation Needs: No Transportation Needs (5/12/2024)    TRANSPORTATION NEEDS     Transportation : No   Physical Activity: Unknown (1/20/2021)    Exercise Vital Sign     Days of Exercise per Week: 0 days   Stress: No Stress Concern Present (1/20/2021)    [employer] - Occupational Stress Questionnaire     Feeling of Stress : Not at all   Housing Stability: Low Risk  (5/12/2024)    Housing Stability Vital Sign     Unable to Pay for Housing in the Last Year: No     Homeless in the Last Year: No       Current Outpatient Medications   Medication Sig Dispense  "Refill    acetaminophen (TYLENOL) 325 MG tablet Take 2 tablets (650 mg total) by mouth every 6 (six) hours as needed for Pain.      amLODIPine (NORVASC) 5 MG tablet Take 1 tablet (5 mg total) by mouth every evening. 90 tablet 3    APPLE CIDER VINEGAR ORAL Take by mouth.      ascorbic acid (VITAMIN C) 500 MG tablet Take 500 mg by mouth once daily.      aspirin (ECOTRIN) 81 MG EC tablet Take 1 tablet (81 mg total) by mouth once daily. 30 tablet 11    b complex vitamins capsule Take 1 capsule by mouth once daily.      BD AMAN 2ND GEN PEN NEEDLE 32 gauge x 5/32" Ndle 1 Needle by abdominal subcutaneous route once daily. 100 each 3    blood sugar diagnostic Strp To check BG 2 times daily, to use with insurance preferred meter  E 11.65 50 each 1    carvediloL (COREG) 12.5 MG tablet Take 1 tablet (12.5 mg total) by mouth 2 (two) times daily. 90 tablet 4    cyanocobalamin, vitamin B-12, 1,000 mcg/15 mL Liqd Take by mouth. Takes a tablet      FARXIGA 10 mg tablet Take 1 tablet (10 mg total) by mouth once daily. Discuss with ordering provider prior to restarting.      FIASP FLEXTOUCH U-100 INSULIN 100 unit/mL (3 mL) InPn SMARTSI Unit(s) SUB-Q 3 Times Daily      FREESTYLE PASCUAL 14 DAY SENSOR Kit APPLY NEW SENSOR EVERY 14 DAYS AS DIRECTED      FREESTYLE PASCUAL 3 SENSOR Vandana USE DAILY AS DIRECTED. CHANGE SENSOR EVERY 14 DAYS      FREESTYLE LITE STRIPS Strp TEST 2 TO 3 TIMES D  2    gabapentin (NEURONTIN) 600 MG tablet Take 1 tablet (600 mg total) by mouth 2 (two) times daily. 60 tablet 11    KERENDIA 20 mg Tab Take by mouth.      lancets Misc To check BG 2 times daily, to use with insurance preferred meter  DX E 11.65 50 each 1    multivit-min/iron/folic acid/K (ADULTS MULTIVITAMIN ORAL) Take by mouth.      mupirocin (BACTROBAN) 2 % ointment Apply topically once daily. 30 g 1    nitroGLYCERIN (NITROSTAT) 0.4 MG SL tablet Place 1 tablet (0.4 mg total) under the tongue every 5 (five) minutes as needed for Chest pain. (Patient not " taking: Reported on 12/5/2024) 30 tablet 12    ONETOUCH DELICA PLUS LANCET 33 gauge Misc   0    pravastatin (PRAVACHOL) 40 MG tablet Take 1 tablet (40 mg total) by mouth every evening. 90 tablet 3    RYBELSUS 14 mg tablet Take 1 tablet (14 mg total) by mouth every morning. Discuss with ordering provider prior to restarting.      TOUJEO SOLOSTAR U-300 INSULIN 300 unit/mL (1.5 mL) InPn pen Inject 20 Units into the skin every evening.      TRUE METRIX GLUCOSE METER Misc 2 (two) times daily. Use to check blood glucose      vitamin E 400 UNIT capsule Take 400 Units by mouth once daily.       No current facility-administered medications for this visit.       Review of patient's allergies indicates:  No Known Allergies      Review of Systems   Constitutional: Negative for chills and fever.   Cardiovascular:  Negative for claudication and leg swelling.   Skin:  Positive for color change and nail changes. Negative for poor wound healing and suspicious lesions.   Musculoskeletal:  Positive for arthritis. Negative for joint pain and joint swelling.   Gastrointestinal:  Negative for nausea and vomiting.   Neurological:  Positive for numbness and paresthesias.   Psychiatric/Behavioral:  Negative for altered mental status.            Objective:      Physical Exam  Constitutional:       General: He is not in acute distress.     Appearance: He is well-developed. He is not diaphoretic.   Cardiovascular:      Pulses:           Dorsalis pedis pulses are 2+ on the right side and 2+ on the left side.        Posterior tibial pulses are 2+ on the right side and 2+ on the left side.      Comments: CFT < 3 seconds bilateral.  Pedal hair growth present bilateral.   No varicosities noted bilateral. No lower extremity edema noted bilateral.  Toes are warm to touch bilateral.    Musculoskeletal:         General: Deformity present. No tenderness.      Right lower leg: No edema.      Left lower leg: No edema.      Comments: Muscle strength 5/5 in  all muscle groups bilateral.  No tenderness nor crepitation with ROM of foot/ankle joints bilateral.  No tenderness with palpation of bilateral foot and ankle.  Arthritic changes changes noted to the dorsal and medial aspect of the Rt. 1st mtp joint.  Bilateral pes planus foot type.  Bilateral hallux abducto valgus.  Bilateral semi-rigid contracture of toes 2-5 with better reduction of the Rt. 2nd DIP joint.     Skin:     General: Skin is warm and dry.      Capillary Refill: Capillary refill takes less than 2 seconds.      Coloration: Skin is not pale.      Findings: No abrasion, bruising, burn, ecchymosis, erythema, lesion, petechiae, rash or wound.      Nails: There is no clubbing.      Comments: Pedal skin has normal turgor, temperature, and texture bilateral.  Toenails x 10 appear thickened by 6 mm, elongated by 6 mm, and discolored with subungual debris.    Neurological:      Mental Status: He is alert and oriented to person, place, and time.      Sensory: Sensory deficit present.      Motor: No weakness or atrophy.      Comments: Protective sensation per [place] monofilament absent bilateral.    Light touch intact bilateral.               Assessment:       Encounter Diagnoses   Name Primary?    Diabetic polyneuropathy associated with type 2 diabetes mellitus Yes    Onychomycosis due to dermatophyte          Plan:       Yobany was seen today for diabetic foot exam and diabetes mellitus.    Diagnoses and all orders for this visit:    Diabetic polyneuropathy associated with type 2 diabetes mellitus    Onychomycosis due to dermatophyte      I counseled the patient on his conditions, their implications and medical management.    Shoe inspection. Diabetic Foot Education. Patient reminded of the importance of good nutrition and blood sugar control to help prevent podiatric complications of diabetes. Patient instructed on proper foot hygeine. We discussed wearing proper shoe gear, daily foot inspections, never  walking without protective shoe gear, never putting sharp instruments to feet    With patient's permission, nails were aggressively reduced and debrided x 10 to their soft tissue attachment mechanically and with electric , removing all offending nail and debris. Patient relates relief following the procedure. She will continue to monitor the areas daily, inspect her feet, wear protective shoe gear when ambulatory, moisturizer to maintain skin integrity and follow in this office in approximately 6 months, sooner p.r.n.    Follow up in about 6 months (around 7/16/2025).    Augusto Simeon DPM

## 2025-02-21 DIAGNOSIS — Z00.00 ENCOUNTER FOR MEDICARE ANNUAL WELLNESS EXAM: ICD-10-CM

## 2025-02-25 DIAGNOSIS — I48.92 ATRIAL FLUTTER, PAROXYSMAL: ICD-10-CM

## 2025-02-25 LAB — HBA1C MFR BLD: 7.3 % (ref 4–6)

## 2025-02-25 RX ORDER — PRAVASTATIN SODIUM 40 MG/1
40 TABLET ORAL NIGHTLY
Qty: 90 TABLET | Refills: 3 | Status: SHIPPED | OUTPATIENT
Start: 2025-02-25

## 2025-02-25 NOTE — TELEPHONE ENCOUNTER
No care due was identified.  [facility] Embedded Care Due Messages. Reference number: [id number].   2/25/2025 11:33:08 AM CST

## 2025-03-10 ENCOUNTER — LAB VISIT (OUTPATIENT)
Dept: LAB | Facility: HOSPITAL | Age: 70
End: 2025-03-10
Attending: INTERNAL MEDICINE
Payer: MEDICARE

## 2025-03-10 DIAGNOSIS — N18.31 STAGE 3A CHRONIC KIDNEY DISEASE: ICD-10-CM

## 2025-03-10 LAB
ALBUMIN SERPL BCP-MCNC: 3.9 G/DL (ref 3.5–5.2)
ANION GAP SERPL CALC-SCNC: 8 MMOL/L (ref 8–16)
BUN SERPL-MCNC: 35 MG/DL (ref 8–23)
CALCIUM SERPL-MCNC: 9.4 MG/DL (ref 8.7–10.5)
CHLORIDE SERPL-SCNC: 103 MMOL/L (ref 95–110)
CO2 SERPL-SCNC: 23 MMOL/L (ref 23–29)
CREAT SERPL-MCNC: 1.4 MG/DL (ref 0.5–1.4)
EST. GFR  (NO RACE VARIABLE): 55.1 ML/MIN/1.73 M^2
GLUCOSE SERPL-MCNC: 181 MG/DL (ref 70–110)
PHOSPHATE SERPL-MCNC: 3.8 MG/DL (ref 2.7–4.5)
POTASSIUM SERPL-SCNC: 5 MMOL/L (ref 3.5–5.1)
PTH-INTACT SERPL-MCNC: 73.3 PG/ML (ref 9–77)
SODIUM SERPL-SCNC: 134 MMOL/L (ref 136–145)

## 2025-03-10 PROCEDURE — 36415 COLL VENOUS BLD VENIPUNCTURE: CPT | Mod: PO | Performed by: INTERNAL MEDICINE

## 2025-03-10 PROCEDURE — 83970 ASSAY OF PARATHORMONE: CPT | Performed by: INTERNAL MEDICINE

## 2025-03-10 PROCEDURE — 80069 RENAL FUNCTION PANEL: CPT | Performed by: INTERNAL MEDICINE

## 2025-03-25 ENCOUNTER — RESULTS FOLLOW-UP (OUTPATIENT)
Dept: NEPHROLOGY | Facility: CLINIC | Age: 70
End: 2025-03-25

## 2025-03-25 LAB
CREATININE, URINE: 9.3 MG/DL
MICROALB/CREAT RATIO: 131 MG/G
MICROALBUMIN URINE: 71 MG/DL

## 2025-05-27 ENCOUNTER — OFFICE VISIT (OUTPATIENT)
Dept: PRIMARY CARE CLINIC | Facility: CLINIC | Age: 70
End: 2025-05-27
Payer: MEDICARE

## 2025-05-27 VITALS
HEIGHT: 74 IN | BODY MASS INDEX: 25.8 KG/M2 | RESPIRATION RATE: 18 BRPM | HEART RATE: 83 BPM | OXYGEN SATURATION: 97 % | WEIGHT: 201.06 LBS | DIASTOLIC BLOOD PRESSURE: 84 MMHG | SYSTOLIC BLOOD PRESSURE: 136 MMHG

## 2025-05-27 DIAGNOSIS — I11.9 HYPERTENSIVE HEART DISEASE WITHOUT HEART FAILURE: ICD-10-CM

## 2025-05-27 DIAGNOSIS — E11.42 TYPE 2 DIABETES MELLITUS WITH DIABETIC POLYNEUROPATHY, WITH LONG-TERM CURRENT USE OF INSULIN: Primary | ICD-10-CM

## 2025-05-27 DIAGNOSIS — Z95.0 CARDIAC PACEMAKER IN SITU: ICD-10-CM

## 2025-05-27 DIAGNOSIS — E11.42 DIABETIC POLYNEUROPATHY ASSOCIATED WITH TYPE 2 DIABETES MELLITUS: ICD-10-CM

## 2025-05-27 DIAGNOSIS — N18.31 STAGE 3A CHRONIC KIDNEY DISEASE: ICD-10-CM

## 2025-05-27 DIAGNOSIS — Z79.4 TYPE 2 DIABETES MELLITUS WITH DIABETIC POLYNEUROPATHY, WITH LONG-TERM CURRENT USE OF INSULIN: Primary | ICD-10-CM

## 2025-05-27 DIAGNOSIS — N25.81 SECONDARY RENAL HYPERPARATHYROIDISM: ICD-10-CM

## 2025-05-27 DIAGNOSIS — I48.92 ATRIAL FLUTTER, PAROXYSMAL: ICD-10-CM

## 2025-05-27 DIAGNOSIS — I25.118 CORONARY ARTERY DISEASE INVOLVING NATIVE CORONARY ARTERY OF NATIVE HEART WITH OTHER FORM OF ANGINA PECTORIS: ICD-10-CM

## 2025-05-27 PROCEDURE — 99214 OFFICE O/P EST MOD 30 MIN: CPT | Mod: S$PBB,,, | Performed by: INTERNAL MEDICINE

## 2025-05-27 PROCEDURE — 99215 OFFICE O/P EST HI 40 MIN: CPT | Mod: PBBFAC | Performed by: INTERNAL MEDICINE

## 2025-05-27 PROCEDURE — 99999 PR PBB SHADOW E&M-EST. PATIENT-LVL V: CPT | Mod: PBBFAC,,, | Performed by: INTERNAL MEDICINE

## 2025-05-27 NOTE — PROGRESS NOTES
[facility] Note    Chief Complaint      Chief Complaint   Patient presents with    Follow-up     6m       History of Present Illness      Yobany Richardson is a 69 y.o. male who presents today for   Chief Complaint   Patient presents with    Follow-up     6m   .  Patient comes to appointment here for 6m checkup . He si seeing dr olamide rodriguez last A1c 7.2 . He has visit with dr [place] nephrology with blood Work pending . He had recent angiogram with dr pradhan in 2/25 . Some blockage noted but no intervention done med management only . He is compliant with all meds . He remains active with adls .     HPI    No problem-specific Assessment & Plan notes found for this encounter.       Problem List Items Addressed This Visit       Diabetic polyneuropathy associated with type 2 diabetes mellitus    Overview   Stable on gabapentin cont current          Hypertensive heart disease without heart failure    Overview   Cont current bp well controlled stable          Atrial flutter, paroxysmal    Overview   Taking plavix only . Had only one episode dr pradhan managing stable          Coronary artery disease involving native coronary artery of native heart with other form of angina pectoris    Overview   7/23    Patent stent to the proximal RCA.    Otherwise, minimal and nonobstructive CAD.    LVEF = 55%.    Patient had second degree type II AV block and 2::1 AV block was observed multiple times during the case.    The estimated blood loss was none.  5/29/25 Stable          Type 2 diabetes mellitus with diabetic polyneuropathy, with long-term current use of insulin - Primary    Overview   7.2 a1c with dr quiñonez 3/25.    optho done   Cont current          Cardiac pacemaker in situ - dual chamber, Biotronik    Overview   7/23 Biotroknic         Secondary renal hyperparathyroidism    Overview   stable         Stage 3a chronic kidney disease    Overview   Nephrology managing              Past Medical History:  Past Medical  History:   Diagnosis Date    Abnormal thallium stress test     Anticoagulant long-term use     Arrhythmia     Atrial flutter, paroxysmal 02/11/2016    CAD (coronary artery disease) 03/09/2016 5/2016 [facility] Left main:NL LAD: 35% proximal LAD. two small diagonals with minimal disease.  Circumflex/Large branching first obtuse marginal: with minimal disease.   RCA: The vessel was large sized (dominant) with a 60% proximal lesion. 100 mcgs of intracoronary Nitroglycerin were given with no change in the lesion. Thus FFR was performed. FFR was 0.79  2/2016 cor CTA ~~ 50% LAD    Coronary artery disease     Diabetes mellitus     Diabetes mellitus, type 2     Disorder of kidney and ureter 2016    KIDNEY STONE    Hypertension     Neuropathy     Paroxysmal atrial flutter     PE (pulmonary embolism)     PONV (postoperative nausea and vomiting)     Pulmonary embolism 02/11/2016 1/2016     Rheumatoid arthritis     Shortness of breath     Stented coronary artery 06/15/2016    5/2016 RCA- synergy 3.5x12    Wears contact lenses     Wears prescription eyeglasses        Past Surgical History:  Past Surgical History:   Procedure Laterality Date    A-V CARDIAC PACEMAKER INSERTION  07/07/2023    Procedure: Dual Chamber PPM (Biotronik);  Surgeon: Yobany Cronin III, MD;  Location: [facility] CATH;  Service: Cardiology;;    ABLATION Left 03/23/2023    Procedure: Ablation;  Surgeon: Yobany Cronin III, MD;  Location: [facility] CATH;  Service: Cardiology;  Laterality: Left;    ARTHROPLASTY OF TOE Right 07/14/2022    Procedure: ARTHROPLASTY, TOE;  Surgeon: Augusto Simeon DPM;  Location: [facility] OR;  Service: Podiatry;  Laterality: Right;  Hallux    CARDIAC CATHETERIZATION  2017    with stent placed x 1    cataract surgery Bilateral     CATHETERIZATION OF BOTH LEFT AND RIGHT HEART  2/18/2025    Procedure: Right / Left heart cath;  Surgeon: Abhinav Bedolla MD;  [facility];  Service: Cardiology;;    CORONARY ANGIOGRAPHY N/A 07/06/2023    Procedure:  Left Heart cath;  Surgeon: Yobany Cronin III, MD;  Location: [facility] CATH;  Service: Cardiology;  Laterality: N/A;    EYE SURGERY Left 05/08/2024    for bleeding of retina    FRACTIONAL FLOW RESERVE MEASUREMENT, MYOCARDIAL  2/18/2025    Procedure: (FFR) LAD;  Surgeon: Abhinav Bedolla MD;  [facility];  Service: Cardiology;;    INSERTION OF IMPLANTABLE LOOP RECORDER N/A 05/03/2023    Procedure: Implantable Loop Recorder;  Surgeon: Yobany Cronin III, MD;  [facility];  Service: Cardiology;  Laterality: N/A;    KNEE ARTHROSCOPY Left 1985    REMOVAL OF IMPLANTABLE LOOP RECORDER  07/07/2023    Procedure: Removal Loop Recorder (Medtronic);  Surgeon: Yobany Cronin III, MD;  [facility];  Service: Cardiology;;    TONSILLECTOMY         Family History:  family history includes Angina in his mother; Bell's palsy in his sister; Cancer in his mother and sister; Depression in his sister and sister; Heart disease in his mother; Hypertension in his mother; Kidney disease in his father; Thyroid disease in his sister and sister.     Social History:  Social History[1]    Review of Systems:   Review of Systems   Constitutional:  Negative for fever and weight loss.   HENT:  Negative for congestion, hearing loss and sore throat.    Eyes:  Negative for blurred vision.   Respiratory:  Negative for cough and shortness of breath.    Cardiovascular:  Negative for chest pain, palpitations, claudication and leg swelling.   Gastrointestinal:  Negative for abdominal pain, constipation, diarrhea and heartburn.   Genitourinary:  Negative for dysuria.   Musculoskeletal:  Negative for back pain and myalgias.   Skin:  Negative for rash.   Neurological:  Negative for focal weakness and headaches.   Psychiatric/Behavioral:  Negative for depression and suicidal ideas. The patient is not nervous/anxious.         Medications:  Encounter Medications[2]    Allergies:  Review of patient's allergies indicates:  No Known  "Allergies      Physical Exam      Vitals:    05/27/25 1400   BP: (!) 140/78   Pulse: 83   Resp: 18        Vital Signs  Pulse: 83  Resp: 18  SpO2: 97 %  BP: (!) 140/78  BP Location: Right arm  Patient Position: Sitting  Pain Score: 0-No pain  Height and Weight  Height: 6' 2" (188 cm)  Weight: 91.2 kg (201 lb 1 oz)  BSA (Calculated - sq m): 2.18 sq meters  BMI (Calculated): 25.8  Weight in (lb) to have BMI = 25: 194.3]     Body mass index is 25.81 kg/m².    Physical Exam  Constitutional:       Appearance: He is well-developed.   HENT:      Head: Normocephalic.   Eyes:      Pupils: Pupils are equal, round, and reactive to light.   Neck:      Thyroid: No thyromegaly.   Cardiovascular:      Rate and Rhythm: Normal rate and regular rhythm.      Heart sounds: No murmur heard.     No friction rub. No gallop.   Pulmonary:      Effort: Pulmonary effort is normal.      Breath sounds: Normal breath sounds.   Abdominal:      General: Bowel sounds are normal.      Palpations: Abdomen is soft.   Musculoskeletal:         General: Normal range of motion.      Cervical back: Normal range of motion.   Skin:     General: Skin is warm and dry.   Neurological:      Mental Status: He is alert and oriented to person, place, and time.      Sensory: No sensory deficit.   Psychiatric:         Behavior: Behavior normal.          Laboratory:  CBC:  No results for input(s): "WBC", "RBC", "HGB", "HCT", "PLT", "MCV", "MCH", "MCHC" in the last 2160 hours.  CMP:  Recent Labs   Lab Result Units 03/10/25  0728   Glucose mg/dL 181*   Calcium mg/dL 9.4   Albumin g/dL 3.9   Sodium mmol/L 134*   Potassium mmol/L 5.0   CO2 mmol/L 23   Chloride mmol/L 103   BUN mg/dL 35*     URINALYSIS:  No results for input(s): "COLORU", "CLARITYU", "SPECGRAV", "PHUR", "PROTEINUA", "GLUCOSEU", "BILIRUBINCON", "BLOODU", "WBCU", "RBCU", "BACTERIA", "MUCUS", "NITRITE", "LEUKOCYTESUR", "UROBILINOGEN", "HYALINECASTS" in the last 2160 hours.   LIPIDS:  No results for input(s): " ""TSH", "HDL", "CHOL", "TRIG", "LDLCALC", "CHOLHDL", "NONHDLCHOL", "TOTALCHOLEST" in the last 2160 hours.  TSH:  No results for input(s): "TSH" in the last 2160 hours.  A1C:  No results for input(s): "HGBA1C" in the last 2160 hours.    Radiology:        Assessment:     Yobany Richardson is a 69 y.o.male with:    Type 2 diabetes mellitus with diabetic polyneuropathy, with long-term current use of insulin    Hypertensive heart disease without heart failure    Diabetic polyneuropathy associated with type 2 diabetes mellitus    Atrial flutter, paroxysmal    Coronary artery disease involving native coronary artery of native heart with other form of angina pectoris    Cardiac pacemaker in situ - dual chamber, Biotronik    Secondary renal hyperparathyroidism    Stage 3a chronic kidney disease                Plan:     Problem List Items Addressed This Visit       Diabetic polyneuropathy associated with type 2 diabetes mellitus    Overview   Stable on gabapentin cont current          Hypertensive heart disease without heart failure    Overview   Cont current bp well controlled stable          Atrial flutter, paroxysmal    Overview   Taking plavix only . Had only one episode dr pradhan managing stable          Coronary artery disease involving native coronary artery of native heart with other form of angina pectoris    Overview   7/23    Patent stent to the proximal RCA.    Otherwise, minimal and nonobstructive CAD.    LVEF = 55%.    Patient had second degree type II AV block and 2::1 AV block was observed multiple times during the case.    The estimated blood loss was none.  5/29/25 Stable          Type 2 diabetes mellitus with diabetic polyneuropathy, with long-term current use of insulin - Primary    Overview   7.2 a1c with dr quiñonez 3/25.    optho done   Cont current          Cardiac pacemaker in situ - dual chamber, Biotronik    Overview   7/23 Biotroknic         Secondary renal hyperparathyroidism    Overview   stable         Stage " 3a chronic kidney disease    Overview   Nephrology managing             As above, continue current medications and maintain follow up with specialists.  Return to clinic in 6 months.      Frederick W Dantagnan [facility]                       [1]   Social History  Socioeconomic History    Marital status:    Tobacco Use    Smoking status: Never    Smokeless tobacco: Never   Substance and Sexual Activity    Alcohol use: No    Drug use: No    Sexual activity: Yes     Partners: Female     Social Drivers of Health     Financial Resource Strain: Low Risk  (5/12/2024)    Overall Financial Resource Strain (CARDIA)     Difficulty of Paying Living Expenses: Not hard at all   Food Insecurity: No Food Insecurity (5/12/2024)    Hunger Vital Sign     Worried About Running Out of Food in the Last Year: Never true     Ran Out of Food in the Last Year: Never true   Transportation Needs: No Transportation Needs (5/12/2024)    TRANSPORTATION NEEDS     Transportation : No   Physical Activity: Unknown (1/20/2021)    Exercise Vital Sign     Days of Exercise per Week: 0 days   Stress: No Stress Concern Present (1/20/2021)    [employer] [place] - Occupational Stress Questionnaire     Feeling of Stress : Not at all   Housing Stability: Unknown (5/12/2024)    Housing Stability Vital Sign     Unable to Pay for Housing in the Last Year: No     Homeless in the Last Year: No   [2]   Outpatient Encounter Medications as of 5/27/2025   Medication Sig Dispense Refill    acetaminophen (TYLENOL) 325 MG tablet Take 2 tablets (650 mg total) by mouth every 6 (six) hours as needed for Pain.      amLODIPine (NORVASC) 5 MG tablet Take 1 tablet (5 mg total) by mouth every evening. 90 tablet 3    APPLE CIDER VINEGAR ORAL Take by mouth.      ascorbic acid (VITAMIN C) 500 MG tablet Take 500 mg by mouth once daily.      aspirin (ECOTRIN) 81 MG EC tablet One tablet p.o. q.day with food 90 tablet  "3    b complex vitamins capsule Take 1 capsule by mouth once daily.      BD AMAN 2ND GEN PEN NEEDLE 32 gauge x 5/32" Ndle 1 Needle by abdominal subcutaneous route once daily. 100 each 3    blood sugar diagnostic Strp To check BG 2 times daily, to use with insurance preferred meter  E 11.65 50 each 1    carvediloL (COREG) 12.5 MG tablet Take 1 tablet (12.5 mg total) by mouth 2 (two) times daily. 90 tablet 4    clopidogreL (PLAVIX) 75 mg tablet Take 1 tablet (75 mg total) by mouth once daily. 30 tablet 11    cyanocobalamin, vitamin B-12, 1,000 mcg/15 mL Liqd Take by mouth. Takes a tablet      FARXIGA 10 mg tablet Take 1 tablet (10 mg total) by mouth once daily. Discuss with ordering provider prior to restarting.      FIASP FLEXTOUCH U-100 INSULIN 100 unit/mL (3 mL) InPn SMARTSI Unit(s) SUB-Q 3 Times Daily      FREESTYLE PASCUAL 14 DAY SENSOR Kit APPLY NEW SENSOR EVERY 14 DAYS AS DIRECTED      FREESTYLE PASCUAL 3 SENSOR Vandana USE DAILY AS DIRECTED. CHANGE SENSOR EVERY 14 DAYS      FREESTYLE LITE STRIPS Strp TEST 2 TO 3 TIMES D  2    gabapentin (NEURONTIN) 600 MG tablet Take 1 tablet (600 mg total) by mouth 2 (two) times daily. 60 tablet 11    KERENDIA 20 mg Tab Take 20 mg by mouth once daily.      lancets Misc To check BG 2 times daily, to use with insurance preferred meter  DX E 11.65 50 each 1    multivit-min/iron/folic acid/K (ADULTS MULTIVITAMIN ORAL) Take by mouth.      nitroGLYCERIN (NITROSTAT) 0.4 MG SL tablet Place 1 tablet (0.4 mg total) under the tongue every 5 (five) minutes as needed for Chest pain. 30 tablet 12    ONETOUCH DELICA PLUS LANCET 33 gauge Misc   0    pravastatin (PRAVACHOL) 40 MG tablet Take 1 tablet (40 mg total) by mouth every evening. 90 tablet 3    RYBELSUS 14 mg tablet Take 1 tablet (14 mg total) by mouth every morning. Discuss with ordering provider prior to restarting.      TOUJEO SOLOSTAR U-300 INSULIN 300 unit/mL (1.5 mL) InPn pen Inject 20 Units into the skin every evening.      TRUE " METRIX GLUCOSE METER Misc 2 (two) times daily. Use to check blood glucose      vitamin E 400 UNIT capsule Take 400 Units by mouth once daily.       No facility-administered encounter medications on file as of 5/27/2025.

## 2025-05-28 ENCOUNTER — RESULTS FOLLOW-UP (OUTPATIENT)
Dept: NEPHROLOGY | Facility: CLINIC | Age: 70
End: 2025-05-28

## 2025-05-28 ENCOUNTER — LAB VISIT (OUTPATIENT)
Dept: LAB | Facility: HOSPITAL | Age: 70
End: 2025-05-28
Attending: INTERNAL MEDICINE
Payer: MEDICARE

## 2025-05-28 DIAGNOSIS — N18.31 STAGE 3A CHRONIC KIDNEY DISEASE: ICD-10-CM

## 2025-05-28 LAB
CREAT UR-MCNC: 74 MG/DL (ref 23–375)
PROT UR-MCNC: 25 MG/DL
PROT/CREAT UR: 0.34 MG/G{CREAT}

## 2025-05-28 PROCEDURE — 84156 ASSAY OF PROTEIN URINE: CPT

## 2025-05-29 ENCOUNTER — PATIENT OUTREACH (OUTPATIENT)
Dept: ADMINISTRATIVE | Facility: HOSPITAL | Age: 70
End: 2025-05-29
Payer: MEDICARE

## 2025-05-29 NOTE — PROGRESS NOTES
Health Maintenance updated with HgA1C & uACR, dated 3/25/25, from [facility].  Chart reviewed, immunizations reconciled, Care Everywhere updated.  Floyd Walsh LPN  Care Coordinator  [place] [facility]

## 2025-06-05 ENCOUNTER — OFFICE VISIT (OUTPATIENT)
Dept: NEPHROLOGY | Facility: CLINIC | Age: 70
End: 2025-06-05
Payer: MEDICARE

## 2025-06-05 VITALS
WEIGHT: 201.06 LBS | SYSTOLIC BLOOD PRESSURE: 132 MMHG | HEART RATE: 66 BPM | HEIGHT: 74 IN | BODY MASS INDEX: 25.8 KG/M2 | DIASTOLIC BLOOD PRESSURE: 78 MMHG | OXYGEN SATURATION: 98 %

## 2025-06-05 DIAGNOSIS — E11.22 CONTROLLED TYPE 2 DIABETES MELLITUS WITH STAGE 3 CHRONIC KIDNEY DISEASE, WITH LONG-TERM CURRENT USE OF INSULIN: ICD-10-CM

## 2025-06-05 DIAGNOSIS — I10 PRIMARY HYPERTENSION: ICD-10-CM

## 2025-06-05 DIAGNOSIS — N18.30 CONTROLLED TYPE 2 DIABETES MELLITUS WITH STAGE 3 CHRONIC KIDNEY DISEASE, WITH LONG-TERM CURRENT USE OF INSULIN: ICD-10-CM

## 2025-06-05 DIAGNOSIS — Z79.4 CONTROLLED TYPE 2 DIABETES MELLITUS WITH STAGE 3 CHRONIC KIDNEY DISEASE, WITH LONG-TERM CURRENT USE OF INSULIN: ICD-10-CM

## 2025-06-05 DIAGNOSIS — N18.31 STAGE 3A CHRONIC KIDNEY DISEASE: Primary | ICD-10-CM

## 2025-06-05 DIAGNOSIS — N25.81 SECONDARY RENAL HYPERPARATHYROIDISM: ICD-10-CM

## 2025-06-05 PROCEDURE — 99214 OFFICE O/P EST MOD 30 MIN: CPT | Mod: PBBFAC,PN | Performed by: INTERNAL MEDICINE

## 2025-06-05 PROCEDURE — 99999 PR PBB SHADOW E&M-EST. PATIENT-LVL IV: CPT | Mod: PBBFAC,,, | Performed by: INTERNAL MEDICINE

## 2025-07-17 ENCOUNTER — OFFICE VISIT (OUTPATIENT)
Dept: PODIATRY | Facility: CLINIC | Age: 70
End: 2025-07-17
Payer: MEDICARE

## 2025-07-17 VITALS — HEIGHT: 74 IN | BODY MASS INDEX: 25.92 KG/M2 | WEIGHT: 201.94 LBS

## 2025-07-17 DIAGNOSIS — B35.1 ONYCHOMYCOSIS DUE TO DERMATOPHYTE: ICD-10-CM

## 2025-07-17 DIAGNOSIS — E11.42 DIABETIC POLYNEUROPATHY ASSOCIATED WITH TYPE 2 DIABETES MELLITUS: Primary | ICD-10-CM

## 2025-07-17 PROCEDURE — 99212 OFFICE O/P EST SF 10 MIN: CPT | Mod: PBBFAC,PO,25 | Performed by: PODIATRIST

## 2025-07-17 PROCEDURE — 11721 DEBRIDE NAIL 6 OR MORE: CPT | Mod: PBBFAC,PO | Performed by: PODIATRIST

## 2025-07-17 PROCEDURE — 99999 PR PBB SHADOW E&M-EST. PATIENT-LVL II: CPT | Mod: PBBFAC,,, | Performed by: PODIATRIST

## 2025-07-17 NOTE — PROGRESS NOTES
Subjective:      Patient ID: Yobany Richardson is a 69 y.o. male.    Chief Complaint: Diabetic Foot Exam and Diabetes Mellitus    Yobany is a 69 y.o. male with a past medical history of Abnormal thallium stress test, Anticoagulant long-term use, Arrhythmia, Atrial flutter, paroxysmal (02/11/2016), CAD (coronary artery disease) (03/09/2016), Coronary artery disease, Diabetes mellitus, Diabetes mellitus, type 2, Disorder of kidney and ureter (2016), Hypertension, Neuropathy, Paroxysmal atrial flutter, PE (pulmonary embolism), PONV (postoperative nausea and vomiting), Pulmonary embolism (02/11/2016), Rheumatoid arthritis, Shortness of breath, Stented coronary artery (06/15/2016), Wears contact lenses, and Wears prescription eyeglasses. Patient relates having toenails that are in need of trimming.  Denies experiencing pain from said issue.  Has not attempted to self trim.  He is having an increase in neuropathy related numbness, however, denies instability in association with said symptoms.  Notes decent control over his blood glucose.  Denies any additional pedal complaints.    PCP: Alirio Walters MD    Date Last Seen by PCP: 5/25    Hemoglobin A1C   Date Value Ref Range Status   02/25/2025 7.3 (A) 4.0 - 6.0 % Final   12/03/2024 6.5 (H) 4.0 - 5.6 % Final     Comment:     ADA Screening Guidelines:  5.7-6.4%  Consistent with prediabetes  >or=6.5%  Consistent with diabetes    High levels of fetal hemoglobin interfere with the HbA1C  assay. Heterozygous hemoglobin variants (HbS, HgC, etc)do  not significantly interfere with this assay.   However, presence of multiple variants may affect accuracy.     05/11/2024 7.6 (H) 0.0 - 5.6 % Final     Comment:     Reference Interval:  5.0 - 5.6 Normal   5.7 - 6.4 High Risk   > 6.5 Diabetic      Hgb A1c results are standardized based on the (NGSP) National   Glycohemoglobin Standardization Program.      Hemoglobin A1C levels are related to mean serum/plasma glucose   during the  preceding 2-3 months.        10/14/2022 6.4 (A) <=5.7 % Final         Past Medical History:   Diagnosis Date    Abnormal thallium stress test     Anticoagulant long-term use     Arrhythmia     Atrial flutter, paroxysmal 02/11/2016    CAD (coronary artery disease) 03/09/2016 5/2016 [facility] Left main:NL LAD: 35% proximal LAD. two small diagonals with minimal disease.  Circumflex/Large branching first obtuse marginal: with minimal disease.   RCA: The vessel was large sized (dominant) with a 60% proximal lesion. 100 mcgs of intracoronary Nitroglycerin were given with no change in the lesion. Thus FFR was performed. FFR was 0.79  2/2016 cor CTA ~~ 50% LAD    Coronary artery disease     Diabetes mellitus     Diabetes mellitus, type 2     Disorder of kidney and ureter 2016    KIDNEY STONE    Hypertension     Neuropathy     Paroxysmal atrial flutter     PE (pulmonary embolism)     PONV (postoperative nausea and vomiting)     Pulmonary embolism 02/11/2016 1/2016     Rheumatoid arthritis     Shortness of breath     Stented coronary artery 06/15/2016    5/2016 RCA- synergy 3.5x12    Wears contact lenses     Wears prescription eyeglasses        Past Surgical History:   Procedure Laterality Date    A-V CARDIAC PACEMAKER INSERTION  07/07/2023    Procedure: Dual Chamber PPM (Biotronik);  Surgeon: Yobany Cronin III, MD;  Location: [facility] CATH;  Service: Cardiology;;    ABLATION Left 03/23/2023    Procedure: Ablation;  Surgeon: Yobany Cronin III, MD;  Location: [facility] CATH;  Service: Cardiology;  Laterality: Left;    ARTHROPLASTY OF TOE Right 07/14/2022    Procedure: ARTHROPLASTY, TOE;  Surgeon: Augusto Simeon DPM;  Location: [facility] OR;  Service: Podiatry;  Laterality: Right;  Hallux    CARDIAC CATHETERIZATION  2017    with stent placed x 1    cataract surgery Bilateral     CATHETERIZATION OF BOTH LEFT AND RIGHT HEART  2/18/2025    Procedure: Right / Left heart cath;  Surgeon: Abhinav Bedolla MD;  Location: [facility] CATH;  Service:  Cardiology;;    CORONARY ANGIOGRAPHY N/A 07/06/2023    Procedure: Left Heart cath;  Surgeon: Yobany Cronin III, MD;  Location: [facility] CATH;  Service: Cardiology;  Laterality: N/A;    EYE SURGERY Left 05/08/2024    for bleeding of retina    FRACTIONAL FLOW RESERVE MEASUREMENT, MYOCARDIAL  2/18/2025    Procedure: (FFR) LAD;  Surgeon: Abhinav Bedolla MD;  Location: [facility] CATH;  Service: Cardiology;;    INSERTION OF IMPLANTABLE LOOP RECORDER N/A 05/03/2023    Procedure: Implantable Loop Recorder;  Surgeon: Yobany Cronin III, MD;  Location: [facility] CATH;  Service: Cardiology;  Laterality: N/A;    KNEE ARTHROSCOPY Left 1985    REMOVAL OF IMPLANTABLE LOOP RECORDER  07/07/2023    Procedure: Removal Loop Recorder ([employer]);  Surgeon: Yobany Cronin III, MD;  Location: [facility] CATH;  Service: Cardiology;;    TONSILLECTOMY         Family History   Problem Relation Name Age of Onset    Cancer Mother      Angina Mother      Heart disease Mother      Hypertension Mother      Kidney disease Father      Thyroid disease Sister      Bell's palsy Sister      Thyroid disease Sister      Depression Sister      Depression Sister      Cancer Sister         Social History     Socioeconomic History    Marital status:    Tobacco Use    Smoking status: Never    Smokeless tobacco: Never   Substance and Sexual Activity    Alcohol use: No    Drug use: No    Sexual activity: Yes     Partners: Female     Social Drivers of Health     Financial Resource Strain: Medium Risk (6/18/2025)    Overall Financial Resource Strain (CARDIA)     Difficulty of Paying Living Expenses: Somewhat hard   Food Insecurity: No Food Insecurity (6/18/2025)    Hunger Vital Sign     Worried About Running Out of Food in the Last Year: Never true     Ran Out of Food in the Last Year: Never true   Transportation Needs: No Transportation Needs (6/18/2025)    PRAPARE - Transportation     Lack of Transportation (Medical): No     Lack of Transportation (Non-Medical): No  "  Physical Activity: Insufficiently Active (6/18/2025)    Exercise Vital Sign     Days of Exercise per Week: 2 days     Minutes of Exercise per Session: 10 min   Stress: Stress Concern Present (6/18/2025)    [employer] - Occupational Stress Questionnaire     Feeling of Stress : To some extent   Housing Stability: High Risk (6/18/2025)    Housing Stability Vital Sign     Unable to Pay for Housing in the Last Year: Yes     Number of Times Moved in the Last Year: 0     Homeless in the Last Year: No       Current Outpatient Medications   Medication Sig Dispense Refill    acetaminophen (TYLENOL) 325 MG tablet Take 2 tablets (650 mg total) by mouth every 6 (six) hours as needed for Pain.      amLODIPine (NORVASC) 5 MG tablet Take 1 tablet (5 mg total) by mouth every evening. 90 tablet 3    APPLE CIDER VINEGAR ORAL Take by mouth.      ascorbic acid (VITAMIN C) 500 MG tablet Take 500 mg by mouth once daily.      aspirin (ECOTRIN) 81 MG EC tablet One tablet p.o. q.day with food 90 tablet 3    b complex vitamins capsule Take 1 capsule by mouth once daily.      BD AMAN 2ND GEN PEN NEEDLE 32 gauge x 5/32" Ndle 1 Needle by abdominal subcutaneous route once daily. 100 each 3    blood sugar diagnostic Strp To check BG 2 times daily, to use with insurance preferred meter  E 11.65 50 each 1    budesonide-formoterol 160-4.5 mcg (SYMBICORT) 160-4.5 mcg/actuation HFAA Inhale 2 puffs into the lungs every 12 (twelve) hours. Controller 10.2 g 6    carvediloL (COREG) 12.5 MG tablet Take 1 tablet (12.5 mg total) by mouth 2 (two) times daily. 90 tablet 4    clopidogreL (PLAVIX) 75 mg tablet Take 1 tablet (75 mg total) by mouth once daily. 30 tablet 11    cyanocobalamin, vitamin B-12, 1,000 mcg/15 mL Liqd Take by mouth. Takes a tablet      FARXIGA 10 mg tablet Take 1 tablet (10 mg total) by mouth once daily. Discuss with ordering provider prior to restarting.      FIASP FLEXTOUCH U-100 INSULIN 100 unit/mL (3 mL) " InPn SMARTSI Unit(s) SUB-Q 3 Times Daily      FREESTYLE PASCUAL 14 DAY SENSOR Kit APPLY NEW SENSOR EVERY 14 DAYS AS DIRECTED      FREESTYLE PASCUAL 3 SENSOR Vandana USE DAILY AS DIRECTED. CHANGE SENSOR EVERY 14 DAYS      FREESTYLE LITE STRIPS Strp TEST 2 TO 3 TIMES D  2    gabapentin (NEURONTIN) 600 MG tablet Take 1 tablet (600 mg total) by mouth 2 (two) times daily. 60 tablet 11    KERENDIA 20 mg Tab Take 20 mg by mouth once daily.      lancets Misc To check BG 2 times daily, to use with insurance preferred meter  DX E 11.65 50 each 1    multivit-min/iron/folic acid/K (ADULTS MULTIVITAMIN ORAL) Take by mouth.      nitroGLYCERIN (NITROSTAT) 0.4 MG SL tablet Place 1 tablet (0.4 mg total) under the tongue every 5 (five) minutes as needed for Chest pain. 30 tablet 12    ONETOUCH DELICA PLUS LANCET 33 gauge Misc   0    pravastatin (PRAVACHOL) 40 MG tablet Take 1 tablet (40 mg total) by mouth every evening. 90 tablet 3    RYBELSUS 14 mg tablet Take 1 tablet (14 mg total) by mouth every morning. Discuss with ordering provider prior to restarting.      TOUJEO SOLOSTAR U-300 INSULIN 300 unit/mL (1.5 mL) InPn pen Inject 20 Units into the skin every evening.      TRUE METRIX GLUCOSE METER Misc 2 (two) times daily. Use to check blood glucose      vitamin E 400 UNIT capsule Take 400 Units by mouth once daily.       No current facility-administered medications for this visit.       Review of patient's allergies indicates:  No Known Allergies      Review of Systems   Constitutional: Negative for chills and fever.   Cardiovascular:  Negative for claudication and leg swelling.   Skin:  Positive for color change and nail changes. Negative for poor wound healing and suspicious lesions.   Musculoskeletal:  Positive for arthritis. Negative for joint pain and joint swelling.   Gastrointestinal:  Negative for nausea and vomiting.   Neurological:  Positive for numbness and paresthesias.   Psychiatric/Behavioral:  Negative for altered mental  status.            Objective:      Physical Exam  Constitutional:       General: He is not in acute distress.     Appearance: He is well-developed. He is not diaphoretic.   Cardiovascular:      Pulses:           Dorsalis pedis pulses are 2+ on the right side and 2+ on the left side.        Posterior tibial pulses are 2+ on the right side and 2+ on the left side.      Comments: CFT < 3 seconds bilateral.  Pedal hair growth present bilateral.   No varicosities noted bilateral. No lower extremity edema noted bilateral.  Toes are warm to touch bilateral.    Musculoskeletal:         General: Deformity present. No tenderness.      Right lower leg: No edema.      Left lower leg: No edema.      Comments: Muscle strength 5/5 in all muscle groups bilateral.  No tenderness nor crepitation with ROM of foot/ankle joints bilateral.  No tenderness with palpation of bilateral foot and ankle.  Arthritic changes changes noted to the dorsal and medial aspect of the Rt. 1st mtp joint.  Bilateral pes planus foot type.  Bilateral hallux abducto valgus.  Bilateral semi-rigid contracture of toes 2-5 with better reduction of the Rt. 2nd DIP joint.     Skin:     General: Skin is warm and dry.      Capillary Refill: Capillary refill takes less than 2 seconds.      Coloration: Skin is not pale.      Findings: No abrasion, bruising, burn, ecchymosis, erythema, lesion, petechiae, rash or wound.      Nails: There is no clubbing.      Comments: Pedal skin has normal turgor, temperature, and texture bilateral.  Toenails x 10 appear thickened by 6 mm, elongated by 8 mm, and discolored with subungual debris.    Neurological:      Mental Status: He is alert and oriented to person, place, and time.      Sensory: Sensory deficit present.      Motor: No weakness or atrophy.      Comments: Protective sensation per [place] monofilament absent bilateral.    Light touch intact bilateral.               Assessment:       Encounter Diagnoses   Name  Primary?    Diabetic polyneuropathy associated with type 2 diabetes mellitus Yes    Onychomycosis due to dermatophyte            Plan:       Yobany was seen today for diabetic foot exam and diabetes mellitus.    Diagnoses and all orders for this visit:    Diabetic polyneuropathy associated with type 2 diabetes mellitus    Onychomycosis due to dermatophyte        I counseled the patient on his conditions, their implications and medical management.    Shoe inspection. Diabetic Foot Education. Patient reminded of the importance of good nutrition and blood sugar control to help prevent podiatric complications of diabetes. Patient instructed on proper foot hygeine. We discussed wearing proper shoe gear, daily foot inspections, never walking without protective shoe gear, never putting sharp instruments to feet    With patient's permission, nails were aggressively reduced and debrided x 10 to their soft tissue attachment mechanically and with electric , removing all offending nail and debris. Patient relates relief following the procedure. She will continue to monitor the areas daily, inspect her feet, wear protective shoe gear when ambulatory, moisturizer to maintain skin integrity and follow in this office in approximately 6 months, sooner p.r.n.    Augusto Simeon DPM

## 2025-07-30 DIAGNOSIS — E11.42 DIABETIC POLYNEUROPATHY ASSOCIATED WITH TYPE 2 DIABETES MELLITUS: ICD-10-CM

## 2025-07-30 RX ORDER — GABAPENTIN 600 MG/1
600 TABLET ORAL 2 TIMES DAILY
Qty: 60 TABLET | Refills: 11 | Status: SHIPPED | OUTPATIENT
Start: 2025-07-30

## (undated) DEVICE — SEE L#120831

## (undated) DEVICE — SPONGE DERMACEA GAUZE 4X4

## (undated) DEVICE — BANDAGE MATRIX HK LOOP 4IN 5YD

## (undated) DEVICE — GLOVE SURG BIOGEL LATEX SZ 7.5

## (undated) DEVICE — GAUZE SPONGE 4X4 12PLY

## (undated) DEVICE — DRESSING N ADH OIL EMUL 3X3

## (undated) DEVICE — DRAPE STERI-DRAPE 1000 17X11IN

## (undated) DEVICE — Device

## (undated) DEVICE — SEE MEDLINE ITEM 154981

## (undated) DEVICE — NDL HYPO REG 25G X 1 1/2

## (undated) DEVICE — SEE MEDLINE ITEM 157131

## (undated) DEVICE — DRAPE EXTREMITY W/ABC NON-SLIP

## (undated) DEVICE — BLADE SURG #15 CARBON STEEL

## (undated) DEVICE — SEE MEDLINE ITEM 157128

## (undated) DEVICE — SYR 10CC LUER LOCK

## (undated) DEVICE — BLADE SAW 16.0MM X 7.0MM

## (undated) DEVICE — STOCKINET 4INX48